# Patient Record
Sex: MALE | Race: WHITE | NOT HISPANIC OR LATINO | Employment: OTHER | URBAN - METROPOLITAN AREA
[De-identification: names, ages, dates, MRNs, and addresses within clinical notes are randomized per-mention and may not be internally consistent; named-entity substitution may affect disease eponyms.]

---

## 2017-03-31 ENCOUNTER — ALLSCRIPTS OFFICE VISIT (OUTPATIENT)
Dept: OTHER | Facility: OTHER | Age: 72
End: 2017-03-31

## 2017-03-31 LAB — HBA1C MFR BLD HPLC: 6.6 %

## 2017-04-03 ENCOUNTER — APPOINTMENT (OUTPATIENT)
Dept: LAB | Facility: CLINIC | Age: 72
End: 2017-04-03
Payer: MEDICARE

## 2017-04-03 ENCOUNTER — TRANSCRIBE ORDERS (OUTPATIENT)
Dept: LAB | Facility: CLINIC | Age: 72
End: 2017-04-03

## 2017-04-03 DIAGNOSIS — E11.22 TYPE 2 DIABETES MELLITUS WITH ESRD (END-STAGE RENAL DISEASE) (HCC): ICD-10-CM

## 2017-04-03 DIAGNOSIS — N18.6 TYPE 2 DIABETES MELLITUS WITH ESRD (END-STAGE RENAL DISEASE) (HCC): ICD-10-CM

## 2017-04-03 DIAGNOSIS — R01.1 UNDIAGNOSED CARDIAC MURMURS: ICD-10-CM

## 2017-04-03 DIAGNOSIS — E11.65 TYPE 2 DIABETES MELLITUS WITH HYPERGLYCEMIA (HCC): ICD-10-CM

## 2017-04-03 DIAGNOSIS — E78.5 OTHER AND UNSPECIFIED HYPERLIPIDEMIA: ICD-10-CM

## 2017-04-03 DIAGNOSIS — I10 ESSENTIAL HYPERTENSION, MALIGNANT: ICD-10-CM

## 2017-04-03 DIAGNOSIS — I10 ESSENTIAL HYPERTENSION, MALIGNANT: Primary | ICD-10-CM

## 2017-04-03 LAB
ALBUMIN SERPL BCP-MCNC: 4.4 G/DL (ref 3.5–5)
ALP SERPL-CCNC: 57 U/L (ref 46–116)
ALT SERPL W P-5'-P-CCNC: 19 U/L (ref 12–78)
ANION GAP SERPL CALCULATED.3IONS-SCNC: 7 MMOL/L (ref 4–13)
AST SERPL W P-5'-P-CCNC: 14 U/L (ref 5–45)
BILIRUB SERPL-MCNC: 0.71 MG/DL (ref 0.2–1)
BUN SERPL-MCNC: 30 MG/DL (ref 5–25)
CALCIUM SERPL-MCNC: 9 MG/DL (ref 8.3–10.1)
CHLORIDE SERPL-SCNC: 104 MMOL/L (ref 100–108)
CHOLEST SERPL-MCNC: 201 MG/DL (ref 50–200)
CO2 SERPL-SCNC: 27 MMOL/L (ref 21–32)
CREAT SERPL-MCNC: 1.27 MG/DL (ref 0.6–1.3)
CREAT UR-MCNC: 120 MG/DL
GFR SERPL CREATININE-BSD FRML MDRD: 55.9 ML/MIN/1.73SQ M
GLUCOSE P FAST SERPL-MCNC: 138 MG/DL (ref 65–99)
HDLC SERPL-MCNC: 46 MG/DL (ref 40–60)
LDLC SERPL CALC-MCNC: 135 MG/DL (ref 0–100)
MICROALBUMIN UR-MCNC: 12.7 MG/L (ref 0–20)
MICROALBUMIN/CREAT 24H UR: 11 MG/G CREATININE (ref 0–30)
POTASSIUM SERPL-SCNC: 4.3 MMOL/L (ref 3.5–5.3)
PROT SERPL-MCNC: 7.5 G/DL (ref 6.4–8.2)
SODIUM SERPL-SCNC: 138 MMOL/L (ref 136–145)
TRIGL SERPL-MCNC: 101 MG/DL

## 2017-04-03 PROCEDURE — 36415 COLL VENOUS BLD VENIPUNCTURE: CPT | Performed by: FAMILY MEDICINE

## 2017-04-03 PROCEDURE — 82570 ASSAY OF URINE CREATININE: CPT | Performed by: FAMILY MEDICINE

## 2017-04-03 PROCEDURE — 80061 LIPID PANEL: CPT

## 2017-04-03 PROCEDURE — 82043 UR ALBUMIN QUANTITATIVE: CPT | Performed by: FAMILY MEDICINE

## 2017-04-03 PROCEDURE — 80053 COMPREHEN METABOLIC PANEL: CPT | Performed by: FAMILY MEDICINE

## 2017-04-12 ENCOUNTER — GENERIC CONVERSION - ENCOUNTER (OUTPATIENT)
Dept: OTHER | Facility: OTHER | Age: 72
End: 2017-04-12

## 2017-10-03 ENCOUNTER — ALLSCRIPTS OFFICE VISIT (OUTPATIENT)
Dept: OTHER | Facility: OTHER | Age: 72
End: 2017-10-03

## 2017-10-09 ENCOUNTER — APPOINTMENT (OUTPATIENT)
Dept: LAB | Facility: CLINIC | Age: 72
End: 2017-10-09
Payer: MEDICARE

## 2017-10-09 ENCOUNTER — TRANSCRIBE ORDERS (OUTPATIENT)
Dept: LAB | Facility: CLINIC | Age: 72
End: 2017-10-09

## 2017-10-09 DIAGNOSIS — E78.5 HYPERLIPIDEMIA, UNSPECIFIED HYPERLIPIDEMIA TYPE: ICD-10-CM

## 2017-10-09 DIAGNOSIS — M54.32 BILATERAL SCIATICA: ICD-10-CM

## 2017-10-09 DIAGNOSIS — M54.31 BILATERAL SCIATICA: ICD-10-CM

## 2017-10-09 DIAGNOSIS — I10 ESSENTIAL HYPERTENSION, MALIGNANT: Primary | ICD-10-CM

## 2017-10-09 DIAGNOSIS — N52.9 IMPOTENCE OF ORGANIC ORIGIN: ICD-10-CM

## 2017-10-09 DIAGNOSIS — I10 ESSENTIAL HYPERTENSION, MALIGNANT: ICD-10-CM

## 2017-10-09 LAB
ALBUMIN SERPL BCP-MCNC: 4.4 G/DL (ref 3.5–5)
ALP SERPL-CCNC: 60 U/L (ref 46–116)
ALT SERPL W P-5'-P-CCNC: 17 U/L (ref 12–78)
ANION GAP SERPL CALCULATED.3IONS-SCNC: 7 MMOL/L (ref 4–13)
AST SERPL W P-5'-P-CCNC: 18 U/L (ref 5–45)
BILIRUB SERPL-MCNC: 0.49 MG/DL (ref 0.2–1)
BUN SERPL-MCNC: 26 MG/DL (ref 5–25)
CALCIUM SERPL-MCNC: 9.6 MG/DL (ref 8.3–10.1)
CHLORIDE SERPL-SCNC: 103 MMOL/L (ref 100–108)
CHOLEST SERPL-MCNC: 171 MG/DL (ref 50–200)
CO2 SERPL-SCNC: 27 MMOL/L (ref 21–32)
CREAT SERPL-MCNC: 1.18 MG/DL (ref 0.6–1.3)
GFR SERPL CREATININE-BSD FRML MDRD: 62 ML/MIN/1.73SQ M
GLUCOSE P FAST SERPL-MCNC: 125 MG/DL (ref 65–99)
HDLC SERPL-MCNC: 42 MG/DL (ref 40–60)
LDLC SERPL CALC-MCNC: 96 MG/DL (ref 0–100)
POTASSIUM SERPL-SCNC: 4 MMOL/L (ref 3.5–5.3)
PROT SERPL-MCNC: 7.6 G/DL (ref 6.4–8.2)
SODIUM SERPL-SCNC: 137 MMOL/L (ref 136–145)
TRIGL SERPL-MCNC: 164 MG/DL

## 2017-10-09 PROCEDURE — 36415 COLL VENOUS BLD VENIPUNCTURE: CPT | Performed by: FAMILY MEDICINE

## 2017-10-09 PROCEDURE — 80053 COMPREHEN METABOLIC PANEL: CPT | Performed by: FAMILY MEDICINE

## 2017-10-09 PROCEDURE — G0103 PSA SCREENING: HCPCS

## 2017-10-09 PROCEDURE — 84154 ASSAY OF PSA FREE: CPT

## 2017-10-09 PROCEDURE — 80061 LIPID PANEL: CPT

## 2017-10-10 LAB
PSA FREE MFR SERPL: 32.5 %
PSA FREE SERPL-MCNC: 0.26 NG/ML
PSA SERPL-MCNC: 0.8 NG/ML (ref 0–4)

## 2017-10-12 ENCOUNTER — GENERIC CONVERSION - ENCOUNTER (OUTPATIENT)
Dept: OTHER | Facility: OTHER | Age: 72
End: 2017-10-12

## 2018-01-11 NOTE — RESULT NOTES
Verified Results  (1) COMPREHENSIVE METABOLIC PANEL [id number] 30:10JT Ade Mitchell     Test Name Result Flag Reference   SODIUM 138 mmol/L  136-145   POTASSIUM 4 3 mmol/L  3 5-5 3   CHLORIDE 104 mmol/L  100-108   CARBON DIOXIDE 27 mmol/L  21-32   ANION GAP (CALC) 7 mmol/L  4-13   BLOOD UREA NITROGEN 30 mg/dL H 5-25   CREATININE 1 27 mg/dL  0 60-1 30   Standardized to IDMS reference method   CALCIUM 9 0 mg/dL  8 3-10 1   BILI, TOTAL 0 71 mg/dL  0 20-1 00   ALK PHOSPHATAS 57 U/L     ALT (SGPT) 19 U/L  12-78   AST(SGOT) 14 U/L  5-45   ALBUMIN 4 4 g/dL  3 5-5 0   TOTAL PROTEIN 7 5 g/dL  6 4-8 2   eGFR Non-African American 55 9 ml/min/1 73sq m     This is a fasting blood test  Water,black tea or black  coffee only after 9:00pm the night before test  Drink 2 glasses of water the morning of test   National Kidney Disease Education Program recommendations are as follows:  GFR calculation is accurate only with a steady state creatinine  Chronic Kidney disease less than 60 ml/min/1 73 sq  meters  Kidney failure less than 15 ml/min/1 73 sq  meters     GLUCOSE FASTING 138 mg/dL H 65-99     (1) LIPID PANEL FASTING W DIRECT LDL REFLEX 03Apr2017 10:53AM Ade Mitchell     Test Name Result Flag Reference   CHOLESTEROL 201 mg/dL H    LDL CHOLESTEROL CALCULATED 135 mg/dL H 0-100   This is a fasting blood test  Water,black tea or black  coffee only after 9:00pm the night before test  Drink 2 glasses of water the morning of test       This is a fasting blood test  Water,black tea or black  coffee only after 9:00pm the night before test  Drink 2 glasses of water the morning of test   Triglyceride:         Normal              <150 mg/dl       Borderline High    150-199 mg/dl       High               200-499 mg/dl       Very High          >499 mg/dl  Cholesterol:         Desirable        <200 mg/dl      Borderline High  200-239 mg/dl      High             >239 mg/dl  HDL Cholesterol:        High    >59 mg/dL      Low <41 mg/dL  LDL Cholesterol:        Optimal          <100 mg/dl        Near Optimal     100-129 mg/dl        Above Optimal          Borderline High   130-159 mg/dl          High              160-189 mg/dl          Very High        >189 mg/dl  LDL CALCULATED:    This screening LDL is a calculated result  It does not have the accuracy of the Direct Measured LDL in the monitoring of patients with hyperlipidemia and/or statin therapy  Direct Measure LDL (OJZ691) must be ordered separately in these patients  TRIGLYCERIDES 101 mg/dL  <=150   Specimen collection should occur prior to N-Acetylcysteine or Metamizole administration due to the potential for falsely depressed results  HDL,DIRECT 46 mg/dL  40-60   Specimen collection should occur prior to Metamizole administration due to the potential for falsely depressed results  (1) MICROALBUMIN CREATININE RATIO, RANDOM URINE 03Apr2017 10:53AM Idxie Cover     Test Name Result Flag Reference   MICROALBUMIN/ CREAT R 11 mg/g creatinine  0-30   MICROALBUMIN,URINE 12 7 mg/L  0 0-20 0   CREATININE URINE 120 0 mg/dL         Plan  PMH: History of type 2 diabetes mellitus    · (1) MICROALBUMIN CREATININE RATIO, RANDOM URINE ; every 1 year;  Last  03Apr2017; Status:Active

## 2018-01-13 VITALS
BODY MASS INDEX: 25.61 KG/M2 | TEMPERATURE: 97.8 F | DIASTOLIC BLOOD PRESSURE: 80 MMHG | WEIGHT: 169 LBS | SYSTOLIC BLOOD PRESSURE: 140 MMHG | HEART RATE: 64 BPM | RESPIRATION RATE: 16 BRPM | HEIGHT: 68 IN

## 2018-01-14 VITALS
HEIGHT: 68 IN | DIASTOLIC BLOOD PRESSURE: 76 MMHG | WEIGHT: 176 LBS | RESPIRATION RATE: 12 BRPM | TEMPERATURE: 98 F | HEART RATE: 60 BPM | SYSTOLIC BLOOD PRESSURE: 138 MMHG | BODY MASS INDEX: 26.67 KG/M2

## 2018-01-15 NOTE — RESULT NOTES
Discussion/Summary   Your triglycerides are elevated  Please make sure to eat a heart healthy diet with low white carbs and low refined sugar  Dr Eliana Guerrero, DO      Verified Results  (1) COMPREHENSIVE METABOLIC PANEL [id number] 00:84BI [facility]     Test Name Result Flag Reference   SODIUM 137 mmol/L  136-145   POTASSIUM 4 0 mmol/L  3 5-5 3   CHLORIDE 103 mmol/L  100-108   CARBON DIOXIDE 27 mmol/L  21-32   ANION GAP (CALC) 7 mmol/L  4-13   BLOOD UREA NITROGEN 26 mg/dL H 5-25   CREATININE 1 18 mg/dL  0 60-1 30   Standardized to IDMS reference method   CALCIUM 9 6 mg/dL  8 3-10 1   BILI, TOTAL 0 49 mg/dL  0 20-1 00   ALK PHOSPHATAS 60 U/L     ALT (SGPT) 17 U/L  12-78   Specimen collection should occur prior to Sulfasalazine and/or Sulfapyridine administration due to the potential for falsely depressed results  AST(SGOT) 18 U/L  5-45   Specimen collection should occur prior to Sulfasalazine administration due to the potential for falsely depressed results  ALBUMIN 4 4 g/dL  3 5-5 0   TOTAL PROTEIN 7 6 g/dL  6 4-8 2   eGFR 62 ml/min/1 73sq m     National Kidney Disease Education Program recommendations are as follows:  GFR calculation is accurate only with a steady state creatinine  Chronic Kidney disease less than 60 ml/min/1 73 sq  meters  Kidney failure less than 15 ml/min/1 73 sq  meters  GLUCOSE FASTING 125 mg/dL H 65-99   Specimen collection should occur prior to Sulfasalazine administration due to the potential for falsely depressed results  Specimen collection should occur prior to Sulfapyridine administration due to the potential for falsely elevated results  (1) LIPID PANEL FASTING W DIRECT LDL REFLEX [id number] 08:47AM [facility]     Test Name Result Flag Reference   CHOLESTEROL 171 mg/dL     LDL CHOLESTEROL CALCULATED 96 mg/dL  0-100   This is a patient instruction:  This is a fasting test  Water, black tea or black coffee only after 9:00pm the night before the test  Drink 2 glasses of water the morning of the test         Triglyceride:        Normal <150 mg/dl   Borderline High 150-199 mg/dl   High 200-499 mg/dl   Very High >499 mg/dl      Cholesterol:       Desirable <200 mg/dl    Borderline High 200-239 mg/dl    High >239 mg/dl      HDL Cholesterol:       High>59 mg/dL    Low <41 mg/dL      HDL Cholesterol:       High>59 mg/dL    Low <41 mg/dL      This screening LDL is a calculated result  It does not have the accuracy of the Direct Measured LDL in the monitoring of patients with hyperlipidemia and/or statin therapy  Direct Measure LDL ([id number]) must be ordered separately in these patients  TRIGLYCERIDES 164 mg/dL H <=150   Specimen collection should occur prior to N-Acetylcysteine or Metamizole administration due to the potential for falsely depressed results  HDL,DIRECT 42 mg/dL  40-60   Specimen collection should occur prior to Metamizole administration due to the potential for falsley depressed results  (1) PSA FREE & TOTAL 09Oct2017 08:47AM Alee Aguero     Test Name Result Flag Reference   PSA, FREE 0 26 ng/mL  N/A   Roche ECLIA methodology  % FREE PSA 32 5 %     The table below lists the probability of prostate cancer for  men with non-suspicious EZE results and total PSA between  4 and 10 ng/mL, by patient age Huy Nascimento, [facility],  [phone])  % Free PSA       50-64 yr        65-75 yr                    0 00-10 00%        56%             55%                   10 01-15 00%        24%             35%                   15 01-20 00%        17%             23%                   20 01-25 00%        10%             20%                        >25 00%         5%              9%  Please note:  Tara et al did not make specific                recommendations regarding the use of                percent free PSA for any other population                of men    Performed at:  [address] [id number]  : Kiana Dias MD, Phone:  [phone]   PROSTATE SPECIFIC ANTIGEN TOTAL 0 8 ng/mL  0 0 - 4 0   Roche ECLIA methodology  According to the American Urological Association, Serum PSA should  decrease and remain at undetectable levels after radical  prostatectomy  The AUA defines biochemical recurrence as an initial  PSA value 0 2 ng/mL or greater followed by a subsequent confirmatory  PSA value 0 2 ng/mL or greater  Values obtained with different assay methods or kits cannot be used  interchangeably  Results cannot be interpreted as absolute evidence  of the presence or absence of malignant disease

## 2018-01-15 NOTE — RESULT NOTES
Verified Results  (1) HEMOGLOBIN A1C [id number] 09:08AM Venkat De La Cruz   TW Order Number: [id number]  TW Order Number: [id number]     Test Name Result Flag Reference   HEMOGLOBIN A1C 7 0 % H 4 2-6 3   EST  AVG  GLUCOSE 154 mg/dl         Plan  Type 2 diabetes mellitus    · (1) HEMOGLOBIN A1C ; every 3 months;  Last [id number]; Next [id number];  Status:Active    Discussion/Summary   Good results

## 2018-02-05 ENCOUNTER — OFFICE VISIT (OUTPATIENT)
Dept: FAMILY MEDICINE CLINIC | Facility: CLINIC | Age: 73
End: 2018-02-05
Payer: MEDICARE

## 2018-02-05 VITALS
WEIGHT: 169.4 LBS | RESPIRATION RATE: 16 BRPM | DIASTOLIC BLOOD PRESSURE: 80 MMHG | HEIGHT: 69 IN | BODY MASS INDEX: 25.09 KG/M2 | HEART RATE: 84 BPM | SYSTOLIC BLOOD PRESSURE: 160 MMHG

## 2018-02-05 DIAGNOSIS — R01.1 MURMUR: ICD-10-CM

## 2018-02-05 DIAGNOSIS — E13.8 DIABETES MELLITUS OF OTHER TYPE WITH COMPLICATION, UNSPECIFIED LONG TERM INSULIN USE STATUS: Primary | ICD-10-CM

## 2018-02-05 DIAGNOSIS — E11.40 CONTROLLED TYPE 2 DIABETES MELLITUS WITH DIABETIC NEUROPATHY, WITHOUT LONG-TERM CURRENT USE OF INSULIN (HCC): ICD-10-CM

## 2018-02-05 DIAGNOSIS — I10 BENIGN ESSENTIAL HYPERTENSION: ICD-10-CM

## 2018-02-05 DIAGNOSIS — E78.2 MIXED HYPERLIPIDEMIA: ICD-10-CM

## 2018-02-05 LAB — SL AMB POCT HEMOGLOBIN AIC: 5.8

## 2018-02-05 PROCEDURE — 99214 OFFICE O/P EST MOD 30 MIN: CPT | Performed by: FAMILY MEDICINE

## 2018-02-05 PROCEDURE — 83036 HEMOGLOBIN GLYCOSYLATED A1C: CPT | Performed by: FAMILY MEDICINE

## 2018-02-05 NOTE — PROGRESS NOTES
Chief Complaint   Patient presents with    Follow-up    Diabetes        Patient ID: Rafal Cabral is a 71 y o  male  Diabetes   He presents for his follow-up diabetic visit  He has type 2 diabetes mellitus  His disease course has been stable  Pertinent negatives for hypoglycemia include no headaches or sweats  Pertinent negatives for diabetes include no blurred vision, no chest pain, no fatigue, no foot paresthesias, no foot ulcerations, no polydipsia, no polyphagia, no polyuria, no visual change, no weakness and no weight loss  There are no hypoglycemic complications  Symptoms are stable  There are no diabetic complications  Risk factors for coronary artery disease include dyslipidemia and hypertension  Current diabetic treatment includes oral agent (dual therapy)  He is compliant with treatment all of the time  He is following a generally healthy diet  Meal planning includes avoidance of concentrated sweets  He participates in exercise daily  There is no change in his home blood glucose trend  An ACE inhibitor/angiotensin II receptor blocker is being taken  Eye exam is not current  Hypertension   This is a recurrent problem  The problem is controlled  Pertinent negatives include no anxiety, blurred vision, chest pain, headaches, malaise/fatigue, neck pain, orthopnea, palpitations, peripheral edema, PND, shortness of breath or sweats  Risk factors for coronary artery disease include diabetes mellitus and dyslipidemia  Past treatments include angiotensin blockers  There is no history of chronic renal disease  Hyperlipidemia   This is a chronic problem  The problem is controlled  He has no history of chronic renal disease  Pertinent negatives include no chest pain or shortness of breath  There are no compliance problems            The following portions of the patient's history were reviewed and updated as appropriate: allergies, current medications, past family history, past medical history, past social history, past surgical history and problem list     Review of Systems   Constitutional: Negative for appetite change, fatigue, malaise/fatigue, unexpected weight change and weight loss  Eyes: Negative for blurred vision  Respiratory: Negative for shortness of breath  Cardiovascular: Negative for chest pain, palpitations, orthopnea and PND  Gastrointestinal: Negative for abdominal pain, constipation and diarrhea  Endocrine: Negative for polydipsia, polyphagia and polyuria  Genitourinary: Negative for difficulty urinating  Musculoskeletal: Negative for neck pain  Neurological: Negative for weakness and headaches  Current Outpatient Prescriptions   Medication Sig Dispense Refill    amLODIPine (NORVASC) 10 mg tablet Take 10 mg by mouth daily   labetalol (NORMODYNE) 100 mg tablet Take 100 mg by mouth 2 (two) times a day Indications: High Blood Pressure   metFORMIN (GLUMETZA) 500 MG (MOD) 24 hr tablet Take 500 mg by mouth daily with breakfast       olmesartan (BENICAR) 40 mg tablet Take 40 mg by mouth daily Indications: High Blood Pressure   pravastatin (PRAVACHOL) 40 mg tablet Take 40 mg by mouth daily   sitaGLIPtin (JANUVIA) 100 mg tablet Take 1 tablet (100 mg total) by mouth daily 30 tablet 6     No current facility-administered medications for this visit  Objective:    /80 (BP Location: Left arm, Patient Position: Sitting, Cuff Size: Adult)   Pulse 84   Resp 16   Ht 5' 9" (1 753 m)   Wt 76 8 kg (169 lb 6 4 oz)   BMI 25 02 kg/m²        Physical Exam   Constitutional: He is oriented to person, place, and time  He appears well-developed and well-nourished  Eyes: Conjunctivae are normal    Neck: Normal range of motion  No thyromegaly present  Cardiovascular: Normal rate, regular rhythm, normal heart sounds and intact distal pulses  Pulmonary/Chest: Effort normal and breath sounds normal    Abdominal: Soft  Musculoskeletal: He exhibits no edema  Neurological: He is alert and oriented to person, place, and time  Psychiatric: He has a normal mood and affect  Labs in chart were reviewed  Assessment/Plan:    Controlled diabetes mellitus with diabetic neuropathy (HCC)  Stable  Chronic condition is stable  Continue current treatment  Education provided       Benign essential hypertension  bp mildly elevated  Watch diet and exercise    Hyperlipidemia  Repeat bw in April  Murmur  Not new  Has been work up  Diagnoses and all orders for this visit:    Diabetes mellitus of other type with complication, unspecified long term insulin use status ([facility] [address] )  Comments:  hold Agus Moreno for now   will recheck at next appt  Orders:  -     POCT hemoglobin A1c  -     sitaGLIPtin (JANUVIA) 100 mg tablet; Take 1 tablet (100 mg total) by mouth daily  -     Lipid panel; Future  -     Comprehensive metabolic panel; Future    Controlled type 2 diabetes mellitus with diabetic neuropathy, without long-term current use of insulin (HCC)    Benign essential hypertension    Mixed hyperlipidemia    Murmur                  No Follow-up on file            Maggie Calzada DO

## 2018-02-05 NOTE — PATIENT INSTRUCTIONS
10% - bad control"> 10% - bad control,Hemoglobin A1c (HbA1c) greater than 10% indicating poor diabetic control,Haemoglobin A1c greater than 10% indicating poor diabetic control">   Diabetes Mellitus Type 2 in Adults, Ambulatory Care   GENERAL INFORMATION:   Diabetes mellitus type 2  is a disease that affects how your body uses glucose (sugar)  Insulin helps move sugar out of the blood so it can be used for energy  Normally, when the blood sugar level increases, the pancreas makes more insulin  Type 2 diabetes develops because either the body cannot make enough insulin, or it cannot use the insulin correctly  After many years, your pancreas may stop making insulin  Common symptoms include the following:   · More hunger or thirst than usual     · Frequent urination     · Weight loss without trying     · Blurred vision  Seek immediate care for the following symptoms:   · Severe abdominal pain, or pain that spreads to your back  You may also be vomiting  · Trouble staying awake or focusing    · Shaking or sweating    · Blurred or double vision    · Breath has a fruity, sweet smell    · Breathing is deep and labored, or rapid and shallow    · Heartbeat is fast and weak  Treatment for diabetes mellitus type 2  includes keeping your blood sugar at a normal level  You must eat the right foods, and exercise regularly  You may also need medicine if you cannot control your blood sugar level with nutrition and exercise  Manage diabetes mellitus type 2:   · Check your blood sugar level  You will be taught how to check a small drop of blood in a glucose monitor  Ask your healthcare provider when and how often to check during the day  Ask your healthcare provider what your blood sugar levels should be when you check them  · Keep track of carbohydrates (sugar and starchy foods)  Your blood sugar level can get too high if you eat too many carbohydrates   Your dietitian will help you plan meals and snacks that have the right amount of carbohydrates  · Eat low-fat foods  Some examples are skinless chicken and low-fat milk  · Eat less sodium (salt)  Some examples of high-sodium foods to limit are soy sauce, potato chips, and soup  Do not add salt to food you cook  Limit your use of table salt  · Eat high-fiber foods  Foods that are a good source of fiber include vegetables, whole grain bread, and beans  · Limit alcohol  Alcohol affects your blood sugar level and can make it harder to manage your diabetes  Women should limit alcohol to 1 drink a day  Men should limit alcohol to 2 drinks a day  A drink of alcohol is 12 ounces of beer, 5 ounces of wine, or 1½ ounces of liquor  · Get regular exercise  Exercise can help keep your blood sugar level steady, decrease your risk of heart disease, and help you lose weight  Exercise for at least 30 minutes, 5 days a week  Include muscle strengthening activities 2 days each week  Work with your healthcare provider to create an exercise plan  · Check your feet each day  for injuries or open sores  Ask your healthcare provider for activities you can do if you have an open sore  · Quit smoking  If you smoke, it is never too late to quit  Smoking can worsen the problems that may occur with diabetes  Ask your healthcare provider for information about how to stop smoking if you are having trouble quitting  · Ask about your weight:  Ask healthcare providers if you need to lose weight, and how much to lose  Ask them to help you with a weight loss program  Even a 10 to 15 pound weight loss can help you manage your blood sugar level  · Carry medical alert identification  Wear medical alert jewelry or carry a card that says you have diabetes  Ask your healthcare provider where to get these items  · Ask about vaccines  Diabetes puts you at risk of serious illness if you get the flu, pneumonia, or hepatitis   Ask your healthcare provider if you should get a flu, pneumonia, or hepatitis B vaccine, and when to get the vaccine  Follow up with your healthcare provider as directed:  Write down your questions so you remember to ask them during your visits  CARE AGREEMENT:   You have the right to help plan your care  Learn about your health condition and how it may be treated  Discuss treatment options with your caregivers to decide what care you want to receive  You always have the right to refuse treatment  The above information is an  only  It is not intended as medical advice for individual conditions or treatments  Talk to your doctor, nurse or pharmacist before following any medical regimen to see if it is safe and effective for you  © 2014 [address] is for End User's use only and may not be sold, redistributed or otherwise used for commercial purposes  All illustrations and images included in CareNotes® are the copyrighted property of A D A M , Inc  or Tomás Billingsley

## 2018-04-02 DIAGNOSIS — E11.9 CONTROLLED TYPE 2 DIABETES MELLITUS WITHOUT COMPLICATION, WITHOUT LONG-TERM CURRENT USE OF INSULIN (HCC): Primary | ICD-10-CM

## 2018-04-03 RX ORDER — DAPAGLIFLOZIN 5 MG/1
TABLET, FILM COATED ORAL
Qty: 30 TABLET | Refills: 3 | Status: SHIPPED | OUTPATIENT
Start: 2018-04-03 | End: 2018-10-29 | Stop reason: SDUPTHER

## 2018-05-10 ENCOUNTER — TELEPHONE (OUTPATIENT)
Dept: FAMILY MEDICINE CLINIC | Facility: CLINIC | Age: 73
End: 2018-05-10

## 2018-05-10 DIAGNOSIS — I10 BENIGN ESSENTIAL HTN: Primary | ICD-10-CM

## 2018-05-10 NOTE — TELEPHONE ENCOUNTER
Dr Shalonda White    Patient was late for today's appointment so it was rescheduled for 5/17  Patient dropped off paperwork he received from Medicare stating olmesartan meds not covered and needs to be switched to different med  He is all out of meds  Leaving papers in Dr Stevens Code folder, copy in drawer up front

## 2018-05-11 RX ORDER — LOSARTAN POTASSIUM AND HYDROCHLOROTHIAZIDE 12.5; 5 MG/1; MG/1
1 TABLET ORAL DAILY
Qty: 90 TABLET | Refills: 0 | Status: SHIPPED | OUTPATIENT
Start: 2018-05-11 | End: 2020-01-21

## 2018-05-15 DIAGNOSIS — I10 BENIGN ESSENTIAL HTN: Primary | ICD-10-CM

## 2018-05-15 RX ORDER — LABETALOL 300 MG/1
TABLET, FILM COATED ORAL
Qty: 60 TABLET | Refills: 5 | Status: SHIPPED | OUTPATIENT
Start: 2018-05-15 | End: 2018-10-29 | Stop reason: SDUPTHER

## 2018-05-17 ENCOUNTER — OFFICE VISIT (OUTPATIENT)
Dept: FAMILY MEDICINE CLINIC | Facility: CLINIC | Age: 73
End: 2018-05-17
Payer: MEDICARE

## 2018-05-17 ENCOUNTER — APPOINTMENT (OUTPATIENT)
Dept: LAB | Facility: CLINIC | Age: 73
End: 2018-05-17
Payer: MEDICARE

## 2018-05-17 ENCOUNTER — TRANSCRIBE ORDERS (OUTPATIENT)
Dept: LAB | Facility: CLINIC | Age: 73
End: 2018-05-17

## 2018-05-17 VITALS
WEIGHT: 166.6 LBS | HEART RATE: 72 BPM | SYSTOLIC BLOOD PRESSURE: 130 MMHG | BODY MASS INDEX: 24.68 KG/M2 | HEIGHT: 69 IN | DIASTOLIC BLOOD PRESSURE: 80 MMHG | TEMPERATURE: 97.6 F | RESPIRATION RATE: 16 BRPM

## 2018-05-17 DIAGNOSIS — E11.40 CONTROLLED TYPE 2 DIABETES MELLITUS WITH DIABETIC NEUROPATHY, WITHOUT LONG-TERM CURRENT USE OF INSULIN (HCC): Primary | ICD-10-CM

## 2018-05-17 DIAGNOSIS — E78.2 MIXED HYPERLIPIDEMIA: ICD-10-CM

## 2018-05-17 DIAGNOSIS — I10 BENIGN ESSENTIAL HYPERTENSION: ICD-10-CM

## 2018-05-17 LAB
ALBUMIN SERPL BCP-MCNC: 4.3 G/DL (ref 3.5–5)
ALP SERPL-CCNC: 66 U/L (ref 46–116)
ALT SERPL W P-5'-P-CCNC: 24 U/L (ref 12–78)
ANION GAP SERPL CALCULATED.3IONS-SCNC: 6 MMOL/L (ref 4–13)
AST SERPL W P-5'-P-CCNC: 20 U/L (ref 5–45)
BILIRUB SERPL-MCNC: 0.55 MG/DL (ref 0.2–1)
BUN SERPL-MCNC: 15 MG/DL (ref 5–25)
CALCIUM SERPL-MCNC: 9.2 MG/DL (ref 8.3–10.1)
CHLORIDE SERPL-SCNC: 105 MMOL/L (ref 100–108)
CHOLEST SERPL-MCNC: 128 MG/DL (ref 50–200)
CO2 SERPL-SCNC: 28 MMOL/L (ref 21–32)
CREAT SERPL-MCNC: 0.98 MG/DL (ref 0.6–1.3)
GFR SERPL CREATININE-BSD FRML MDRD: 77 ML/MIN/1.73SQ M
GLUCOSE P FAST SERPL-MCNC: 111 MG/DL (ref 65–99)
HDLC SERPL-MCNC: 61 MG/DL (ref 40–60)
LDLC SERPL CALC-MCNC: 53 MG/DL (ref 0–100)
NONHDLC SERPL-MCNC: 67 MG/DL
POTASSIUM SERPL-SCNC: 4.4 MMOL/L (ref 3.5–5.3)
PROT SERPL-MCNC: 7.4 G/DL (ref 6.4–8.2)
SL AMB POCT HEMOGLOBIN AIC: 5.7
SODIUM SERPL-SCNC: 139 MMOL/L (ref 136–145)
TRIGL SERPL-MCNC: 68 MG/DL

## 2018-05-17 PROCEDURE — 83036 HEMOGLOBIN GLYCOSYLATED A1C: CPT | Performed by: FAMILY MEDICINE

## 2018-05-17 PROCEDURE — 36415 COLL VENOUS BLD VENIPUNCTURE: CPT

## 2018-05-17 PROCEDURE — 99214 OFFICE O/P EST MOD 30 MIN: CPT | Performed by: FAMILY MEDICINE

## 2018-05-17 PROCEDURE — 80053 COMPREHEN METABOLIC PANEL: CPT

## 2018-05-17 PROCEDURE — 80061 LIPID PANEL: CPT

## 2018-05-17 NOTE — PATIENT INSTRUCTIONS
Chronic Hypertension   AMBULATORY CARE:   Hypertension  is high blood pressure (BP)  Your BP is the force of your blood moving against the walls of your arteries  Normal BP is less than 120/80  Prehypertension is between 120/80 and 139/89  Hypertension is 140/90 or higher  Hypertension causes your BP to get so high that your heart has to work much harder than normal  This can damage your heart  Chronic hypertension is a long-term condition that you can control with a healthy lifestyle or medicines  A controlled blood pressure helps protect your organs, such as your heart, lungs, brain, and kidneys  Common symptoms include the following:   · Headache     · Blurred vision    · Chest pain     · Dizziness or weakness     · Trouble breathing     · Nosebleeds  Call 911 for any of the following:   · You have discomfort in your chest that feels like squeezing, pressure, fullness, or pain  · You become confused or have difficulty speaking  · You suddenly feel lightheaded or have trouble breathing  · You have pain or discomfort in your back, neck, jaw, stomach, or arm  Seek care immediately if:   · You have a severe headache or vision loss  · You have weakness in an arm or leg  Contact your healthcare provider if:   · You feel faint, dizzy, confused, or drowsy  · You have been taking your BP medicine and your BP is still higher than your healthcare provider says it should be  · You have questions or concerns about your condition or care  Treatment for chronic hypertension  may include medicine to lower your BP and lower your cholesterol level  A low cholesterol level helps prevent heart disease and makes it easier to control your blood pressure  Heart disease can make your blood pressure harder to control  You may also need to make lifestyle changes  Take your medicine exactly as directed    Manage chronic hypertension:  Talk with your healthcare provider about these and other ways to manage hypertension:  · Take your BP at home  Sit and rest for 5 minutes before you take your BP  Extend your arm and support it on a flat surface  Your arm should be at the same level as your heart  Follow the directions that came with your BP monitor  If possible, take at least 2 BP readings each time  Take your BP at least twice a day at the same times each day, such as morning and evening  Keep a record of your BP readings and bring it to your follow-up visits  Ask your healthcare provider what your blood pressure should be  · Limit sodium (salt) as directed  Too much sodium can affect your fluid balance  Check labels to find low-sodium or no-salt-added foods  Some low-sodium foods use potassium salts for flavor  Too much potassium can also cause health problems  Your healthcare provider will tell you how much sodium and potassium are safe for you to have in a day  He or she may recommend that you limit sodium to 2,300 mg a day  · Follow the meal plan recommended by your healthcare provider  A dietitian or your provider can give you more information on low-sodium plans or the DASH (Dietary Approaches to Stop Hypertension) eating plan  The DASH plan is low in sodium, unhealthy fats, and total fat  It is high in potassium, calcium, and fiber  · Exercise to maintain a healthy weight  Exercise at least 30 minutes per day, on most days of the week  This will help decrease your blood pressure  Ask about the best exercise plan for you  · Decrease stress  This may help lower your BP  Learn ways to relax, such as deep breathing or listening to music  · Limit alcohol  Women should limit alcohol to 1 drink a day  Men should limit alcohol to 2 drinks a day  A drink of alcohol is 12 ounces of beer, 5 ounces of wine, or 1½ ounces of liquor  · Do not smoke  Nicotine and other chemicals in cigarettes and cigars can increase your BP and also cause lung damage   Ask your healthcare provider for information if you currently smoke and need help to quit  E-cigarettes or smokeless tobacco still contain nicotine  Talk to your healthcare provider before you use these products  Follow up with your healthcare provider as directed: You will need to return to have your BP checked and to have other lab tests done  Write down your questions so you remember to ask them during your visits  © 2017 [address] Information is for End User's use only and may not be sold, redistributed or otherwise used for commercial purposes  All illustrations and images included in CareNotes® are the copyrighted property of [employer]  or Tomás Billingsley  The above information is an  only  It is not intended as medical advice for individual conditions or treatments  Talk to your doctor, nurse or pharmacist before following any medical regimen to see if it is safe and effective for you  10% - bad control"> 10% - bad control,Hemoglobin A1c (HbA1c) greater than 10% indicating poor diabetic control,Haemoglobin A1c greater than 10% indicating poor diabetic control">   Diabetes Mellitus Type 2 in Adults, Ambulatory Care   GENERAL INFORMATION:   Diabetes mellitus type 2  is a disease that affects how your body uses glucose (sugar)  Insulin helps move sugar out of the blood so it can be used for energy  Normally, when the blood sugar level increases, the pancreas makes more insulin  Type 2 diabetes develops because either the body cannot make enough insulin, or it cannot use the insulin correctly  After many years, your pancreas may stop making insulin  Common symptoms include the following:   · More hunger or thirst than usual     · Frequent urination     · Weight loss without trying     · Blurred vision  Seek immediate care for the following symptoms:   · Severe abdominal pain, or pain that spreads to your back  You may also be vomiting      · Trouble staying awake or focusing    · Shaking or sweating    · Blurred or double vision    · Breath has a fruity, sweet smell    · Breathing is deep and labored, or rapid and shallow    · Heartbeat is fast and weak  Treatment for diabetes mellitus type 2  includes keeping your blood sugar at a normal level  You must eat the right foods, and exercise regularly  You may also need medicine if you cannot control your blood sugar level with nutrition and exercise  Manage diabetes mellitus type 2:   · Check your blood sugar level  You will be taught how to check a small drop of blood in a glucose monitor  Ask your healthcare provider when and how often to check during the day  Ask your healthcare provider what your blood sugar levels should be when you check them  · Keep track of carbohydrates (sugar and starchy foods)  Your blood sugar level can get too high if you eat too many carbohydrates  Your dietitian will help you plan meals and snacks that have the right amount of carbohydrates  · Eat low-fat foods  Some examples are skinless chicken and low-fat milk  · Eat less sodium (salt)  Some examples of high-sodium foods to limit are soy sauce, potato chips, and soup  Do not add salt to food you cook  Limit your use of table salt  · Eat high-fiber foods  Foods that are a good source of fiber include vegetables, whole grain bread, and beans  · Limit alcohol  Alcohol affects your blood sugar level and can make it harder to manage your diabetes  Women should limit alcohol to 1 drink a day  Men should limit alcohol to 2 drinks a day  A drink of alcohol is 12 ounces of beer, 5 ounces of wine, or 1½ ounces of liquor  · Get regular exercise  Exercise can help keep your blood sugar level steady, decrease your risk of heart disease, and help you lose weight  Exercise for at least 30 minutes, 5 days a week  Include muscle strengthening activities 2 days each week  Work with your healthcare provider to create an exercise plan      · Check your feet each day  for injuries or open sores  Ask your healthcare provider for activities you can do if you have an open sore  · Quit smoking  If you smoke, it is never too late to quit  Smoking can worsen the problems that may occur with diabetes  Ask your healthcare provider for information about how to stop smoking if you are having trouble quitting  · Ask about your weight:  Ask healthcare providers if you need to lose weight, and how much to lose  Ask them to help you with a weight loss program  Even a 10 to 15 pound weight loss can help you manage your blood sugar level  · Carry medical alert identification  Wear medical alert jewelry or carry a card that says you have diabetes  Ask your healthcare provider where to get these items  · Ask about vaccines  Diabetes puts you at risk of serious illness if you get the flu, pneumonia, or hepatitis  Ask your healthcare provider if you should get a flu, pneumonia, or hepatitis B vaccine, and when to get the vaccine  Follow up with your healthcare provider as directed:  Write down your questions so you remember to ask them during your visits  CARE AGREEMENT:   You have the right to help plan your care  Learn about your health condition and how it may be treated  Discuss treatment options with your caregivers to decide what care you want to receive  You always have the right to refuse treatment  The above information is an  only  It is not intended as medical advice for individual conditions or treatments  Talk to your doctor, nurse or pharmacist before following any medical regimen to see if it is safe and effective for you  © 2014 [address] is for End User's use only and may not be sold, redistributed or otherwise used for commercial purposes  All illustrations and images included in CareNotes® are the copyrighted property of [employer]  or Tomás Billingsley  Doing wonderfully!!!   Get bw done

## 2018-05-17 NOTE — PROGRESS NOTES
Chief Complaint   Patient presents with    Follow-up    Diabetes        Patient ID: Lewis Main is a 68 y o  male  Pt seen and examined  Feeling "great "  Working with   Doing a lot of outside work     Had to change bp meds but bp is well controlled       Diabetes   He presents for his follow-up diabetic visit  He has type 2 diabetes mellitus  There are no hypoglycemic associated symptoms  Pertinent negatives for hypoglycemia include no headaches or sweats  Pertinent negatives for diabetes include no blurred vision, no chest pain, no fatigue, no foot paresthesias, no foot ulcerations, no polydipsia, no polyphagia, no polyuria, no visual change, no weakness and no weight loss  There are no hypoglycemic complications  Symptoms are stable  Risk factors for coronary artery disease include dyslipidemia, hypertension and male sex  He participates in exercise daily  An ACE inhibitor/angiotensin II receptor blocker is being taken  Eye exam is current  Hypertension   This is a chronic problem  The problem is unchanged  The problem is controlled  Pertinent negatives include no anxiety, blurred vision, chest pain, headaches, malaise/fatigue, neck pain, orthopnea, palpitations, peripheral edema, PND, shortness of breath or sweats  Hyperlipidemia   This is a chronic problem  Pertinent negatives include no chest pain, myalgias or shortness of breath  Current antihyperlipidemic treatment includes statins  There are no compliance problems  The following portions of the patient's history were reviewed and updated as appropriate: allergies, current medications, past family history, past medical history, past social history, past surgical history and problem list     Review of Systems   Constitutional: Negative for fatigue, malaise/fatigue and weight loss  Eyes: Negative for blurred vision  Respiratory: Negative for chest tightness and shortness of breath      Cardiovascular: Negative for chest pain, palpitations, orthopnea, leg swelling and PND  Gastrointestinal: Negative for abdominal pain, diarrhea, nausea and vomiting  Endocrine: Negative for polydipsia, polyphagia and polyuria  Musculoskeletal: Negative for myalgias and neck pain  Neurological: Negative for weakness, numbness and headaches  Current Outpatient Prescriptions   Medication Sig Dispense Refill    amLODIPine (NORVASC) 10 mg tablet Take 10 mg by mouth daily   FARXIGA 5 MG TABS TAKE ONE TABLET BY MOUTH EVERY DAY 30 tablet 3    labetalol (NORMODYNE) 300 mg tablet TAKE ONE TABLET BY MOUTH TWICE A DAY 60 tablet 5    losartan-hydrochlorothiazide (HYZAAR) 50-12 5 mg per tablet Take 1 tablet by mouth daily 90 tablet 0    metFORMIN (GLUMETZA) 500 MG (MOD) 24 hr tablet Take 500 mg by mouth daily with breakfast       pravastatin (PRAVACHOL) 40 mg tablet Take 40 mg by mouth daily   sitaGLIPtin (JANUVIA) 100 mg tablet Take 1 tablet (100 mg total) by mouth daily 30 tablet 6     No current facility-administered medications for this visit  Objective:    /80 (BP Location: Left arm, Patient Position: Sitting, Cuff Size: Standard)   Pulse 72   Temp 97 6 °F (36 4 °C)   Resp 16   Ht 5' 9" (1 753 m)   Wt 75 6 kg (166 lb 9 6 oz)   BMI 24 60 kg/m²        Physical Exam   Constitutional: He is oriented to person, place, and time  He appears well-developed and well-nourished  Eyes: Conjunctivae are normal    Neck: Normal range of motion  No thyromegaly present  Cardiovascular: Normal rate, regular rhythm, normal heart sounds and intact distal pulses  Pulmonary/Chest: Effort normal and breath sounds normal    Abdominal: Soft  Musculoskeletal: He exhibits no edema  Neurological: He is alert and oriented to person, place, and time  Psychiatric: He has a normal mood and affect  Vitals reviewed              Assessment/Plan:         Diagnoses and all orders for this visit:    Controlled type 2 diabetes mellitus with diabetic neuropathy, without long-term current use of insulin (HCC)  Comments:  well controlled at 5 7  t/c decreasing medication if a1c continues to be low  exercising and diet modfication  Orders:  -     POCT hemoglobin A1c    Benign essential hypertension  Comments:  meds changed due to insurance  bp is stable  continue current medication   Orders:  -     Lipid panel; Future  -     Comprehensive metabolic panel; Future    Mixed hyperlipidemia  Comments:  on a statin  will obtain bw  Orders:  -     Lipid panel; Future  -     Comprehensive metabolic panel; Future            Patient Instructions     Chronic Hypertension   AMBULATORY CARE:   Hypertension  is high blood pressure (BP)  Your BP is the force of your blood moving against the walls of your arteries  Normal BP is less than 120/80  Prehypertension is between 120/80 and 139/89  Hypertension is 140/90 or higher  Hypertension causes your BP to get so high that your heart has to work much harder than normal  This can damage your heart  Chronic hypertension is a long-term condition that you can control with a healthy lifestyle or medicines  A controlled blood pressure helps protect your organs, such as your heart, lungs, brain, and kidneys  Common symptoms include the following:   · Headache     · Blurred vision    · Chest pain     · Dizziness or weakness     · Trouble breathing     · Nosebleeds  Call 911 for any of the following:   · You have discomfort in your chest that feels like squeezing, pressure, fullness, or pain  · You become confused or have difficulty speaking  · You suddenly feel lightheaded or have trouble breathing  · You have pain or discomfort in your back, neck, jaw, stomach, or arm  Seek care immediately if:   · You have a severe headache or vision loss  · You have weakness in an arm or leg  Contact your healthcare provider if:   · You feel faint, dizzy, confused, or drowsy       · You have been taking your BP medicine and your BP is still higher than your healthcare provider says it should be  · You have questions or concerns about your condition or care  Treatment for chronic hypertension  may include medicine to lower your BP and lower your cholesterol level  A low cholesterol level helps prevent heart disease and makes it easier to control your blood pressure  Heart disease can make your blood pressure harder to control  You may also need to make lifestyle changes  Take your medicine exactly as directed  Manage chronic hypertension:  Talk with your healthcare provider about these and other ways to manage hypertension:  · Take your BP at home  Sit and rest for 5 minutes before you take your BP  Extend your arm and support it on a flat surface  Your arm should be at the same level as your heart  Follow the directions that came with your BP monitor  If possible, take at least 2 BP readings each time  Take your BP at least twice a day at the same times each day, such as morning and evening  Keep a record of your BP readings and bring it to your follow-up visits  Ask your healthcare provider what your blood pressure should be  · Limit sodium (salt) as directed  Too much sodium can affect your fluid balance  Check labels to find low-sodium or no-salt-added foods  Some low-sodium foods use potassium salts for flavor  Too much potassium can also cause health problems  Your healthcare provider will tell you how much sodium and potassium are safe for you to have in a day  He or she may recommend that you limit sodium to 2,300 mg a day  · Follow the meal plan recommended by your healthcare provider  A dietitian or your provider can give you more information on low-sodium plans or the DASH (Dietary Approaches to Stop Hypertension) eating plan  The DASH plan is low in sodium, unhealthy fats, and total fat  It is high in potassium, calcium, and fiber  · Exercise to maintain a healthy weight    Exercise at least 30 minutes per day, on most days of the week  This will help decrease your blood pressure  Ask about the best exercise plan for you  · Decrease stress  This may help lower your BP  Learn ways to relax, such as deep breathing or listening to music  · Limit alcohol  Women should limit alcohol to 1 drink a day  Men should limit alcohol to 2 drinks a day  A drink of alcohol is 12 ounces of beer, 5 ounces of wine, or 1½ ounces of liquor  · Do not smoke  Nicotine and other chemicals in cigarettes and cigars can increase your BP and also cause lung damage  Ask your healthcare provider for information if you currently smoke and need help to quit  E-cigarettes or smokeless tobacco still contain nicotine  Talk to your healthcare provider before you use these products  Follow up with your healthcare provider as directed: You will need to return to have your BP checked and to have other lab tests done  Write down your questions so you remember to ask them during your visits  © 2017 [address] Information is for End User's use only and may not be sold, redistributed or otherwise used for commercial purposes  All illustrations and images included in CareNotes® are the copyrighted property of [employer]  or Tomás Billingsley  The above information is an  only  It is not intended as medical advice for individual conditions or treatments  Talk to your doctor, nurse or pharmacist before following any medical regimen to see if it is safe and effective for you  10% - bad control"> 10% - bad control,Hemoglobin A1c (HbA1c) greater than 10% indicating poor diabetic control,Haemoglobin A1c greater than 10% indicating poor diabetic control">   Diabetes Mellitus Type 2 in Adults, Ambulatory Care   GENERAL INFORMATION:   Diabetes mellitus type 2  is a disease that affects how your body uses glucose (sugar)  Insulin helps move sugar out of the blood so it can be used for energy  Normally, when the blood sugar level increases, the pancreas makes more insulin  Type 2 diabetes develops because either the body cannot make enough insulin, or it cannot use the insulin correctly  After many years, your pancreas may stop making insulin  Common symptoms include the following:   · More hunger or thirst than usual     · Frequent urination     · Weight loss without trying     · Blurred vision  Seek immediate care for the following symptoms:   · Severe abdominal pain, or pain that spreads to your back  You may also be vomiting  · Trouble staying awake or focusing    · Shaking or sweating    · Blurred or double vision    · Breath has a fruity, sweet smell    · Breathing is deep and labored, or rapid and shallow    · Heartbeat is fast and weak  Treatment for diabetes mellitus type 2  includes keeping your blood sugar at a normal level  You must eat the right foods, and exercise regularly  You may also need medicine if you cannot control your blood sugar level with nutrition and exercise  Manage diabetes mellitus type 2:   · Check your blood sugar level  You will be taught how to check a small drop of blood in a glucose monitor  Ask your healthcare provider when and how often to check during the day  Ask your healthcare provider what your blood sugar levels should be when you check them  · Keep track of carbohydrates (sugar and starchy foods)  Your blood sugar level can get too high if you eat too many carbohydrates  Your dietitian will help you plan meals and snacks that have the right amount of carbohydrates  · Eat low-fat foods  Some examples are skinless chicken and low-fat milk  · Eat less sodium (salt)  Some examples of high-sodium foods to limit are soy sauce, potato chips, and soup  Do not add salt to food you cook  Limit your use of table salt  · Eat high-fiber foods  Foods that are a good source of fiber include vegetables, whole grain bread, and beans  · Limit alcohol  Alcohol affects your blood sugar level and can make it harder to manage your diabetes  Women should limit alcohol to 1 drink a day  Men should limit alcohol to 2 drinks a day  A drink of alcohol is 12 ounces of beer, 5 ounces of wine, or 1½ ounces of liquor  · Get regular exercise  Exercise can help keep your blood sugar level steady, decrease your risk of heart disease, and help you lose weight  Exercise for at least 30 minutes, 5 days a week  Include muscle strengthening activities 2 days each week  Work with your healthcare provider to create an exercise plan  · Check your feet each day  for injuries or open sores  Ask your healthcare provider for activities you can do if you have an open sore  · Quit smoking  If you smoke, it is never too late to quit  Smoking can worsen the problems that may occur with diabetes  Ask your healthcare provider for information about how to stop smoking if you are having trouble quitting  · Ask about your weight:  Ask healthcare providers if you need to lose weight, and how much to lose  Ask them to help you with a weight loss program  Even a 10 to 15 pound weight loss can help you manage your blood sugar level  · Carry medical alert identification  Wear medical alert jewelry or carry a card that says you have diabetes  Ask your healthcare provider where to get these items  · Ask about vaccines  Diabetes puts you at risk of serious illness if you get the flu, pneumonia, or hepatitis  Ask your healthcare provider if you should get a flu, pneumonia, or hepatitis B vaccine, and when to get the vaccine  Follow up with your healthcare provider as directed:  Write down your questions so you remember to ask them during your visits  CARE AGREEMENT:   You have the right to help plan your care  Learn about your health condition and how it may be treated  Discuss treatment options with your caregivers to decide what care you want to receive   You always have the right to refuse treatment  The above information is an  only  It is not intended as medical advice for individual conditions or treatments  Talk to your doctor, nurse or pharmacist before following any medical regimen to see if it is safe and effective for you  © 2014 [address] is for End User's use only and may not be sold, redistributed or otherwise used for commercial purposes  All illustrations and images included in CareNotes® are the copyrighted property of [employer]  or Tomás Billingsley  Doing wonderfully!!!   Get bw done                     Helen Betancourt, DO

## 2018-05-18 ENCOUNTER — TELEPHONE (OUTPATIENT)
Dept: FAMILY MEDICINE CLINIC | Facility: CLINIC | Age: 73
End: 2018-05-18

## 2018-05-18 NOTE — TELEPHONE ENCOUNTER
----- Message from Roger Smith DO sent at 5/18/2018 11:53 AM EDT -----  bw stable  Follow up as discussed at last appt     Roger Smith DO

## 2018-05-22 DIAGNOSIS — E78.2 MIXED HYPERLIPIDEMIA: Primary | ICD-10-CM

## 2018-05-22 RX ORDER — PRAVASTATIN SODIUM 40 MG
TABLET ORAL
Qty: 30 TABLET | Refills: 3 | Status: SHIPPED | OUTPATIENT
Start: 2018-05-22 | End: 2018-10-29 | Stop reason: SDUPTHER

## 2018-05-23 ENCOUNTER — TELEPHONE (OUTPATIENT)
Dept: FAMILY MEDICINE CLINIC | Facility: CLINIC | Age: 73
End: 2018-05-23

## 2018-05-23 NOTE — TELEPHONE ENCOUNTER
----- Message from Janel Nguyen DO sent at 5/18/2018 11:53 AM EDT -----  bw stable  Follow up as discussed at last appt     Janel Nguyen DO

## 2018-08-03 DIAGNOSIS — E11.8 TYPE 2 DIABETES MELLITUS WITH COMPLICATION, UNSPECIFIED WHETHER LONG TERM INSULIN USE: Primary | ICD-10-CM

## 2018-08-04 RX ORDER — METFORMIN HYDROCHLORIDE 500 MG/1
500 TABLET, FILM COATED, EXTENDED RELEASE ORAL 2 TIMES DAILY WITH MEALS
Qty: 60 TABLET | Refills: 0 | Status: SHIPPED | OUTPATIENT
Start: 2018-08-04 | End: 2018-08-09 | Stop reason: DRUGHIGH

## 2018-08-06 DIAGNOSIS — I10 ESSENTIAL HYPERTENSION: Primary | ICD-10-CM

## 2018-08-07 RX ORDER — AMLODIPINE BESYLATE 10 MG/1
10 TABLET ORAL DAILY
Qty: 30 TABLET | Refills: 0 | Status: SHIPPED | OUTPATIENT
Start: 2018-08-07 | End: 2020-08-24

## 2018-08-09 ENCOUNTER — TELEPHONE (OUTPATIENT)
Dept: FAMILY MEDICINE CLINIC | Facility: CLINIC | Age: 73
End: 2018-08-09

## 2018-08-09 DIAGNOSIS — E11.9 TYPE 2 DIABETES MELLITUS WITHOUT COMPLICATION, WITHOUT LONG-TERM CURRENT USE OF INSULIN (HCC): Primary | ICD-10-CM

## 2018-10-29 ENCOUNTER — OFFICE VISIT (OUTPATIENT)
Dept: FAMILY MEDICINE CLINIC | Facility: CLINIC | Age: 73
End: 2018-10-29
Payer: MEDICARE

## 2018-10-29 VITALS
DIASTOLIC BLOOD PRESSURE: 80 MMHG | HEIGHT: 69 IN | TEMPERATURE: 97.4 F | HEART RATE: 76 BPM | RESPIRATION RATE: 16 BRPM | SYSTOLIC BLOOD PRESSURE: 158 MMHG | WEIGHT: 170 LBS | BODY MASS INDEX: 25.18 KG/M2

## 2018-10-29 DIAGNOSIS — I10 BENIGN ESSENTIAL HTN: ICD-10-CM

## 2018-10-29 DIAGNOSIS — R01.1 MURMUR: ICD-10-CM

## 2018-10-29 DIAGNOSIS — E78.2 MIXED HYPERLIPIDEMIA: ICD-10-CM

## 2018-10-29 DIAGNOSIS — E11.8 TYPE 2 DIABETES MELLITUS WITH COMPLICATION, UNSPECIFIED WHETHER LONG TERM INSULIN USE: Primary | ICD-10-CM

## 2018-10-29 DIAGNOSIS — Z23 NEED FOR INFLUENZA VACCINATION: ICD-10-CM

## 2018-10-29 LAB — SL AMB POCT HEMOGLOBIN AIC: 5.5

## 2018-10-29 PROCEDURE — G0008 ADMIN INFLUENZA VIRUS VAC: HCPCS | Performed by: FAMILY MEDICINE

## 2018-10-29 PROCEDURE — 83036 HEMOGLOBIN GLYCOSYLATED A1C: CPT | Performed by: FAMILY MEDICINE

## 2018-10-29 PROCEDURE — 90662 IIV NO PRSV INCREASED AG IM: CPT | Performed by: FAMILY MEDICINE

## 2018-10-29 PROCEDURE — 99214 OFFICE O/P EST MOD 30 MIN: CPT | Performed by: FAMILY MEDICINE

## 2018-10-29 RX ORDER — LABETALOL 300 MG/1
300 TABLET, FILM COATED ORAL 2 TIMES DAILY
Qty: 60 TABLET | Refills: 0 | Status: SHIPPED | OUTPATIENT
Start: 2018-10-29 | End: 2019-02-05 | Stop reason: SDUPTHER

## 2018-10-29 RX ORDER — PRAVASTATIN SODIUM 40 MG
40 TABLET ORAL
Qty: 30 TABLET | Refills: 9 | Status: SHIPPED | OUTPATIENT
Start: 2018-10-29 | End: 2019-12-10 | Stop reason: SDUPTHER

## 2018-10-29 NOTE — PROGRESS NOTES
Mandeep Delgado 1945 male MRN: [id number]    [address] [facility]  Follow-up Visit    ASSESSMENT/PLAN  Mandeep Delgado is a 68 y o  male presents to the office for     1  Type 2 diabetes mellitus with complication, unspecified whether long term insulin use ([facility] [address] )  - Controlled  -Most recent lab testing:   Results from last 6 Months  Lab Units 10/29/18  1511  05/17/18  1105   HEMOGLOBIN A1C  5 5  < >  --    LDL CALC mg/dL  --   --  53   CREATININE mg/dL  --   --  0 98   EGFR ml/min/1 73sq m  --   --  77   < > = values in this interval not displayed  - Continue on  Farxiga 5mg, Januvia 100mg daily,Metformin 1000 mg BID  - Podiatry/ Foot exam: performed today/ Normal no signs of PN   - Vaccines: Infuenza vaccine: given today  - Diabetes education:Encourage the patient to continue lifestyle changes  If the patient A1c continue to be on lower side, will like to take 1 medication away to see if he would tolerate, given risk of hypoglycemia  Will await till 3 month follow up  - POCT hemoglobin A1c  - metFORMIN (GLUCOPHAGE) 1000 MG tablet; Take 1 tablet (1,000 mg total) by mouth 2 (two) times a day with meals  Dispense: 60 tablet; Refill: 9  - Dapagliflozin Propanediol (FARXIGA) 5 MG TABS; Take 1 tablet (5 mg total) by mouth daily  Dispense: 30 tablet; Refill: 9    2  Benign essential hypertension  - Slighltyl elevated  - Would like the patient to take BP 3 time this week, and report to us on Monday   - COnitnue medications for now  3  Mixed hyperlipidemia  - Controlled  Repeat in Feb   - pravastatin (PRAVACHOL) 40 mg tablet; Take 1 tablet (40 mg total) by mouth daily at bedtime  Dispense: 30 tablet; Refill: 9    4  Need for influenza vaccination    - influenza vaccine, [id number], high-dose, PF 0 5 mL, for patients 65 yr+ (FLUZONE HIGH-DOSE)    5  Murmur  - Unsure his baseline, would like a ECHO to evaluate     - Echo complete with contrast if indicated; Future           Disposition: Return to the clinic in  4 months  Future Appointments  Date Time Provider Nirmal [place]   2/13/2019 10:30 AM Farooq El MD [facility]          SUBJECTIVE  CC: Follow-up (Dm check and est new pcp)      HPI:  Raudel Bergeron is a 68 y o  male with significant pmhx of DM type 2; HLD; HTN; murmur presenting to the office for a follow up on chronic conditions  - DM type 2; he is very compliant with his diet and medications  Denies any hypoglycemia  Doesn't not monitor his sugars  -HLD; takes medications w/o s e   - murmur denies any sob on exertion  States that he has had it since a child  Last echo > 10 years ago  - HTN currently no signs of Cp/ or SOB  States that his BP sometimes is elevated, and sometimes normal  Never truly controlled  No questions or concerns to discuss today  Review of Systems   Constitutional: Negative for activity change, appetite change, chills, fatigue and fever  HENT: Negative for congestion  Eyes: Negative for visual disturbance  Respiratory: Negative for cough, chest tightness and shortness of breath  Cardiovascular: Negative for chest pain and leg swelling  Gastrointestinal: Negative for abdominal distention, abdominal pain, constipation, diarrhea, nausea and vomiting  Allergic/Immunologic: Negative for environmental allergies  Neurological: Negative for dizziness, light-headedness and headaches  All other systems reviewed and are negative        Historical Information   The patient history was reviewed as follows:    Past Medical History:   Diagnosis Date    Arthritis     generalized    Diabetes mellitus ([facility] [address] )     type    Hypertension     Peyronie's disease     last assessed [id number]    Reflux involving intestinal tract     gerd     Past Surgical History:   Procedure Laterality Date    APPENDECTOMY      COLONOSCOPY N/A 1/22/2016    Procedure: COLONOSCOPY;  Surgeon: Criss Taylor MD;  Location: [facility] GI LAB; Service:     KNEE ARTHROSCOPY W/ MENISCAL REPAIR Right      History reviewed  No pertinent family history  Social History   History   Alcohol Use    Yes     Comment: social     History   Drug Use No     History   Smoking Status    Never Smoker   Smokeless Tobacco    Never Used       Medications:     Current Outpatient Prescriptions:     amLODIPine (NORVASC) 10 mg tablet, Take 1 tablet (10 mg total) by mouth daily, Disp: 30 tablet, Rfl: 0    Dapagliflozin Propanediol (FARXIGA) 5 MG TABS, Take 1 tablet (5 mg total) by mouth daily, Disp: 30 tablet, Rfl: 9    labetalol (NORMODYNE) 300 mg tablet, Take 1 tablet (300 mg total) by mouth 2 (two) times a day, Disp: 60 tablet, Rfl: 0    losartan-hydrochlorothiazide (HYZAAR) 50-12 5 mg per tablet, Take 1 tablet by mouth daily, Disp: 90 tablet, Rfl: 0    metFORMIN (GLUCOPHAGE) 1000 MG tablet, Take 1 tablet (1,000 mg total) by mouth 2 (two) times a day with meals, Disp: 60 tablet, Rfl: 9    pravastatin (PRAVACHOL) 40 mg tablet, Take 1 tablet (40 mg total) by mouth daily at bedtime, Disp: 30 tablet, Rfl: 9    sitaGLIPtin (JANUVIA) 100 mg tablet, Take 1 tablet (100 mg total) by mouth daily, Disp: 30 tablet, Rfl: 6  No Known Allergies    OBJECTIVE    Vitals:   Vitals:    10/29/18 1456   BP: 158/80   BP Location: Left arm   Patient Position: Sitting   Cuff Size: Standard   Pulse: 76   Resp: 16   Temp: (!) 97 4 °F (36 3 °C)   Weight: 77 1 kg (170 lb)   Height: 5' 9" (1 753 m)           Physical Exam   Constitutional: He is oriented to person, place, and time  Vital signs are normal  He appears well-developed and well-nourished  HENT:   Head: Normocephalic and atraumatic  Right Ear: Hearing normal    Left Ear: Hearing normal    Eyes: Pupils are equal, round, and reactive to light  Conjunctivae and EOM are normal    Neck: Normal range of motion  Neck supple  Cardiovascular: Normal rate, regular rhythm, S1 normal, S2 normal and intact distal pulses    Pulses are no weak pulses  Murmur heard  Pulses:       Dorsalis pedis pulses are 2+ on the right side, and 2+ on the left side  Posterior tibial pulses are 2+ on the right side, and 2+ on the left side  Pulmonary/Chest: Effort normal and breath sounds normal  No respiratory distress  He has no wheezes  Abdominal: Soft  Bowel sounds are normal  He exhibits no distension  There is no tenderness  Musculoskeletal: Normal range of motion  He exhibits no edema  Feet:   Right Foot:   Skin Integrity: Negative for ulcer, skin breakdown, erythema, warmth, callus or dry skin  Left Foot:   Skin Integrity: Negative for ulcer, skin breakdown, erythema, warmth, callus or dry skin  Neurological: He is alert and oriented to person, place, and time  He has normal strength  Skin: Skin is warm  No rash noted  Psychiatric: He has a normal mood and affect  His speech is normal and behavior is normal  Judgment and thought content normal    Vitals reviewed  Patient's shoes and socks removed  Right Foot/Ankle   Right Foot Inspection  Skin Exam: skin normal and skin intact no dry skin, no warmth, no callus, no erythema, no maceration, no abnormal color, no pre-ulcer, no ulcer and no callus                          Toe Exam: ROM and strength within normal limitsno swelling  Sensory       Monofilament testing: intact  Vascular  Capillary refills: < 3 seconds  The right DP pulse is 2+  The right PT pulse is 2+  Left Foot/Ankle  Left Foot Inspection  Skin Exam: skin normal and skin intactno dry skin, no warmth, no erythema, no maceration, normal color, no pre-ulcer, no ulcer and no callus                         Toe Exam: ROM and strength within normal limitsno swelling                   Sensory       Monofilament: intact  Vascular  Capillary refills: < 3 seconds  The left DP pulse is 2+  The left PT pulse is 2+  Assign Risk Category:  No deformity present; No loss of protective sensation;  No weak pulses       Risk: [address] [facility] [address]

## 2019-02-05 DIAGNOSIS — I10 BENIGN ESSENTIAL HTN: ICD-10-CM

## 2019-02-05 RX ORDER — LABETALOL 300 MG/1
300 TABLET, FILM COATED ORAL 2 TIMES DAILY
Qty: 180 TABLET | Refills: 2 | Status: SHIPPED | OUTPATIENT
Start: 2019-02-05 | End: 2019-12-18 | Stop reason: SDUPTHER

## 2019-04-25 ENCOUNTER — TELEPHONE (OUTPATIENT)
Dept: FAMILY MEDICINE CLINIC | Facility: CLINIC | Age: 74
End: 2019-04-25

## 2019-05-15 LAB
LEFT EYE DIABETIC RETINOPATHY: NORMAL
RIGHT EYE DIABETIC RETINOPATHY: NORMAL

## 2019-12-10 DIAGNOSIS — E11.9 TYPE 2 DIABETES MELLITUS WITHOUT COMPLICATION, WITHOUT LONG-TERM CURRENT USE OF INSULIN (HCC): ICD-10-CM

## 2019-12-10 DIAGNOSIS — E78.2 MIXED HYPERLIPIDEMIA: ICD-10-CM

## 2019-12-10 RX ORDER — PRAVASTATIN SODIUM 40 MG
40 TABLET ORAL
Qty: 30 TABLET | Refills: 0 | Status: SHIPPED | OUTPATIENT
Start: 2019-12-10 | End: 2020-01-20 | Stop reason: SDUPTHER

## 2019-12-10 NOTE — TELEPHONE ENCOUNTER
Dr Angelica Ram    Patient is requesting refill pravastatin sodium 40 mg tab, farxiga 5 mg and metformin hcl 1000 mg sent to [facility]  Last ov 10/29/18, I scheduled DM appointment 12/19

## 2019-12-18 ENCOUNTER — OFFICE VISIT (OUTPATIENT)
Dept: FAMILY MEDICINE CLINIC | Facility: CLINIC | Age: 74
End: 2019-12-18
Payer: MEDICARE

## 2019-12-18 VITALS
BODY MASS INDEX: 25.77 KG/M2 | DIASTOLIC BLOOD PRESSURE: 86 MMHG | WEIGHT: 174 LBS | RESPIRATION RATE: 16 BRPM | HEIGHT: 69 IN | HEART RATE: 60 BPM | TEMPERATURE: 97 F | SYSTOLIC BLOOD PRESSURE: 140 MMHG

## 2019-12-18 DIAGNOSIS — E11.9 TYPE 2 DIABETES MELLITUS WITHOUT COMPLICATION, WITHOUT LONG-TERM CURRENT USE OF INSULIN (HCC): Primary | ICD-10-CM

## 2019-12-18 DIAGNOSIS — M25.511 BILATERAL SHOULDER PAIN, UNSPECIFIED CHRONICITY: ICD-10-CM

## 2019-12-18 DIAGNOSIS — M25.512 BILATERAL SHOULDER PAIN, UNSPECIFIED CHRONICITY: ICD-10-CM

## 2019-12-18 DIAGNOSIS — Z12.5 SCREENING PSA (PROSTATE SPECIFIC ANTIGEN): ICD-10-CM

## 2019-12-18 DIAGNOSIS — Z77.018 EXPOSURE TO MERCURY: ICD-10-CM

## 2019-12-18 DIAGNOSIS — Z23 NEED FOR VACCINATION: ICD-10-CM

## 2019-12-18 DIAGNOSIS — R01.1 MURMUR: ICD-10-CM

## 2019-12-18 DIAGNOSIS — E78.2 MIXED HYPERLIPIDEMIA: ICD-10-CM

## 2019-12-18 DIAGNOSIS — I10 BENIGN ESSENTIAL HTN: ICD-10-CM

## 2019-12-18 LAB — SL AMB POCT HEMOGLOBIN AIC: 6.1 (ref ?–6.5)

## 2019-12-18 PROCEDURE — G0438 PPPS, INITIAL VISIT: HCPCS | Performed by: FAMILY MEDICINE

## 2019-12-18 PROCEDURE — G0008 ADMIN INFLUENZA VIRUS VAC: HCPCS | Performed by: FAMILY MEDICINE

## 2019-12-18 PROCEDURE — 90662 IIV NO PRSV INCREASED AG IM: CPT | Performed by: FAMILY MEDICINE

## 2019-12-18 PROCEDURE — 83036 HEMOGLOBIN GLYCOSYLATED A1C: CPT | Performed by: FAMILY MEDICINE

## 2019-12-18 RX ORDER — LABETALOL 300 MG/1
300 TABLET, FILM COATED ORAL 2 TIMES DAILY
Qty: 180 TABLET | Refills: 2 | Status: SHIPPED | OUTPATIENT
Start: 2019-12-18 | End: 2020-12-28

## 2019-12-18 NOTE — PROGRESS NOTES
[address] [facility]  DM Type 2 check  ASSESSMENT/PLAN  Lb Mcclain is a 69 y o  male presents to the office for     1) Diabetes Type 2  - Controlled  -Most recent lab testing:   Results from last 6 Months   Lab Units 12/18/19  1230   HEMOGLOBIN A1C  6 1     - Continue on Farxiga 5mg, Januvia 100mg daily,Metformin 1000 mg BID  - Opthamology: UTD  - Podiatry/ Foot exam: performed today, mild neuropathy over b/l greater toes  Will monitor  - Vaccines:  Immunization History   Administered Date(s) Administered    Influenza Split High Dose Preservative Free IM 11/01/2013, 10/19/2015, 10/03/2017    Influenza TIV (IM) 12/15/1999, 12/27/2010    Influenza, high dose seasonal 0 5 mL 10/29/2018, 12/18/2019    Pneumococcal Conjugate 13-Valent 10/03/2017    Pneumococcal Polysaccharide PPV23 12/27/2010, 02/07/2014   - Diabetes education:Encourage the patient to continue lifestyle changes  2) HTN: Slighthy above goal  Repeat at our next visit  If elevated will increase medications  3) HLD Continue on pravastatin 40mg repeat levels  4) Screening PSA sent  5) Murmor: Referred to Cardio; never performed our ECHO  6) B/l shoulder pain: Referred to ortho for eval  Patient would like to be seen by the specilist for steriod injection  7) exposure to mercury requesting urine sample given that his girlfriend came back significantly elevated with no symptoms and needed to be admitted    Flu vaccine given today    BMI Counseling: Body mass index is 25 7 kg/m²  Discussed the patient's BMI with him  The BMI is above normal  Nutrition recommendations include consuming healthier snacks  No future appointments  Disposition: Return to the clinic in 6 months          SUBJECTIVE  CC: Medicare Wellness Visit and Diabetes      HPI:  Lb Mcclain is a 69 y o  male presenting to the office for Diabetes check  Patient is taking all his diabetic medications without any difficulties    Started to notice a little neuropathy over his 2 larger greater toes  Other than that does do daily foot exams  Patient states that he has no complaints of hypoglycemia  Patient has never seen a podiatrist   Patient states that he has bilateral shoulder pain that he would like to get a steroid injection for given that it helped him with his knee significantly in the past   Patient would like to the results being performe A flu vaccine today  Girlfriend was just exposed to me agree levels and needed admission for cleansing  Patient concerned given that she was asymptomatic and was only done as a screening by her PCP  History of murmur as a child  Patient denies any shortness of breath or chest   Did not get the echocardiogram that was prescribed at her last appointment  Hyperlipidemia taking the medications as prescribed without any difficulties  Hypertension taking his blood pressure medications as prescribed denies any abnormalities  Denies any chest pain or shortness of breath    Review of Systems   Constitutional: Negative for activity change, appetite change, chills, fatigue and fever  HENT: Negative for congestion  Respiratory: Negative for cough, chest tightness and shortness of breath  Cardiovascular: Negative for chest pain and leg swelling  Gastrointestinal: Negative for abdominal distention, abdominal pain, constipation, diarrhea, nausea and vomiting  Musculoskeletal: Positive for arthralgias (Bilateral shoulder pain)  All other systems reviewed and are negative        Historical Information   The patient history was reviewed as follows:    Past Medical History:   Diagnosis Date    Arthritis     generalized    Diabetes mellitus ([facility] [address] )     type    Hypertension     Peyronie's disease     last assessed [id number]    Reflux involving intestinal tract     gerd     Past Surgical History:   Procedure Laterality Date    APPENDECTOMY      COLONOSCOPY N/A 1/22/2016    Procedure: COLONOSCOPY; Surgeon: Saray Cook MD;  Location: [facility] GI LAB; Service:     KNEE ARTHROSCOPY W/ MENISCAL REPAIR Right      History reviewed  No pertinent family history  Social History   Social History     Substance and Sexual Activity   Alcohol Use Yes    Comment: social     Social History     Substance and Sexual Activity   Drug Use No     Social History     Tobacco Use   Smoking Status Never Smoker   Smokeless Tobacco Never Used       Medications:     Current Outpatient Medications:     amLODIPine (NORVASC) 10 mg tablet, Take 1 tablet (10 mg total) by mouth daily, Disp: 30 tablet, Rfl: 0    Dapagliflozin Propanediol (FARXIGA) 5 MG TABS, Take 1 tablet (5 mg total) by mouth daily, Disp: 30 tablet, Rfl: 0    labetalol (NORMODYNE) 300 mg tablet, Take 1 tablet (300 mg total) by mouth 2 (two) times a day, Disp: 180 tablet, Rfl: 2    losartan-hydrochlorothiazide (HYZAAR) 50-12 5 mg per tablet, Take 1 tablet by mouth daily, Disp: 90 tablet, Rfl: 0    metFORMIN (GLUCOPHAGE) 1000 MG tablet, Take 1 tablet (1,000 mg total) by mouth 2 (two) times a day with meals, Disp: 60 tablet, Rfl: 0    pravastatin (PRAVACHOL) 40 mg tablet, Take 1 tablet (40 mg total) by mouth daily at bedtime, Disp: 30 tablet, Rfl: 0    sitaGLIPtin (JANUVIA) 100 mg tablet, Take 1 tablet (100 mg total) by mouth daily, Disp: 30 tablet, Rfl: 6  No Known Allergies    OBJECTIVE    Vitals:   Vitals:    12/18/19 0809   BP: 140/86   BP Location: Right arm   Patient Position: Sitting   Cuff Size: Standard   Pulse: 60   Resp: 16   Temp: (!) 97 °F (36 1 °C)   TempSrc: Tympanic   Weight: 78 9 kg (174 lb)   Height: 5' 9" (1 753 m)           Physical Exam   Constitutional: He is oriented to person, place, and time  Vital signs are normal  He appears well-developed and well-nourished  HENT:   Head: Normocephalic and atraumatic     Right Ear: External ear normal    Left Ear: External ear normal    Nose: Nose normal    Mouth/Throat: Oropharynx is clear and moist  Eyes: Pupils are equal, round, and reactive to light  Conjunctivae and EOM are normal    Neck: Normal range of motion  Neck supple  Cardiovascular: Normal rate, regular rhythm, S1 normal, S2 normal and intact distal pulses  Pulses are no weak pulses  Murmur heard  Pulses:       Dorsalis pedis pulses are 2+ on the right side, and 2+ on the left side  Posterior tibial pulses are 2+ on the right side, and 2+ on the left side  Pulmonary/Chest: Effort normal and breath sounds normal  No respiratory distress  He has no wheezes  Musculoskeletal: Normal range of motion  He exhibits no edema  Right shoulder: He exhibits no tenderness (With range of motion)  Left shoulder: He exhibits tenderness (With range of motion however does have full range of motion  )  He exhibits normal range of motion  Feet:    Feet:   Right Foot:   Skin Integrity: Negative for ulcer, skin breakdown, erythema, warmth, callus or dry skin  Left Foot:   Skin Integrity: Negative for ulcer, skin breakdown, erythema, warmth, callus or dry skin  Neurological: He is alert and oriented to person, place, and time  He has normal strength  Skin: Skin is warm  Psychiatric: He has a normal mood and affect  His speech is normal and behavior is normal  Judgment and thought content normal    Vitals reviewed  Patient's shoes and socks removed  Right Foot/Ankle   Right Foot Inspection  Skin Exam: skin normal and skin intact no dry skin, no warmth, no callus, no erythema, no maceration, no abnormal color, no pre-ulcer, no ulcer and no callus                          Toe Exam: ROM and strength within normal limitsno swelling  Sensory   Vibration: intact  Proprioception: intact   Monofilament testing: diminished  Vascular  Capillary refills: < 3 seconds  The right DP pulse is 2+  The right PT pulse is 2+       Left Foot/Ankle  Left Foot Inspection  Skin Exam: skin normal and skin intactno dry skin, no warmth, no erythema, no maceration, normal color, no pre-ulcer, no ulcer and no callus                         Toe Exam: ROM and strength within normal limitsno swelling                   Sensory   Vibration: intact  Proprioception: intact  Monofilament: diminished  Vascular  Capillary refills: < 3 seconds  The left DP pulse is 2+  The left PT pulse is 2+  Assign Risk Category:  No deformity present; No loss of protective sensation;  No weak pulses       Risk: 1         Osmin Barron MD  [id number] [facility]

## 2019-12-19 NOTE — PATIENT INSTRUCTIONS

## 2019-12-19 NOTE — PROGRESS NOTES
Assessment and Plan:     Problem List Items Addressed This Visit        Other    Hyperlipidemia    Relevant Orders    Lipid panel    Murmur    Relevant Orders    Echo complete with contrast if indicated    Ambulatory referral to Cardiology      Other Visit Diagnoses     Type 2 diabetes mellitus without complication, without long-term current use of insulin ([facility] [address] )    -  Primary    Relevant Orders    POCT hemoglobin A1c (Completed)    Microalbumin / creatinine urine ratio    Comprehensive metabolic panel    Benign essential HTN        Relevant Medications    labetalol (NORMODYNE) 300 mg tablet    Need for vaccination        Relevant Orders    FLUZONE HIGH-DOSE: influenza vaccine, high-dose, preservative-free 0 5 mL (Completed)    Bilateral shoulder pain, unspecified chronicity        Relevant Orders    Ambulatory referral to Orthopedic Surgery    Exposure to mercury        Relevant Orders    Heavy Metals Panel 4, Urine (CSA)    Heavy metals screen, urine    Screening PSA (prostate specific antigen)        Relevant Orders    PSA, Total Screen           Preventive health issues were discussed with patient, and age appropriate screening tests were ordered as noted in patient's After Visit Summary  Personalized health advice and appropriate referrals for health education or preventive services given if needed, as noted in patient's After Visit Summary       History of Present Illness:     Patient presents for Medicare Annual Wellness visit    Patient Care Team:  Ed Patel MD as PCP - General (Family Medicine)  Gabriela Miller MD (Family Medicine)  MD Dino Page MD as Endoscopist     Problem List:     Patient Active Problem List   Diagnosis    Benign essential hypertension    Controlled diabetes mellitus with diabetic neuropathy ([facility] [address] )    Hyperlipidemia    Murmur    Organic impotence      Past Medical and Surgical History:     Past Medical History:   Diagnosis Date    Arthritis     generalized  Diabetes mellitus ([facility] [address] )     type    Hypertension     Peyronie's disease     last assessed [id number]    Reflux involving intestinal tract     gerd     Past Surgical History:   Procedure Laterality Date    APPENDECTOMY      COLONOSCOPY N/A 1/22/2016    Procedure: COLONOSCOPY;  Surgeon: Rema Pierce MD;  Location: [facility]; Service:     KNEE ARTHROSCOPY W/ MENISCAL REPAIR Right       Family History:     History reviewed  No pertinent family history     Social History:     Social History     Socioeconomic History    Marital status: Single     Spouse name: None    Number of children: None    Years of education: None    Highest education level: None   Occupational History    None   Social Needs    Financial resource strain: None    Food insecurity:     Worry: None     Inability: None    Transportation needs:     Medical: None     Non-medical: None   Tobacco Use    Smoking status: Never Smoker    Smokeless tobacco: Never Used   Substance and Sexual Activity    Alcohol use: Yes     Comment: social    Drug use: No    Sexual activity: None   Lifestyle    Physical activity:     Days per week: None     Minutes per session: None    Stress: None   Relationships    Social connections:     Talks on phone: None     Gets together: None     Attends Pent[employer]ostal service: None     Active member of club or organization: None     Attends meetings of clubs or organizations: None     Relationship status: None    Intimate partner violence:     Fear of current or ex partner: None     Emotionally abused: None     Physically abused: None     Forced sexual activity: None   Other Topics Concern    None   Social History Narrative    Daily caffeinnated coffee        Medications and Allergies:     Current Outpatient Medications   Medication Sig Dispense Refill    amLODIPine (NORVASC) 10 mg tablet Take 1 tablet (10 mg total) by mouth daily 30 tablet 0    Dapagliflozin Propanediol (FARXIGA) 5 MG TABS Take 1 tablet (5 mg total) by mouth daily 30 tablet 0    labetalol (NORMODYNE) 300 mg tablet Take 1 tablet (300 mg total) by mouth 2 (two) times a day 180 tablet 2    losartan-hydrochlorothiazide (HYZAAR) 50-12 5 mg per tablet Take 1 tablet by mouth daily 90 tablet 0    metFORMIN (GLUCOPHAGE) 1000 MG tablet Take 1 tablet (1,000 mg total) by mouth 2 (two) times a day with meals 60 tablet 0    pravastatin (PRAVACHOL) 40 mg tablet Take 1 tablet (40 mg total) by mouth daily at bedtime 30 tablet 0    sitaGLIPtin (JANUVIA) 100 mg tablet Take 1 tablet (100 mg total) by mouth daily 30 tablet 6     No current facility-administered medications for this visit  No Known Allergies   Immunizations:     Immunization History   Administered Date(s) Administered    Influenza Split High Dose Preservative Free IM 11/01/2013, 10/19/2015, 10/03/2017    Influenza TIV (IM) 12/15/1999, 12/27/2010    Influenza, high dose seasonal 0 5 mL 10/29/2018, 12/18/2019    Pneumococcal Conjugate 13-Valent 10/03/2017    Pneumococcal Polysaccharide PPV23 12/27/2010, 02/07/2014      Health Maintenance:         Topic Date Due    Hepatitis C Screening  01/16/2020 (Originally 1945)    CRC Screening: Colonoscopy  01/22/2026     There are no preventive care reminders to display for this patient  Medicare Health Risk Assessment:     /86 (BP Location: Right arm, Patient Position: Sitting, Cuff Size: Standard)   Pulse 60   Temp (!) 97 °F (36 1 °C) (Tympanic)   Resp 16   Ht 5' 9" (1 753 m)   Wt 78 9 kg (174 lb)   BMI 25 70 kg/m²      Jodee Nova is here for his Initial Wellness visit  Health Risk Assessment:   Patient rates overall health as very good  Patient feels that their physical health rating is same  Eyesight was rated as slightly worse  Hearing was rated as same  Patient feels that their emotional and mental health rating is same  Pain experienced in the last 7 days has been some  Patient's pain rating has been 6/10       Depression Screening:   PHQ-2 Score: 0      Fall Risk Screening: In the past year, patient has experienced: no history of falling in past year      Home Safety:  Patient does not have trouble with stairs inside or outside of their home  Patient has working smoke alarms and has working carbon monoxide detector  Home safety hazards include: none  Nutrition:   Current diet is Regular, Diabetic, No Added Salt, Limited junk food and Low Saturated Fat  Medications:   Patient is currently taking over-the-counter supplements  OTC medications include: see medication list  Patient is not able to manage medications  Activities of Daily Living (ADLs)/Instrumental Activities of Daily Living (IADLs):   Walk and transfer into and out of bed and chair?: Yes  Dress and groom yourself?: Yes    Bathe or shower yourself?: Yes    Feed yourself? Yes  Do your laundry/housekeeping?: Yes  Manage your money, pay your bills and track your expenses?: Yes  Make your own meals?: Yes    Do your own shopping?: Yes    Previous Hospitalizations:   Any hospitalizations or ED visits within the last 12 months?: No      Advance Care Planning:   Living will: Yes    Durable POA for healthcare:  Yes    Advanced directive: Yes    End of Life Decisions reviewed with patient: Yes    Provider agrees with end of life decisions: Yes      Comments: Full code    1) Jeanne Dockery his son is his POA    Cognitive Screening:   Provider or family/friend/caregiver concerned regarding cognition?: No    PREVENTIVE SCREENINGS      Cardiovascular Screening:    General: Screening Not Indicated and History Lipid Disorder    Due for: Lipid Panel      Diabetes Screening:     General: Screening Not Indicated and History Diabetes    Due for: Blood Glucose      Colorectal Cancer Screening:     General: Screening Current      Prostate Cancer Screening:    General: Risks and Benefits Discussed    Due for: PSA      Osteoporosis Screening:    General: Screening Not Indicated Abdominal Aortic Aneurysm (AAA) Screening:    Risk factors include: age between 73-67 yo        Hepatitis C Screening:    General: Screening Current      Fredrick Tejeda MD

## 2019-12-20 ENCOUNTER — APPOINTMENT (OUTPATIENT)
Dept: LAB | Facility: CLINIC | Age: 74
End: 2019-12-20
Payer: MEDICARE

## 2019-12-20 DIAGNOSIS — E78.2 MIXED HYPERLIPIDEMIA: ICD-10-CM

## 2019-12-20 DIAGNOSIS — E11.9 TYPE 2 DIABETES MELLITUS WITHOUT COMPLICATION, WITHOUT LONG-TERM CURRENT USE OF INSULIN (HCC): ICD-10-CM

## 2019-12-20 DIAGNOSIS — Z12.5 SCREENING PSA (PROSTATE SPECIFIC ANTIGEN): ICD-10-CM

## 2019-12-20 LAB
ALBUMIN SERPL BCP-MCNC: 4.6 G/DL (ref 3.5–5)
ALP SERPL-CCNC: 76 U/L (ref 46–116)
ALT SERPL W P-5'-P-CCNC: 21 U/L (ref 12–78)
ANION GAP SERPL CALCULATED.3IONS-SCNC: 4 MMOL/L (ref 4–13)
AST SERPL W P-5'-P-CCNC: 18 U/L (ref 5–45)
BILIRUB SERPL-MCNC: 0.84 MG/DL (ref 0.2–1)
BUN SERPL-MCNC: 17 MG/DL (ref 5–25)
CALCIUM SERPL-MCNC: 9.6 MG/DL (ref 8.3–10.1)
CHLORIDE SERPL-SCNC: 104 MMOL/L (ref 100–108)
CHOLEST SERPL-MCNC: 189 MG/DL (ref 50–200)
CO2 SERPL-SCNC: 30 MMOL/L (ref 21–32)
CREAT SERPL-MCNC: 1.26 MG/DL (ref 0.6–1.3)
CREAT UR-MCNC: 100 MG/DL
GFR SERPL CREATININE-BSD FRML MDRD: 56 ML/MIN/1.73SQ M
GLUCOSE P FAST SERPL-MCNC: 175 MG/DL (ref 65–99)
HDLC SERPL-MCNC: 60 MG/DL
LDLC SERPL CALC-MCNC: 111 MG/DL (ref 0–100)
MICROALBUMIN UR-MCNC: 392 MG/L (ref 0–20)
MICROALBUMIN/CREAT 24H UR: 392 MG/G CREATININE (ref 0–30)
NONHDLC SERPL-MCNC: 129 MG/DL
POTASSIUM SERPL-SCNC: 4.3 MMOL/L (ref 3.5–5.3)
PROT SERPL-MCNC: 7.5 G/DL (ref 6.4–8.2)
PSA SERPL-MCNC: 0.5 NG/ML (ref 0–4)
SODIUM SERPL-SCNC: 138 MMOL/L (ref 136–145)
TRIGL SERPL-MCNC: 90 MG/DL

## 2019-12-20 PROCEDURE — 80053 COMPREHEN METABOLIC PANEL: CPT

## 2019-12-20 PROCEDURE — 82570 ASSAY OF URINE CREATININE: CPT | Performed by: FAMILY MEDICINE

## 2019-12-20 PROCEDURE — 80061 LIPID PANEL: CPT

## 2019-12-20 PROCEDURE — 82043 UR ALBUMIN QUANTITATIVE: CPT | Performed by: FAMILY MEDICINE

## 2019-12-20 PROCEDURE — 36415 COLL VENOUS BLD VENIPUNCTURE: CPT

## 2019-12-20 PROCEDURE — G0103 PSA SCREENING: HCPCS

## 2019-12-27 ENCOUNTER — TELEPHONE (OUTPATIENT)
Dept: FAMILY MEDICINE CLINIC | Facility: CLINIC | Age: 74
End: 2019-12-27

## 2019-12-27 NOTE — TELEPHONE ENCOUNTER
----- Message from Sofiya Mathis MD sent at 12/26/2019  4:18 PM EST -----  Please advise the patient that his PSA was within normal limits  His cholesterol is stable     Sugars were slightly elevated on fasting as expected given diabetes  Even having a good A1c would recommend avoid excessive sugars/carbohydrate intake  any questions let me know

## 2019-12-30 ENCOUNTER — TELEPHONE (OUTPATIENT)
Dept: FAMILY MEDICINE CLINIC | Facility: CLINIC | Age: 74
End: 2019-12-30

## 2019-12-30 NOTE — TELEPHONE ENCOUNTER
----- Message from Breanna Vargas MD sent at 12/26/2019  4:18 PM EST -----  Please advise the patient that his PSA was within normal limits  His cholesterol is stable     Sugars were slightly elevated on fasting as expected given diabetes  Even having a good A1c would recommend avoid excessive sugars/carbohydrate intake  any questions let me know

## 2019-12-30 NOTE — TELEPHONE ENCOUNTER
----- Message from Javier Mccormick MD sent at 12/26/2019  4:18 PM EST -----  Please advise the patient that his PSA was within normal limits  His cholesterol is stable     Sugars were slightly elevated on fasting as expected given diabetes  Even having a good A1c would recommend avoid excessive sugars/carbohydrate intake  any questions let me know

## 2020-01-02 DIAGNOSIS — E11.9 TYPE 2 DIABETES MELLITUS WITHOUT COMPLICATION, WITHOUT LONG-TERM CURRENT USE OF INSULIN (HCC): Primary | ICD-10-CM

## 2020-01-03 ENCOUNTER — TELEPHONE (OUTPATIENT)
Dept: FAMILY MEDICINE CLINIC | Facility: CLINIC | Age: 75
End: 2020-01-03

## 2020-01-03 NOTE — TELEPHONE ENCOUNTER
[id number]  tc/cma----- Message from John Lawson MD sent at 1/2/2020  4:40 PM EST -----  Left a voice message for the patient to call back  His microalbumin/creatinine ratio came back elevated which is strange given that his last 4 years have been consistently low and his diabetes is very well managed    Would like him to repeat the crea_  tinine/microalbumin levels downstairs as urine test   Order has been placed in the chart

## 2020-01-06 ENCOUNTER — TELEPHONE (OUTPATIENT)
Dept: FAMILY MEDICINE CLINIC | Facility: CLINIC | Age: 75
End: 2020-01-06

## 2020-01-06 NOTE — TELEPHONE ENCOUNTER
NIKOLAS spoke to pt and he will go to the lab tomorrow to retest micro  tc/cma ----- Message from Ed Patel MD sent at 1/2/2020  4:40 PM EST -----  Left a voice message for the patient to call back  His microalbumin/creatinine ratio came back elevated which is strange given that his last 4 years have been consistently low and his diabetes is very well managed    Would like him to repeat the crea_  tinine/microalbumin levels downstairs as urine test   Order has been placed in the chart

## 2020-01-08 ENCOUNTER — APPOINTMENT (OUTPATIENT)
Dept: LAB | Facility: CLINIC | Age: 75
End: 2020-01-08
Payer: MEDICARE

## 2020-01-08 LAB
CREAT UR-MCNC: 86.3 MG/DL
MICROALBUMIN UR-MCNC: 115 MG/L (ref 0–20)
MICROALBUMIN/CREAT 24H UR: 133 MG/G CREATININE (ref 0–30)

## 2020-01-08 PROCEDURE — 82043 UR ALBUMIN QUANTITATIVE: CPT | Performed by: FAMILY MEDICINE

## 2020-01-08 PROCEDURE — 82570 ASSAY OF URINE CREATININE: CPT | Performed by: FAMILY MEDICINE

## 2020-01-20 DIAGNOSIS — E78.2 MIXED HYPERLIPIDEMIA: ICD-10-CM

## 2020-01-20 DIAGNOSIS — E11.9 TYPE 2 DIABETES MELLITUS WITHOUT COMPLICATION, WITHOUT LONG-TERM CURRENT USE OF INSULIN (HCC): ICD-10-CM

## 2020-01-20 RX ORDER — PRAVASTATIN SODIUM 40 MG
40 TABLET ORAL
Qty: 30 TABLET | Refills: 0 | Status: SHIPPED | OUTPATIENT
Start: 2020-01-20 | End: 2020-02-20 | Stop reason: SDUPTHER

## 2020-01-20 NOTE — TELEPHONE ENCOUNTER
Dr Joey Owen    Patient is requesting refill farxiga 5 mg and pravastatin sodium sent to [facility]  Last ov 12/18/19

## 2020-01-21 ENCOUNTER — CONSULT (OUTPATIENT)
Dept: CARDIOLOGY CLINIC | Facility: CLINIC | Age: 75
End: 2020-01-21
Payer: MEDICARE

## 2020-01-21 VITALS
HEIGHT: 69 IN | DIASTOLIC BLOOD PRESSURE: 90 MMHG | SYSTOLIC BLOOD PRESSURE: 154 MMHG | BODY MASS INDEX: 25.03 KG/M2 | HEART RATE: 49 BPM | WEIGHT: 169 LBS | OXYGEN SATURATION: 99 %

## 2020-01-21 DIAGNOSIS — E11.40 CONTROLLED TYPE 2 DIABETES MELLITUS WITH DIABETIC NEUROPATHY, WITHOUT LONG-TERM CURRENT USE OF INSULIN (HCC): ICD-10-CM

## 2020-01-21 DIAGNOSIS — E78.2 MIXED HYPERLIPIDEMIA: ICD-10-CM

## 2020-01-21 DIAGNOSIS — I10 BENIGN ESSENTIAL HYPERTENSION: ICD-10-CM

## 2020-01-21 DIAGNOSIS — R01.1 MURMUR: Primary | ICD-10-CM

## 2020-01-21 PROCEDURE — 99205 OFFICE O/P NEW HI 60 MIN: CPT | Performed by: INTERNAL MEDICINE

## 2020-01-21 PROCEDURE — 93000 ELECTROCARDIOGRAM COMPLETE: CPT | Performed by: INTERNAL MEDICINE

## 2020-01-21 RX ORDER — LISINOPRIL 5 MG/1
5 TABLET ORAL DAILY
Qty: 30 TABLET | Refills: 5 | Status: SHIPPED | OUTPATIENT
Start: 2020-01-21 | End: 2020-08-11 | Stop reason: SDUPTHER

## 2020-01-21 NOTE — PROGRESS NOTES
[facility]  [address]   [place] [address]    Cardiology Consultation    Lorette Landau  [id number]  1945      Consult for: Heart murmur  Appreciate consult by: Vadim Li MD    1  Murmur  Ambulatory referral to Cardiology    POCT ECG    Echo complete with contrast if indicated   2  Benign essential hypertension  POCT ECG    lisinopril (ZESTRIL) 5 mg tablet    Basic metabolic panel   3  Controlled type 2 diabetes mellitus with diabetic neuropathy, without long-term current use of insulin ([facility])  lisinopril (ZESTRIL) 5 mg tablet    Basic metabolic panel   4  Mixed hyperlipidemia        Discussion/Summary:     - Mr Laury Cherry has a systolic ejection murmur likely secondary to aortic valve stenosis  Will review with 2D echocardiogram   - review of recent blood work showed the presence of protein urea  Was previously taking losartan but does not recall why it was discontinued  Will begin low-dose lisinopril 5 mg daily to assist with protein urea and blood pressure control as blood pressure is elevated on exam today  - repeat BMP in 1 month  - will follow-up after echocardiogram and BMP complete  HPI:     Lorette Landau is a 69year old male here for evaluation of a heart murmur  He has had a heart murmur for the past several years  He has no complaints at this time  He denies any shortness of breath, LE edema, orthopnea or PND  In summer, he was active playing golf on a regular basis  He has not been able to play after suffering injury  Currently undergoing rehab  He is retired and previously worked in [employer]  No prior history of cardiac disease  He does have a history of hypertension  In the past he was on losartan and currently uses labetalol along with amlodipine  Recent blood work showed the presence of proteinuria      Past Medical History:   Diagnosis Date    Arthritis     generalized    Diabetes mellitus ([facility] [address] )     type  Hypertension     Peyronie's disease     last assessed [id number]    Reflux involving intestinal tract     gerd     Social History     Socioeconomic History    Marital status: Single     Spouse name: Not on file    Number of children: Not on file    Years of education: Not on file    Highest education level: Not on file   Occupational History    Not on file   Social Needs    Financial resource strain: Not on file    Food insecurity:     Worry: Not on file     Inability: Not on file    Transportation needs:     Medical: Not on file     Non-medical: Not on file   Tobacco Use    Smoking status: Never Smoker    Smokeless tobacco: Never Used   Substance and Sexual Activity    Alcohol use: Yes     Comment: social    Drug use: No    Sexual activity: Not on file   Lifestyle    Physical activity:     Days per week: Not on file     Minutes per session: Not on file    Stress: Not on file   Relationships    Social connections:     Talks on phone: Not on file     Gets together: Not on file     Attends Faith service: Not on file     Active member of club or organization: Not on file     Attends meetings of clubs or organizations: Not on file     Relationship status: Not on file    Intimate partner violence:     Fear of current or ex partner: Not on file     Emotionally abused: Not on file     Physically abused: Not on file     Forced sexual activity: Not on file   Other Topics Concern    Not on file   Social History Narrative    Daily caffeinnated coffee       Family History   Problem Relation Age of Onset    Heart attack Mother [de-identified]        CABG    Kidney failure Father     Diabetes Father         pre-diabetic     Past Surgical History:   Procedure Laterality Date    APPENDECTOMY      COLONOSCOPY N/A 1/22/2016    Procedure: COLONOSCOPY;  Surgeon: Jonny Silva MD;  Location: [place] [address] GI LAB;   Service:     KNEE ARTHROSCOPY W/ MENISCAL REPAIR Right        Current Outpatient Medications:     amLODIPine (NORVASC) 10 mg tablet, Take 1 tablet (10 mg total) by mouth daily, Disp: 30 tablet, Rfl: 0    Dapagliflozin Propanediol (FARXIGA) 5 MG TABS, Take 1 tablet (5 mg total) by mouth daily, Disp: 30 tablet, Rfl: 0    labetalol (NORMODYNE) 300 mg tablet, Take 1 tablet (300 mg total) by mouth 2 (two) times a day, Disp: 180 tablet, Rfl: 2    metFORMIN (GLUCOPHAGE) 1000 MG tablet, Take 1 tablet (1,000 mg total) by mouth 2 (two) times a day with meals, Disp: 60 tablet, Rfl: 0    pravastatin (PRAVACHOL) 40 mg tablet, Take 1 tablet (40 mg total) by mouth daily at bedtime, Disp: 30 tablet, Rfl: 0    lisinopril (ZESTRIL) 5 mg tablet, Take 1 tablet (5 mg total) by mouth daily, Disp: 30 tablet, Rfl: 5    sitaGLIPtin (JANUVIA) 100 mg tablet, Take 1 tablet (100 mg total) by mouth daily (Patient not taking: Reported on 1/21/2020), Disp: 30 tablet, Rfl: 6  No Known Allergies    Review of Systems:   Review of Systems   Constitutional: Negative for chills, fatigue and fever  HENT: Negative for congestion, nosebleeds and postnasal drip  Respiratory: Negative for cough, chest tightness and shortness of breath  Cardiovascular: Negative for chest pain, palpitations and leg swelling  Gastrointestinal: Negative for abdominal distention, abdominal pain, diarrhea, nausea and vomiting  Endocrine: Negative for polydipsia, polyphagia and polyuria  Musculoskeletal: Negative for gait problem and myalgias  Skin: Negative for color change, pallor and rash  Allergic/Immunologic: Negative for environmental allergies, food allergies and immunocompromised state  Neurological: Negative for dizziness, seizures, syncope and light-headedness  Hematological: Negative for adenopathy  Does not bruise/bleed easily  Psychiatric/Behavioral: Negative for dysphoric mood  The patient is not nervous/anxious          Physical Examination:     Vitals:    01/21/20 1452   BP: 154/90   BP Location: Right arm   Patient Position: Sitting   Cuff Size: Standard   Pulse: (!) 49   SpO2: 99%   Weight: 76 7 kg (169 lb)   Height: 5' 9" (1 753 m)       Physical Exam   Constitutional: He is oriented to person, place, and time  He appears well-developed  No distress  HENT:   Head: Normocephalic and atraumatic  Eyes: Pupils are equal, round, and reactive to light  Conjunctivae and EOM are normal    Neck: Neck supple  No JVD present  No thyromegaly present  Cardiovascular: Regular rhythm and intact distal pulses  Bradycardia present  Exam reveals no gallop and no friction rub  Murmur heard  Systolic murmur is present with a grade of 3/6  Pulmonary/Chest: Effort normal and breath sounds normal    Abdominal: Soft  He exhibits no distension  There is no tenderness  Musculoskeletal: He exhibits deformity  He exhibits no edema  Neurological: He is alert and oriented to person, place, and time  No cranial nerve deficit  Skin: Skin is warm and dry  No rash noted  He is not diaphoretic  No erythema  Psychiatric: He has a normal mood and affect   His behavior is normal  Judgment and thought content normal        Labs:   No results found for: WBC, HGB, HCT, MCV, RDW, PLT  BMP:  Lab Results   Component Value Date    SODIUM 138 12/20/2019    K 4 3 12/20/2019     12/20/2019    CO2 30 12/20/2019    ANIONGAP 13 2 10/22/2015    BUN 17 12/20/2019    CREATININE 1 26 12/20/2019    GLUF 175 (H) 12/20/2019    CALCIUM 9 6 12/20/2019    EGFR 56 12/20/2019     LFT:  Lab Results   Component Value Date    AST 18 12/20/2019    ALT 21 12/20/2019    ALKPHOS 76 12/20/2019    TP 7 5 12/20/2019    ALB 4 6 12/20/2019      No results found for: [facility]  Lab Results   Component Value Date    HGBA1C 6 1 12/18/2019     Lipid Profile:   Lab Results   Component Value Date    CHOLESTEROL 189 12/20/2019    HDL 60 12/20/2019    LDLCALC 111 (H) 12/20/2019    TRIG 90 12/20/2019     Lab Results   Component Value Date    CHOLESTEROL 189 12/20/2019    CHOLESTEROL 128 05/17/2018     No results found for: CKTOTAL, CKMB, CKMBINDEX, TROPONINI  No results found for: NTBNP   Recent Results (from the past 672 hour(s))   Microalbumin / creatinine urine ratio    Collection Time: 01/08/20 12:28 PM   Result Value Ref Range    Creatinine, Ur 86 3 mg/dL    Microalbum  ,U,Random 115 0 (H) 0 0 - 20 0 mg/L    Microalb Creat Ratio 133 (H) 0 - 30 mg/g creatinine       Imaging & Testing   I have personally reviewed pertinent reports  Cardiac Testing   No results found for this or any previous visit  EKG: Personally reviewed  Sinus bradycardia at 50 beats per minute with incomplete right bundle-branch block and nonspecific T-wave abnormalities      Roby Siegel DO, [place]  [phone]  Please call with any questions

## 2020-02-11 ENCOUNTER — HOSPITAL ENCOUNTER (OUTPATIENT)
Dept: NON INVASIVE DIAGNOSTICS | Facility: HOSPITAL | Age: 75
Discharge: HOME | End: 2020-02-11
Attending: INTERNAL MEDICINE
Payer: MEDICARE

## 2020-02-11 DIAGNOSIS — R01.1 MURMUR: ICD-10-CM

## 2020-02-11 PROCEDURE — 93306 TTE W/DOPPLER COMPLETE: CPT

## 2020-02-12 PROCEDURE — 93306 TTE W/DOPPLER COMPLETE: CPT | Performed by: INTERNAL MEDICINE

## 2020-02-13 DIAGNOSIS — E11.9 TYPE 2 DIABETES MELLITUS WITHOUT COMPLICATION, WITHOUT LONG-TERM CURRENT USE OF INSULIN (HCC): ICD-10-CM

## 2020-02-13 NOTE — TELEPHONE ENCOUNTER
Dr Иван Min:    Patient needs a refill of metformin sent to [facility] in [place]    Patient is out of this medication as of today

## 2020-02-20 DIAGNOSIS — E11.9 TYPE 2 DIABETES MELLITUS WITHOUT COMPLICATION, WITHOUT LONG-TERM CURRENT USE OF INSULIN (HCC): ICD-10-CM

## 2020-02-20 DIAGNOSIS — E78.2 MIXED HYPERLIPIDEMIA: ICD-10-CM

## 2020-02-20 NOTE — TELEPHONE ENCOUNTER
Dr Jose Mojica    Patient is requesting refills pravastatin sodium  40 mg  And farxiga 5 mg tabs sent to [facility]  Last ov 12/2019  I advised that Dr Jose Mojica is out of the office

## 2020-02-21 RX ORDER — PRAVASTATIN SODIUM 40 MG
40 TABLET ORAL
Qty: 30 TABLET | Refills: 0 | Status: SHIPPED | OUTPATIENT
Start: 2020-02-21 | End: 2020-04-01 | Stop reason: SDUPTHER

## 2020-03-23 DIAGNOSIS — E11.9 TYPE 2 DIABETES MELLITUS WITHOUT COMPLICATION, WITHOUT LONG-TERM CURRENT USE OF INSULIN (HCC): ICD-10-CM

## 2020-03-23 NOTE — TELEPHONE ENCOUNTER
Dr Tawnya Hartman,    Patient needs refill on his Farxiga 5mg sent to [facility] in [place]

## 2020-04-01 DIAGNOSIS — E78.2 MIXED HYPERLIPIDEMIA: ICD-10-CM

## 2020-04-01 RX ORDER — PRAVASTATIN SODIUM 40 MG
40 TABLET ORAL
Qty: 90 TABLET | Refills: 1 | Status: SHIPPED | OUTPATIENT
Start: 2020-04-01 | End: 2020-09-15 | Stop reason: SDUPTHER

## 2020-05-20 ENCOUNTER — TELEPHONE (OUTPATIENT)
Dept: FAMILY MEDICINE CLINIC | Facility: CLINIC | Age: 75
End: 2020-05-20

## 2020-08-02 ENCOUNTER — TELEPHONE (OUTPATIENT)
Dept: FAMILY MEDICINE CLINIC | Facility: CLINIC | Age: 75
End: 2020-08-02

## 2020-08-03 NOTE — TELEPHONE ENCOUNTER
Due for a DM check in August please schedule   Also remind him that he is due for his Eye exam  If he has it already performed please gather info for caregap

## 2020-08-11 ENCOUNTER — TELEPHONE (OUTPATIENT)
Dept: FAMILY MEDICINE CLINIC | Facility: CLINIC | Age: 75
End: 2020-08-11

## 2020-08-11 DIAGNOSIS — E11.40 CONTROLLED TYPE 2 DIABETES MELLITUS WITH DIABETIC NEUROPATHY, WITHOUT LONG-TERM CURRENT USE OF INSULIN (HCC): ICD-10-CM

## 2020-08-11 DIAGNOSIS — I10 BENIGN ESSENTIAL HYPERTENSION: ICD-10-CM

## 2020-08-11 RX ORDER — LISINOPRIL 5 MG/1
5 TABLET ORAL DAILY
Qty: 30 TABLET | Refills: 5 | Status: SHIPPED | OUTPATIENT
Start: 2020-08-11 | End: 2021-01-20 | Stop reason: SDUPTHER

## 2020-08-11 NOTE — TELEPHONE ENCOUNTER
Dr Amauri Fleming,    Patient dropped in asking if you could refill his lisinopril 5mg  Patient usually gets it filled by cardiologist but was wondering if you could refill it for him  Pharmacy is [facility] in [place]  Please advise

## 2020-08-11 NOTE — TELEPHONE ENCOUNTER
Called pt states he has not seen cardio Dr Gely Khan in a while pt has apt with you 8-24-20 can you fill lisinopril for him?

## 2020-08-19 LAB
LEFT EYE DIABETIC RETINOPATHY: NORMAL
RIGHT EYE DIABETIC RETINOPATHY: NORMAL

## 2020-08-24 ENCOUNTER — TELEPHONE (OUTPATIENT)
Dept: ADMINISTRATIVE | Facility: OTHER | Age: 75
End: 2020-08-24

## 2020-08-24 ENCOUNTER — OFFICE VISIT (OUTPATIENT)
Dept: FAMILY MEDICINE CLINIC | Facility: CLINIC | Age: 75
End: 2020-08-24
Payer: MEDICARE

## 2020-08-24 VITALS
HEIGHT: 69 IN | DIASTOLIC BLOOD PRESSURE: 84 MMHG | OXYGEN SATURATION: 98 % | RESPIRATION RATE: 20 BRPM | WEIGHT: 162 LBS | BODY MASS INDEX: 23.99 KG/M2 | TEMPERATURE: 97.5 F | HEART RATE: 59 BPM | SYSTOLIC BLOOD PRESSURE: 132 MMHG

## 2020-08-24 DIAGNOSIS — E11.9 TYPE 2 DIABETES MELLITUS WITHOUT COMPLICATION, WITHOUT LONG-TERM CURRENT USE OF INSULIN (HCC): Primary | ICD-10-CM

## 2020-08-24 DIAGNOSIS — I10 BENIGN ESSENTIAL HYPERTENSION: ICD-10-CM

## 2020-08-24 DIAGNOSIS — K04.7 TOOTH ABSCESS: ICD-10-CM

## 2020-08-24 DIAGNOSIS — E78.2 MIXED HYPERLIPIDEMIA: ICD-10-CM

## 2020-08-24 LAB — SL AMB POCT HEMOGLOBIN AIC: 5.8 (ref ?–6.5)

## 2020-08-24 PROCEDURE — 83036 HEMOGLOBIN GLYCOSYLATED A1C: CPT | Performed by: FAMILY MEDICINE

## 2020-08-24 PROCEDURE — 3044F HG A1C LEVEL LT 7.0%: CPT | Performed by: FAMILY MEDICINE

## 2020-08-24 PROCEDURE — 3060F POS MICROALBUMINURIA REV: CPT | Performed by: FAMILY MEDICINE

## 2020-08-24 PROCEDURE — 4040F PNEUMOC VAC/ADMIN/RCVD: CPT | Performed by: FAMILY MEDICINE

## 2020-08-24 PROCEDURE — 1160F RVW MEDS BY RX/DR IN RCRD: CPT | Performed by: FAMILY MEDICINE

## 2020-08-24 PROCEDURE — 99214 OFFICE O/P EST MOD 30 MIN: CPT | Performed by: FAMILY MEDICINE

## 2020-08-24 PROCEDURE — 3008F BODY MASS INDEX DOCD: CPT | Performed by: FAMILY MEDICINE

## 2020-08-24 PROCEDURE — 2022F DILAT RTA XM EVC RTNOPTHY: CPT | Performed by: FAMILY MEDICINE

## 2020-08-24 PROCEDURE — 3079F DIAST BP 80-89 MM HG: CPT | Performed by: FAMILY MEDICINE

## 2020-08-24 PROCEDURE — 3075F SYST BP GE 130 - 139MM HG: CPT | Performed by: FAMILY MEDICINE

## 2020-08-24 PROCEDURE — 1036F TOBACCO NON-USER: CPT | Performed by: FAMILY MEDICINE

## 2020-08-24 RX ORDER — AMOXICILLIN AND CLAVULANATE POTASSIUM 875; 125 MG/1; MG/1
1 TABLET, FILM COATED ORAL EVERY 12 HOURS SCHEDULED
Qty: 20 TABLET | Refills: 0 | Status: SHIPPED | OUTPATIENT
Start: 2020-08-24 | End: 2020-09-03

## 2020-08-24 RX ORDER — ASPIRIN 81 MG/1
81 TABLET, CHEWABLE ORAL DAILY
Status: ON HOLD | COMMUNITY
End: 2021-05-14 | Stop reason: SDUPTHER

## 2020-08-24 NOTE — PATIENT INSTRUCTIONS
1) Diabetes Type 2  - Controlled  -Most recent lab testing:   Results from last 6 Months   Lab Units 08/24/20  0913   HEMOGLOBIN A1C  5 8     - Continue on  metformin a 1000 mg twice a day,farxiga 5mg  If her A1c remains at 5 8% we will likely be decreasing her metformin to 500 mg twice a day    - Opthamology:  Up-to-date early signs of cataracts  - Podiatry/ Foot exam: December UTD  - Vaccines:  Immunization History   Administered Date(s) Administered    Influenza Split High Dose Preservative Free IM 11/01/2013, 10/19/2015, 10/03/2017    Influenza TIV (IM) 12/15/1999, 12/27/2010    Influenza, high dose seasonal 0 7 mL 10/29/2018, 12/18/2019    Pneumococcal Conjugate 13-Valent 10/03/2017    Pneumococcal Polysaccharide PPV23 12/27/2010, 02/07/2014

## 2020-08-24 NOTE — LETTER
Diabetic Eye Exam Form    Date Requested: 20  Patient: Frida Kwong  Patient : 1945   Referring Provider: Live Crystal MD    Dilated Retinal Exam, Optomap-Iris Exam, or Fundus Photography Done         Yes ([place] one above)         No     Date of Diabetic Eye Exam ______________________________  Left Eye      Exam did show retinopathy    Exam did not show retinopathy         Mild       Moderate       None       Proliferative       Severe     Right Eye     Exam did show retinopathy    Exam did not show retinopathy         Mild       Moderate       None       Proliferative       Severe     Comments __________________________________________________________    Practice Providing Exam ______________________________________________    Exam Performed By (print name) _______________________________________      Provider Signature ___________________________________________________      These reports are needed for  compliance    Please fax this completed form and a copy of the Diabetic Eye Exam report to our office located at [place] [address] as soon as possible to [phone] juan Toussaint Lean: Phone [phone]    We thank you for your assistance in treating our mutual patient

## 2020-08-24 NOTE — TELEPHONE ENCOUNTER
Upon review of the In Basket request and the patient's chart, initial outreach has been made via fax, please see Contacts section for details  A second outreach attempt will be made within 5 business days      Thank you  Jeevan Nguyễn MA

## 2020-08-24 NOTE — TELEPHONE ENCOUNTER
Upon review of the In Basket request we were able to locate, review, and update the patient chart as requested for Diabetic Eye Exam     Any additional questions or concerns should be emailed to the Practice Liaisons via [email or web address] email, please do not reply via In Basket  Thank you  Dennis Kwok MA ----- Message from Doc Mcintosh MD sent at 8/24/2020  9:23 AM EDT -----  Dr Stacey Brown  ( Dr Phill Castleman office) 7/19/2020  DM eye exam  Please help me obtain     Thank you

## 2020-08-24 NOTE — PROGRESS NOTES
[facility] - [facility]  DM Type 2 check  ASSESSMENT/PLAN  Leandro Walker is a 77 y o  male presents to the office for     Diagnoses and all orders for this visit:    Type 2 diabetes mellitus without complication, without long-term current use of insulin (HCC)  -     POCT hemoglobin A1c    Benign essential hypertension    Mixed hyperlipidemia    Tooth abscess  -     Ambulatory referral to Oral Maxillofacial Surgery; Future  -     amoxicillin-clavulanate (Augmentin) 875-125 mg per tablet; Take 1 tablet by mouth every 12 (twelve) hours for 10 days    Other orders  -     aspirin 81 mg chewable tablet; Chew 81 mg daily      1) Diabetes Type 2  - Controlled  -Most recent lab testing:   Results from last 6 Months   Lab Units 08/24/20  0913   HEMOGLOBIN A1C  5 8     - Continue on  metformin a 1000 mg twice a day,farxiga 5mg  If her A1c remains at 5 8% we will likely be decreasing her metformin to 500 mg twice a day  - Opthamology:  Up-to-date early signs of cataracts  - Podiatry/ Foot exam: December UTD  - Vaccines:  Immunization History   Administered Date(s) Administered    Influenza Split High Dose Preservative Free IM 11/01/2013, 10/19/2015, 10/03/2017    Influenza TIV (IM) 12/15/1999, 12/27/2010    Influenza, high dose seasonal 0 7 mL 10/29/2018, 12/18/2019    Pneumococcal Conjugate 13-Valent 10/03/2017    Pneumococcal Polysaccharide PPV23 12/27/2010, 02/07/2014     - Diabetes education:Encourage the patient to continue lifestyle changes  2  Hypertension with murmur continue labetalol 300 mg twice a day with lisinopril 5 mg daily  3  Hyperlipidemia continue on pravastatin 40 mg daily  Repeat fasting lipid panel in December  Currently controlled  4  Very concerned about tooth abscess  Patient is to report to oral surgery at 2:00 p m  Today for evaluation  Augmentin twice a day has been called into the pharmacy      Future Appointments   Date Time Provider Nirmal Cleveland 12/14/2020 11:30 AM Jayla Sommers MD [facility]          Disposition: Return to the clinic in 6 months             SUBJECTIVE  CC: Diabetes (pt here for diabetic check)      HPI:  Tamara Frankel is a 77 y o  male presenting to the office for Diabetes check with Chronic conditions check  Patient states that his right tooth has been hurting him  He believes that broke off and now started with swelling of the gland  Patient states that is very tender and not able to chew  Patient states that his diabetes is been going very well  He has been losing weight ot help with numbers  Patient has been depressed given the fact that he is unable to see his grandchildren given the COVID crisis  He does not need therapy or any medications at this time  Patient states that his blood pressures been very well controlled  Went for an echocardiogram with his cardiologist   Lisinopril was added to his medications  Denies any SOB or chest pain  Taking medications as prescribed for HLD    Review of Systems   Constitutional: Negative for activity change, appetite change, chills, fatigue and fever  HENT: Positive for dental problem (Right tooth)  Negative for congestion  Respiratory: Negative for cough, chest tightness and shortness of breath  Cardiovascular: Negative for chest pain and leg swelling  Gastrointestinal: Negative for abdominal distention, abdominal pain, constipation, diarrhea, nausea and vomiting  All other systems reviewed and are negative        Historical Information   The patient history was reviewed as follows:    Past Medical History:   Diagnosis Date    Arthritis     generalized    Diabetes mellitus ([address] )     type    Hypertension     Peyronie's disease     last assessed [id number]    Reflux involving intestinal tract     gerd     Past Surgical History:   Procedure Laterality Date    APPENDECTOMY      COLONOSCOPY N/A 1/22/2016    Procedure: COLONOSCOPY;  Surgeon: Vanessa Ling MD; [facility]; Service:     KNEE ARTHROSCOPY W/ MENISCAL REPAIR Right      Family History   Problem Relation Age of Onset    Heart attack Mother [de-identified]        CABG    Kidney failure Father     Diabetes Father         pre-diabetic      Social History   Social History     Substance and Sexual Activity   Alcohol Use Yes    Comment: social     Social History     Substance and Sexual Activity   Drug Use No     Social History     Tobacco Use   Smoking Status Never Smoker   Smokeless Tobacco Never Used       Medications:     Current Outpatient Medications:     aspirin 81 mg chewable tablet, Chew 81 mg daily, Disp: , Rfl:     Dapagliflozin Propanediol (Farxiga) 5 MG TABS, Take 1 tablet (5 mg total) by mouth daily, Disp: 90 tablet, Rfl: 2    labetalol (NORMODYNE) 300 mg tablet, Take 1 tablet (300 mg total) by mouth 2 (two) times a day, Disp: 180 tablet, Rfl: 2    lisinopril (ZESTRIL) 5 mg tablet, Take 1 tablet (5 mg total) by mouth daily, Disp: 30 tablet, Rfl: 5    metFORMIN (GLUCOPHAGE) 1000 MG tablet, Take 1 tablet (1,000 mg total) by mouth 2 (two) times a day with meals, Disp: 180 tablet, Rfl: 2    pravastatin (PRAVACHOL) 40 mg tablet, Take 1 tablet (40 mg total) by mouth daily at bedtime, Disp: 90 tablet, Rfl: 1    amoxicillin-clavulanate (Augmentin) 875-125 mg per tablet, Take 1 tablet by mouth every 12 (twelve) hours for 10 days, Disp: 20 tablet, Rfl: 0  No Known Allergies    OBJECTIVE    Vitals:   Vitals:    08/24/20 0905   BP: 132/84   BP Location: Right arm   Patient Position: Sitting   Cuff Size: Standard   Pulse: 59   Resp: 20   Temp: 97 5 °F (36 4 °C)   TempSrc: Temporal   SpO2: 98%   Weight: 73 5 kg (162 lb)   Height: 5' 9" (1 753 m)           Physical Exam  Vitals signs reviewed  Constitutional:       Appearance: He is well-developed  HENT:      Head: Normocephalic and atraumatic        Right Ear: Tympanic membrane, ear canal and external ear normal       Left Ear: Tympanic membrane, ear canal and external ear normal       Ears:      Comments: Osteoma b/l ear       Nose: Nose normal       Mouth/Throat:     Eyes:      Conjunctiva/sclera: Conjunctivae normal       Pupils: Pupils are equal, round, and reactive to light  Neck:      Musculoskeletal: Normal range of motion and neck supple  Cardiovascular:      Rate and Rhythm: Normal rate and regular rhythm  Heart sounds: S1 normal and S2 normal  Murmur present  Pulmonary:      Effort: Pulmonary effort is normal  No respiratory distress  Breath sounds: Normal breath sounds  No wheezing  Musculoskeletal: Normal range of motion  Skin:     General: Skin is warm  Neurological:      Mental Status: He is alert and oriented to person, place, and time  Psychiatric:         Speech: Speech normal          Behavior: Behavior normal          Thought Content:  Thought content normal          Judgment: Judgment normal           Diabetic Foot Exam     Sade Crespo MD  [address] [facility]

## 2020-08-25 ENCOUNTER — TELEPHONE (OUTPATIENT)
Dept: FAMILY MEDICINE CLINIC | Facility: CLINIC | Age: 75
End: 2020-08-25

## 2020-08-25 NOTE — TELEPHONE ENCOUNTER
Dr May Eye:    Patient called to update you on his Oral Surgeon appointment  The surgeon removed the tooth and was advised that the infection is not an abscess but a growth  He was advised to followup with ENT  Please c/b to discuss further

## 2020-08-26 DIAGNOSIS — R60.0 SWELLING OF RIGHT PAROTID GLAND: Primary | ICD-10-CM

## 2020-08-26 NOTE — TELEPHONE ENCOUNTER
1  Tell patient I am sorry that the referral wasn't placed earlier  He doesn't need to pick it up  Please give him ENT in [facility] contact for Dr Shantell Morgan  2  I will need to get records from the Oral surgeon that we referred him too  Can we please call their office  Because I know ENT will need it

## 2020-08-26 NOTE — TELEPHONE ENCOUNTER
Dr Magali Kearney,    Patient dropped by the office asking for a referral to see an ENT  Could you please write one and he will

## 2020-08-28 NOTE — TELEPHONE ENCOUNTER
Dede Molina, SRUTHI  Phone: [phone]  Fax: [phone] [address] [facility]  [address]

## 2020-08-31 ENCOUNTER — OFFICE VISIT (OUTPATIENT)
Dept: OTOLARYNGOLOGY | Facility: CLINIC | Age: 75
End: 2020-08-31
Payer: MEDICARE

## 2020-08-31 VITALS — WEIGHT: 162 LBS | TEMPERATURE: 98.3 F | HEIGHT: 69 IN | BODY MASS INDEX: 23.99 KG/M2

## 2020-08-31 DIAGNOSIS — R60.0 SWELLING OF RIGHT PAROTID GLAND: ICD-10-CM

## 2020-08-31 DIAGNOSIS — R22.1 MASS OF RIGHT SIDE OF NECK: Primary | ICD-10-CM

## 2020-08-31 PROCEDURE — 10021 FNA BX W/O IMG GDN 1ST LES: CPT | Performed by: SPECIALIST

## 2020-08-31 PROCEDURE — 99204 OFFICE O/P NEW MOD 45 MIN: CPT | Performed by: SPECIALIST

## 2020-08-31 PROCEDURE — 88173 CYTOPATH EVAL FNA REPORT: CPT | Performed by: PATHOLOGY

## 2020-08-31 NOTE — ASSESSMENT & PLAN NOTE
Mass right neck along submandibular region  No facial nerve weakness noted today  Noted erythema along the mass on skin, 1 5 cm excoriation  Discussed possible causes including lymph node, submandibular mass, vs dental process  Reviewed risk salivary gland carcinoma  Treatment options include imaging, FNAB of mass, vs surgical excision of the mass  Agreed to in office FNAB of the right mass, see procedure note  Also Ct scan neck     Follow up after testing

## 2020-09-04 ENCOUNTER — APPOINTMENT (OUTPATIENT)
Dept: LAB | Facility: CLINIC | Age: 75
End: 2020-09-04
Payer: MEDICARE

## 2020-09-04 DIAGNOSIS — I10 BENIGN ESSENTIAL HYPERTENSION: ICD-10-CM

## 2020-09-04 DIAGNOSIS — E11.40 CONTROLLED TYPE 2 DIABETES MELLITUS WITH DIABETIC NEUROPATHY, WITHOUT LONG-TERM CURRENT USE OF INSULIN (HCC): ICD-10-CM

## 2020-09-04 DIAGNOSIS — R22.1 MASS OF RIGHT SIDE OF NECK: Primary | ICD-10-CM

## 2020-09-04 LAB
ANION GAP SERPL CALCULATED.3IONS-SCNC: 6 MMOL/L (ref 4–13)
BUN SERPL-MCNC: 22 MG/DL (ref 5–25)
CALCIUM SERPL-MCNC: 9.4 MG/DL (ref 8.3–10.1)
CHLORIDE SERPL-SCNC: 106 MMOL/L (ref 100–108)
CO2 SERPL-SCNC: 27 MMOL/L (ref 21–32)
CREAT SERPL-MCNC: 1.11 MG/DL (ref 0.6–1.3)
GFR SERPL CREATININE-BSD FRML MDRD: 65 ML/MIN/1.73SQ M
GLUCOSE P FAST SERPL-MCNC: 111 MG/DL (ref 65–99)
POTASSIUM SERPL-SCNC: 4.3 MMOL/L (ref 3.5–5.3)
SODIUM SERPL-SCNC: 139 MMOL/L (ref 136–145)

## 2020-09-04 PROCEDURE — 80048 BASIC METABOLIC PNL TOTAL CA: CPT

## 2020-09-04 PROCEDURE — 36415 COLL VENOUS BLD VENIPUNCTURE: CPT

## 2020-09-09 ENCOUNTER — HOSPITAL ENCOUNTER (OUTPATIENT)
Dept: RADIOLOGY | Facility: HOSPITAL | Age: 75
Discharge: HOME | End: 2020-09-09
Payer: MEDICARE

## 2020-09-09 DIAGNOSIS — R22.1 MASS OF RIGHT SIDE OF NECK: ICD-10-CM

## 2020-09-09 PROCEDURE — 70491 CT SOFT TISSUE NECK W/DYE: CPT

## 2020-09-09 PROCEDURE — G1004 CDSM NDSC: HCPCS

## 2020-09-09 RX ADMIN — IOHEXOL 85 ML: 350 INJECTION, SOLUTION INTRAVENOUS at 10:52

## 2020-09-14 ENCOUNTER — OFFICE VISIT (OUTPATIENT)
Dept: OTOLARYNGOLOGY | Facility: CLINIC | Age: 75
End: 2020-09-14
Payer: MEDICARE

## 2020-09-14 VITALS — HEIGHT: 69 IN | BODY MASS INDEX: 23.99 KG/M2 | WEIGHT: 162 LBS | TEMPERATURE: 97.9 F

## 2020-09-14 DIAGNOSIS — R22.1 MASS OF RIGHT SIDE OF NECK: Primary | ICD-10-CM

## 2020-09-14 PROCEDURE — 99214 OFFICE O/P EST MOD 30 MIN: CPT | Performed by: SPECIALIST

## 2020-09-14 NOTE — ASSESSMENT & PLAN NOTE
FNAB indicating  Negative for malignancy  Specimen consists of macrophages and neutrophils associated with fibroconnective tissue  Ct scan neck indicating  Nodular enhancing lesion within the right lower anterior neck as described above with overlying skin thickening and reticulation of subcutaneous fat  Lesion extends deep to the rectus musculature which is thickened  Recent biopsy was reportedly negative for malignancy and findings may be secondary to phlegmonous change and/or nodular scar with suggestion of communication with the right mandibular tooth extraction site  Continued interval surveillance is recommended to exclude neoplastic etiologies  Right-sided supraclavicular lipoma as described above    Occlusion of the right internal carotid artery with reconstitution at the level of the cavernous segment  Correlation with CTA is recommended      Discussed most likely was a infectious process related to his dental concerns  Mass right neck along submandibular region  No facial nerve weakness noted today  Noted erythema along the mass on skin, 1 5 cm excoriation, area of induration mobile  Discussed possible causes including lymph node, submandibular mass, vs dental process  Treatment options include imaging, FNAB of mass, vs surgical excision of the mass  Discussed worsening symptoms including increased swelling, erythema, pain, fever  Discussed carotid occlusion  Strongly recommend pt contact Dr Manuel Gupta and vascular surgery for further evaluation      Agreed to watchful monitoring   Follow up in 4 weeks

## 2020-09-14 NOTE — PROGRESS NOTES
Assessment/Plan:    Mass of right side of neck  FNAB indicating  Negative for malignancy  Specimen consists of macrophages and neutrophils associated with fibroconnective tissue  Ct scan neck indicating  Nodular enhancing lesion within the right lower anterior neck as described above with overlying skin thickening and reticulation of subcutaneous fat  Lesion extends deep to the rectus musculature which is thickened  Recent biopsy was reportedly negative for malignancy and findings may be secondary to phlegmonous change and/or nodular scar with suggestion of communication with the right mandibular tooth extraction site  Continued interval surveillance is recommended to exclude neoplastic etiologies  Right-sided supraclavicular lipoma as described above    Occlusion of the right internal carotid artery with reconstitution at the level of the cavernous segment  Correlation with CTA is recommended      Discussed most likely was a infectious process related to his dental concerns  Mass right neck along submandibular region  No facial nerve weakness noted today  Noted erythema along the mass on skin, 1 5 cm excoriation, area of induration mobile  Discussed possible causes including lymph node, submandibular mass, vs dental process  Treatment options include imaging, FNAB of mass, vs surgical excision of the mass  Discussed worsening symptoms including increased swelling, erythema, pain, fever  Discussed carotid occlusion  Strongly recommend pt contact Dr Ghanshyam Noble and vascular surgery for further evaluation  Agreed to watchful monitoring   Follow up in 4 weeks            Diagnoses and all orders for this visit:    Mass of right side of neck          Subjective:      Patient ID: Rolanda Dey is a 77 y o  male  Presents today as a follow up due to neck lump  Broken lower tooth last November  Tooth removed few week ago by oral surgeon    Began with lump under chin for past month, assumed related to tooth concern  Tenderness area improving  External redness along right submandibular area  Feels improved in site since last visit  Less redness and less pain  Applying antibiotic ointment to site and completed oral antibiotics  Ct scan of neck  Prior FNAB of the mass  The following portions of the patient's history were reviewed and updated as appropriate: allergies, current medications, past family history, past medical history, past social history, past surgical history and problem list     Review of Systems   Constitutional: Negative  HENT: Positive for dental problem and mouth sores  Negative for congestion, ear discharge, ear pain, hearing loss, nosebleeds, postnasal drip, rhinorrhea, sinus pressure, sinus pain, sore throat, tinnitus and voice change  Eyes: Negative  Respiratory: Negative for chest tightness and shortness of breath  Cardiovascular: Negative  Gastrointestinal: Negative  Endocrine: Negative  Musculoskeletal: Negative  Skin: Positive for wound  Negative for color change  Neurological: Negative for dizziness, numbness and headaches  Psychiatric/Behavioral: Negative  Objective:      Temp 97 9 °F (36 6 °C) (Temporal)   Ht 5' 9" (1 753 m)   Wt 73 5 kg (162 lb)   BMI 23 92 kg/m²          Physical Exam  Constitutional:       Appearance: He is well-developed  HENT:      Head: Normocephalic  Right Ear: Hearing, tympanic membrane, ear canal and external ear normal  No decreased hearing noted  No drainage or tenderness  Tympanic membrane is not perforated or erythematous  Left Ear: Hearing, tympanic membrane, ear canal and external ear normal  No decreased hearing noted  No drainage or tenderness  Tympanic membrane is not perforated or erythematous  Nose: Nose normal  No nasal deformity or septal deviation  Mouth/Throat:      Mouth: Mucous membranes are not pale and not dry  No oral lesions  Dentition: Abnormal dentition  Pharynx: Uvula midline  No oropharyngeal exudate  Neck:      Musculoskeletal: Full passive range of motion without pain, normal range of motion and neck supple  Trachea: No tracheal deviation  Cardiovascular:      Rate and Rhythm: Normal rate  Pulmonary:      Effort: Pulmonary effort is normal  No accessory muscle usage or respiratory distress  Musculoskeletal:      Right shoulder: He exhibits normal range of motion  Lymphadenopathy:      Cervical: No cervical adenopathy  Skin:     General: Skin is warm and dry  Comments: Right skin excoriation with mild edema     Neurological:      Mental Status: He is alert and oriented to person, place, and time  Cranial Nerves: No cranial nerve deficit  Sensory: No sensory deficit  Psychiatric:         Behavior: Behavior is cooperative           Scribe Attestation    I,:   MOSHE Mcguire am acting as a scribe while in the presence of the attending physician :        I,:   Jess Cummins MD personally performed the services described in this documentation    as scribed in my presence :

## 2020-09-15 ENCOUNTER — OFFICE VISIT (OUTPATIENT)
Dept: FAMILY MEDICINE CLINIC | Facility: CLINIC | Age: 75
End: 2020-09-15
Payer: MEDICARE

## 2020-09-15 VITALS
HEIGHT: 69 IN | TEMPERATURE: 97.4 F | SYSTOLIC BLOOD PRESSURE: 170 MMHG | WEIGHT: 165.4 LBS | BODY MASS INDEX: 24.5 KG/M2 | OXYGEN SATURATION: 98 % | DIASTOLIC BLOOD PRESSURE: 78 MMHG | RESPIRATION RATE: 16 BRPM | HEART RATE: 57 BPM

## 2020-09-15 DIAGNOSIS — I65.21 CAROTID OCCLUSION, RIGHT: Primary | ICD-10-CM

## 2020-09-15 DIAGNOSIS — I10 BENIGN ESSENTIAL HYPERTENSION: ICD-10-CM

## 2020-09-15 DIAGNOSIS — E11.40 CONTROLLED TYPE 2 DIABETES MELLITUS WITH DIABETIC NEUROPATHY, WITHOUT LONG-TERM CURRENT USE OF INSULIN (HCC): ICD-10-CM

## 2020-09-15 DIAGNOSIS — E78.2 MIXED HYPERLIPIDEMIA: ICD-10-CM

## 2020-09-15 DIAGNOSIS — Z23 NEED FOR INFLUENZA VACCINATION: ICD-10-CM

## 2020-09-15 PROCEDURE — 90662 IIV NO PRSV INCREASED AG IM: CPT | Performed by: FAMILY MEDICINE

## 2020-09-15 PROCEDURE — G0008 ADMIN INFLUENZA VIRUS VAC: HCPCS | Performed by: FAMILY MEDICINE

## 2020-09-15 PROCEDURE — 99214 OFFICE O/P EST MOD 30 MIN: CPT | Performed by: FAMILY MEDICINE

## 2020-09-15 RX ORDER — PRAVASTATIN SODIUM 80 MG/1
80 TABLET ORAL
Qty: 90 TABLET | Refills: 2 | Status: SHIPPED | OUTPATIENT
Start: 2020-09-15 | End: 2021-05-14 | Stop reason: HOSPADM

## 2020-09-15 NOTE — PATIENT INSTRUCTIONS
Repeat liver function in 2 weeks, or sooner if you get a CTA scan appointment    Will need contrast   I will contact Dr Russell Cardenas

## 2020-09-15 NOTE — PROGRESS NOTES
Efren Lennon 1945 male MRN: [id number]    FAMILY PRACTICE OFFICE VISIT  [facility] - [address]      ASSESSMENT/PLAN  Efren Lennon is a 77 y o  male presents to the office for    Diagnoses and all orders for this visit:    Carotid occlusion, right  -     Ambulatory referral to Vascular Surgery; Future  -     CTA neck w wo contrast; Future    Benign essential hypertension    Mixed hyperlipidemia  -     pravastatin (PRAVACHOL) 80 mg tablet; Take 1 tablet (80 mg total) by mouth daily at bedtime  -     Comprehensive metabolic panel; Future    Need for influenza vaccination  -     FLUZONE HIGH-DOSE: influenza vaccine, high-dose, preservative-free 0 7 mL    Controlled type 2 diabetes mellitus with diabetic neuropathy, without long-term current use of insulin (HCC)       CT showing occlusion to Right carotid  Occlusion of the right internal carotid artery with reconstitution at the level of the cavernous segment  CTA scheduled for Friday  Spoke with Vascular schedule for Friday as well  Currently Asymptomatic  ER precautions given  Increase cholesterol pravastatin to 80 mg daily from 40mg  On baby ASA  BP elevated today from normal, given anxiety about recent finding  Patient spent the night goggling and was very stressed to talk about it  DM controlled A1C 5 8% on medications           Future Appointments   Date Time Provider Nirmal Cleveland   9/18/2020  9:30 AM [place]  [facility] [id number] [facility]   9/18/2020  1:00 PM Pinky Avery DO [facility]   10/12/2020  2:30 PM Sharifa Doherty MD ENT [facility]   12/14/2020 11:30 AM Joao Russell MD [facility]          SUBJECTIVE  CC: Follow-up (pt here f/u abscess, ENT Dr Raj Holloway did bx and Ct and a blockage of carotoid was found)      HPI:  Efren Lennon is a 77 y o  male who presents for an acute appointment  Recently seen by Dr Raj Holloway had a CT of the neck, and discovered Carotid occlusion of the right   Patient with a history of aortic stenosis moderate being managed by cardiology  No symptoms of SOB, Dizziness, Visual problems  Patient states that he is very anxious today and BP is never high  He takes all his medications as prescribed without any issues  Taking all DM medications as prescribed  Review of Systems   Constitutional: Negative for activity change, appetite change, chills, fatigue and fever  HENT: Negative for congestion  Eyes: Negative for visual disturbance  Respiratory: Negative for cough, chest tightness and shortness of breath  Cardiovascular: Negative for chest pain and leg swelling  Gastrointestinal: Negative for abdominal distention, abdominal pain, constipation, diarrhea, nausea and vomiting  Neurological: Negative for dizziness and headaches  All other systems reviewed and are negative        Historical Information   The patient history was reviewed as follows:  Past Medical History:   Diagnosis Date    Arthritis     generalized    Diabetes mellitus [address] [address]     type    Hypertension     Peyronie's disease     last assessed [id number]    Reflux involving intestinal tract     gerd         Medications:     Current Outpatient Medications:     aspirin 81 mg chewable tablet, Chew 81 mg daily, Disp: , Rfl:     Dapagliflozin Propanediol (Farxiga) 5 MG TABS, Take 1 tablet (5 mg total) by mouth daily, Disp: 90 tablet, Rfl: 2    labetalol (NORMODYNE) 300 mg tablet, Take 1 tablet (300 mg total) by mouth 2 (two) times a day, Disp: 180 tablet, Rfl: 2    lisinopril (ZESTRIL) 5 mg tablet, Take 1 tablet (5 mg total) by mouth daily, Disp: 30 tablet, Rfl: 5    metFORMIN (GLUCOPHAGE) 1000 MG tablet, Take 1 tablet (1,000 mg total) by mouth 2 (two) times a day with meals, Disp: 180 tablet, Rfl: 2    pravastatin (PRAVACHOL) 80 mg tablet, Take 1 tablet (80 mg total) by mouth daily at bedtime, Disp: 90 tablet, Rfl: 2    No Known Allergies    OBJECTIVE  Vitals:   Vitals:    09/15/20 1451   BP: 170/78   BP Location: Right arm   Patient Position: Sitting   Cuff Size: Standard   Pulse: 57   Resp: 16   Temp: (!) 97 4 °F (36 3 °C)   TempSrc: Temporal   SpO2: 98%   Weight: 75 kg (165 lb 6 4 oz)   Height: 5' 9" (1 753 m)         Physical Exam  Vitals signs reviewed  Constitutional:       Appearance: He is well-developed  HENT:      Head: Normocephalic and atraumatic  Eyes:      Conjunctiva/sclera: Conjunctivae normal       Pupils: Pupils are equal, round, and reactive to light  Neck:      Musculoskeletal: Normal range of motion and neck supple  Vascular: Carotid bruit present  Cardiovascular:      Rate and Rhythm: Normal rate and regular rhythm  Heart sounds: Normal heart sounds  Pulmonary:      Effort: Pulmonary effort is normal  No respiratory distress  Breath sounds: Normal breath sounds  Musculoskeletal: Normal range of motion  Skin:     General: Skin is warm  Capillary Refill: Capillary refill takes less than 2 seconds  Neurological:      Mental Status: He is alert and oriented to person, place, and time                      Shawna King MD,   [facility]  9/15/2020

## 2020-09-15 NOTE — Clinical Note
Patient is being referred to Vascular for Occlusion of Right carotid  Already set for CTA, and scheduled for Friday  Increase statin to 80mg  Currently on 81mg of ASA, will likely need increase  However pending results

## 2020-09-17 ENCOUNTER — APPOINTMENT (OUTPATIENT)
Dept: LAB | Facility: CLINIC | Age: 75
End: 2020-09-17
Payer: MEDICARE

## 2020-09-17 DIAGNOSIS — E78.2 MIXED HYPERLIPIDEMIA: ICD-10-CM

## 2020-09-17 LAB
ALBUMIN SERPL BCP-MCNC: 4.1 G/DL (ref 3.5–5)
ALP SERPL-CCNC: 70 U/L (ref 46–116)
ALT SERPL W P-5'-P-CCNC: 16 U/L (ref 12–78)
ANION GAP SERPL CALCULATED.3IONS-SCNC: 5 MMOL/L (ref 4–13)
AST SERPL W P-5'-P-CCNC: 15 U/L (ref 5–45)
BILIRUB SERPL-MCNC: 0.52 MG/DL (ref 0.2–1)
BUN SERPL-MCNC: 14 MG/DL (ref 5–25)
CALCIUM SERPL-MCNC: 9.2 MG/DL (ref 8.3–10.1)
CHLORIDE SERPL-SCNC: 107 MMOL/L (ref 100–108)
CO2 SERPL-SCNC: 27 MMOL/L (ref 21–32)
CREAT SERPL-MCNC: 1.07 MG/DL (ref 0.6–1.3)
GFR SERPL CREATININE-BSD FRML MDRD: 68 ML/MIN/1.73SQ M
GLUCOSE P FAST SERPL-MCNC: 142 MG/DL (ref 65–99)
POTASSIUM SERPL-SCNC: 4.4 MMOL/L (ref 3.5–5.3)
PROT SERPL-MCNC: 7.1 G/DL (ref 6.4–8.2)
SODIUM SERPL-SCNC: 139 MMOL/L (ref 136–145)

## 2020-09-17 PROCEDURE — 80053 COMPREHEN METABOLIC PANEL: CPT

## 2020-09-17 PROCEDURE — 36415 COLL VENOUS BLD VENIPUNCTURE: CPT

## 2020-09-18 ENCOUNTER — HOSPITAL ENCOUNTER (OUTPATIENT)
Dept: RADIOLOGY | Facility: HOSPITAL | Age: 75
Discharge: HOME | End: 2020-09-18
Attending: FAMILY MEDICINE
Payer: MEDICARE

## 2020-09-18 ENCOUNTER — OFFICE VISIT (OUTPATIENT)
Dept: VASCULAR SURGERY | Facility: CLINIC | Age: 75
End: 2020-09-18
Payer: MEDICARE

## 2020-09-18 VITALS
WEIGHT: 166 LBS | BODY MASS INDEX: 24.59 KG/M2 | SYSTOLIC BLOOD PRESSURE: 168 MMHG | TEMPERATURE: 99.2 F | HEIGHT: 69 IN | DIASTOLIC BLOOD PRESSURE: 90 MMHG | HEART RATE: 60 BPM

## 2020-09-18 DIAGNOSIS — I65.21 CAROTID OCCLUSION, RIGHT: ICD-10-CM

## 2020-09-18 DIAGNOSIS — I65.21 CAROTID OCCLUSION, RIGHT: Primary | ICD-10-CM

## 2020-09-18 PROCEDURE — 70498 CT ANGIOGRAPHY NECK: CPT

## 2020-09-18 PROCEDURE — 99203 OFFICE O/P NEW LOW 30 MIN: CPT | Performed by: SURGERY

## 2020-09-18 RX ADMIN — IOHEXOL 85 ML: 350 INJECTION, SOLUTION INTRAVENOUS at 09:40

## 2020-09-18 NOTE — ASSESSMENT & PLAN NOTE
Patient was undergoing workup for a right submandibular mass which was fortunately benign however during part workup carotid disease was noted  He underwent a CTA of the neck this morning  Official read is pending  The right ICA is occluded  Fortunately has no neurologic sequelae  He does have bulky plaque at the distal left common carotid artery however the left internal carotid artery appears patent  Will await the official read  We will obtain a carotid duplex as baseline  He should continue on his aspirin and statin therapy

## 2020-09-18 NOTE — PROGRESS NOTES
Assessment/Plan:    ICAO (internal carotid artery occlusion), right  Patient was undergoing workup for a right submandibular mass which was fortunately benign however during part workup carotid disease was noted  He underwent a CTA of the neck this morning  Official read is pending  The right ICA is occluded  Fortunately has no neurologic sequelae  He does have bulky plaque at the distal left common carotid artery however the left internal carotid artery appears patent  Will await the official read  We will obtain a carotid duplex as baseline  He should continue on his aspirin and statin therapy  Of note With respect to his chronically occluded right internal carotid artery there is no indication for treatment  Diagnoses and all orders for this visit:    Carotid occlusion, right  -     Ambulatory referral to Vascular Surgery  -     VAS carotid complete study; Future          Subjective:      Patient ID: Liliane Joya is a 77 y o  male  Patient is new to us referred by Dr Marlene Pisano, patient had a CTA neck w contrast  Patient denies any TIA or stroke symptoms  Patient currently takes ASA 81mg, Lisionpril, pravastatin  Michele Ngo is a pleasant 45-year-old gentleman who recently underwent workup for a right-sided submandibular mass which was ultimately deemed benign  During part of workup there was suspicion for carotid disease  He denies any focal neurologic deficits  He is a nonsmoker  He underwent CTA of the neck early this morning  The official read is pending however upon my review the right ICA is chronically occluded  There is bulky ulcerated plaque of the distal left common carotid artery with otherwise widely patent internal carotid artery        The following portions of the patient's history were reviewed and updated as appropriate: allergies, current medications, past family history, past medical history, past social history, past surgical history and problem list     Review of Systems Constitutional: Negative  Negative for chills and fever  HENT: Negative  Eyes: Negative  Negative for visual disturbance  Respiratory: Negative  Negative for chest tightness and shortness of breath  Cardiovascular: Negative  Negative for chest pain  Gastrointestinal: Negative  Endocrine: Negative  Genitourinary: Negative  Musculoskeletal: Negative  Skin: Negative  Allergic/Immunologic: Negative  Neurological: Negative  Negative for dizziness, facial asymmetry, speech difficulty, weakness, light-headedness, numbness and headaches  Hematological: Negative  Psychiatric/Behavioral: Negative  I have personally reviewed the ROS entered by MA and agree as documented  Objective:      /90 (BP Location: Left arm, Patient Position: Sitting, Cuff Size: Standard)   Pulse 60   Temp 99 2 °F (37 3 °C) (Tympanic)   Ht 5' 9" (1 753 m)   Wt 75 3 kg (166 lb)   BMI 24 51 kg/m²          Physical Exam  Constitutional:       General: He is not in acute distress  Appearance: He is well-developed  HENT:      Head: Normocephalic and atraumatic  Eyes:      General: No scleral icterus  Conjunctiva/sclera: Conjunctivae normal    Neck:      Musculoskeletal: Normal range of motion and neck supple  Trachea: No tracheal deviation  Cardiovascular:      Rate and Rhythm: Normal rate and regular rhythm  Heart sounds: Normal heart sounds  Pulmonary:      Effort: Pulmonary effort is normal       Breath sounds: Normal breath sounds  Abdominal:      General: There is no distension  Palpations: Abdomen is soft  There is no mass (no appreciable aortic pulsation/aneurysm)  Tenderness: There is no abdominal tenderness  There is no guarding or rebound  Musculoskeletal: Normal range of motion  Skin:     General: Skin is warm and dry  Neurological:      Mental Status: He is alert and oriented to person, place, and time     Psychiatric:         Behavior: Behavior normal          Thought Content: Thought content normal          Judgment: Judgment normal        CTA neck reviewed personally by me  Right ICA occluded  Bulky ulcerated plaque distal left common carotid artery/carotid bulb with no apparent significant hemodynamically significant left ICA stenosis

## 2020-09-18 NOTE — PATIENT INSTRUCTIONS
Carotid Artery Disease   AMBULATORY CARE:   Carotid artery disease  is a condition that causes narrow or blocked carotid arteries  Your carotid arteries are the blood vessels that supply your brain with most of the blood it needs to work  You have 2 carotid arteries, one on each side of your neck  Call 911 for any of the following:   · You have any of the following signs of a stroke:      ¨ Numbness or drooping on one side of your face     ¨ Weakness in an arm or leg    ¨ Confusion or difficulty speaking    ¨ Dizziness, a severe headache, or vision loss    · You have any of the following signs of a heart attack:      ¨ Squeezing, pressure, or pain in your chest that lasts longer than 5 minutes or returns    ¨ Discomfort or pain in your back, neck, jaw, stomach, or arm     ¨ Trouble breathing    ¨ Nausea or vomiting    ¨ Lightheadedness or a sudden cold sweat, especially with chest pain or trouble breathing  Contact your healthcare provider if:   · You have questions or concerns about your condition or care  Signs and symptoms of carotid artery disease: You may have no signs or symptoms  Most commonly, carotid artery disease causes transient ischemic attacks (TIAs), or mini-strokes  You may have numbness, weakness, lack of movement, or vision or speech problems  A TIA goes away quickly and does not cause permanent damage  A TIA may be a warning sign that you are about to have a stroke  If you have any symptoms of a TIA or stroke, seek care immediately  Warning signs of a stroke: The word F A S T  can help you remember and recognize warning signs of a stroke  · F = Face:  One side of the face droops  · A = Arms:  One arm starts to drop when both arms are raised  · S = Speech:  Speech is slurred or sounds different than usual     · T = Time:  A person who is having a stroke needs to be seen immediately  A stroke is a medical emergency that needs immediate treatment   Some medicines and treatments work best if given within a few hours of a stroke  Treatment  for carotid artery disease depends on how narrow your arteries have become, your symptoms, and your general health  The goal of treatment is to lower your risk for a stroke  You may need any of the following:  · Take aspirin if directed  Your healthcare provider may suggest that you take an aspirin a day to prevent blood clots from forming in the carotid arteries  If your healthcare provider wants you to take aspirin daily, do not take acetaminophen or ibuprofen instead  · Control risk factors  High blood pressure, high cholesterol, heart disease, diabetes, and being overweight increase your risk for atherosclerosis  · Procedures can help open blocked arteries  A carotid endarterectomy is used to cut plaque out of the artery  An angioplasty is used to push the plaque against the artery wall with a balloon device  Sometimes a stent is placed during an angioplasty  A stent is a metal mesh tube that is placed in the artery to keep it open  Manage carotid artery disease:   · Eat a variety of healthy foods  Healthy foods include fruit, vegetables, whole-grain breads, low-fat dairy products, lean meat, and fish  Choose fish that are high in omega-3 fatty acids, such as salmon and fresh tuna  Ask your healthcare provider for more information on a heart healthy diet and the DASH eating plan  · Limit sodium (salt)  Sodium may increase your blood pressure  Add less table salt to your foods  Read food labels and choose foods that are low in sodium  Your healthcare provider may suggest you follow a low sodium diet  · Reach or maintain a healthy weight  Extra weight makes your heart work harder  Ask your healthcare provider how much you should weight  He can help you create a safe weight loss plan  Even a weight loss of 10% of your body weight can help your heart function better  · Exercise as directed    Exercise helps improve heart function and can help you manage your weight  Exercise can also help lower your cholesterol and blood sugar levels  Try to get at least 30 minutes of exercise at least 5 times each week  Try to be active every day  Your healthcare provider can help you create an exercise plan that works best for you  · Limit alcohol  Alcohol can increase your blood pressure and triglyceride levels  Men should limit alcohol to 2 drinks per day  Women should limit alcohol to 1 drink per day  A drink of alcohol is 12 ounces of beer, 5 ounces of wine, or 1½ ounces of liquor  · Do not smoke  Nicotine and other chemicals in cigarettes and cigars can cause heart and lung damage  Ask your healthcare provider for information if you currently smoke and need help to quit  E-cigarettes or smokeless tobacco still contain nicotine  Talk to your healthcare provider before you use these products  Follow up with your healthcare provider as directed:  Write down your questions so you remember to ask them during your visits  © 2017 [employer] Information is for End User's use only and may not be sold, redistributed or otherwise used for commercial purposes  All illustrations and images included in CareNotes® are the copyrighted property of [employer]  or Tomás Billingsley  The above information is an  only  It is not intended as medical advice for individual conditions or treatments  Talk to your doctor, nurse or pharmacist before following any medical regimen to see if it is safe and effective for you

## 2020-09-21 ENCOUNTER — TELEPHONE (OUTPATIENT)
Dept: FAMILY MEDICINE CLINIC | Facility: CLINIC | Age: 75
End: 2020-09-21

## 2020-09-21 NOTE — TELEPHONE ENCOUNTER
[facility] called from reading room - significant findings on pt CT A of neck tc/cma

## 2020-09-22 NOTE — TELEPHONE ENCOUNTER
Please advise the patient that the carotid is 60 to 70% stenosis of the left  I have sent it directly to the vascular doctor for him to evaluate

## 2020-09-22 NOTE — TELEPHONE ENCOUNTER
Pt stated that Dr Marzena Tyson is sending the pt for a f/u US pt goes Monday and then the pt will have a f/u appt with Dr Farr Larger

## 2020-09-28 ENCOUNTER — HOSPITAL ENCOUNTER (OUTPATIENT)
Dept: RADIOLOGY | Facility: HOSPITAL | Age: 75
Discharge: HOME | End: 2020-09-28
Attending: SURGERY
Payer: MEDICARE

## 2020-09-28 ENCOUNTER — TRANSCRIBE ORDERS (OUTPATIENT)
Dept: ADMINISTRATIVE | Facility: HOSPITAL | Age: 75
End: 2020-09-28

## 2020-09-28 DIAGNOSIS — I65.21 CAROTID OCCLUSION, RIGHT: ICD-10-CM

## 2020-09-28 PROCEDURE — 93880 EXTRACRANIAL BILAT STUDY: CPT

## 2020-09-28 PROCEDURE — 93880 EXTRACRANIAL BILAT STUDY: CPT | Performed by: SURGERY

## 2020-09-29 ENCOUNTER — TELEPHONE (OUTPATIENT)
Dept: ADMINISTRATIVE | Facility: HOSPITAL | Age: 75
End: 2020-09-29

## 2020-09-30 ENCOUNTER — TELEPHONE (OUTPATIENT)
Dept: ADMINISTRATIVE | Facility: HOSPITAL | Age: 75
End: 2020-09-30

## 2020-09-30 ENCOUNTER — OFFICE VISIT (OUTPATIENT)
Dept: VASCULAR SURGERY | Facility: CLINIC | Age: 75
End: 2020-09-30
Payer: MEDICARE

## 2020-09-30 VITALS
HEIGHT: 69 IN | BODY MASS INDEX: 24.53 KG/M2 | TEMPERATURE: 98.6 F | SYSTOLIC BLOOD PRESSURE: 152 MMHG | WEIGHT: 165.6 LBS | HEART RATE: 90 BPM | DIASTOLIC BLOOD PRESSURE: 90 MMHG

## 2020-09-30 DIAGNOSIS — I65.22 ASYMPTOMATIC STENOSIS OF LEFT CAROTID ARTERY: Primary | ICD-10-CM

## 2020-09-30 DIAGNOSIS — I65.21 INTERNAL CAROTID ARTERY OCCLUSION, RIGHT: ICD-10-CM

## 2020-09-30 PROCEDURE — 99214 OFFICE O/P EST MOD 30 MIN: CPT | Performed by: SURGERY

## 2020-09-30 RX ORDER — CEFAZOLIN SODIUM 1 G/50ML
1000 SOLUTION INTRAVENOUS ONCE
Status: CANCELLED | OUTPATIENT
Start: 2020-10-29 | End: 2020-09-30

## 2020-09-30 RX ORDER — CHLORHEXIDINE GLUCONATE 0.12 MG/ML
15 RINSE ORAL EVERY 12 HOURS SCHEDULED
Status: CANCELLED | OUTPATIENT
Start: 2020-10-29

## 2020-09-30 RX ORDER — CHLORHEXIDINE GLUCONATE 0.12 MG/ML
15 RINSE ORAL ONCE
Status: CANCELLED | OUTPATIENT
Start: 2020-10-29 | End: 2020-09-30

## 2020-09-30 NOTE — PATIENT INSTRUCTIONS
Carotid Artery Disease   AMBULATORY CARE:   Carotid artery disease  is a condition that causes narrow or blocked carotid arteries  Your carotid arteries are the blood vessels that supply your brain with most of the blood it needs to work  You have 2 carotid arteries, one on each side of your neck  Call 911 for any of the following:   · You have any of the following signs of a stroke:      ¨ Numbness or drooping on one side of your face     ¨ Weakness in an arm or leg    ¨ Confusion or difficulty speaking    ¨ Dizziness, a severe headache, or vision loss    · You have any of the following signs of a heart attack:      ¨ Squeezing, pressure, or pain in your chest that lasts longer than 5 minutes or returns    ¨ Discomfort or pain in your back, neck, jaw, stomach, or arm     ¨ Trouble breathing    ¨ Nausea or vomiting    ¨ Lightheadedness or a sudden cold sweat, especially with chest pain or trouble breathing  Contact your healthcare provider if:   · You have questions or concerns about your condition or care  Signs and symptoms of carotid artery disease: You may have no signs or symptoms  Most commonly, carotid artery disease causes transient ischemic attacks (TIAs), or mini-strokes  You may have numbness, weakness, lack of movement, or vision or speech problems  A TIA goes away quickly and does not cause permanent damage  A TIA may be a warning sign that you are about to have a stroke  If you have any symptoms of a TIA or stroke, seek care immediately  Warning signs of a stroke: The word F A S T  can help you remember and recognize warning signs of a stroke  · F = Face:  One side of the face droops  · A = Arms:  One arm starts to drop when both arms are raised  · S = Speech:  Speech is slurred or sounds different than usual     · T = Time:  A person who is having a stroke needs to be seen immediately  A stroke is a medical emergency that needs immediate treatment   Some medicines and treatments work best if given within a few hours of a stroke  Treatment  for carotid artery disease depends on how narrow your arteries have become, your symptoms, and your general health  The goal of treatment is to lower your risk for a stroke  You may need any of the following:  · Take aspirin if directed  Your healthcare provider may suggest that you take an aspirin a day to prevent blood clots from forming in the carotid arteries  If your healthcare provider wants you to take aspirin daily, do not take acetaminophen or ibuprofen instead  · Control risk factors  High blood pressure, high cholesterol, heart disease, diabetes, and being overweight increase your risk for atherosclerosis  · Procedures can help open blocked arteries  A carotid endarterectomy is used to cut plaque out of the artery  An angioplasty is used to push the plaque against the artery wall with a balloon device  Sometimes a stent is placed during an angioplasty  A stent is a metal mesh tube that is placed in the artery to keep it open  Manage carotid artery disease:   · Eat a variety of healthy foods  Healthy foods include fruit, vegetables, whole-grain breads, low-fat dairy products, lean meat, and fish  Choose fish that are high in omega-3 fatty acids, such as salmon and fresh tuna  Ask your healthcare provider for more information on a heart healthy diet and the DASH eating plan  · Limit sodium (salt)  Sodium may increase your blood pressure  Add less table salt to your foods  Read food labels and choose foods that are low in sodium  Your healthcare provider may suggest you follow a low sodium diet  · Reach or maintain a healthy weight  Extra weight makes your heart work harder  Ask your healthcare provider how much you should weight  He can help you create a safe weight loss plan  Even a weight loss of 10% of your body weight can help your heart function better  · Exercise as directed    Exercise helps improve heart function and can help you manage your weight  Exercise can also help lower your cholesterol and blood sugar levels  Try to get at least 30 minutes of exercise at least 5 times each week  Try to be active every day  Your healthcare provider can help you create an exercise plan that works best for you  · Limit alcohol  Alcohol can increase your blood pressure and triglyceride levels  Men should limit alcohol to 2 drinks per day  Women should limit alcohol to 1 drink per day  A drink of alcohol is 12 ounces of beer, 5 ounces of wine, or 1½ ounces of liquor  · Do not smoke  Nicotine and other chemicals in cigarettes and cigars can cause heart and lung damage  Ask your healthcare provider for information if you currently smoke and need help to quit  E-cigarettes or smokeless tobacco still contain nicotine  Talk to your healthcare provider before you use these products  Follow up with your healthcare provider as directed:  Write down your questions so you remember to ask them during your visits  © 2017 [facility] Information is for End User's use only and may not be sold, redistributed or otherwise used for commercial purposes  All illustrations and images included in CareNotes® are the copyrighted property of [employer]  or Tomás Billingsley  The above information is an  only  It is not intended as medical advice for individual conditions or treatments  Talk to your doctor, nurse or pharmacist before following any medical regimen to see if it is safe and effective for you

## 2020-09-30 NOTE — PROGRESS NOTES
Assessment/Plan:    Internal carotid artery occlusion, right  Right internal iliac artery chronically occluded  No vascular interventions indicated  His carotid duplex also however demonstrates elevated left internal carotid artery which while it  can be due to compensatory flow secondary to the contralateral occlusion does have heavy atherosclerotic plaque noted on CTA  Recommend left carotid endarterectomy  The procedure, risks, benefits, alternatives, and anticipated postop course were discussed in detail  Patient was agreeable to proceed  Written consent was obtained  Asymptomatic stenosis of left carotid artery  Asymptomatic left carotid stenosis  Carotid duplex-  Left                 Impression  PSV  EDV (cm/s)  Direction of Flow  Ratio    Dist  ICA                        106          36                      0 81    Mid  ICA                         124          33                      2 22    Prox  ICA            50 - 69%    155          39                      1 40    Carotid Bifurcation  70%+        291          77                              Dist CCA                         115                                          Mid CCA                          131                                  1 49    Prox CCA                          88                                          Ext Carotid                      216          41                      1 32    Prox Vert                         51              Antegrade                   Subclavian                       107                                                    Diagnoses and all orders for this visit:    Asymptomatic stenosis of left carotid artery  -     Case request operating room: ENDARTERECTOMY ARTERY CAROTID; Standing  -     Type and screen; Future  -     CBC and Platelet; Future  -     EKG 12 lead;  Future  -     Case request operating room: ENDARTERECTOMY ARTERY CAROTID    Internal carotid artery occlusion, right    Other orders  - Diet NPO; Sips with meds; Standing  -     Void on call to OR; Standing  -     Insert peripheral IV; Standing  -     Nursing Communication CHG bath, have staff wash entire body (neck down) per pre op bathing protocol  Routine, evening prior to, and day of surgery ; Standing  -     Nursing Communication Swab both nares with Povidone-Iodine solution, EXCLUDE if patient has shellfish/Iodine allergy  Routine, day of surgery, on call to OR ; Standing  -     chlorhexidine (PERIDEX) 0 12 % oral rinse 15 mL  -     Place sequential compression device; Standing  -     chlorhexidine (PERIDEX) 0 12 % oral rinse 15 mL  -     ceFAZolin (ANCEF) IVPB (premix in dextrose) 1,000 mg 50 mL          Subjective:      Patient ID: Benny Cuenca is a 77 y o  male  Patient is here to REV CV done on 9/28/20  Patient has no tia or stroke symptoms  Patient currently takes ASA 81mg, Lisinopril, pravastatin  Megha Citrin returns to the office to discuss results of his carotid duplex  He recently underwent CT imaging for a right submandibular mass  CT a was notable for right internal carotid artery occlusion  There was also heavy plaque burden of the distal left common carotid/bifurcation  Carotid duplex does suggest a 70+ stenosis at the bifurcation with a 50-69% of the proximal ICA  He denies any focal neurologic deficits  The following portions of the patient's history were reviewed and updated as appropriate: allergies, current medications, past family history, past medical history, past social history, past surgical history and problem list     Review of Systems   Constitutional: Negative  Negative for chills and fever  HENT: Negative  Eyes: Negative  Negative for visual disturbance  Respiratory: Negative  Negative for chest tightness and shortness of breath  Cardiovascular: Negative  Negative for chest pain  Gastrointestinal: Negative  Endocrine: Negative  Genitourinary: Negative  Musculoskeletal: Negative  Skin: Negative  Allergic/Immunologic: Negative  Neurological: Negative  Negative for dizziness, facial asymmetry, speech difficulty, weakness, light-headedness, numbness and headaches  Hematological: Negative  Psychiatric/Behavioral: Negative  I have personally reviewed the ROS entered by MA and agree as documented  Objective:      /90 (BP Location: Left arm, Patient Position: Sitting, Cuff Size: Standard)   Pulse 90   Temp 98 6 °F (37 °C) (Tympanic)   Ht 5' 9" (1 753 m)   Wt 75 1 kg (165 lb 9 6 oz)   BMI 24 45 kg/m²          Physical Exam  Constitutional:       General: He is not in acute distress  Appearance: He is well-developed  HENT:      Head: Normocephalic and atraumatic  Eyes:      General: No scleral icterus  Conjunctiva/sclera: Conjunctivae normal    Neck:      Musculoskeletal: Normal range of motion and neck supple  Trachea: No tracheal deviation  Cardiovascular:      Rate and Rhythm: Normal rate and regular rhythm  Heart sounds: Normal heart sounds  Pulmonary:      Effort: Pulmonary effort is normal       Breath sounds: Normal breath sounds  Abdominal:      General: There is no distension  Palpations: Abdomen is soft  There is no mass (no appreciable aortic pulsation/aneurysm)  Tenderness: There is no abdominal tenderness  There is no guarding or rebound  Musculoskeletal: Normal range of motion  Skin:     General: Skin is warm and dry  Neurological:      Mental Status: He is alert and oriented to person, place, and time  Psychiatric:         Mood and Affect: Mood normal          Behavior: Behavior normal          Thought Content:  Thought content normal          Judgment: Judgment normal        Operative Scheduling Information:    Hospital:  [facility]    Physician:  Mckenzie Hernandez    Surgery:  Left carotid endarterectomy    Urgency:  Standard    Level:  Level 3: Outpatients to be scheduled for elective surgery than can be delayed up to 4 weeks without reasonable expectation of detriment to patient    Case Length:  Normal    Post-op Bed:  Stepdown    OR Table:  Standard    Equipment Needs:  Product/Implant:  Bovine pericardial patch    Medication Instructions:  Aspirin:   Continue (do not hold)    Hydration:  No

## 2020-09-30 NOTE — ASSESSMENT & PLAN NOTE
Right internal iliac artery chronically occluded  No vascular interventions indicated  His carotid duplex also however demonstrates elevated left internal carotid artery which while it  can be due to compensatory flow secondary to the contralateral occlusion does have heavy atherosclerotic plaque noted on CTA  Recommend left carotid endarterectomy  The procedure, risks, benefits, alternatives, and anticipated postop course were discussed in detail  Patient was agreeable to proceed  Written consent was obtained

## 2020-09-30 NOTE — ASSESSMENT & PLAN NOTE
Asymptomatic left carotid stenosis  Carotid duplex-  Left                 Impression  PSV  EDV (cm/s)  Direction of Flow  Ratio    Dist  ICA                        106          36                      0 81    Mid  ICA                         124          33                      2 22    Prox   ICA            50 - 69%    155          39                      1 40    Carotid Bifurcation  70%+        291          77                              Dist CCA                         115                                          Mid CCA                          131                                  1 49    Prox CCA                          88                                          Ext Carotid                      216          41                      1 32    Prox Vert                         51              Antegrade                   Subclavian                       107

## 2020-10-02 ENCOUNTER — PREP FOR PROCEDURE (OUTPATIENT)
Dept: VASCULAR SURGERY | Facility: CLINIC | Age: 75
End: 2020-10-02

## 2020-10-02 DIAGNOSIS — E11.40 CONTROLLED TYPE 2 DIABETES MELLITUS WITH DIABETIC NEUROPATHY, UNSPECIFIED WHETHER LONG TERM INSULIN USE (HCC): ICD-10-CM

## 2020-10-02 DIAGNOSIS — Z11.59 SCREENING FOR VIRAL DISEASE: Primary | ICD-10-CM

## 2020-10-02 DIAGNOSIS — I65.22 ASYMPTOMATIC STENOSIS OF LEFT CAROTID ARTERY: Primary | ICD-10-CM

## 2020-10-12 ENCOUNTER — OFFICE VISIT (OUTPATIENT)
Dept: OTOLARYNGOLOGY | Facility: CLINIC | Age: 75
End: 2020-10-12
Payer: MEDICARE

## 2020-10-12 VITALS — BODY MASS INDEX: 24.44 KG/M2 | TEMPERATURE: 97.5 F | WEIGHT: 165 LBS | HEIGHT: 69 IN

## 2020-10-12 DIAGNOSIS — R22.1 MASS OF RIGHT SIDE OF NECK: Primary | ICD-10-CM

## 2020-10-12 DIAGNOSIS — I65.21 INTERNAL CAROTID ARTERY OCCLUSION, RIGHT: ICD-10-CM

## 2020-10-12 PROCEDURE — 99213 OFFICE O/P EST LOW 20 MIN: CPT | Performed by: SPECIALIST

## 2020-10-15 ENCOUNTER — ANESTHESIA EVENT (OUTPATIENT)
Dept: PERIOP | Facility: HOSPITAL | Age: 75
DRG: 039 | End: 2020-10-15
Payer: MEDICARE

## 2020-10-19 ENCOUNTER — APPOINTMENT (OUTPATIENT)
Dept: LAB | Facility: CLINIC | Age: 75
End: 2020-10-19
Payer: MEDICARE

## 2020-10-19 DIAGNOSIS — I65.22 ASYMPTOMATIC STENOSIS OF LEFT CAROTID ARTERY: Primary | ICD-10-CM

## 2020-10-19 DIAGNOSIS — Z11.59 SCREENING FOR VIRAL DISEASE: ICD-10-CM

## 2020-10-19 DIAGNOSIS — E11.40 CONTROLLED TYPE 2 DIABETES MELLITUS WITH DIABETIC NEUROPATHY, UNSPECIFIED WHETHER LONG TERM INSULIN USE (HCC): ICD-10-CM

## 2020-10-19 LAB
ANION GAP SERPL CALCULATED.3IONS-SCNC: 2 MMOL/L (ref 4–13)
BUN SERPL-MCNC: 20 MG/DL (ref 5–25)
CALCIUM SERPL-MCNC: 9.4 MG/DL (ref 8.3–10.1)
CHLORIDE SERPL-SCNC: 107 MMOL/L (ref 100–108)
CO2 SERPL-SCNC: 30 MMOL/L (ref 21–32)
CREAT SERPL-MCNC: 1.06 MG/DL (ref 0.6–1.3)
ERYTHROCYTE [DISTWIDTH] IN BLOOD BY AUTOMATED COUNT: 12.8 % (ref 11.6–15.1)
EST. AVERAGE GLUCOSE BLD GHB EST-MCNC: 131 MG/DL
GFR SERPL CREATININE-BSD FRML MDRD: 69 ML/MIN/1.73SQ M
GLUCOSE SERPL-MCNC: 207 MG/DL (ref 65–140)
HBA1C MFR BLD: 6.2 %
HCT VFR BLD AUTO: 41.3 % (ref 36.5–49.3)
HGB BLD-MCNC: 13.8 G/DL (ref 12–17)
INR PPP: 1 (ref 0.84–1.19)
MCH RBC QN AUTO: 31.2 PG (ref 26.8–34.3)
MCHC RBC AUTO-ENTMCNC: 33.4 G/DL (ref 31.4–37.4)
MCV RBC AUTO: 93 FL (ref 82–98)
PLATELET # BLD AUTO: 186 THOUSANDS/UL (ref 149–390)
PMV BLD AUTO: 9.9 FL (ref 8.9–12.7)
POTASSIUM SERPL-SCNC: 4.1 MMOL/L (ref 3.5–5.3)
PROTHROMBIN TIME: 13.2 SECONDS (ref 11.6–14.5)
RBC # BLD AUTO: 4.42 MILLION/UL (ref 3.88–5.62)
SODIUM SERPL-SCNC: 139 MMOL/L (ref 136–145)
WBC # BLD AUTO: 5.4 THOUSAND/UL (ref 4.31–10.16)

## 2020-10-19 PROCEDURE — 85610 PROTHROMBIN TIME: CPT

## 2020-10-19 PROCEDURE — 86900 BLOOD TYPING SEROLOGIC ABO: CPT

## 2020-10-19 PROCEDURE — 85027 COMPLETE CBC AUTOMATED: CPT

## 2020-10-19 PROCEDURE — 86901 BLOOD TYPING SEROLOGIC RH(D): CPT

## 2020-10-19 PROCEDURE — U0003 INFECTIOUS AGENT DETECTION BY NUCLEIC ACID (DNA OR RNA); SEVERE ACUTE RESPIRATORY SYNDROME CORONAVIRUS 2 (SARS-COV-2) (CORONAVIRUS DISEASE [COVID-19]), AMPLIFIED PROBE TECHNIQUE, MAKING USE OF HIGH THROUGHPUT TECHNOLOGIES AS DESCRIBED BY CMS-2020-01-R: HCPCS | Performed by: SURGERY

## 2020-10-19 PROCEDURE — 83036 HEMOGLOBIN GLYCOSYLATED A1C: CPT

## 2020-10-19 PROCEDURE — 86850 RBC ANTIBODY SCREEN: CPT

## 2020-10-19 PROCEDURE — 80048 BASIC METABOLIC PNL TOTAL CA: CPT

## 2020-10-19 PROCEDURE — 36415 COLL VENOUS BLD VENIPUNCTURE: CPT

## 2020-10-20 LAB
ABO GROUP BLD: NORMAL
BLD GP AB SCN SERPL QL: NEGATIVE
RH BLD: POSITIVE
SPECIMEN EXPIRATION DATE: NORMAL

## 2020-10-21 LAB — SARS-COV-2 RNA SPEC QL NAA+PROBE: NOT DETECTED

## 2020-10-29 ENCOUNTER — APPOINTMENT (OUTPATIENT)
Dept: NON INVASIVE DIAGNOSTICS | Facility: HOSPITAL | Age: 75
DRG: 039 | End: 2020-10-29
Payer: MEDICARE

## 2020-10-29 ENCOUNTER — HOSPITAL ENCOUNTER (INPATIENT)
Facility: HOSPITAL | Age: 75
LOS: 1 days | Discharge: HOME | DRG: 039 | End: 2020-10-30
Attending: SURGERY | Admitting: SURGERY
Payer: MEDICARE

## 2020-10-29 ENCOUNTER — ANESTHESIA (OUTPATIENT)
Dept: PERIOP | Facility: HOSPITAL | Age: 75
DRG: 039 | End: 2020-10-29
Payer: MEDICARE

## 2020-10-29 VITALS — HEART RATE: 75 BPM

## 2020-10-29 DIAGNOSIS — I65.22 CAROTID STENOSIS, LEFT: ICD-10-CM

## 2020-10-29 DIAGNOSIS — I10 BENIGN ESSENTIAL HYPERTENSION: Primary | ICD-10-CM

## 2020-10-29 DIAGNOSIS — E78.5 HYPERLIPIDEMIA, UNSPECIFIED HYPERLIPIDEMIA TYPE: ICD-10-CM

## 2020-10-29 PROBLEM — Z98.890 STATUS POST CAROTID ENDARTERECTOMY: Status: ACTIVE | Noted: 2020-10-29

## 2020-10-29 LAB
ABO GROUP BLD: NORMAL
GLUCOSE SERPL-MCNC: 117 MG/DL (ref 65–140)
GLUCOSE SERPL-MCNC: 122 MG/DL (ref 65–140)
GLUCOSE SERPL-MCNC: 126 MG/DL (ref 65–140)
GLUCOSE SERPL-MCNC: 131 MG/DL (ref 65–140)
KCT BLD-ACNC: 189 SEC (ref 89–137)
KCT BLD-ACNC: 201 SEC (ref 89–137)
KCT BLD-ACNC: 225 SEC (ref 89–137)
PLATELET # BLD AUTO: 169 THOUSANDS/UL (ref 149–390)
PMV BLD AUTO: 9.4 FL (ref 8.9–12.7)
RH BLD: POSITIVE
SPECIMEN SOURCE: ABNORMAL

## 2020-10-29 PROCEDURE — 85049 AUTOMATED PLATELET COUNT: CPT | Performed by: SURGERY

## 2020-10-29 PROCEDURE — 99024 POSTOP FOLLOW-UP VISIT: CPT | Performed by: SURGERY

## 2020-10-29 PROCEDURE — G9197 ORDER FOR CEPH: HCPCS | Performed by: SURGERY

## 2020-10-29 PROCEDURE — 03UJ0KZ SUPPLEMENT LEFT COMMON CAROTID ARTERY WITH NONAUTOLOGOUS TISSUE SUBSTITUTE, OPEN APPROACH: ICD-10-PCS | Performed by: SURGERY

## 2020-10-29 PROCEDURE — 03CJ0ZZ EXTIRPATION OF MATTER FROM LEFT COMMON CAROTID ARTERY, OPEN APPROACH: ICD-10-PCS | Performed by: SURGERY

## 2020-10-29 PROCEDURE — 82948 REAGENT STRIP/BLOOD GLUCOSE: CPT

## 2020-10-29 PROCEDURE — 35301 RECHANNELING OF ARTERY: CPT | Performed by: SURGERY

## 2020-10-29 PROCEDURE — 99223 1ST HOSP IP/OBS HIGH 75: CPT | Performed by: INTERNAL MEDICINE

## 2020-10-29 PROCEDURE — 93882 EXTRACRANIAL UNI/LTD STUDY: CPT

## 2020-10-29 PROCEDURE — 85347 COAGULATION TIME ACTIVATED: CPT

## 2020-10-29 PROCEDURE — 35301 RECHANNELING OF ARTERY: CPT | Performed by: PHYSICIAN ASSISTANT

## 2020-10-29 DEVICE — XENOSURE BIOLOGIC PATCH, 0.8CM X 8CM, EIFU
Type: IMPLANTABLE DEVICE | Site: CAROTID | Status: FUNCTIONAL
Brand: XENOSURE BIOLOGIC PATCH

## 2020-10-29 RX ORDER — GLYCOPYRROLATE 0.2 MG/ML
INJECTION INTRAMUSCULAR; INTRAVENOUS AS NEEDED
Status: DISCONTINUED | OUTPATIENT
Start: 2020-10-29 | End: 2020-10-29

## 2020-10-29 RX ORDER — DEXTROSE MONOHYDRATE AND SODIUM CHLORIDE 5; .45 G/100ML; G/100ML
75 INJECTION, SOLUTION INTRAVENOUS CONTINUOUS
Status: DISPENSED | OUTPATIENT
Start: 2020-10-29 | End: 2020-10-29

## 2020-10-29 RX ORDER — LISINOPRIL 5 MG/1
5 TABLET ORAL DAILY
Status: DISCONTINUED | OUTPATIENT
Start: 2020-10-30 | End: 2020-10-29

## 2020-10-29 RX ORDER — PROTAMINE SULFATE 10 MG/ML
INJECTION, SOLUTION INTRAVENOUS AS NEEDED
Status: DISCONTINUED | OUTPATIENT
Start: 2020-10-29 | End: 2020-10-29

## 2020-10-29 RX ORDER — LABETALOL 20 MG/4 ML (5 MG/ML) INTRAVENOUS SYRINGE
5
Status: COMPLETED | OUTPATIENT
Start: 2020-10-29 | End: 2020-10-29

## 2020-10-29 RX ORDER — NEOSTIGMINE METHYLSULFATE 1 MG/ML
INJECTION INTRAVENOUS AS NEEDED
Status: DISCONTINUED | OUTPATIENT
Start: 2020-10-29 | End: 2020-10-29

## 2020-10-29 RX ORDER — EPHEDRINE SULFATE 50 MG/ML
INJECTION INTRAVENOUS AS NEEDED
Status: DISCONTINUED | OUTPATIENT
Start: 2020-10-29 | End: 2020-10-29

## 2020-10-29 RX ORDER — CEFAZOLIN SODIUM 1 G/50ML
1000 SOLUTION INTRAVENOUS ONCE
Status: COMPLETED | OUTPATIENT
Start: 2020-10-29 | End: 2020-10-29

## 2020-10-29 RX ORDER — FENTANYL CITRATE 50 UG/ML
INJECTION, SOLUTION INTRAMUSCULAR; INTRAVENOUS AS NEEDED
Status: DISCONTINUED | OUTPATIENT
Start: 2020-10-29 | End: 2020-10-29

## 2020-10-29 RX ORDER — LIDOCAINE HYDROCHLORIDE AND EPINEPHRINE 10; 10 MG/ML; UG/ML
INJECTION, SOLUTION INFILTRATION; PERINEURAL AS NEEDED
Status: DISCONTINUED | OUTPATIENT
Start: 2020-10-29 | End: 2020-10-29 | Stop reason: HOSPADM

## 2020-10-29 RX ORDER — HEPARIN SODIUM 5000 [USP'U]/ML
5000 INJECTION, SOLUTION INTRAVENOUS; SUBCUTANEOUS EVERY 8 HOURS SCHEDULED
Status: DISCONTINUED | OUTPATIENT
Start: 2020-10-30 | End: 2020-10-30 | Stop reason: HOSPADM

## 2020-10-29 RX ORDER — CHLORHEXIDINE GLUCONATE 0.12 MG/ML
15 RINSE ORAL ONCE
Status: DISCONTINUED | OUTPATIENT
Start: 2020-10-29 | End: 2020-10-29

## 2020-10-29 RX ORDER — SODIUM CHLORIDE 9 MG/ML
125 INJECTION, SOLUTION INTRAVENOUS CONTINUOUS
Status: DISCONTINUED | OUTPATIENT
Start: 2020-10-29 | End: 2020-10-29

## 2020-10-29 RX ORDER — FENTANYL CITRATE/PF 50 MCG/ML
25 SYRINGE (ML) INJECTION
Status: DISCONTINUED | OUTPATIENT
Start: 2020-10-29 | End: 2020-10-29 | Stop reason: HOSPADM

## 2020-10-29 RX ORDER — ONDANSETRON 2 MG/ML
INJECTION INTRAMUSCULAR; INTRAVENOUS AS NEEDED
Status: DISCONTINUED | OUTPATIENT
Start: 2020-10-29 | End: 2020-10-29

## 2020-10-29 RX ORDER — ROCURONIUM BROMIDE 10 MG/ML
INJECTION, SOLUTION INTRAVENOUS AS NEEDED
Status: DISCONTINUED | OUTPATIENT
Start: 2020-10-29 | End: 2020-10-29

## 2020-10-29 RX ORDER — LIDOCAINE HYDROCHLORIDE 10 MG/ML
INJECTION, SOLUTION EPIDURAL; INFILTRATION; INTRACAUDAL; PERINEURAL AS NEEDED
Status: DISCONTINUED | OUTPATIENT
Start: 2020-10-29 | End: 2020-10-29

## 2020-10-29 RX ORDER — PRAVASTATIN SODIUM 80 MG/1
80 TABLET ORAL
Status: DISCONTINUED | OUTPATIENT
Start: 2020-10-29 | End: 2020-10-30 | Stop reason: HOSPADM

## 2020-10-29 RX ORDER — ALBUTEROL SULFATE 2.5 MG/3ML
2.5 SOLUTION RESPIRATORY (INHALATION) ONCE AS NEEDED
Status: DISCONTINUED | OUTPATIENT
Start: 2020-10-29 | End: 2020-10-29 | Stop reason: HOSPADM

## 2020-10-29 RX ORDER — HYDRALAZINE HYDROCHLORIDE 20 MG/ML
15 INJECTION INTRAMUSCULAR; INTRAVENOUS
Status: DISCONTINUED | OUTPATIENT
Start: 2020-10-29 | End: 2020-10-30 | Stop reason: HOSPADM

## 2020-10-29 RX ORDER — SODIUM CHLORIDE 9 MG/ML
INJECTION, SOLUTION INTRAVENOUS CONTINUOUS PRN
Status: DISCONTINUED | OUTPATIENT
Start: 2020-10-29 | End: 2020-10-29

## 2020-10-29 RX ORDER — ONDANSETRON 2 MG/ML
4 INJECTION INTRAMUSCULAR; INTRAVENOUS EVERY 6 HOURS PRN
Status: DISCONTINUED | OUTPATIENT
Start: 2020-10-29 | End: 2020-10-30 | Stop reason: HOSPADM

## 2020-10-29 RX ORDER — PROPOFOL 10 MG/ML
INJECTION, EMULSION INTRAVENOUS AS NEEDED
Status: DISCONTINUED | OUTPATIENT
Start: 2020-10-29 | End: 2020-10-29

## 2020-10-29 RX ORDER — CHLORHEXIDINE GLUCONATE 0.12 MG/ML
15 RINSE ORAL EVERY 12 HOURS SCHEDULED
Status: DISCONTINUED | OUTPATIENT
Start: 2020-10-29 | End: 2020-10-29

## 2020-10-29 RX ORDER — ASPIRIN 81 MG/1
81 TABLET, CHEWABLE ORAL DAILY
Status: DISCONTINUED | OUTPATIENT
Start: 2020-10-30 | End: 2020-10-30 | Stop reason: HOSPADM

## 2020-10-29 RX ORDER — HEPARIN SODIUM 1000 [USP'U]/ML
INJECTION, SOLUTION INTRAVENOUS; SUBCUTANEOUS AS NEEDED
Status: DISCONTINUED | OUTPATIENT
Start: 2020-10-29 | End: 2020-10-29

## 2020-10-29 RX ORDER — CLOPIDOGREL BISULFATE 75 MG/1
75 TABLET ORAL DAILY
Status: DISCONTINUED | OUTPATIENT
Start: 2020-10-29 | End: 2020-10-30 | Stop reason: HOSPADM

## 2020-10-29 RX ORDER — LISINOPRIL 5 MG/1
5 TABLET ORAL DAILY
Status: DISCONTINUED | OUTPATIENT
Start: 2020-10-29 | End: 2020-10-30 | Stop reason: HOSPADM

## 2020-10-29 RX ORDER — MIDAZOLAM HYDROCHLORIDE 2 MG/2ML
INJECTION, SOLUTION INTRAMUSCULAR; INTRAVENOUS AS NEEDED
Status: DISCONTINUED | OUTPATIENT
Start: 2020-10-29 | End: 2020-10-29

## 2020-10-29 RX ADMIN — FENTANYL CITRATE 50 MCG: 50 INJECTION, SOLUTION INTRAMUSCULAR; INTRAVENOUS at 11:03

## 2020-10-29 RX ADMIN — GLYCOPYRROLATE 0.4 MG: 0.2 INJECTION, SOLUTION INTRAMUSCULAR; INTRAVENOUS at 13:08

## 2020-10-29 RX ADMIN — LIDOCAINE HYDROCHLORIDE 100 MG: 10 INJECTION, SOLUTION EPIDURAL; INFILTRATION; INTRACAUDAL; PERINEURAL at 10:31

## 2020-10-29 RX ADMIN — PROTAMINE SULFATE 20 MG: 10 INJECTION, SOLUTION INTRAVENOUS at 12:56

## 2020-10-29 RX ADMIN — EPHEDRINE SULFATE 5 MG: 50 INJECTION, SOLUTION INTRAVENOUS at 11:13

## 2020-10-29 RX ADMIN — DEXTROSE AND SODIUM CHLORIDE 75 ML/HR: 5; .45 INJECTION, SOLUTION INTRAVENOUS at 15:19

## 2020-10-29 RX ADMIN — HYDRALAZINE HYDROCHLORIDE 15 MG: 20 INJECTION INTRAMUSCULAR; INTRAVENOUS at 15:09

## 2020-10-29 RX ADMIN — HYDRALAZINE HYDROCHLORIDE 15 MG: 20 INJECTION INTRAMUSCULAR; INTRAVENOUS at 20:51

## 2020-10-29 RX ADMIN — SODIUM CHLORIDE: 0.9 INJECTION, SOLUTION INTRAVENOUS at 10:27

## 2020-10-29 RX ADMIN — GLYCOPYRROLATE 0.2 MG: 0.2 INJECTION, SOLUTION INTRAMUSCULAR; INTRAVENOUS at 10:50

## 2020-10-29 RX ADMIN — HEPARIN SODIUM 2000 UNITS: 1000 INJECTION INTRAVENOUS; SUBCUTANEOUS at 11:38

## 2020-10-29 RX ADMIN — ROCURONIUM BROMIDE 50 MG: 10 INJECTION, SOLUTION INTRAVENOUS at 10:31

## 2020-10-29 RX ADMIN — EPHEDRINE SULFATE 10 MG: 50 INJECTION, SOLUTION INTRAVENOUS at 11:35

## 2020-10-29 RX ADMIN — SODIUM CHLORIDE: 0.9 INJECTION, SOLUTION INTRAVENOUS at 12:00

## 2020-10-29 RX ADMIN — MIDAZOLAM 2 MG: 1 INJECTION INTRAMUSCULAR; INTRAVENOUS at 10:27

## 2020-10-29 RX ADMIN — SODIUM CHLORIDE: 0.9 INJECTION, SOLUTION INTRAVENOUS at 10:46

## 2020-10-29 RX ADMIN — LABETALOL 20 MG/4 ML (5 MG/ML) INTRAVENOUS SYRINGE 5 MG: at 13:52

## 2020-10-29 RX ADMIN — PROPOFOL 200 MG: 10 INJECTION, EMULSION INTRAVENOUS at 10:31

## 2020-10-29 RX ADMIN — CHLORHEXIDINE GLUCONATE 0.12% ORAL RINSE 15 ML: 1.2 LIQUID ORAL at 09:16

## 2020-10-29 RX ADMIN — HEPARIN SODIUM 7000 UNITS: 1000 INJECTION INTRAVENOUS; SUBCUTANEOUS at 11:26

## 2020-10-29 RX ADMIN — ONDANSETRON 4 MG: 2 INJECTION INTRAMUSCULAR; INTRAVENOUS at 10:31

## 2020-10-29 RX ADMIN — SODIUM CHLORIDE: 0.9 INJECTION, SOLUTION INTRAVENOUS at 12:35

## 2020-10-29 RX ADMIN — PHENYLEPHRINE HYDROCHLORIDE 100 MCG: 10 INJECTION INTRAVENOUS at 11:10

## 2020-10-29 RX ADMIN — CLOPIDOGREL BISULFATE 75 MG: 75 TABLET ORAL at 17:07

## 2020-10-29 RX ADMIN — CEFAZOLIN SODIUM 1000 MG: 1 SOLUTION INTRAVENOUS at 10:31

## 2020-10-29 RX ADMIN — FENTANYL CITRATE 50 MCG: 50 INJECTION, SOLUTION INTRAMUSCULAR; INTRAVENOUS at 10:31

## 2020-10-29 RX ADMIN — LABETALOL HYDROCHLORIDE 300 MG: 200 TABLET, FILM COATED ORAL at 17:25

## 2020-10-29 RX ADMIN — EPHEDRINE SULFATE 5 MG: 50 INJECTION, SOLUTION INTRAVENOUS at 11:28

## 2020-10-29 RX ADMIN — HYDRALAZINE HYDROCHLORIDE 15 MG: 20 INJECTION INTRAMUSCULAR; INTRAVENOUS at 23:23

## 2020-10-29 RX ADMIN — HEPARIN SODIUM 1000 UNITS: 1000 INJECTION INTRAVENOUS; SUBCUTANEOUS at 12:20

## 2020-10-29 RX ADMIN — PHENYLEPHRINE HYDROCHLORIDE 30 MCG/MIN: 10 INJECTION INTRAVENOUS at 10:49

## 2020-10-29 RX ADMIN — PRAVASTATIN SODIUM 80 MG: 80 TABLET ORAL at 23:23

## 2020-10-29 RX ADMIN — LABETALOL 20 MG/4 ML (5 MG/ML) INTRAVENOUS SYRINGE 5 MG: at 14:09

## 2020-10-29 RX ADMIN — NEOSTIGMINE METHYLSULFATE 3 MG: 1 INJECTION, SOLUTION INTRAVENOUS at 13:08

## 2020-10-29 RX ADMIN — LISINOPRIL 5 MG: 5 TABLET ORAL at 17:25

## 2020-10-30 ENCOUNTER — DOCUMENTATION (OUTPATIENT)
Dept: VASCULAR SURGERY | Facility: CLINIC | Age: 75
End: 2020-10-30

## 2020-10-30 VITALS
HEIGHT: 69 IN | WEIGHT: 165.57 LBS | OXYGEN SATURATION: 96 % | BODY MASS INDEX: 24.52 KG/M2 | HEART RATE: 80 BPM | RESPIRATION RATE: 18 BRPM | SYSTOLIC BLOOD PRESSURE: 113 MMHG | DIASTOLIC BLOOD PRESSURE: 59 MMHG | TEMPERATURE: 97.9 F

## 2020-10-30 LAB
ANION GAP SERPL CALCULATED.3IONS-SCNC: 12 MMOL/L (ref 4–13)
BUN SERPL-MCNC: 12 MG/DL (ref 5–25)
CALCIUM SERPL-MCNC: 7.8 MG/DL (ref 8.3–10.1)
CHLORIDE SERPL-SCNC: 103 MMOL/L (ref 100–108)
CO2 SERPL-SCNC: 22 MMOL/L (ref 21–32)
CREAT SERPL-MCNC: 0.67 MG/DL (ref 0.6–1.3)
ERYTHROCYTE [DISTWIDTH] IN BLOOD BY AUTOMATED COUNT: 13 % (ref 11.6–15.1)
GFR SERPL CREATININE-BSD FRML MDRD: 95 ML/MIN/1.73SQ M
GLUCOSE SERPL-MCNC: 135 MG/DL (ref 65–140)
GLUCOSE SERPL-MCNC: 142 MG/DL (ref 65–140)
GLUCOSE SERPL-MCNC: 185 MG/DL (ref 65–140)
HCT VFR BLD AUTO: 40 % (ref 36.5–49.3)
HGB BLD-MCNC: 13.5 G/DL (ref 12–17)
MCH RBC QN AUTO: 31.4 PG (ref 26.8–34.3)
MCHC RBC AUTO-ENTMCNC: 33.8 G/DL (ref 31.4–37.4)
MCV RBC AUTO: 93 FL (ref 82–98)
PLATELET # BLD AUTO: 172 THOUSANDS/UL (ref 149–390)
PMV BLD AUTO: 9.6 FL (ref 8.9–12.7)
POTASSIUM SERPL-SCNC: 3.5 MMOL/L (ref 3.5–5.3)
RBC # BLD AUTO: 4.3 MILLION/UL (ref 3.88–5.62)
SODIUM SERPL-SCNC: 137 MMOL/L (ref 136–145)
WBC # BLD AUTO: 8.65 THOUSAND/UL (ref 4.31–10.16)

## 2020-10-30 PROCEDURE — 82948 REAGENT STRIP/BLOOD GLUCOSE: CPT

## 2020-10-30 PROCEDURE — 80048 BASIC METABOLIC PNL TOTAL CA: CPT | Performed by: SURGERY

## 2020-10-30 PROCEDURE — 99024 POSTOP FOLLOW-UP VISIT: CPT | Performed by: PHYSICIAN ASSISTANT

## 2020-10-30 PROCEDURE — 85027 COMPLETE CBC AUTOMATED: CPT | Performed by: SURGERY

## 2020-10-30 PROCEDURE — 99024 POSTOP FOLLOW-UP VISIT: CPT | Performed by: SURGERY

## 2020-10-30 PROCEDURE — 99232 SBSQ HOSP IP/OBS MODERATE 35: CPT | Performed by: INTERNAL MEDICINE

## 2020-10-30 RX ORDER — HYDROCODONE BITARTRATE AND ACETAMINOPHEN 5; 325 MG/1; MG/1
1 TABLET ORAL EVERY 6 HOURS PRN
Qty: 4 TABLET | Refills: 0 | Status: SHIPPED | OUTPATIENT
Start: 2020-10-30 | End: 2020-11-03

## 2020-10-30 RX ORDER — CLOPIDOGREL BISULFATE 75 MG/1
75 TABLET ORAL DAILY
Qty: 30 TABLET | Refills: 2 | Status: SHIPPED | OUTPATIENT
Start: 2020-10-31 | End: 2021-01-20

## 2020-10-30 RX ADMIN — HYDRALAZINE HYDROCHLORIDE 15 MG: 20 INJECTION INTRAMUSCULAR; INTRAVENOUS at 05:49

## 2020-10-30 RX ADMIN — HEPARIN SODIUM 5000 UNITS: 5000 INJECTION INTRAVENOUS; SUBCUTANEOUS at 05:50

## 2020-10-30 RX ADMIN — HYDRALAZINE HYDROCHLORIDE 15 MG: 20 INJECTION INTRAMUSCULAR; INTRAVENOUS at 01:53

## 2020-10-30 RX ADMIN — ASPIRIN 81 MG CHEWABLE TABLET 81 MG: 81 TABLET CHEWABLE at 09:06

## 2020-10-30 RX ADMIN — HYDRALAZINE HYDROCHLORIDE 15 MG: 20 INJECTION INTRAMUSCULAR; INTRAVENOUS at 03:59

## 2020-10-30 RX ADMIN — METFORMIN HYDROCHLORIDE 1000 MG: 500 TABLET ORAL at 10:30

## 2020-10-30 RX ADMIN — LISINOPRIL 5 MG: 5 TABLET ORAL at 09:06

## 2020-10-30 RX ADMIN — LABETALOL HYDROCHLORIDE 300 MG: 200 TABLET, FILM COATED ORAL at 09:05

## 2020-10-30 RX ADMIN — CLOPIDOGREL BISULFATE 75 MG: 75 TABLET ORAL at 09:06

## 2020-11-02 ENCOUNTER — TELEPHONE (OUTPATIENT)
Dept: VASCULAR SURGERY | Facility: CLINIC | Age: 75
End: 2020-11-02

## 2020-11-02 ENCOUNTER — TRANSITIONAL CARE MANAGEMENT (OUTPATIENT)
Dept: FAMILY MEDICINE CLINIC | Facility: CLINIC | Age: 75
End: 2020-11-02

## 2020-11-03 ENCOUNTER — OFFICE VISIT (OUTPATIENT)
Dept: FAMILY MEDICINE CLINIC | Facility: CLINIC | Age: 75
End: 2020-11-03
Payer: MEDICARE

## 2020-11-03 VITALS
DIASTOLIC BLOOD PRESSURE: 70 MMHG | HEIGHT: 69 IN | HEART RATE: 62 BPM | RESPIRATION RATE: 18 BRPM | WEIGHT: 169 LBS | OXYGEN SATURATION: 99 % | SYSTOLIC BLOOD PRESSURE: 164 MMHG | BODY MASS INDEX: 25.03 KG/M2 | TEMPERATURE: 97.4 F

## 2020-11-03 DIAGNOSIS — E11.40 CONTROLLED TYPE 2 DIABETES MELLITUS WITH DIABETIC NEUROPATHY, WITHOUT LONG-TERM CURRENT USE OF INSULIN (HCC): ICD-10-CM

## 2020-11-03 DIAGNOSIS — I10 BENIGN ESSENTIAL HYPERTENSION: ICD-10-CM

## 2020-11-03 DIAGNOSIS — Z98.890 STATUS POST CAROTID ENDARTERECTOMY: Primary | ICD-10-CM

## 2020-11-03 DIAGNOSIS — E83.51 HYPOCALCEMIA: ICD-10-CM

## 2020-11-03 PROCEDURE — 99495 TRANSJ CARE MGMT MOD F2F 14D: CPT | Performed by: FAMILY MEDICINE

## 2020-11-06 ENCOUNTER — TELEPHONE (OUTPATIENT)
Dept: VASCULAR SURGERY | Facility: CLINIC | Age: 75
End: 2020-11-06

## 2020-11-09 ENCOUNTER — OFFICE VISIT (OUTPATIENT)
Dept: VASCULAR SURGERY | Facility: CLINIC | Age: 75
End: 2020-11-09

## 2020-11-09 VITALS
HEIGHT: 69 IN | BODY MASS INDEX: 24.29 KG/M2 | SYSTOLIC BLOOD PRESSURE: 152 MMHG | DIASTOLIC BLOOD PRESSURE: 88 MMHG | TEMPERATURE: 98.7 F | WEIGHT: 164 LBS | HEART RATE: 71 BPM | RESPIRATION RATE: 18 BRPM

## 2020-11-09 DIAGNOSIS — I65.21 INTERNAL CAROTID ARTERY OCCLUSION, RIGHT: Primary | ICD-10-CM

## 2020-11-09 DIAGNOSIS — I65.22 CAROTID STENOSIS, LEFT: ICD-10-CM

## 2020-11-09 PROCEDURE — 99024 POSTOP FOLLOW-UP VISIT: CPT | Performed by: NURSE PRACTITIONER

## 2020-12-28 DIAGNOSIS — I10 BENIGN ESSENTIAL HTN: ICD-10-CM

## 2020-12-28 RX ORDER — LABETALOL 300 MG/1
TABLET, FILM COATED ORAL
Qty: 180 TABLET | Refills: 2 | Status: SHIPPED | OUTPATIENT
Start: 2020-12-28 | End: 2021-02-08

## 2021-01-20 ENCOUNTER — APPOINTMENT (OUTPATIENT)
Dept: LAB | Facility: CLINIC | Age: 76
End: 2021-01-20
Payer: MEDICARE

## 2021-01-20 ENCOUNTER — OFFICE VISIT (OUTPATIENT)
Dept: FAMILY MEDICINE CLINIC | Facility: CLINIC | Age: 76
End: 2021-01-20
Payer: MEDICARE

## 2021-01-20 VITALS
RESPIRATION RATE: 18 BRPM | DIASTOLIC BLOOD PRESSURE: 90 MMHG | HEIGHT: 69 IN | HEART RATE: 62 BPM | TEMPERATURE: 97.4 F | SYSTOLIC BLOOD PRESSURE: 180 MMHG | BODY MASS INDEX: 25.58 KG/M2 | WEIGHT: 172.7 LBS

## 2021-01-20 DIAGNOSIS — I10 BENIGN ESSENTIAL HYPERTENSION: ICD-10-CM

## 2021-01-20 DIAGNOSIS — Z12.5 SCREENING PSA (PROSTATE SPECIFIC ANTIGEN): ICD-10-CM

## 2021-01-20 DIAGNOSIS — E11.40 CONTROLLED TYPE 2 DIABETES MELLITUS WITH DIABETIC NEUROPATHY, WITHOUT LONG-TERM CURRENT USE OF INSULIN (HCC): ICD-10-CM

## 2021-01-20 DIAGNOSIS — G89.29 CHRONIC PAIN OF BOTH SHOULDERS: ICD-10-CM

## 2021-01-20 DIAGNOSIS — E78.5 HYPERLIPIDEMIA, UNSPECIFIED HYPERLIPIDEMIA TYPE: ICD-10-CM

## 2021-01-20 DIAGNOSIS — I65.22 CAROTID STENOSIS, LEFT: ICD-10-CM

## 2021-01-20 DIAGNOSIS — M25.511 CHRONIC PAIN OF BOTH SHOULDERS: ICD-10-CM

## 2021-01-20 DIAGNOSIS — Z00.00 MEDICARE ANNUAL WELLNESS VISIT, SUBSEQUENT: Primary | ICD-10-CM

## 2021-01-20 DIAGNOSIS — M25.512 CHRONIC PAIN OF BOTH SHOULDERS: ICD-10-CM

## 2021-01-20 LAB
ALBUMIN SERPL BCP-MCNC: 4.4 G/DL (ref 3.5–5)
ALP SERPL-CCNC: 72 U/L (ref 46–116)
ALT SERPL W P-5'-P-CCNC: 20 U/L (ref 12–78)
ANION GAP SERPL CALCULATED.3IONS-SCNC: 4 MMOL/L (ref 4–13)
AST SERPL W P-5'-P-CCNC: 19 U/L (ref 5–45)
BASOPHILS # BLD AUTO: 0.09 THOUSANDS/ΜL (ref 0–0.1)
BASOPHILS NFR BLD AUTO: 1 % (ref 0–1)
BILIRUB SERPL-MCNC: 0.54 MG/DL (ref 0.2–1)
BUN SERPL-MCNC: 18 MG/DL (ref 5–25)
CALCIUM SERPL-MCNC: 9.8 MG/DL (ref 8.3–10.1)
CHLORIDE SERPL-SCNC: 108 MMOL/L (ref 100–108)
CHOLEST SERPL-MCNC: 154 MG/DL (ref 50–200)
CO2 SERPL-SCNC: 29 MMOL/L (ref 21–32)
CREAT SERPL-MCNC: 1.12 MG/DL (ref 0.6–1.3)
EOSINOPHIL # BLD AUTO: 0.43 THOUSAND/ΜL (ref 0–0.61)
EOSINOPHIL NFR BLD AUTO: 4 % (ref 0–6)
ERYTHROCYTE [DISTWIDTH] IN BLOOD BY AUTOMATED COUNT: 13.1 % (ref 11.6–15.1)
GFR SERPL CREATININE-BSD FRML MDRD: 64 ML/MIN/1.73SQ M
GLUCOSE P FAST SERPL-MCNC: 124 MG/DL (ref 65–99)
HCT VFR BLD AUTO: 42.1 % (ref 36.5–49.3)
HDLC SERPL-MCNC: 59 MG/DL
HGB BLD-MCNC: 13.6 G/DL (ref 12–17)
IMM GRANULOCYTES # BLD AUTO: 0.03 THOUSAND/UL (ref 0–0.2)
IMM GRANULOCYTES NFR BLD AUTO: 0 % (ref 0–2)
LDLC SERPL CALC-MCNC: 82 MG/DL (ref 0–100)
LYMPHOCYTES # BLD AUTO: 2.07 THOUSANDS/ΜL (ref 0.6–4.47)
LYMPHOCYTES NFR BLD AUTO: 21 % (ref 14–44)
MCH RBC QN AUTO: 30.1 PG (ref 26.8–34.3)
MCHC RBC AUTO-ENTMCNC: 32.3 G/DL (ref 31.4–37.4)
MCV RBC AUTO: 93 FL (ref 82–98)
MONOCYTES # BLD AUTO: 0.68 THOUSAND/ΜL (ref 0.17–1.22)
MONOCYTES NFR BLD AUTO: 7 % (ref 4–12)
NEUTROPHILS # BLD AUTO: 6.41 THOUSANDS/ΜL (ref 1.85–7.62)
NEUTS SEG NFR BLD AUTO: 67 % (ref 43–75)
NONHDLC SERPL-MCNC: 95 MG/DL
NRBC BLD AUTO-RTO: 0 /100 WBCS
PLATELET # BLD AUTO: 192 THOUSANDS/UL (ref 149–390)
PMV BLD AUTO: 10 FL (ref 8.9–12.7)
POTASSIUM SERPL-SCNC: 4.6 MMOL/L (ref 3.5–5.3)
PROT SERPL-MCNC: 7.4 G/DL (ref 6.4–8.2)
PSA SERPL-MCNC: 0.7 NG/ML (ref 0–4)
RBC # BLD AUTO: 4.52 MILLION/UL (ref 3.88–5.62)
SL AMB POCT HEMOGLOBIN AIC: 6.7 (ref ?–6.5)
SODIUM SERPL-SCNC: 141 MMOL/L (ref 136–145)
TRIGL SERPL-MCNC: 65 MG/DL
WBC # BLD AUTO: 9.71 THOUSAND/UL (ref 4.31–10.16)

## 2021-01-20 PROCEDURE — 80061 LIPID PANEL: CPT

## 2021-01-20 PROCEDURE — 83036 HEMOGLOBIN GLYCOSYLATED A1C: CPT | Performed by: FAMILY MEDICINE

## 2021-01-20 PROCEDURE — 85025 COMPLETE CBC W/AUTO DIFF WBC: CPT

## 2021-01-20 PROCEDURE — 36415 COLL VENOUS BLD VENIPUNCTURE: CPT

## 2021-01-20 PROCEDURE — 80053 COMPREHEN METABOLIC PANEL: CPT

## 2021-01-20 PROCEDURE — 99214 OFFICE O/P EST MOD 30 MIN: CPT | Performed by: FAMILY MEDICINE

## 2021-01-20 PROCEDURE — G0439 PPPS, SUBSEQ VISIT: HCPCS | Performed by: FAMILY MEDICINE

## 2021-01-20 PROCEDURE — G0103 PSA SCREENING: HCPCS

## 2021-01-20 RX ORDER — LISINOPRIL 10 MG/1
10 TABLET ORAL DAILY
Qty: 90 TABLET | Refills: 1 | Status: SHIPPED | OUTPATIENT
Start: 2021-01-20 | End: 2021-03-26

## 2021-01-20 NOTE — PATIENT INSTRUCTIONS

## 2021-01-20 NOTE — PROGRESS NOTES
Ramu Calvert 1945 male MRN: [id number]    FAMILY PRACTICE OFFICE VISIT  [facility] - [address]      ASSESSMENT/PLAN  Ramu Calvert is a 77 y o  male presents to the office for    Diagnoses and all orders for this visit:    Medicare annual wellness visit, subsequent    Benign essential hypertension  -     lisinopril (ZESTRIL) 10 mg tablet; Take 1 tablet (10 mg total) by mouth daily  -     Comprehensive metabolic panel; Future  -     CBC and differential; Future    Controlled type 2 diabetes mellitus with diabetic neuropathy, without long-term current use of insulin (HCC)  -     POCT hemoglobin A1c  -     lisinopril (ZESTRIL) 10 mg tablet; Take 1 tablet (10 mg total) by mouth daily    Carotid stenosis, left    Chronic pain of both shoulders  -     XR shoulder 2+ vw left; Future  -     XR shoulder 2+ vw right; Future    Hyperlipidemia, unspecified hyperlipidemia type  -     Lipid panel; Future    Screening PSA (prostate specific antigen)  -     PSA, Total Screen; Future       A1c; at 6 7  Education given on diet control  Continue on metformin a 1000 mg b i d  Hyperlipidemia continue medications as prescribed  Hypertension not well controlled; recommend following BP's at home  Start lisinopril 10 mg; for patient to Cardiology  I do believe the patient should have a stress test or catheterization given his history of occlusion of the carotid  Bilateral shoulder x-rays  If blood pressure worsens would recommend the patient go to the emergency room  BMI Counseling: Body mass index is 25 5 kg/m²  The BMI is above normal  Nutrition recommendations include encouraging healthy choices of fruits and vegetables, consuming healthier snacks and limiting drinks that contain sugar  Exercise recommendations include exercising 3-5 times per week               Future Appointments   Date Time Provider [place]al [place]   2/9/2021  9:00 AM [place] [facility] 1 [id number]   5/10/2021 8:00 AM [place] [facility] 2 [id number]   8/9/2021  8:00 AM [place] [facility] 2 [id number]          SUBJECTIVE  CC: Medicare Wellness Visit and Diabetes (f/u)      HPI:  Lb Mcclain is a 77 y o  male who presents for an [address] appointment  Patient states that he does have chest tightness and shortness of breath at times  But is very sporadic  Has not been seen by the cardiologist   Only vascular surgery  Rotator cuff injury on the right/shoulder separation of the left history  Does not want to see an orthopedic does not have full range of motion of both arms      Review of Systems   Constitutional: Negative for activity change, appetite change, chills, fatigue and fever  HENT: Negative for congestion  Respiratory: Negative for cough, chest tightness and shortness of breath  Cardiovascular: Negative for chest pain and leg swelling  Gastrointestinal: Negative for abdominal distention, abdominal pain, constipation, diarrhea, nausea and vomiting  Musculoskeletal: Positive for arthralgias  All other systems reviewed and are negative        Historical Information   The patient history was reviewed as follows:  Past Medical History:   Diagnosis Date    Arthritis     generalized    Asthma     seasonal-does not use inhaler    Back pain     Carotid artery stenosis     Diabetes mellitus ([facility] [address] )     type    Hyperlipidemia     Hypertension     Peyronie's disease     last assessed [id number]    Seasonal allergies     Wears glasses          Medications:     Current Outpatient Medications:     aspirin 81 mg chewable tablet, Chew 81 mg daily, Disp: , Rfl:     Dapagliflozin Propanediol (Farxiga) 5 MG TABS, Take 1 tablet (5 mg total) by mouth daily (Patient taking differently: Take 5 mg by mouth every morning ), Disp: 90 tablet, Rfl: 2    labetalol (NORMODYNE) 300 mg tablet, TAKE ONE TABLET BY MOUTH TWICE A DAY (GENERIC FOR NORMODYNE), Disp: 180 tablet, Rfl: 2    lisinopril (ZESTRIL) 10 mg tablet, Take 1 tablet (10 mg total) by mouth daily, Disp: 90 tablet, Rfl: 1    Naproxen Sodium (ALEVE PO), Take 2 tablets by mouth as needed , Disp: , Rfl:     pravastatin (PRAVACHOL) 80 mg tablet, Take 1 tablet (80 mg total) by mouth daily at bedtime, Disp: 90 tablet, Rfl: 2    metFORMIN (GLUCOPHAGE) 1000 MG tablet, Take 1 tablet (1,000 mg total) by mouth 2 (two) times a day with meals, Disp: 180 tablet, Rfl: 2    No Known Allergies    OBJECTIVE  Vitals:   Vitals:    01/20/21 1041 01/20/21 1048   BP: (!) 210/82 (!) 190/90   BP Location: Left arm Left arm   Patient Position: Sitting Sitting   Cuff Size: Large Standard   Pulse: 62 62   Resp: 18    Temp: (!) 97 4 °F (36 3 °C)    Weight: 78 3 kg (172 lb 11 2 oz)    Height: 5' 9" (1 753 m)          Physical Exam  Vitals signs reviewed  Constitutional:       Appearance: He is well-developed  HENT:      Head: Normocephalic and atraumatic  Eyes:      Conjunctiva/sclera: Conjunctivae normal       Pupils: Pupils are equal, round, and reactive to light  Neck:      Musculoskeletal: Normal range of motion and neck supple  Cardiovascular:      Rate and Rhythm: Normal rate and regular rhythm  Heart sounds: Normal heart sounds, S1 normal and S2 normal  No murmur  Pulmonary:      Effort: Pulmonary effort is normal  No respiratory distress  Breath sounds: Normal breath sounds  No wheezing  Musculoskeletal:      Right shoulder: He exhibits decreased range of motion  Left shoulder: He exhibits decreased range of motion  Skin:     General: Skin is warm  Neurological:      Mental Status: He is alert and oriented to person, place, and time  Psychiatric:         Speech: Speech normal          Behavior: Behavior normal          Thought Content:  Thought content normal          Judgment: Judgment normal                     Tracey Chong MD,   [facility]  1/20/2021

## 2021-01-20 NOTE — PROGRESS NOTES
Assessment and Plan:     Problem List Items Addressed This Visit        Endocrine    Controlled diabetes mellitus with diabetic neuropathy ([facility] [address] )    Relevant Medications    lisinopril (ZESTRIL) 10 mg tablet    Other Relevant Orders    POCT hemoglobin A1c (Completed)       Cardiovascular and Mediastinum    Benign essential hypertension    Relevant Medications    lisinopril (ZESTRIL) 10 mg tablet    Other Relevant Orders    Comprehensive metabolic panel (Completed)    CBC and differential (Completed)    Carotid stenosis, left       Other    Hyperlipidemia    Relevant Orders    Lipid panel (Completed)      Other Visit Diagnoses     Medicare annual wellness visit, subsequent    -  Primary    Chronic pain of both shoulders        Relevant Orders    XR shoulder 2+ vw left    XR shoulder 2+ vw right    Screening PSA (prostate specific antigen)        Relevant Orders    PSA, Total Screen (Completed)           Preventive health issues were discussed with patient, and age appropriate screening tests were ordered as noted in patient's After Visit Summary  Personalized health advice and appropriate referrals for health education or preventive services given if needed, as noted in patient's After Visit Summary       History of Present Illness:     Patient presents for Medicare Annual Wellness visit    Patient Care Team:  Ed Patel MD as PCP - General (Family Medicine)  Gabriela Miller MD (Family Medicine)  MD Dino Page MD as Endoscopist  Jasper Adams DO (Vascular Surgery)     Problem List:     Patient Active Problem List   Diagnosis    Benign essential hypertension    Controlled diabetes mellitus with diabetic neuropathy ([facility] [address] )    Hyperlipidemia    Murmur    Organic impotence    Mass of right side of neck    ICAO (internal carotid artery occlusion), right    Internal carotid artery occlusion, right    Carotid stenosis, left    Asthma    Status post carotid endarterectomy      Past Medical and Surgical History:     Past Medical History:   Diagnosis Date    Arthritis     generalized    Asthma     seasonal-does not use inhaler    Back pain     Carotid artery stenosis     Diabetes mellitus ([address] )     type    Hyperlipidemia     Hypertension     Peyronie's disease     last assessed [id number]    Seasonal allergies     Wears glasses      Past Surgical History:   Procedure Laterality Date    APPENDECTOMY      CAROTID ENDARTERECTOMY Left 10/29/2020    COLONOSCOPY N/A 1/22/2016    Procedure: COLONOSCOPY;  Surgeon: Jonny Silva MD;  Location: [facility];   Service:     KNEE ARTHROSCOPY W/ MENISCAL REPAIR Right     ME THROMBOENDARTECTMY NECK,NECK INCIS Left 10/29/2020    Procedure: CAROTID ENDARTERECTOMY W/BOVINE PATCH ANGIOPLASTY AND COMPLETION DUPLEX IMAGING;  Surgeon: Moisés Tapia DO;  Location: [facility] OR;  Service: Vascular      Family History:     Family History   Problem Relation Age of Onset    Heart attack Mother [de-identified]        CABG    Kidney failure Father     Diabetes Father         pre-diabetic      Social History:     E-Cigarette/Vaping    E-Cigarette Use Never User      E-Cigarette/Vaping Substances    Nicotine No     THC No     CBD No     Flavoring No     Other No     Unknown No      Social History     Socioeconomic History    Marital status: Single     Spouse name: None    Number of children: None    Years of education: None    Highest education level: None   Occupational History    None   Social Needs    Financial resource strain: None    Food insecurity     Worry: None     Inability: None    Transportation needs     Medical: None     Non-medical: None   Tobacco Use    Smoking status: Never Smoker    Smokeless tobacco: Never Used   Substance and Sexual Activity    Alcohol use: Yes     Frequency: 4 or more times a week     Drinks per session: 1 or 2    Drug use: No    Sexual activity: None   Lifestyle    Physical activity     Days per week: None     Minutes per session: None    Stress: None   Relationships    Social connections     Talks on phone: None     Gets together: None     Attends [employer] service: None     Active member of club or organization: None     Attends meetings of clubs or organizations: None     Relationship status: None    Intimate partner violence     Fear of current or ex partner: None     Emotionally abused: None     Physically abused: None     Forced sexual activity: None   Other Topics Concern    None   Social History Narrative    Daily caffeinnated coffee       Medications and Allergies:     Current Outpatient Medications   Medication Sig Dispense Refill    aspirin 81 mg chewable tablet Chew 81 mg daily      Dapagliflozin Propanediol (Farxiga) 5 MG TABS Take 1 tablet (5 mg total) by mouth daily (Patient taking differently: Take 5 mg by mouth every morning ) 90 tablet 2    labetalol (NORMODYNE) 300 mg tablet TAKE ONE TABLET BY MOUTH TWICE A DAY (GENERIC FOR NORMODYNE) 180 tablet 2    lisinopril (ZESTRIL) 10 mg tablet Take 1 tablet (10 mg total) by mouth daily 90 tablet 1    Naproxen Sodium (ALEVE PO) Take 2 tablets by mouth as needed       pravastatin (PRAVACHOL) 80 mg tablet Take 1 tablet (80 mg total) by mouth daily at bedtime 90 tablet 2    metFORMIN (GLUCOPHAGE) 1000 MG tablet Take 1 tablet (1,000 mg total) by mouth 2 (two) times a day with meals 180 tablet 2     No current facility-administered medications for this visit        No Known Allergies   Immunizations:     Immunization History   Administered Date(s) Administered    Influenza Split High Dose Preservative Free IM 11/01/2013, 10/19/2015, 10/03/2017    Influenza, high dose seasonal 0 7 mL 10/29/2018, 12/18/2019, 09/15/2020    Influenza, seasonal, injectable 12/15/1999, 12/27/2010    Pneumococcal Conjugate 13-Valent 10/03/2017    Pneumococcal Polysaccharide PPV23 12/27/2010, 02/07/2014      Health Maintenance:         Topic Date Due    Hepatitis C Screening 1945    Colorectal Cancer Screening  01/22/2026         Topic Date Due    DTaP,Tdap,and Td Vaccines (1 - Tdap) 03/19/1967      Medicare Health Risk Assessment:     BP (!) 180/90 (BP Location: Left arm, Patient Position: Sitting, Cuff Size: Standard)   Pulse 62   Temp (!) 97 4 °F (36 3 °C)   Resp 18   Ht 5' 9" (1 753 m)   Wt 78 3 kg (172 lb 11 2 oz)   BMI 25 50 kg/m²      Kirt Benavides is here for his Subsequent Wellness visit  Last Medicare Wellness visit information reviewed, patient interviewed and updates made to the record today  Health Risk Assessment:   Patient rates overall health as fair  Patient feels that their physical health rating is same  Eyesight was rated as same  Hearing was rated as same  Patient feels that their emotional and mental health rating is same  Pain experienced in the last 7 days has been some  Patient's pain rating has been 9/10  Patient states that he has experienced no weight loss or gain in last 6 months  Shoulder pain    Depression Screening:   PHQ-2 Score: 0      Fall Risk Screening: In the past year, patient has experienced: no history of falling in past year      Home Safety:  Patient has trouble with stairs inside or outside of their home  Patient has working smoke alarms and has working carbon monoxide detector  Home safety hazards include: none  Nutrition:   Current diet is Regular and No Added Salt  Medications:   Patient is currently taking over-the-counter supplements  OTC medications include: see medication list  Patient is able to manage medications  Activities of Daily Living (ADLs)/Instrumental Activities of Daily Living (IADLs):   Walk and transfer into and out of bed and chair?: Yes  Dress and groom yourself?: Yes    Bathe or shower yourself?: Yes    Feed yourself?  Yes  Do your laundry/housekeeping?: Yes  Manage your money, pay your bills and track your expenses?: Yes  Make your own meals?: Yes    Do your own shopping?: Yes    Previous Hospitalizations:   Any hospitalizations or ED visits within the last 12 months?: Yes      Hospitalization Comments: Surgery on carotid artery    Advance Care Planning:   Living will: Yes    Durable POA for healthcare:  Yes    Advanced directive: Yes    Advanced directive counseling given: Yes    Five wishes given: No      Cognitive Screening:   Provider or family/friend/caregiver concerned regarding cognition?: No    PREVENTIVE SCREENINGS      Cardiovascular Screening:    General: History Lipid Disorder and Risks and Benefits Discussed      Diabetes Screening:     General: Screening Not Indicated, History Diabetes and Risks and Benefits Discussed      Colorectal Cancer Screening:     General: Screening Current      Prostate Cancer Screening:    General: Screening Current    Due for: PSA      Osteoporosis Screening:    General: Risks and Benefits Discussed      Abdominal Aortic Aneurysm (AAA) Screening:    Risk factors include: age between 73-69 yo        General: Risks and Benefits Discussed      Lung Cancer Screening:     General: Screening Not Indicated      Hepatitis C Screening:    General: Screening Not Indicated      Rosie Mayorga MD

## 2021-01-21 ENCOUNTER — TELEPHONE (OUTPATIENT)
Dept: FAMILY MEDICINE CLINIC | Facility: CLINIC | Age: 76
End: 2021-01-21

## 2021-01-21 NOTE — TELEPHONE ENCOUNTER
----- Message from Xavi Gutierrez MD sent at 1/21/2021  7:28 AM EST -----  Please call Lona Baron to see how his BP is around 11  Also Please advise him of the following  1  Prostate level is within normal limits  2  Cholesterol is within normal limits  3  Liver and kidney stable from previous years  4  Sugars slightly elevated, just continue to work on DM diet as we discussed

## 2021-01-21 NOTE — TELEPHONE ENCOUNTER
CALLED PT REVIEWED ALL LAB RESULTS, PT TOOK HIS BP @ 11AM TODAY AND IT /97 BUT BEFORE HE TOOK HIS MEDICINE, PT IS GOING TO CALL ME BACK LATER THIS AFTERNOON WITH THAT READING

## 2021-01-22 NOTE — TELEPHONE ENCOUNTER
Pt states he feels fine just started new meds this morning higher dose of lisinopril 192/97 he checked it at [facility] this morning he had a problem with BP cuff this morning and ordering another cuff should have tomorrow

## 2021-01-26 NOTE — TELEPHONE ENCOUNTER
Please have him increase to Lisinopril 20mg, so that all numbers go down   Thank you  I will see him next week

## 2021-01-26 NOTE — TELEPHONE ENCOUNTER
Called pt his bp today after meds was 171/82, he is keeping a log and it is slowly coming down and he feels fine, he said if you have any message for him its ok to leave on voicemail if he does not answer

## 2021-01-26 NOTE — TELEPHONE ENCOUNTER
CALLED PT AND ADVISED HIM TO INCREASE HIS LISINOPRIL TO 20MG QD, AND CONTINUE MONITORING HIS BP AND KEEP A LOG TO BRING WITH HIM AT NEXT APPT

## 2021-02-05 ENCOUNTER — TELEPHONE (OUTPATIENT)
Dept: CARDIOLOGY CLINIC | Facility: CLINIC | Age: 76
End: 2021-02-05

## 2021-02-05 DIAGNOSIS — E11.9 TYPE 2 DIABETES MELLITUS WITHOUT COMPLICATION, WITHOUT LONG-TERM CURRENT USE OF INSULIN (HCC): ICD-10-CM

## 2021-02-05 RX ORDER — DAPAGLIFLOZIN 5 MG/1
5 TABLET, FILM COATED ORAL DAILY
Qty: 30 TABLET | Refills: 1 | Status: SHIPPED | OUTPATIENT
Start: 2021-02-05 | End: 2021-04-10

## 2021-02-05 NOTE — TELEPHONE ENCOUNTER
Dr Deana Navarro,    Patient called for a refill on farxiga 5mg tabs, but patient is requesting only a 30 day supply because the 90 day supply was very expensive  Pharmacy is [facility] in [place]

## 2021-02-05 NOTE — TELEPHONE ENCOUNTER
COVID Pre-Visit Screening     1  Have you traveled outside of your state in the past 2 weeks? NO  2  Do you presently have a fever or flu-like symptoms? NO  3  Do you have symptoms of an upper respiratory infection like runny nose, sore throat, or cough? NO  4  Are you suffering from new headache that you have not had in the past?  NO  5  Do you have/have you experienced any new shortness of breath recently? NO  6  Do you have any new diarrhea, nausea or vomiting? NO  7  Have you been in contact with anyone who has been sick or diagnosed with COVID-19? NO  8  Do you have any new loss of taste or smell? NO  9  Are you able to wear a mask without a valve for the entire visit?  YES

## 2021-02-08 ENCOUNTER — OFFICE VISIT (OUTPATIENT)
Dept: CARDIOLOGY CLINIC | Facility: CLINIC | Age: 76
End: 2021-02-08
Payer: MEDICARE

## 2021-02-08 VITALS
DIASTOLIC BLOOD PRESSURE: 108 MMHG | TEMPERATURE: 97.1 F | HEART RATE: 54 BPM | SYSTOLIC BLOOD PRESSURE: 152 MMHG | OXYGEN SATURATION: 99 % | BODY MASS INDEX: 25.18 KG/M2 | WEIGHT: 170 LBS | HEIGHT: 69 IN

## 2021-02-08 DIAGNOSIS — E78.2 MIXED HYPERLIPIDEMIA: Primary | ICD-10-CM

## 2021-02-08 DIAGNOSIS — I10 BENIGN ESSENTIAL HYPERTENSION: ICD-10-CM

## 2021-02-08 DIAGNOSIS — E11.40 CONTROLLED TYPE 2 DIABETES MELLITUS WITH DIABETIC NEUROPATHY, WITHOUT LONG-TERM CURRENT USE OF INSULIN (HCC): ICD-10-CM

## 2021-02-08 DIAGNOSIS — I65.21 INTERNAL CAROTID ARTERY OCCLUSION, RIGHT: ICD-10-CM

## 2021-02-08 PROCEDURE — 99214 OFFICE O/P EST MOD 30 MIN: CPT | Performed by: INTERNAL MEDICINE

## 2021-02-08 RX ORDER — CARVEDILOL 6.25 MG/1
6.25 TABLET ORAL 2 TIMES DAILY WITH MEALS
Qty: 60 TABLET | Refills: 1 | Status: SHIPPED | OUTPATIENT
Start: 2021-02-08 | End: 2021-04-12 | Stop reason: SDUPTHER

## 2021-02-09 ENCOUNTER — HOSPITAL ENCOUNTER (OUTPATIENT)
Dept: RADIOLOGY | Facility: HOSPITAL | Age: 76
Discharge: HOME | End: 2021-02-09
Payer: MEDICARE

## 2021-02-09 DIAGNOSIS — I65.22 CAROTID STENOSIS, LEFT: ICD-10-CM

## 2021-02-09 DIAGNOSIS — I65.21 INTERNAL CAROTID ARTERY OCCLUSION, RIGHT: ICD-10-CM

## 2021-02-09 PROCEDURE — 93880 EXTRACRANIAL BILAT STUDY: CPT

## 2021-02-09 PROCEDURE — 93000 ELECTROCARDIOGRAM COMPLETE: CPT | Performed by: INTERNAL MEDICINE

## 2021-02-09 PROCEDURE — 93880 EXTRACRANIAL BILAT STUDY: CPT | Performed by: SURGERY

## 2021-02-09 NOTE — PROGRESS NOTES
Cardiology   Donavon Trammell DO, Ashley Delcid MD, Lester Jones MD, Chavo Ibanez MD, [facility] - [place]  -------------------------------------------------------------------  [facility]  [address], [address] [facility],[address]  [place], [address]  [phone]    Cardiology Follow Up  Hyun Page  1945  [id number]          Assessment/Plan:    1  Benign essential hypertension  - BP remains elevated inspite of increase in lisinopril  - Will discontinue labetolol and begin carvedilol   - Avoid NSAIDS  - If no improvement, will obtain workup for secondary cause of hypertension with renal artery ultrasound and renin/aldosterone  - carvedilol (COREG) 6 25 mg tablet; Take 1 tablet (6 25 mg total) by mouth 2 (two) times a day with meals  Dispense: 60 tablet; Refill: 1    2  Internal carotid artery occlusion, right  - Continue ASA  - POCT ECG    3  Mixed hyperlipidemia  - Continue pravastatin    4  Controlled type 2 diabetes mellitus with diabetic neuropathy, without long-term current use of insulin ([facility] [address] )  - Management per PMD         Interval History:     Hynu Page is 77 y o  male here for followup of hypertension  Recently, BP has been elevated during visit to PMD   Lisinopril was increased to 20 mg daily  He has been using it along with labetolol 300 mg bid  He denies any chest pain, shortness of breath, LE edema, orthopnea or PND  BP has improved but remains elevated  He reports good adherence with medications and uses NSAIDS rarely  patient has a longstanding history of hypertension  He has been on labetalol for many years  Previously he was on losartan as well as amlodipine  Recently, was found to have severe carotid artery stenosis incidentally on CT and eventually underwent carotid endarterectomy         Past Medical History:   Diagnosis Date    Arthritis     generalized    Asthma     seasonal-does not use inhaler    Back pain     Carotid artery stenosis     Diabetes mellitus ([facility] [address] )     type    Hyperlipidemia     Hypertension     Peyronie's disease     last assessed [id number]    Seasonal allergies     Wears glasses      Social History     Socioeconomic History    Marital status: Single     Spouse name: Not on file    Number of children: Not on file    Years of education: Not on file    Highest education level: Not on file   Occupational History    Not on file   Social Needs    Financial resource strain: Not on file    Food insecurity     Worry: Not on file     Inability: Not on file    Transportation needs     Medical: Not on file     Non-medical: Not on file   Tobacco Use    Smoking status: Never Smoker    Smokeless tobacco: Never Used   Substance and Sexual Activity    Alcohol use: Yes     Frequency: 4 or more times a week     Drinks per session: 1 or 2    Drug use: No    Sexual activity: Not on file   Lifestyle    Physical activity     Days per week: Not on file     Minutes per session: Not on file    Stress: Not on file   Relationships    Social connections     Talks on phone: Not on file     Gets together: Not on file     Attends Scientologist service: Not on file     Active member of club or organization: Not on file     Attends meetings of clubs or organizations: Not on file     Relationship status: Not on file    Intimate partner violence     Fear of current or ex partner: Not on file     Emotionally abused: Not on file     Physically abused: Not on file     Forced sexual activity: Not on file   Other Topics Concern    Not on file   Social History Narrative    Daily caffeinnated coffee       Family History   Problem Relation Age of Onset    Heart attack Mother [de-identified]        CABG    Kidney failure Father     Diabetes Father         pre-diabetic     Past Surgical History:   Procedure Laterality Date    APPENDECTOMY      CAROTID ENDARTERECTOMY Left 10/29/2020    COLONOSCOPY N/A 1/22/2016    Procedure: COLONOSCOPY;  Surgeon: Cece Navarrete MD;  Location: [facility] GI LAB; Service:     KNEE ARTHROSCOPY W/ MENISCAL REPAIR Right     [place] THROMBOENDARTECTMY NECK,NECK INCIS Left 10/29/2020    Procedure: CAROTID ENDARTERECTOMY W/BOVINE PATCH ANGIOPLASTY AND COMPLETION DUPLEX IMAGING;  Surgeon: Malika Perez DO;  Location: [facility];  Service: Vascular       Current Outpatient Medications:     aspirin 81 mg chewable tablet, Chew 81 mg daily, Disp: , Rfl:     Dapagliflozin Propanediol (Farxiga) 5 MG TABS, Take 1 tablet (5 mg total) by mouth daily, Disp: 30 tablet, Rfl: 1    lisinopril (ZESTRIL) 10 mg tablet, Take 1 tablet (10 mg total) by mouth daily, Disp: 90 tablet, Rfl: 1    Naproxen Sodium (ALEVE PO), Take 2 tablets by mouth as needed , Disp: , Rfl:     pravastatin (PRAVACHOL) 80 mg tablet, Take 1 tablet (80 mg total) by mouth daily at bedtime, Disp: 90 tablet, Rfl: 2    carvedilol (COREG) 6 25 mg tablet, Take 1 tablet (6 25 mg total) by mouth 2 (two) times a day with meals, Disp: 60 tablet, Rfl: 1    metFORMIN (GLUCOPHAGE) 1000 MG tablet, Take 1 tablet (1,000 mg total) by mouth 2 (two) times a day with meals, Disp: 180 tablet, Rfl: 2        Review of Systems:  Review of Systems   Constitutional: Negative for chills and fever  HENT: Negative for ear pain and sore throat  Eyes: Negative for pain and visual disturbance  Respiratory: Negative for cough and shortness of breath  Cardiovascular: Negative for chest pain and palpitations  Gastrointestinal: Negative for abdominal pain and vomiting  Genitourinary: Negative for dysuria and hematuria  Musculoskeletal: Negative for arthralgias and back pain  Skin: Negative for color change and rash  Neurological: Negative for seizures and syncope  All other systems reviewed and are negative          Physical Exam:  Vitals:  Vitals:    02/08/21 1523   BP: (!) 152/108   BP Location: Left arm   Patient Position: Sitting   Cuff Size: Large   Pulse: (!) 54 Temp: (!) 97 1 °F (36 2 °C)   TempSrc: Temporal   SpO2: 99%   Weight: 77 1 kg (170 lb)   Height: 5' 9" (1 753 m)     Physical Exam   Constitutional: He appears healthy  No distress  Eyes: Pupils are equal, round, and reactive to light  Conjunctivae are normal    Neck: Normal range of motion  Neck supple  No JVD present  Cardiovascular: Normal rate, regular rhythm and normal heart sounds  Exam reveals no gallop and no friction rub  No murmur heard  Pulmonary/Chest: Effort normal and breath sounds normal  He has no wheezes  He has no rales  Musculoskeletal:         General: No tenderness, deformity or edema  Neurological: He is alert and oriented to person, place, and time  Skin: Skin is warm and dry  Cardiographics:  EKG: Personally reviewed   Sinus bradycardia 55 beats per minute with LVH and  Repolarization abnormality  Last known EF: 35-40%    This note was completed in part utilizing [employer] Fluency Direct Software  Grammatical errors, random word insertions, spelling mistakes, and incomplete sentences can be an occasional consequence of this system secondary to software limitations, ambient noise, and hardware issues  If you have any questions or concerns about the content, text, or information contained within the body of this dictation, please contact the provider for clarification

## 2021-02-16 DIAGNOSIS — E11.9 TYPE 2 DIABETES MELLITUS WITHOUT COMPLICATION, WITHOUT LONG-TERM CURRENT USE OF INSULIN (HCC): ICD-10-CM

## 2021-03-08 ENCOUNTER — OFFICE VISIT (OUTPATIENT)
Dept: CARDIOLOGY CLINIC | Facility: CLINIC | Age: 76
End: 2021-03-08
Payer: MEDICARE

## 2021-03-08 VITALS
TEMPERATURE: 98.4 F | BODY MASS INDEX: 24.29 KG/M2 | HEIGHT: 69 IN | OXYGEN SATURATION: 98 % | DIASTOLIC BLOOD PRESSURE: 92 MMHG | SYSTOLIC BLOOD PRESSURE: 136 MMHG | WEIGHT: 164 LBS | HEART RATE: 75 BPM

## 2021-03-08 DIAGNOSIS — I10 BENIGN ESSENTIAL HYPERTENSION: Primary | ICD-10-CM

## 2021-03-08 DIAGNOSIS — E78.2 MIXED HYPERLIPIDEMIA: ICD-10-CM

## 2021-03-08 DIAGNOSIS — I50.22 CHRONIC SYSTOLIC CONGESTIVE HEART FAILURE (HCC): ICD-10-CM

## 2021-03-08 PROCEDURE — 99214 OFFICE O/P EST MOD 30 MIN: CPT | Performed by: INTERNAL MEDICINE

## 2021-03-08 RX ORDER — LISINOPRIL AND HYDROCHLOROTHIAZIDE 12.5; 1 MG/1; MG/1
1 TABLET ORAL 2 TIMES DAILY
Qty: 60 TABLET | Refills: 2 | Status: SHIPPED | OUTPATIENT
Start: 2021-03-08 | End: 2021-05-14 | Stop reason: HOSPADM

## 2021-03-08 NOTE — PROGRESS NOTES
Cardiology   Roby Siegel DO, Amie Jauregui MD, Ye Pérez MD, Vikas Fernández MD, [facility] - [place]  -------------------------------------------------------------------  [facility]OPED[facility]  [address], [address], [address] [place] [address] [place] [address], [address] [place] [address]  [phone]    Cardiology Follow Up  Sheba Rosas  1945  [id number]          Assessment/Plan:    1  Benign essential hypertension  - BP remains elevated in spite of increase in lisinopril and switch to carvedilol  - Will add HCTZ to lisinopril  May continue to take bid  - Avoid NSAIDS   - BMP in 2 weeks    2  Internal carotid artery occlusion, right  - Continue ASA  - POCT ECG    3  Mixed hyperlipidemia  - Continue pravastatin    4  Controlled type 2 diabetes mellitus with diabetic neuropathy, without long-term current use of insulin (HCC)  - Management per PMD     5  CHF - chronic systolic CHF with EF of 12-12%  - No symptoms at this time  - Continue carvedilol and lisinopril  - Stress test to be ordered after BP controlled  Interval History:     Sheba Rosas is 77 y o  male here for followup of hypertension  He was last seen on February 8th and was   Switched from labetalol to carvedilol 6 25 mg b i d   Afterwards, blood pressure has improved but he still has elevated readings on home monitoring  He has no symptoms  Denies any chest pain or shortness of breath  Remains on lisinopril 10 mg bid  BP has improved but remains elevated  He reports good adherence with medications and uses NSAIDS rarely  patient has a longstanding history of hypertension  He has been on labetalol for many years  Previously he was on losartan as well as amlodipine  Recently, was found to have severe carotid artery stenosis incidentally on CT and eventually underwent carotid endarterectomy         Past Medical History:   Diagnosis Date    Arthritis     generalized    Asthma     seasonal-does not use inhaler    Back pain     Carotid artery stenosis     Diabetes mellitus ([facility] [address] )     type    Hyperlipidemia     Hypertension     Peyronie's disease     last assessed [id number]    Seasonal allergies     Wears glasses      Social History     Socioeconomic History    Marital status: Single     Spouse name: Not on file    Number of children: Not on file    Years of education: Not on file    Highest education level: Not on file   Occupational History    Not on file   Social Needs    Financial resource strain: Not on file    Food insecurity     Worry: Not on file     Inability: Not on file    Transportation needs     Medical: Not on file     Non-medical: Not on file   Tobacco Use    Smoking status: Never Smoker    Smokeless tobacco: Never Used   Substance and Sexual Activity    Alcohol use: Yes     Frequency: 4 or more times a week     Drinks per session: 1 or 2    Drug use: No    Sexual activity: Not on file   Lifestyle    Physical activity     Days per week: Not on file     Minutes per session: Not on file    Stress: Not on file   Relationships    Social connections     Talks on phone: Not on file     Gets together: Not on file     Attends Scientologist service: Not on file     Active member of club or organization: Not on file     Attends meetings of clubs or organizations: Not on file     Relationship status: Not on file    Intimate partner violence     Fear of current or ex partner: Not on file     Emotionally abused: Not on file     Physically abused: Not on file     Forced sexual activity: Not on file   Other Topics Concern    Not on file   Social History Narrative    Daily caffeinnated coffee       Family History   Problem Relation Age of Onset    Heart attack Mother [de-identified]        CABG    Kidney failure Father     Diabetes Father         pre-diabetic     Past Surgical History:   Procedure Laterality Date    APPENDECTOMY      CAROTID ENDARTERECTOMY Left 10/29/2020    COLONOSCOPY N/A 1/22/2016    Procedure: COLONOSCOPY;  Surgeon: Cece Navarrete MD;  Location: [place] [address] GI LAB; Service:     KNEE ARTHROSCOPY W/ MENISCAL REPAIR Right     [place] THROMBOENDARTECTMY NECK,NECK INCIS Left 10/29/2020    Procedure: CAROTID ENDARTERECTOMY W/BOVINE PATCH ANGIOPLASTY AND COMPLETION DUPLEX IMAGING;  Surgeon: Malika Perez DO;  Location: [facility];  Service: Vascular       Current Outpatient Medications:     aspirin 81 mg chewable tablet, Chew 81 mg daily, Disp: , Rfl:     carvedilol (COREG) 6 25 mg tablet, Take 1 tablet (6 25 mg total) by mouth 2 (two) times a day with meals, Disp: 60 tablet, Rfl: 1    Dapagliflozin Propanediol (Farxiga) 5 MG TABS, Take 1 tablet (5 mg total) by mouth daily, Disp: 30 tablet, Rfl: 1    lisinopril (ZESTRIL) 10 mg tablet, Take 1 tablet (10 mg total) by mouth daily (Patient taking differently: Take 10 mg by mouth 2 (two) times a day ), Disp: 90 tablet, Rfl: 1    metFORMIN (GLUCOPHAGE) 1000 MG tablet, TAKE ONE TABLET BY MOUTH TWICE A DAY WITH MEALS, Disp: 180 tablet, Rfl: 2    Naproxen Sodium (ALEVE PO), Take 2 tablets by mouth as needed , Disp: , Rfl:     pravastatin (PRAVACHOL) 80 mg tablet, Take 1 tablet (80 mg total) by mouth daily at bedtime, Disp: 90 tablet, Rfl: 2    lisinopril-hydrochlorothiazide (PRINZIDE,ZESTORETIC) 10-12 5 MG per tablet, Take 1 tablet by mouth 2 (two) times a day, Disp: 60 tablet, Rfl: 2        Review of Systems:  Review of Systems   Constitutional: Negative for chills and fever  HENT: Negative for ear pain and sore throat  Eyes: Negative for pain and visual disturbance  Respiratory: Negative for cough and shortness of breath  Cardiovascular: Negative for chest pain and palpitations  Gastrointestinal: Negative for abdominal pain and vomiting  Genitourinary: Negative for dysuria and hematuria  Musculoskeletal: Negative for arthralgias and back pain  Skin: Negative for color change and rash     Neurological: Negative for seizures and syncope  All other systems reviewed and are negative  Physical Exam:  Vitals:  Vitals:    03/08/21 1058   BP: 136/92   BP Location: Right arm   Patient Position: Sitting   Cuff Size: Standard   Pulse: 75   Temp: 98 4 °F (36 9 °C)   TempSrc: Temporal   SpO2: 98%   Weight: 74 4 kg (164 lb)   Height: 5' 9" (1 753 m)     Physical Exam   Constitutional: He appears healthy  No distress  Eyes: Pupils are equal, round, and reactive to light  Conjunctivae are normal    Neck: Normal range of motion  Neck supple  No JVD present  Cardiovascular: Normal rate, regular rhythm and normal heart sounds  Exam reveals no gallop and no friction rub  No murmur heard  Pulmonary/Chest: Effort normal and breath sounds normal  He has no wheezes  He has no rales  Musculoskeletal:         General: No tenderness, deformity or edema  Neurological: He is alert and oriented to person, place, and time  Skin: Skin is warm and dry  Cardiographics:  EKG: Personally reviewed   Sinus bradycardia 55 beats per minute with LVH and  Repolarization abnormality  Last known EF: 35-40%    This note was completed in part utilizing [employer] Fluency Direct Software  Grammatical errors, random word insertions, spelling mistakes, and incomplete sentences can be an occasional consequence of this system secondary to software limitations, ambient noise, and hardware issues  If you have any questions or concerns about the content, text, or information contained within the body of this dictation, please contact the provider for clarification

## 2021-03-09 ENCOUNTER — TELEPHONE (OUTPATIENT)
Dept: CARDIOLOGY CLINIC | Facility: CLINIC | Age: 76
End: 2021-03-09

## 2021-03-09 PROBLEM — I50.22 CHRONIC SYSTOLIC CONGESTIVE HEART FAILURE (HCC): Status: ACTIVE | Noted: 2021-03-09

## 2021-03-09 NOTE — TELEPHONE ENCOUNTER
Insurance will not cover Lisinopril with HCTZ  Can they be sent in separately? Please advise  If so, please send to pharmacy  Thank you

## 2021-03-22 ENCOUNTER — APPOINTMENT (OUTPATIENT)
Dept: LAB | Facility: CLINIC | Age: 76
End: 2021-03-22
Payer: MEDICARE

## 2021-03-22 DIAGNOSIS — I10 BENIGN ESSENTIAL HYPERTENSION: ICD-10-CM

## 2021-03-22 LAB
ANION GAP SERPL CALCULATED.3IONS-SCNC: 3 MMOL/L (ref 4–13)
BUN SERPL-MCNC: 23 MG/DL (ref 5–25)
CALCIUM SERPL-MCNC: 9.5 MG/DL (ref 8.3–10.1)
CHLORIDE SERPL-SCNC: 105 MMOL/L (ref 100–108)
CO2 SERPL-SCNC: 31 MMOL/L (ref 21–32)
CREAT SERPL-MCNC: 1.18 MG/DL (ref 0.6–1.3)
GFR SERPL CREATININE-BSD FRML MDRD: 60 ML/MIN/1.73SQ M
GLUCOSE P FAST SERPL-MCNC: 135 MG/DL (ref 65–99)
POTASSIUM SERPL-SCNC: 4.2 MMOL/L (ref 3.5–5.3)
SODIUM SERPL-SCNC: 139 MMOL/L (ref 136–145)

## 2021-03-22 PROCEDURE — 36415 COLL VENOUS BLD VENIPUNCTURE: CPT

## 2021-03-22 PROCEDURE — 80048 BASIC METABOLIC PNL TOTAL CA: CPT

## 2021-03-26 ENCOUNTER — OFFICE VISIT (OUTPATIENT)
Dept: CARDIOLOGY CLINIC | Facility: CLINIC | Age: 76
End: 2021-03-26
Payer: MEDICARE

## 2021-03-26 VITALS
WEIGHT: 164.5 LBS | HEIGHT: 69 IN | BODY MASS INDEX: 24.37 KG/M2 | SYSTOLIC BLOOD PRESSURE: 138 MMHG | TEMPERATURE: 98.4 F | DIASTOLIC BLOOD PRESSURE: 92 MMHG | HEART RATE: 55 BPM | OXYGEN SATURATION: 97 %

## 2021-03-26 DIAGNOSIS — I50.42 CHRONIC COMBINED SYSTOLIC AND DIASTOLIC CONGESTIVE HEART FAILURE (HCC): ICD-10-CM

## 2021-03-26 DIAGNOSIS — E78.2 MIXED HYPERLIPIDEMIA: ICD-10-CM

## 2021-03-26 DIAGNOSIS — I10 BENIGN ESSENTIAL HYPERTENSION: Primary | ICD-10-CM

## 2021-03-26 DIAGNOSIS — E11.40 CONTROLLED TYPE 2 DIABETES MELLITUS WITH DIABETIC NEUROPATHY, WITHOUT LONG-TERM CURRENT USE OF INSULIN (HCC): ICD-10-CM

## 2021-03-26 PROCEDURE — 99214 OFFICE O/P EST MOD 30 MIN: CPT | Performed by: INTERNAL MEDICINE

## 2021-03-26 NOTE — H&P (VIEW-ONLY)
Cardiology   Jennifer Edwards DO, Mely Moreno MD, Sukhi Rios MD, Kelly Pinedo MD, [facility] - [place]  -------------------------------------------------------------------  [facility]  [address], [address] [facility], [address] [place] [address], [facility]  [phone]    Cardiology Follow Up  Yaquelin Marrero  1945  [id number]          Assessment/Plan:    1  Benign essential hypertension  - BP improved with addition of HCTZ and increase in lisinopril and switch to carvedilol  - Avoid NSAIDS     2  Internal carotid artery occlusion, right  - Continue ASA  - POCT ECG    3  Mixed hyperlipidemia  - Continue pravastatin    4  Controlled type 2 diabetes mellitus with diabetic neuropathy, without long-term current use of insulin (HCC)  - Management per PMD     5  CHF - chronic systolic CHF with EF of 07-11%  - No symptoms at this time  - Continue carvedilol and lisinopril  - Stress test ordered to rule out CAD - he has a history of PVD  Interval History:     Yaquelin Marrero is 77 y o  male here for followup of hypertension  During his last visit,  Hydrochlorothiazide was added to his medication regimen as blood pressure remained elevated  Previously, he was seen on February 8th and was  switched from labetalol to carvedilol 6 25 mg b i d  He reports good adherence with medications and uses NSAIDS rarely  blood pressure has  Improved  Since his last visit, he has been feeling well without any chest pain, shortness of breath, lower extremity edema, orthopnea or paroxysmal nocturnal dyspnea  The patient has a longstanding history of hypertension  He has been on labetalol for many years  Previously he was on losartan as well as amlodipine  Recently, was found to have severe carotid artery stenosis incidentally on CT and eventually underwent carotid endarterectomy       He had an echocardiogram done last year which showed an ejection fraction which was moderately reduced        Past Medical History:   Diagnosis Date    Arthritis     generalized    Asthma     seasonal-does not use inhaler    Back pain     Carotid artery stenosis     Diabetes mellitus (HCC)     type    Hyperlipidemia     Hypertension     Peyronie's disease     last assessed [id number]    Seasonal allergies     Wears glasses      Social History     Socioeconomic History    Marital status: Single     Spouse name: Not on file    Number of children: Not on file    Years of education: Not on file    Highest education level: Not on file   Occupational History    Not on file   Social Needs    Financial resource strain: Not on file    Food insecurity     Worry: Not on file     Inability: Not on file    Transportation needs     Medical: Not on file     Non-medical: Not on file   Tobacco Use    Smoking status: Never Smoker    Smokeless tobacco: Never Used   Substance and Sexual Activity    Alcohol use: Yes     Frequency: 4 or more times a week     Drinks per session: 1 or 2    Drug use: No    Sexual activity: Not on file   Lifestyle    Physical activity     Days per week: Not on file     Minutes per session: Not on file    Stress: Not on file   Relationships    Social connections     Talks on phone: Not on file     Gets together: Not on file     Attends Sikh service: Not on file     Active member of club or organization: Not on file     Attends meetings of clubs or organizations: Not on file     Relationship status: Not on file    Intimate partner violence     Fear of current or ex partner: Not on file     Emotionally abused: Not on file     Physically abused: Not on file     Forced sexual activity: Not on file   Other Topics Concern    Not on file   Social History Narrative    Daily caffeinnated coffee       Family History   Problem Relation Age of Onset    Heart attack Mother [de-identified]        CABG    Kidney failure Father     Diabetes Father         pre-diabetic     Past Surgical History:   Procedure Laterality Date    APPENDECTOMY      CAROTID ENDARTERECTOMY Left 10/29/2020    COLONOSCOPY N/A 1/22/2016    Procedure: COLONOSCOPY;  Surgeon: Criss Taylor MD;  Location: [facility] GI LAB; Service:     KNEE ARTHROSCOPY W/ MENISCAL REPAIR Right     [place] THROMBOENDARTECTMY NECK,NECK INCIS Left 10/29/2020    Procedure: CAROTID ENDARTERECTOMY W/BOVINE PATCH ANGIOPLASTY AND COMPLETION DUPLEX IMAGING;  Surgeon: Bonita Manrique DO;  Location: [facility];  Service: Vascular       Current Outpatient Medications:     aspirin 81 mg chewable tablet, Chew 81 mg daily, Disp: , Rfl:     carvedilol (COREG) 6 25 mg tablet, Take 1 tablet (6 25 mg total) by mouth 2 (two) times a day with meals, Disp: 60 tablet, Rfl: 1    Dapagliflozin Propanediol (Farxiga) 5 MG TABS, Take 1 tablet (5 mg total) by mouth daily, Disp: 30 tablet, Rfl: 1    lisinopril (ZESTRIL) 10 mg tablet, Take 1 tablet (10 mg total) by mouth daily (Patient taking differently: Take 10 mg by mouth 2 (two) times a day ), Disp: 90 tablet, Rfl: 1    lisinopril-hydrochlorothiazide (PRINZIDE,ZESTORETIC) 10-12 5 MG per tablet, Take 1 tablet by mouth 2 (two) times a day, Disp: 60 tablet, Rfl: 2    metFORMIN (GLUCOPHAGE) 1000 MG tablet, TAKE ONE TABLET BY MOUTH TWICE A DAY WITH MEALS, Disp: 180 tablet, Rfl: 2    Naproxen Sodium (ALEVE PO), Take 2 tablets by mouth as needed , Disp: , Rfl:     pravastatin (PRAVACHOL) 80 mg tablet, Take 1 tablet (80 mg total) by mouth daily at bedtime, Disp: 90 tablet, Rfl: 2        Review of Systems:  Review of Systems   Constitutional: Positive for fatigue  Negative for chills and fever  HENT: Negative for ear pain and sore throat  Eyes: Negative for pain and visual disturbance  Respiratory: Negative for cough and shortness of breath  Cardiovascular: Negative for chest pain and palpitations  Gastrointestinal: Negative for abdominal pain and vomiting     Genitourinary: Negative for dysuria and hematuria  Musculoskeletal: Negative for arthralgias and back pain  Skin: Negative for color change and rash  Neurological: Negative for seizures and syncope  All other systems reviewed and are negative  Physical Exam:  Vitals:  Vitals:    03/26/21 1022   BP: 138/92   BP Location: Left arm   Patient Position: Sitting   Cuff Size: Standard   Pulse: 55   Temp: 98 4 °F (36 9 °C)   TempSrc: Temporal   SpO2: 97%   Weight: 74 6 kg (164 lb 8 oz)   Height: 5' 9" (1 753 m)     Physical Exam   Constitutional: He appears healthy  No distress  Eyes: Pupils are equal, round, and reactive to light  Conjunctivae are normal    Neck: Normal range of motion  Neck supple  No JVD present  Cardiovascular: Normal rate, regular rhythm and normal heart sounds  Exam reveals no gallop and no friction rub  No murmur heard  Pulmonary/Chest: Effort normal and breath sounds normal  He has no wheezes  He has no rales  Musculoskeletal:         General: No tenderness, deformity or edema  Neurological: He is alert and oriented to person, place, and time  Skin: Skin is warm and dry  Cardiographics:  EKG: Personally reviewed   Sinus bradycardia 55 beats per minute with LVH and  Repolarization abnormality  Last known EF: 35-40%    This note was completed in part utilizing [employer] Fluency Direct Software  Grammatical errors, random word insertions, spelling mistakes, and incomplete sentences can be an occasional consequence of this system secondary to software limitations, ambient noise, and hardware issues  If you have any questions or concerns about the content, text, or information contained within the body of this dictation, please contact the provider for clarification

## 2021-03-31 ENCOUNTER — TELEPHONE (OUTPATIENT)
Dept: ADMINISTRATIVE | Facility: HOSPITAL | Age: 76
End: 2021-03-31

## 2021-03-31 NOTE — TELEPHONE ENCOUNTER
Attempted to contact patient to schedule 9 month office visit to review carotid doppler results  Unable to leave voicemail because recall placed for OV  Surgeon - Amber Hernández (NPI: [id number])  Date of Surgery - 10/29/2020  All appointments must be scheduled by, 4/18/2022  !!!! Patient must be scheduled for their 9-month office visit before the follow-up window close date !!!!    [facility]/ [facility] VQI Protocol  patients must be scheduled for the following    [] 3-month Doppler -     [] 9-month Doppler Expected - scheduled for 08/09/2021    [x] 9-month OV after Doppler - due on or after 08/09/2021          Scheduling Reminders  1  Insurance requires, 9-month doppler to be scheduled 6-months and 2-days after the 3-month doppler  2  Appointment notes MUST be entered in the following format:  a  VQI FORMS NEEDED VQI LTFU ([facility] or [facility]) (Date of Surgery) CV Duplex (Date of Doppler) (Location of Doppler)  3  If unable to schedule, a recall MUST be entered  >>Please route this encounter to [place], [place], and [place]  <<

## 2021-04-06 ENCOUNTER — HOSPITAL ENCOUNTER (OUTPATIENT)
Dept: RADIOLOGY | Facility: HOSPITAL | Age: 76
Discharge: HOME | End: 2021-04-06
Attending: INTERNAL MEDICINE
Payer: MEDICARE

## 2021-04-06 ENCOUNTER — HOSPITAL ENCOUNTER (OUTPATIENT)
Dept: NON INVASIVE DIAGNOSTICS | Facility: HOSPITAL | Age: 76
Discharge: HOME | End: 2021-04-06
Attending: INTERNAL MEDICINE
Payer: MEDICARE

## 2021-04-06 DIAGNOSIS — I10 BENIGN ESSENTIAL HYPERTENSION: ICD-10-CM

## 2021-04-06 DIAGNOSIS — I50.42 CHRONIC COMBINED SYSTOLIC AND DIASTOLIC CONGESTIVE HEART FAILURE (HCC): ICD-10-CM

## 2021-04-06 LAB
CHEST PAIN STATEMENT: NORMAL
MAX DIASTOLIC BP: 81 MMHG
MAX HEART RATE: 78 BPM
MAX PREDICTED HEART RATE: 145 BPM
MAX. SYSTOLIC BP: 192 MMHG
PROTOCOL NAME: NORMAL
REASON FOR TERMINATION: NORMAL
TARGET HR FORMULA: NORMAL
TEST INDICATION: NORMAL
TIME IN EXERCISE PHASE: NORMAL

## 2021-04-06 PROCEDURE — 78452 HT MUSCLE IMAGE SPECT MULT: CPT

## 2021-04-06 PROCEDURE — 93017 CV STRESS TEST TRACING ONLY: CPT

## 2021-04-06 PROCEDURE — 93018 CV STRESS TEST I&R ONLY: CPT | Performed by: INTERNAL MEDICINE

## 2021-04-06 PROCEDURE — G1004 CDSM NDSC: HCPCS

## 2021-04-06 PROCEDURE — A9502 TC99M TETROFOSMIN: HCPCS

## 2021-04-06 PROCEDURE — 93016 CV STRESS TEST SUPVJ ONLY: CPT | Performed by: INTERNAL MEDICINE

## 2021-04-06 PROCEDURE — 78452 HT MUSCLE IMAGE SPECT MULT: CPT | Performed by: INTERNAL MEDICINE

## 2021-04-08 DIAGNOSIS — R94.39 ABNORMAL STRESS TEST: ICD-10-CM

## 2021-04-08 DIAGNOSIS — I50.42 CHRONIC COMBINED SYSTOLIC AND DIASTOLIC CONGESTIVE HEART FAILURE (HCC): Primary | ICD-10-CM

## 2021-04-08 NOTE — PROGRESS NOTES
Patient's stress test reviewed with him - will schedule for cardiac catheterization for further evaluation

## 2021-04-09 DIAGNOSIS — E11.9 TYPE 2 DIABETES MELLITUS WITHOUT COMPLICATION, WITHOUT LONG-TERM CURRENT USE OF INSULIN (HCC): ICD-10-CM

## 2021-04-10 RX ORDER — DAPAGLIFLOZIN 5 MG/1
TABLET, FILM COATED ORAL
Qty: 30 TABLET | Refills: 1 | Status: SHIPPED | OUTPATIENT
Start: 2021-04-10 | End: 2021-06-17 | Stop reason: SDUPTHER

## 2021-04-12 ENCOUNTER — TELEPHONE (OUTPATIENT)
Dept: NON INVASIVE DIAGNOSTICS | Facility: HOSPITAL | Age: 76
End: 2021-04-12

## 2021-04-12 ENCOUNTER — TELEPHONE (OUTPATIENT)
Dept: CARDIOLOGY CLINIC | Facility: CLINIC | Age: 76
End: 2021-04-12

## 2021-04-12 DIAGNOSIS — I10 BENIGN ESSENTIAL HYPERTENSION: ICD-10-CM

## 2021-04-12 RX ORDER — CARVEDILOL 6.25 MG/1
6.25 TABLET ORAL 2 TIMES DAILY WITH MEALS
Qty: 90 TABLET | Refills: 3 | Status: SHIPPED | OUTPATIENT
Start: 2021-04-12 | End: 2021-05-14 | Stop reason: HOSPADM

## 2021-04-12 NOTE — TELEPHONE ENCOUNTER
Pt advised of Dr Maria Elena Galvez message regarding holding his diabetes meds  Pt expressed understanding

## 2021-04-12 NOTE — TELEPHONE ENCOUNTER
Pt called asking if he can take his diabetes medications prior to his angioplasty tomorrow? The nurse at the cath lab gave him instructions to take his BP meds, but told him to contact you regarding his diabetes meds  Please advise

## 2021-04-12 NOTE — TELEPHONE ENCOUNTER
Need refills of the following:   carvedilol (COREG) 6 25 mg tablet    90-day supply  [facility]    R[facility] out yesterday

## 2021-04-13 ENCOUNTER — HOSPITAL ENCOUNTER (OUTPATIENT)
Dept: NON INVASIVE DIAGNOSTICS | Facility: HOSPITAL | Age: 76
Discharge: HOME | End: 2021-04-13
Admitting: INTERNAL MEDICINE
Payer: MEDICARE

## 2021-04-13 VITALS
OXYGEN SATURATION: 98 % | TEMPERATURE: 97.8 F | HEART RATE: 60 BPM | RESPIRATION RATE: 16 BRPM | DIASTOLIC BLOOD PRESSURE: 74 MMHG | SYSTOLIC BLOOD PRESSURE: 156 MMHG

## 2021-04-13 DIAGNOSIS — I25.10 CORONARY ARTERY DISEASE INVOLVING NATIVE CORONARY ARTERY OF NATIVE HEART, ANGINA PRESENCE UNSPECIFIED: Primary | ICD-10-CM

## 2021-04-13 DIAGNOSIS — I50.42 CHRONIC COMBINED SYSTOLIC AND DIASTOLIC CONGESTIVE HEART FAILURE (HCC): ICD-10-CM

## 2021-04-13 DIAGNOSIS — R94.39 ABNORMAL STRESS TEST: ICD-10-CM

## 2021-04-13 LAB
ANION GAP SERPL CALCULATED.3IONS-SCNC: 11 MMOL/L (ref 4–13)
ATRIAL RATE: 57 BPM
BASOPHILS # BLD AUTO: 0.06 THOUSANDS/ΜL (ref 0–0.1)
BASOPHILS NFR BLD AUTO: 1 % (ref 0–1)
BUN SERPL-MCNC: 18 MG/DL (ref 5–25)
CALCIUM SERPL-MCNC: 9.1 MG/DL (ref 8.3–10.1)
CHLORIDE SERPL-SCNC: 104 MMOL/L (ref 100–108)
CO2 SERPL-SCNC: 28 MMOL/L (ref 21–32)
CREAT SERPL-MCNC: 1.04 MG/DL (ref 0.6–1.3)
EOSINOPHIL # BLD AUTO: 0.48 THOUSAND/ΜL (ref 0–0.61)
EOSINOPHIL NFR BLD AUTO: 7 % (ref 0–6)
ERYTHROCYTE [DISTWIDTH] IN BLOOD BY AUTOMATED COUNT: 12.4 % (ref 11.6–15.1)
GFR SERPL CREATININE-BSD FRML MDRD: 70 ML/MIN/1.73SQ M
GLUCOSE P FAST SERPL-MCNC: 160 MG/DL (ref 65–99)
GLUCOSE SERPL-MCNC: 160 MG/DL (ref 65–140)
HCT VFR BLD AUTO: 42.9 % (ref 36.5–49.3)
HGB BLD-MCNC: 14.1 G/DL (ref 12–17)
IMM GRANULOCYTES # BLD AUTO: 0.03 THOUSAND/UL (ref 0–0.2)
IMM GRANULOCYTES NFR BLD AUTO: 1 % (ref 0–2)
LYMPHOCYTES # BLD AUTO: 2.19 THOUSANDS/ΜL (ref 0.6–4.47)
LYMPHOCYTES NFR BLD AUTO: 34 % (ref 14–44)
MCH RBC QN AUTO: 29.8 PG (ref 26.8–34.3)
MCHC RBC AUTO-ENTMCNC: 32.9 G/DL (ref 31.4–37.4)
MCV RBC AUTO: 91 FL (ref 82–98)
MONOCYTES # BLD AUTO: 0.5 THOUSAND/ΜL (ref 0.17–1.22)
MONOCYTES NFR BLD AUTO: 8 % (ref 4–12)
NEUTROPHILS # BLD AUTO: 3.26 THOUSANDS/ΜL (ref 1.85–7.62)
NEUTS SEG NFR BLD AUTO: 49 % (ref 43–75)
NRBC BLD AUTO-RTO: 0 /100 WBCS
P AXIS: 61 DEGREES
PLATELET # BLD AUTO: 203 THOUSANDS/UL (ref 149–390)
PMV BLD AUTO: 9.5 FL (ref 8.9–12.7)
POTASSIUM SERPL-SCNC: 4.2 MMOL/L (ref 3.5–5.3)
PR INTERVAL: 184 MS
QRS AXIS: -21 DEGREES
QRSD INTERVAL: 106 MS
QT INTERVAL: 434 MS
QTC INTERVAL: 416 MS
RBC # BLD AUTO: 4.73 MILLION/UL (ref 3.88–5.62)
SODIUM SERPL-SCNC: 143 MMOL/L (ref 136–145)
T WAVE AXIS: 138 DEGREES
VENTRICULAR RATE: 55 BPM
WBC # BLD AUTO: 6.52 THOUSAND/UL (ref 4.31–10.16)

## 2021-04-13 PROCEDURE — 99152 MOD SED SAME PHYS/QHP 5/>YRS: CPT | Performed by: INTERNAL MEDICINE

## 2021-04-13 PROCEDURE — C1769 GUIDE WIRE: HCPCS | Performed by: INTERNAL MEDICINE

## 2021-04-13 PROCEDURE — 93458 L HRT ARTERY/VENTRICLE ANGIO: CPT | Performed by: INTERNAL MEDICINE

## 2021-04-13 PROCEDURE — 99153 MOD SED SAME PHYS/QHP EA: CPT | Performed by: INTERNAL MEDICINE

## 2021-04-13 PROCEDURE — 93010 ELECTROCARDIOGRAM REPORT: CPT | Performed by: INTERNAL MEDICINE

## 2021-04-13 PROCEDURE — 93005 ELECTROCARDIOGRAM TRACING: CPT

## 2021-04-13 PROCEDURE — 80048 BASIC METABOLIC PNL TOTAL CA: CPT | Performed by: INTERNAL MEDICINE

## 2021-04-13 PROCEDURE — 85025 COMPLETE CBC W/AUTO DIFF WBC: CPT | Performed by: INTERNAL MEDICINE

## 2021-04-13 PROCEDURE — C1894 INTRO/SHEATH, NON-LASER: HCPCS | Performed by: INTERNAL MEDICINE

## 2021-04-13 RX ORDER — LIDOCAINE HYDROCHLORIDE 10 MG/ML
INJECTION, SOLUTION EPIDURAL; INFILTRATION; INTRACAUDAL; PERINEURAL CODE/TRAUMA/SEDATION MEDICATION
Status: COMPLETED | OUTPATIENT
Start: 2021-04-13 | End: 2021-04-13

## 2021-04-13 RX ORDER — SODIUM CHLORIDE 9 MG/ML
75 INJECTION, SOLUTION INTRAVENOUS CONTINUOUS
Status: DISPENSED | OUTPATIENT
Start: 2021-04-13 | End: 2021-04-13

## 2021-04-13 RX ORDER — NITROGLYCERIN 20 MG/100ML
INJECTION INTRAVENOUS CODE/TRAUMA/SEDATION MEDICATION
Status: COMPLETED | OUTPATIENT
Start: 2021-04-13 | End: 2021-04-13

## 2021-04-13 RX ORDER — ASPIRIN 81 MG/1
TABLET, CHEWABLE ORAL CODE/TRAUMA/SEDATION MEDICATION
Status: COMPLETED | OUTPATIENT
Start: 2021-04-13 | End: 2021-04-13

## 2021-04-13 RX ORDER — MIDAZOLAM HYDROCHLORIDE 2 MG/2ML
INJECTION, SOLUTION INTRAMUSCULAR; INTRAVENOUS CODE/TRAUMA/SEDATION MEDICATION
Status: COMPLETED | OUTPATIENT
Start: 2021-04-13 | End: 2021-04-13

## 2021-04-13 RX ORDER — HEPARIN SODIUM 1000 [USP'U]/ML
INJECTION, SOLUTION INTRAVENOUS; SUBCUTANEOUS CODE/TRAUMA/SEDATION MEDICATION
Status: COMPLETED | OUTPATIENT
Start: 2021-04-13 | End: 2021-04-13

## 2021-04-13 RX ORDER — VERAPAMIL HYDROCHLORIDE 2.5 MG/ML
INJECTION, SOLUTION INTRAVENOUS CODE/TRAUMA/SEDATION MEDICATION
Status: COMPLETED | OUTPATIENT
Start: 2021-04-13 | End: 2021-04-13

## 2021-04-13 RX ORDER — FENTANYL CITRATE 50 UG/ML
INJECTION, SOLUTION INTRAMUSCULAR; INTRAVENOUS CODE/TRAUMA/SEDATION MEDICATION
Status: COMPLETED | OUTPATIENT
Start: 2021-04-13 | End: 2021-04-13

## 2021-04-13 RX ADMIN — FENTANYL CITRATE 25 MCG: 50 INJECTION, SOLUTION INTRAMUSCULAR; INTRAVENOUS at 07:52

## 2021-04-13 RX ADMIN — ASPIRIN 324 MG: 81 TABLET, CHEWABLE ORAL at 07:56

## 2021-04-13 RX ADMIN — IOHEXOL 80 ML: 350 INJECTION, SOLUTION INTRAVENOUS at 08:12

## 2021-04-13 RX ADMIN — NITROGLYCERIN 200 MCG: 20 INJECTION INTRAVENOUS at 07:56

## 2021-04-13 RX ADMIN — HEPARIN SODIUM 4000 UNITS: 1000 INJECTION INTRAVENOUS; SUBCUTANEOUS at 07:56

## 2021-04-13 RX ADMIN — VERAPAMIL HYDROCHLORIDE 2.5 MG: 2.5 INJECTION, SOLUTION INTRAVENOUS at 07:56

## 2021-04-13 RX ADMIN — MIDAZOLAM HYDROCHLORIDE 1 MG: 1 INJECTION, SOLUTION INTRAMUSCULAR; INTRAVENOUS at 07:51

## 2021-04-13 RX ADMIN — LIDOCAINE HYDROCHLORIDE 1 ML: 10 INJECTION, SOLUTION EPIDURAL; INFILTRATION; INTRACAUDAL at 07:55

## 2021-04-13 NOTE — INTERVAL H&P NOTE
H&P reviewed  After examining the patient I find no changes in the patients condition since the H&P had been written      Vitals are stable

## 2021-04-13 NOTE — DISCHARGE INSTRUCTIONS
1  Please see the post cardiac catheterization instructions  No heavy lifting, greater than 10 lbs  or strenuous  activity for 48 hrs  2 Remove band aid tomorrow  Shower and wash area- wrist gently with soap and water- beginning tomorrow  Rinse and pat dry  Apply new water seal band aid  Repeat this process for 5 days  No powders, creams lotions or antibiotic ointments  for 5 days  No tub baths, hot tubs or swimming for 5 days  3 No driving for 2 days    4  Do not stop aspirin  5  Follow up with Cardiothoracic surgery for CABG evaluation  Coronary Artery Disease   WHAT YOU SHOULD KNOW:   Coronary artery disease (CAD) is narrowing of the arteries to your heart caused by a buildup of plaque  Plaque is made up of cholesterol and other substances  The narrowing in your arteries decreases the amount of blood that can flow to your heart  This causes your heart to get less oxygen  INSTRUCTIONS:   Medicines: You may  need any of the following:  · Blood pressure medicines  are given to lower your blood pressure  These medicines may include ACE inhibitors and beta-blockers  ACE inhibitors help keep your blood vessels relaxed and open, which helps keep blood flowing into your heart  Beta-blockers keep your heart pumping strongly and regularly  This helps keep your heart from working too hard to get oxygen  · Cholesterol-lowering medicines  help lower high blood cholesterol levels  · Nitrates , such as nitroglycerin, relax the arteries of your heart so it gets more oxygen  They help to relieve your chest pain  · Antiplatelet medicines , such as aspirin, keep platelets from sticking to a damaged part of your artery  Platelets are a part of your blood that stick together to help heal injuries  They may cause a blockage in your artery and keep blood from flowing to your heart  · Anticoagulants    are a type of blood thinner medicine that helps prevent clots   Clots can cause strokes, heart attacks, and death  These medicines may cause you to bleed or bruise more easily  ¨ Watch for bleeding from your gums or nose  Watch for blood in your urine and bowel movements  Use a soft washcloth and a soft toothbrush  If you shave, use an electric razor  Avoid activities that can cause bruising or bleeding  ¨ Tell your healthcare provider about all medicines you take because many medicines cannot be used with anticoagulants  Do not start or stop any medicines unless your healthcare provider tells you to  Tell your dentist and other healthcare providers that you take anticoagulants  Wear a bracelet or necklace that says you take this medicine  ¨ You will need regular blood tests so your healthcare provider can decide how much medicine you need  Take anticoagulants exactly as directed  Tell your healthcare provider right away if you forget to take the medicine, or if you take too much  ¨ If you take warfarin, some foods can change how your blood clots  Do not make major changes to your diet while you take warfarin  Warfarin works best when you eat about the same amount of vitamin K every day  Vitamin K is found in green leafy vegetables, broccoli, grapes, and other foods  Ask for more information about what to eat when you take warfarin  · Take your medicine as directed  Call your healthcare provider if you think your medicine is not helping or if you have side effects  Tell him if you are allergic to any medicine  Keep a list of the medicines, vitamins, and herbs you take  Include the amounts, and when and why you take them  Bring the list or the pill bottles to follow-up visits  Carry your medicine list with you in case of an emergency  Follow up with your primary healthcare provider or cardiologist as directed: You may need to return for other tests  You may also be referred to a cardiac surgeon  Write down your questions so you remember to ask them during your visits    Cardiac rehabilitation:  Your primary healthcare provider or cardiologist may recommend that you attend cardiac rehabilitation (rehab)  This is a program run by specialists who will help you safely strengthen your heart and reduce the risk of more heart disease  The plan includes exercise, relaxation, stress management, and heart-healthy nutrition  Caregivers will also check to make sure any medicines you are taking are working  Manage CAD:   · Exercise regularly  Exercise at least 30 minutes per day, on most days of the week  Exercise helps to lower high cholesterol and high blood pressure  It can also help you to maintain a healthy weight  Ask your primary healthcare provider about the kind of exercise you should do and how to get started  · Maintain a healthy weight  If you are overweight, talk to your primary healthcare provider about how to lose weight  A weight loss of 10% can improve your heart health  · Eat heart-healthy foods  Include fresh fruits and vegetables in your meal plan  Choose low-fat foods, such as skim or 1% fat milk, low-fat cheese and yogurt, fish, chicken (without skin), and lean meats  Eat two 4-ounce servings of fish high in omega-3 fats each week, such as salmon, fresh tuna, and herring  Avoid foods that are high in sodium, such as canned foods, potato chips, salty snacks, and cold cuts  Put less table salt on your food  · Do not smoke  Smoking increases your risk of a heart attack  If you smoke, it is never too late to quit  Ask primary healthcare provider for information if you need help quitting  · Limit alcohol  Women should limit alcohol to 1 drink a day  Men should limit alcohol to 2 drinks a day  A drink of alcohol is 12 ounces of beer, 5 ounces of wine, or 1½ ounces of liquor  · Manage other health conditions  Follow your primary healthcare provider's advice on how to manage other conditions that can affect your heart health   These include diabetes, high blood pressure, and high cholesterol  You may need to take medicines for these conditions and make other lifestyle changes  Talk to your primary healthcare provider if you are depressed  He may recommend treatment for your depression  · Ask if you should have a flu vaccine  The flu can be dangerous for a person who has CAD  The flu vaccine is available every year in the fall  Contact your primary healthcare provider if:   · You have chest pain that is more frequent, or you have chest pain at rest      · You have questions or concerns about your condition or care  Return to the emergency department if:  You have any of the following signs of a heart attack:  · Squeezing, pressure, fullness, or pain in your chest that lasts longer than a few minutes or returns     · Discomfort or pain in your back, neck, jaw, stomach, or arm    · Shortness of breath or breathing problems    · A sudden cold sweat, lightheadedness, dizziness, or nausea, especially with chest pain or trouble breathing  © 2014 [address] is for End User's use only and may not be sold, redistributed or otherwise used for commercial purposes  All illustrations and images included in CareNotes® are the copyrighted property of [employer]  or Tomás Billingsley  The above information is an  only  It is not intended as medical advice for individual conditions or treatments  Talk to your doctor, nurse or pharmacist before following any medical regimen to see if it is safe and effective for you

## 2021-04-15 ENCOUNTER — TELEPHONE (OUTPATIENT)
Dept: CARDIAC SURGERY | Facility: CLINIC | Age: 76
End: 2021-04-15

## 2021-04-22 ENCOUNTER — OFFICE VISIT (OUTPATIENT)
Dept: CARDIAC SURGERY | Facility: CLINIC | Age: 76
End: 2021-04-22
Payer: MEDICARE

## 2021-04-22 VITALS
WEIGHT: 162.6 LBS | RESPIRATION RATE: 18 BRPM | HEIGHT: 69 IN | TEMPERATURE: 97 F | SYSTOLIC BLOOD PRESSURE: 180 MMHG | HEART RATE: 70 BPM | BODY MASS INDEX: 24.08 KG/M2 | OXYGEN SATURATION: 99 % | DIASTOLIC BLOOD PRESSURE: 90 MMHG

## 2021-04-22 DIAGNOSIS — I10 BENIGN ESSENTIAL HYPERTENSION: ICD-10-CM

## 2021-04-22 DIAGNOSIS — E09.59: ICD-10-CM

## 2021-04-22 DIAGNOSIS — I06.0 RHEUMATIC AORTIC STENOSIS: ICD-10-CM

## 2021-04-22 DIAGNOSIS — R06.00 DYSPNEA, UNSPECIFIED: ICD-10-CM

## 2021-04-22 DIAGNOSIS — I25.10 CORONARY ARTERY DISEASE INVOLVING NATIVE CORONARY ARTERY OF NATIVE HEART, ANGINA PRESENCE UNSPECIFIED: Primary | ICD-10-CM

## 2021-04-22 PROCEDURE — 99204 OFFICE O/P NEW MOD 45 MIN: CPT | Performed by: PHYSICIAN ASSISTANT

## 2021-04-22 RX ORDER — CHLORHEXIDINE GLUCONATE 0.12 MG/ML
15 RINSE ORAL EVERY 12 HOURS SCHEDULED
Status: CANCELLED | OUTPATIENT
Start: 2021-04-22

## 2021-04-22 RX ORDER — CEFAZOLIN SODIUM 2 G/50ML
2000 SOLUTION INTRAVENOUS ONCE
Status: CANCELLED | OUTPATIENT
Start: 2021-04-22 | End: 2021-04-22

## 2021-04-22 NOTE — H&P
Consultation - Cardiothoracic Surgery   Marvell Gaucher 77 y o  male MRN: [id number]    Physician Requesting Consult: Claire Saenz    Reason for Consult / Principal Problem: Aortic stenosis, Rheumatic, Coronary artery disease    History of Present Illness: Marvell Gaucher is a 77y o  year old male with a past medical history notable for carotid disease s/p L CEA and occluded ANI, DM, HTN, HTN, and arthritis who had complaints of SOB while shoveling snow this past winter, an echocardiogram showed decreased LVEF at 40%  His stress test was abnormal and then cardiac catheterization showed MVCAD  The patient now presents to the office for evaluation  Upon consultation today he is present with his girlfriend  He is a lifelong non smoker, has 2 drinks per night and retired from sales  He tolerates all of his ADLs independently  He is a diabetic with controlled blood glucose, last A1c 6 7  Both parents had a history of CABG mother in her 76s and father in his 62s  He has known aortic stenosis for quite some time he states he had rheumatic fever at age 4  Past Medical History:  Past Medical History:   Diagnosis Date    Arthritis     generalized    Asthma     seasonal-does not use inhaler    Back pain     Carotid artery stenosis     Diabetes mellitus (HCC)     type    GERD (gastroesophageal reflux disease)     Hiatal hernia     Hyperlipidemia     Hypertension     Peyronie's disease     last assessed [id number]    Seasonal allergies     Wears glasses      Past Surgical History:   Past Surgical History:   Procedure Laterality Date    APPENDECTOMY      CAROTID ENDARTERECTOMY Left 10/29/2020    COLONOSCOPY N/A 1/22/2016    Procedure: COLONOSCOPY;  Surgeon: Antonio Souza MD;  Location: [facility];   Service:     KNEE ARTHROSCOPY Left     KNEE ARTHROSCOPY W/ MENISCAL REPAIR Right     IA THROMBOENDARTECTMY NECK,NECK INCIS Left 10/29/2020    Procedure: CAROTID ENDARTERECTOMY W/BOVINE PATCH ANGIOPLASTY AND COMPLETION DUPLEX IMAGING;  Surgeon: Jas Zaldivar DO;  Location: [facility];  Service: Vascular     Family History:  Family History   Problem Relation Age of Onset    Heart attack Mother [de-identified]        CABG    Kidney failure Father     Diabetes Father         pre-diabetic     Social History:      Social History     Substance and Sexual Activity   Alcohol Use Yes    Frequency: 4 or more times a week    Drinks per session: 1 or 2    Binge frequency: Daily or almost daily     Social History     Substance and Sexual Activity   Drug Use No     Social History     Tobacco Use   Smoking Status Never Smoker   Smokeless Tobacco Never Used       Home Medications:   Prior to Admission medications    Medication Sig Start Date End Date Taking? Authorizing Provider   aspirin 81 mg chewable tablet Chew 81 mg daily   Yes Historical Provider, MD   carvedilol (COREG) 6 25 mg tablet Take 1 tablet (6 25 mg total) by mouth 2 (two) times a day with meals 4/12/21  Yes Lizzie Trujillo DO   Farxiga 5 MG TABS TAKE ONE TABLET BY MOUTH EVERY DAY 4/10/21  Yes Osmani Lange MD   lisinopril-hydrochlorothiazide (PRINZIDE,ZESTORETIC) 10-12 5 MG per tablet Take 1 tablet by mouth 2 (two) times a day 3/8/21  Yes Lizzie Trujillo DO   metFORMIN (GLUCOPHAGE) 1000 MG tablet TAKE ONE TABLET BY MOUTH TWICE A DAY WITH MEALS 2/17/21  Yes Osmani Lange MD   Naproxen Sodium (ALEVE PO) Take 2 tablets by mouth as needed    Yes Historical Provider, MD   pravastatin (PRAVACHOL) 80 mg tablet Take 1 tablet (80 mg total) by mouth daily at bedtime 9/15/20  Yes Osmani Lange MD   mupirocin (BACTROBAN) 2 % nasal ointment into each nostril 2 (two) times a day Start 5 days prior to surgery 4/22/21   Lloyd Flannery PA-C       Allergies:  No Known Allergies    Review of Systems:     Review of Systems   Constitutional: Positive for activity change and fatigue  HENT: Negative  Eyes: Negative  Respiratory: Positive for shortness of breath  Cardiovascular: Negative  Gastrointestinal: Negative  Endocrine: Negative  Genitourinary: Negative  Musculoskeletal: Negative  Skin: Negative  Allergic/Immunologic: Negative  Neurological: Negative  Hematological: Negative  Psychiatric/Behavioral: Negative  Vital Signs:     Vitals:    04/22/21 1405 04/22/21 1408   BP: (!) 184/96 (!) 180/90   BP Location: Left arm Right arm   Patient Position: Sitting Sitting   Cuff Size: Standard Standard   Pulse: 70    Resp: 18    Temp: (!) 97 °F (36 1 °C)    SpO2: 99%    Weight: 73 8 kg (162 lb 9 6 oz)    Height: 5' 9" (1 753 m)        Physical Exam:     Physical Exam  Constitutional:       General: He is not in acute distress  Appearance: He is normal weight  He is not ill-appearing or toxic-appearing  HENT:      Head: Normocephalic and atraumatic  Right Ear: External ear normal       Left Ear: External ear normal       Nose: Nose normal       Mouth/Throat:      Mouth: Mucous membranes are moist       Pharynx: Oropharynx is clear  Eyes:      General: No scleral icterus  Right eye: No discharge  Left eye: No discharge  Extraocular Movements: Extraocular movements intact  Conjunctiva/sclera: Conjunctivae normal       Pupils: Pupils are equal, round, and reactive to light  Neck:      Musculoskeletal: Normal range of motion and neck supple  Vascular: Carotid bruit present  Comments: Murmur with radiation to carotids on left  Cardiovascular:      Heart sounds: Murmur present  Comments: III/VI systolic murmur, rate is regular  Pulmonary:      Effort: Pulmonary effort is normal  No respiratory distress  Breath sounds: Normal breath sounds  No wheezing or rales  Abdominal:      General: Abdomen is flat  Bowel sounds are normal  There is no distension  Palpations: Abdomen is soft  Tenderness: There is no abdominal tenderness  There is no guarding     Musculoskeletal: Normal range of motion  General: No swelling or deformity  Right lower leg: No edema  Left lower leg: No edema  Skin:     General: Skin is warm and dry  Coloration: Skin is not pale  Findings: No erythema or rash  Neurological:      General: No focal deficit present  Mental Status: He is alert and oriented to person, place, and time  Cranial Nerves: No cranial nerve deficit  Sensory: No sensory deficit  Motor: No weakness  Coordination: Coordination normal       Gait: Gait normal       Deep Tendon Reflexes: Reflexes normal    Psychiatric:         Mood and Affect: Mood normal          Behavior: Behavior normal          Thought Content: Thought content normal          Judgment: Judgment normal        Imaging Studies:     Cardiac Catheterization:   CORONARY CIRCULATION:  Left main: The vessel was normal sized and mildly calcified  Angiography showed moderate atherosclerosis  No focal stenosis seen  It trifurcates into LAD, large ramus intermedius and nondominant circumflex system  Left main is calcified  LAD: The vessel was normal sized and mildly calcified  Angiography showed moderate atherosclerosis  Vessel is diffusely disease  Ostial there may be 50-60% stenosis  It gives a medium-size diagonal which has proximal around 90% stenosis  Mid LAD has around 50% stenosis distal vessel is moderately diffusely disease  Circumflex: The vessel was normal sized  It is a non dominant vessel  It is proximally 100% occluded  A obtuse marginal fills from right to left collaterals  Ramus intermedius: The vessel was large sized  Angiography showed moderate atherosclerosis  It has proximal / ostial around 90-95% stenosis  It is a large vessel  Distal vessel is a good target  RCA: The vessel was normal sized  It is moderately calcified  It has ostial around 45 50% stenosis  Mid there is a 35% stenosis  At the bifurcation of RPDA and RPL  RPDA has around 90% stenosis   RPL has around 50% stenosis  RCA gives  collateral to the left circulation to the obtuse marginal      CARDIAC STRUCTURES:  Global left ventricular function was moderately depressed  EF was estimated at 40 %  There was about 15 mm gradient across the aortic valve  Patient will need to get update echo Doppler for LV function as well as aortic valve severity     IMPRESSIONS:  There is a three-vessel coronary artery disease with significant stenosis of large ramus intermedius, medium size circumflex with total occlusion proximally, moderate disease of LAD and severe disease of distal RCA  Patient also had  moderately decreased LV systolic function and about 15 mm gradient across the aortic valve  Echocardiogram 2/2020:   LEFT VENTRICLE: Size was normal  Systolic function was moderately to markedly reduced by Teichholz  Ejection fraction was estimated in the range of 35 % to 40 % to be 36 %  There were no regional wall motion abnormalities  Wall thickness  was mildly increased  No evidence of apical thrombus  DOPPLER: Doppler parameters were consistent with abnormal left ventricular relaxation (grade 1 diastolic dysfunction)  Doppler parameters were consistent with elevated mean left atrial  filling pressure      RIGHT VENTRICLE: The size was normal  Systolic function was low normal with TAPSE-1 6cm Wall thickness was normal      LEFT ATRIUM: The atrium was moderately to markedly dilated      RIGHT ATRIUM: The atrium was moderately dilated      MITRAL VALVE: There was mild annular calcification  There was mild thickening  There was normal leaflet separation  DOPPLER: The transmitral velocity was within the normal range  There was no evidence for stenosis  There was mild  regurgitation      AORTIC VALVE: The valve was trileaflet  Leaflets exhibited mildly increased thickness, mild calcification, moderately reduced cuspal separation, and sclerosis  DOPPLER: Transaortic velocity was increased due to valvular stenosis   There was  moderate stenosis There was trace regurgitation      TRICUSPID VALVE: The valve structure was normal  There was normal leaflet separation  DOPPLER: The transtricuspid velocity was within the normal range  There was no evidence for stenosis  There was no significant regurgitation      PULMONIC VALVE: Leaflets exhibited normal thickness, no calcification, and normal cuspal separation  DOPPLER: The transpulmonic velocity was within the normal range  There was no significant regurgitation      PERICARDIUM: There was no pericardial effusion  The pericardium was normal in appearance      AORTA: The root exhibited normal size      SYSTEMIC VEINS: IVC: The inferior vena cava was normal in size  I have personally reviewed pertinent reports  Assessment:  Patient Active Problem List    Diagnosis Date Noted    Coronary artery disease involving native coronary artery of native heart 04/13/2021    Chronic systolic congestive heart failure ([facility] [address] ) 03/09/2021    Status post carotid endarterectomy 10/29/2020    Asthma     Internal carotid artery occlusion, right 09/30/2020    Carotid stenosis, left 09/30/2020    ICAO (internal carotid artery occlusion), right 09/18/2020    Mass of right side of neck 08/31/2020    Murmur 03/31/2017    [facility] with [facility] 06/15/2016    Hyperlipidemia 03/06/2014    Benign essential hypertension 09/04/2012    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     Multivessel CAD and Aortic Stenosis     Plan:  Risks and benefits of aortic valve replacement and coronary artery bypass grafting were discussed in detail today with the patient pending his echocardiogram studies for his aortic valve stenosis degree  They understand and wish to proceed with further workup and ultimately surgical intervention  We have ordered routine preoperative laboratory and vascular studies    Pending the results of these tests, they will be scheduled for surgery with Dr Kayce Johnson, SOCORRO Monet was comfortable with our recommendations, and their questions were answered to their satisfaction  Thank you for allowing us to participate in the care of this patient  SIGNATURE: Víctor BearAbraham tyson  DATE: April 22, 2021  TIME: 2:31 PM    * This note was completed in part utilizing [employer] fluency direct voice recognition software  Grammatical errors, random word insertion, spelling mistakes, and incomplete sentences may be an occasional consequence of the system secondary to software limitations, ambient noise and hardware issues  At the time of dictation, efforts were made to edit, clarify and /or correct errors  Please read the chart carefully and recognize, using context, where substitutions have occurred  If you have any questions or concerns about the context, text or information contained within the body of this dictation, please contact myself, the provider, for further clarification

## 2021-04-22 NOTE — PROGRESS NOTES
Consultation - Cardiothoracic Surgery   Mariia Estrada 77 y o  male MRN: [id number]    Physician Requesting Consult: Rogena Spatz    Reason for Consult / Principal Problem: Aortic stenosis, Rheumatic, Coronary artery disease    History of Present Illness: Mariia Estrada is a 77y o  year old male with a past medical history notable for carotid disease s/p L CEA and occluded ANI, DM, HTN, HTN, and arthritis who had complaints of SOB while shoveling snow this past winter, an echocardiogram showed decreased LVEF at 40%  His stress test was abnormal and then cardiac catheterization showed MVCAD  The patient now presents to the office for evaluation  Upon consultation today he is present with his girlfriend  He is a lifelong non smoker, has 2 drinks per night and retired from sales  He tolerates all of his ADLs independently  He is a diabetic with controlled blood glucose, last A1c 6 7  Both parents had a history of CABG mother in her 76s and father in his 62s  He has known aortic stenosis for quite some time he states he had rheumatic fever at age 4  Past Medical History:  Past Medical History:   Diagnosis Date    Arthritis     generalized    Asthma     seasonal-does not use inhaler    Back pain     Carotid artery stenosis     Diabetes mellitus (HCC)     type    GERD (gastroesophageal reflux disease)     Hiatal hernia     Hyperlipidemia     Hypertension     Peyronie's disease     last assessed [id number]    Seasonal allergies     Wears glasses      Past Surgical History:   Past Surgical History:   Procedure Laterality Date    APPENDECTOMY      CAROTID ENDARTERECTOMY Left 10/29/2020    COLONOSCOPY N/A 1/22/2016    Procedure: COLONOSCOPY;  Surgeon: Noe Zhong MD;  Location: [place] [id number] [facility];   Service:     KNEE ARTHROSCOPY Left     KNEE ARTHROSCOPY W/ MENISCAL REPAIR Right     ND THROMBOENDARTECTMY NECK,NECK INCIS Left 10/29/2020    Procedure: CAROTID ENDARTERECTOMY W/BOVINE PATCH ANGIOPLASTY AND COMPLETION DUPLEX IMAGING;  Surgeon: Bonita Manrique DO;  Location: [facility];  Service: Vascular     Family History:  Family History   Problem Relation Age of Onset    Heart attack Mother [de-identified]        CABG    Kidney failure Father     Diabetes Father         pre-diabetic     Social History:      Social History     Substance and Sexual Activity   Alcohol Use Yes    Frequency: 4 or more times a week    Drinks per session: 1 or 2    Binge frequency: Daily or almost daily     Social History     Substance and Sexual Activity   Drug Use No     Social History     Tobacco Use   Smoking Status Never Smoker   Smokeless Tobacco Never Used       Home Medications:   Prior to Admission medications    Medication Sig Start Date End Date Taking? Authorizing Provider   aspirin 81 mg chewable tablet Chew 81 mg daily   Yes Historical Provider, MD   carvedilol (COREG) 6 25 mg tablet Take 1 tablet (6 25 mg total) by mouth 2 (two) times a day with meals 4/12/21  Yes Lizzie Trujillo DO   Farxiga 5 MG TABS TAKE ONE TABLET BY MOUTH EVERY DAY 4/10/21  Yes Gregory Nugent MD   lisinopril-hydrochlorothiazide (PRINZIDE,ZESTORETIC) 10-12 5 MG per tablet Take 1 tablet by mouth 2 (two) times a day 3/8/21  Yes Lizzie Trujillo DO   metFORMIN (GLUCOPHAGE) 1000 MG tablet TAKE ONE TABLET BY MOUTH TWICE A DAY WITH MEALS 2/17/21  Yes Gregory Nugent MD   Naproxen Sodium (ALEVE PO) Take 2 tablets by mouth as needed    Yes Historical Provider, MD   pravastatin (PRAVACHOL) 80 mg tablet Take 1 tablet (80 mg total) by mouth daily at bedtime 9/15/20  Yes Gregory Nugent MD   mupirocin (BACTROBAN) 2 % nasal ointment into each nostril 2 (two) times a day Start 5 days prior to surgery 4/22/21   Chencho Shelton PA-C       Allergies:  No Known Allergies    Review of Systems:     Review of Systems   Constitutional: Positive for activity change and fatigue  HENT: Negative  Eyes: Negative  Respiratory: Positive for shortness of breath  Cardiovascular: Negative  Gastrointestinal: Negative  Endocrine: Negative  Genitourinary: Negative  Musculoskeletal: Negative  Skin: Negative  Allergic/Immunologic: Negative  Neurological: Negative  Hematological: Negative  Psychiatric/Behavioral: Negative  Vital Signs:     Vitals:    04/22/21 1405 04/22/21 1408   BP: (!) 184/96 (!) 180/90   BP Location: Left arm Right arm   Patient Position: Sitting Sitting   Cuff Size: Standard Standard   Pulse: 70    Resp: 18    Temp: (!) 97 °F (36 1 °C)    SpO2: 99%    Weight: 73 8 kg (162 lb 9 6 oz)    Height: 5' 9" (1 753 m)        Physical Exam:     Physical Exam  Constitutional:       General: He is not in acute distress  Appearance: He is normal weight  He is not ill-appearing or toxic-appearing  HENT:      Head: Normocephalic and atraumatic  Right Ear: External ear normal       Left Ear: External ear normal       Nose: Nose normal       Mouth/Throat:      Mouth: Mucous membranes are moist       Pharynx: Oropharynx is clear  Eyes:      General: No scleral icterus  Right eye: No discharge  Left eye: No discharge  Extraocular Movements: Extraocular movements intact  Conjunctiva/sclera: Conjunctivae normal       Pupils: Pupils are equal, round, and reactive to light  Neck:      Musculoskeletal: Normal range of motion and neck supple  Vascular: Carotid bruit present  Comments: Murmur with radiation to carotids on left  Cardiovascular:      Heart sounds: Murmur present  Comments: III/VI systolic murmur, rate is regular  Pulmonary:      Effort: Pulmonary effort is normal  No respiratory distress  Breath sounds: Normal breath sounds  No wheezing or rales  Abdominal:      General: Abdomen is flat  Bowel sounds are normal  There is no distension  Palpations: Abdomen is soft  Tenderness: There is no abdominal tenderness  There is no guarding     Musculoskeletal: Normal range of motion  General: No swelling or deformity  Right lower leg: No edema  Left lower leg: No edema  Skin:     General: Skin is warm and dry  Coloration: Skin is not pale  Findings: No erythema or rash  Neurological:      General: No focal deficit present  Mental Status: He is alert and oriented to person, place, and time  Cranial Nerves: No cranial nerve deficit  Sensory: No sensory deficit  Motor: No weakness  Coordination: Coordination normal       Gait: Gait normal       Deep Tendon Reflexes: Reflexes normal    Psychiatric:         Mood and Affect: Mood normal          Behavior: Behavior normal          Thought Content: Thought content normal          Judgment: Judgment normal        Imaging Studies:     Cardiac Catheterization:   CORONARY CIRCULATION:  Left main: The vessel was normal sized and mildly calcified  Angiography showed moderate atherosclerosis  No focal stenosis seen  It trifurcates into LAD, large ramus intermedius and nondominant circumflex system  Left main is calcified  LAD: The vessel was normal sized and mildly calcified  Angiography showed moderate atherosclerosis  Vessel is diffusely disease  Ostial there may be 50-60% stenosis  It gives a medium-size diagonal which has proximal around 90% stenosis  Mid LAD has around 50% stenosis distal vessel is moderately diffusely disease  Circumflex: The vessel was normal sized  It is a non dominant vessel  It is proximally 100% occluded  A obtuse marginal fills from right to left collaterals  Ramus intermedius: The vessel was large sized  Angiography showed moderate atherosclerosis  It has proximal / ostial around 90-95% stenosis  It is a large vessel  Distal vessel is a good target  RCA: The vessel was normal sized  It is moderately calcified  It has ostial around 45 50% stenosis  Mid there is a 35% stenosis  At the bifurcation of RPDA and RPL  RPDA has around 90% stenosis   RPL has around 50% stenosis  RCA gives  collateral to the left circulation to the obtuse marginal      CARDIAC STRUCTURES:  Global left ventricular function was moderately depressed  EF was estimated at 40 %  There was about 15 mm gradient across the aortic valve  Patient will need to get update echo Doppler for LV function as well as aortic valve severity     IMPRESSIONS:  There is a three-vessel coronary artery disease with significant stenosis of large ramus intermedius, medium size circumflex with total occlusion proximally, moderate disease of LAD and severe disease of distal RCA  Patient also had  moderately decreased LV systolic function and about 15 mm gradient across the aortic valve  Echocardiogram 2/2020:   LEFT VENTRICLE: Size was normal  Systolic function was moderately to markedly reduced by Teichholz  Ejection fraction was estimated in the range of 35 % to 40 % to be 36 %  There were no regional wall motion abnormalities  Wall thickness  was mildly increased  No evidence of apical thrombus  DOPPLER: Doppler parameters were consistent with abnormal left ventricular relaxation (grade 1 diastolic dysfunction)  Doppler parameters were consistent with elevated mean left atrial  filling pressure      RIGHT VENTRICLE: The size was normal  Systolic function was low normal with TAPSE-1 6cm Wall thickness was normal      LEFT ATRIUM: The atrium was moderately to markedly dilated      RIGHT ATRIUM: The atrium was moderately dilated      MITRAL VALVE: There was mild annular calcification  There was mild thickening  There was normal leaflet separation  DOPPLER: The transmitral velocity was within the normal range  There was no evidence for stenosis  There was mild  regurgitation      AORTIC VALVE: The valve was trileaflet  Leaflets exhibited mildly increased thickness, mild calcification, moderately reduced cuspal separation, and sclerosis  DOPPLER: Transaortic velocity was increased due to valvular stenosis   There was  moderate stenosis There was trace regurgitation      TRICUSPID VALVE: The valve structure was normal  There was normal leaflet separation  DOPPLER: The transtricuspid velocity was within the normal range  There was no evidence for stenosis  There was no significant regurgitation      PULMONIC VALVE: Leaflets exhibited normal thickness, no calcification, and normal cuspal separation  DOPPLER: The transpulmonic velocity was within the normal range  There was no significant regurgitation      PERICARDIUM: There was no pericardial effusion  The pericardium was normal in appearance      AORTA: The root exhibited normal size      SYSTEMIC VEINS: IVC: The inferior vena cava was normal in size  I have personally reviewed pertinent reports  Assessment:  Patient Active Problem List    Diagnosis Date Noted    Coronary artery disease involving native coronary artery of native heart 04/13/2021    Chronic systolic congestive heart failure ([facility] [address] ) 03/09/2021    Status post carotid endarterectomy 10/29/2020    Asthma     Internal carotid artery occlusion, right 09/30/2020    Carotid stenosis, left 09/30/2020    ICAO (internal carotid artery occlusion), right 09/18/2020    Mass of right side of neck 08/31/2020    Murmur 03/31/2017    Controlled diabetes mellitus with diabetic neuropathy [facility] ) 06/15/2016    Hyperlipidemia 03/06/2014    Benign essential hypertension 09/04/2012    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     Multivessel CAD and Aortic Stenosis     Plan:  Risks and benefits of aortic valve replacement and coronary artery bypass grafting were discussed in detail today with the patient pending his echocardiogram studies for his aortic valve stenosis degree  They understand and wish to proceed with further workup and ultimately surgical intervention  We have ordered routine preoperative laboratory and vascular studies    Pending the results of these tests, they will be scheduled for surgery with Dr Brandon Cárdenas, SOCORRO Cuenca was comfortable with our recommendations, and their questions were answered to their satisfaction  Thank you for allowing us to participate in the care of this patient  SIGNATURE: Michaelsiobhan Jewel, [place]  DATE: April 22, 2021  TIME: 2:31 PM    * This note was completed in part utilizing m-e[employer] fluency direct voice recognition software  Grammatical errors, random word insertion, spelling mistakes, and incomplete sentences may be an occasional consequence of the system secondary to software limitations, ambient noise and hardware issues  At the time of dictation, efforts were made to edit, clarify and /or correct errors  Please read the chart carefully and recognize, using context, where substitutions have occurred  If you have any questions or concerns about the context, text or information contained within the body of this dictation, please contact myself, the provider, for further clarification

## 2021-04-22 NOTE — H&P (VIEW-ONLY)
Consultation - Cardiothoracic Surgery   Aicha Payne 77 y o  male MRN: [id number]    Physician Requesting Consult: Sean Santiago    Reason for Consult / Principal Problem: Aortic stenosis, Rheumatic, Coronary artery disease    History of Present Illness: Aicha Payne is a 77y o  year old male with a past medical history notable for carotid disease s/p L CEA and occluded ANI, DM, HTN, HTN, and arthritis who had complaints of SOB while shoveling snow this past winter, an echocardiogram showed decreased LVEF at 40%  His stress test was abnormal and then cardiac catheterization showed MVCAD  The patient now presents to the office for evaluation  Upon consultation today he is present with his girlfriend  He is a lifelong non smoker, has 2 drinks per night and retired from sales  He tolerates all of his ADLs independently  He is a diabetic with controlled blood glucose, last A1c 6 7  Both parents had a history of CABG mother in her 76s and father in his 62s  He has known aortic stenosis for quite some time he states he had rheumatic fever at age 4  Past Medical History:  Past Medical History:   Diagnosis Date    Arthritis     generalized    Asthma     seasonal-does not use inhaler    Back pain     Carotid artery stenosis     Diabetes mellitus (HCC)     type    GERD (gastroesophageal reflux disease)     Hiatal hernia     Hyperlipidemia     Hypertension     Peyronie's disease     last assessed [id number]    Seasonal allergies     Wears glasses      Past Surgical History:   Past Surgical History:   Procedure Laterality Date    APPENDECTOMY      CAROTID ENDARTERECTOMY Left 10/29/2020    COLONOSCOPY N/A 1/22/2016    Procedure: COLONOSCOPY;  Surgeon: Cristiane Mata MD;  Location: [place] [address] [facility];   Service:     KNEE ARTHROSCOPY Left     KNEE ARTHROSCOPY W/ MENISCAL REPAIR Right     HI THROMBOENDARTECTMY NECK,NECK INCIS Left 10/29/2020    Procedure: CAROTID ENDARTERECTOMY W/BOVINE PATCH ANGIOPLASTY AND COMPLETION DUPLEX IMAGING;  Surgeon: Be Lin DO;  Location: [facility];  Service: Vascular     Family History:  Family History   Problem Relation Age of Onset    Heart attack Mother [de-identified]        CABG    Kidney failure Father     Diabetes Father         pre-diabetic     Social History:      Social History     Substance and Sexual Activity   Alcohol Use Yes    Frequency: 4 or more times a week    Drinks per session: 1 or 2    Binge frequency: Daily or almost daily     Social History     Substance and Sexual Activity   Drug Use No     Social History     Tobacco Use   Smoking Status Never Smoker   Smokeless Tobacco Never Used       Home Medications:   Prior to Admission medications    Medication Sig Start Date End Date Taking? Authorizing Provider   aspirin 81 mg chewable tablet Chew 81 mg daily   Yes Historical Provider, MD   carvedilol (COREG) 6 25 mg tablet Take 1 tablet (6 25 mg total) by mouth 2 (two) times a day with meals 4/12/21  Yes Lizzie Trujillo DO   Farxiga 5 MG TABS TAKE ONE TABLET BY MOUTH EVERY DAY 4/10/21  Yes Felicita Gaffney MD   lisinopril-hydrochlorothiazide (PRINZIDE,ZESTORETIC) 10-12 5 MG per tablet Take 1 tablet by mouth 2 (two) times a day 3/8/21  Yes Lizzie Trujillo DO   metFORMIN (GLUCOPHAGE) 1000 MG tablet TAKE ONE TABLET BY MOUTH TWICE A DAY WITH MEALS 2/17/21  Yes Felicita Gaffney MD   Naproxen Sodium (ALEVE PO) Take 2 tablets by mouth as needed    Yes Historical Provider, MD   pravastatin (PRAVACHOL) 80 mg tablet Take 1 tablet (80 mg total) by mouth daily at bedtime 9/15/20  Yes Felicita Gaffney MD   mupirocin (BACTROBAN) 2 % nasal ointment into each nostril 2 (two) times a day Start 5 days prior to surgery 4/22/21   Teresa Alexander PA-C       Allergies:  No Known Allergies    Review of Systems:     Review of Systems   Constitutional: Positive for activity change and fatigue  HENT: Negative  Eyes: Negative  Respiratory: Positive for shortness of breath  Cardiovascular: Negative  Gastrointestinal: Negative  Endocrine: Negative  Genitourinary: Negative  Musculoskeletal: Negative  Skin: Negative  Allergic/Immunologic: Negative  Neurological: Negative  Hematological: Negative  Psychiatric/Behavioral: Negative  Vital Signs:     Vitals:    04/22/21 1405 04/22/21 1408   BP: (!) 184/96 (!) 180/90   BP Location: Left arm Right arm   Patient Position: Sitting Sitting   Cuff Size: Standard Standard   Pulse: 70    Resp: 18    Temp: (!) 97 °F (36 1 °C)    SpO2: 99%    Weight: 73 8 kg (162 lb 9 6 oz)    Height: 5' 9" (1 753 m)        Physical Exam:     Physical Exam  Constitutional:       General: He is not in acute distress  Appearance: He is normal weight  He is not ill-appearing or toxic-appearing  HENT:      Head: Normocephalic and atraumatic  Right Ear: External ear normal       Left Ear: External ear normal       Nose: Nose normal       Mouth/Throat:      Mouth: Mucous membranes are moist       Pharynx: Oropharynx is clear  Eyes:      General: No scleral icterus  Right eye: No discharge  Left eye: No discharge  Extraocular Movements: Extraocular movements intact  Conjunctiva/sclera: Conjunctivae normal       Pupils: Pupils are equal, round, and reactive to light  Neck:      Musculoskeletal: Normal range of motion and neck supple  Vascular: Carotid bruit present  Comments: Murmur with radiation to carotids on left  Cardiovascular:      Heart sounds: Murmur present  Comments: III/VI systolic murmur, rate is regular  Pulmonary:      Effort: Pulmonary effort is normal  No respiratory distress  Breath sounds: Normal breath sounds  No wheezing or rales  Abdominal:      General: Abdomen is flat  Bowel sounds are normal  There is no distension  Palpations: Abdomen is soft  Tenderness: There is no abdominal tenderness  There is no guarding     Musculoskeletal: Normal range of motion  General: No swelling or deformity  Right lower leg: No edema  Left lower leg: No edema  Skin:     General: Skin is warm and dry  Coloration: Skin is not pale  Findings: No erythema or rash  Neurological:      General: No focal deficit present  Mental Status: He is alert and oriented to person, place, and time  Cranial Nerves: No cranial nerve deficit  Sensory: No sensory deficit  Motor: No weakness  Coordination: Coordination normal       Gait: Gait normal       Deep Tendon Reflexes: Reflexes normal    Psychiatric:         Mood and Affect: Mood normal          Behavior: Behavior normal          Thought Content: Thought content normal          Judgment: Judgment normal        Imaging Studies:     Cardiac Catheterization:   CORONARY CIRCULATION:  Left main: The vessel was normal sized and mildly calcified  Angiography showed moderate atherosclerosis  No focal stenosis seen  It trifurcates into LAD, large ramus intermedius and nondominant circumflex system  Left main is calcified  LAD: The vessel was normal sized and mildly calcified  Angiography showed moderate atherosclerosis  Vessel is diffusely disease  Ostial there may be 50-60% stenosis  It gives a medium-size diagonal which has proximal around 90% stenosis  Mid LAD has around 50% stenosis distal vessel is moderately diffusely disease  Circumflex: The vessel was normal sized  It is a non dominant vessel  It is proximally 100% occluded  A obtuse marginal fills from right to left collaterals  Ramus intermedius: The vessel was large sized  Angiography showed moderate atherosclerosis  It has proximal / ostial around 90-95% stenosis  It is a large vessel  Distal vessel is a good target  RCA: The vessel was normal sized  It is moderately calcified  It has ostial around 45 50% stenosis  Mid there is a 35% stenosis  At the bifurcation of RPDA and RPL  RPDA has around 90% stenosis   RPL has around 50% stenosis  RCA gives  collateral to the left circulation to the obtuse marginal      CARDIAC STRUCTURES:  Global left ventricular function was moderately depressed  EF was estimated at 40 %  There was about 15 mm gradient across the aortic valve  Patient will need to get update echo Doppler for LV function as well as aortic valve severity     IMPRESSIONS:  There is a three-vessel coronary artery disease with significant stenosis of large ramus intermedius, medium size circumflex with total occlusion proximally, moderate disease of LAD and severe disease of distal RCA  Patient also had  moderately decreased LV systolic function and about 15 mm gradient across the aortic valve  Echocardiogram 2/2020:   LEFT VENTRICLE: Size was normal  Systolic function was moderately to markedly reduced by Teichholz  Ejection fraction was estimated in the range of 35 % to 40 % to be 36 %  There were no regional wall motion abnormalities  Wall thickness  was mildly increased  No evidence of apical thrombus  DOPPLER: Doppler parameters were consistent with abnormal left ventricular relaxation (grade 1 diastolic dysfunction)  Doppler parameters were consistent with elevated mean left atrial  filling pressure      RIGHT VENTRICLE: The size was normal  Systolic function was low normal with TAPSE-1 6cm Wall thickness was normal      LEFT ATRIUM: The atrium was moderately to markedly dilated      RIGHT ATRIUM: The atrium was moderately dilated      MITRAL VALVE: There was mild annular calcification  There was mild thickening  There was normal leaflet separation  DOPPLER: The transmitral velocity was within the normal range  There was no evidence for stenosis  There was mild  regurgitation      AORTIC VALVE: The valve was trileaflet  Leaflets exhibited mildly increased thickness, mild calcification, moderately reduced cuspal separation, and sclerosis  DOPPLER: Transaortic velocity was increased due to valvular stenosis   There was  moderate stenosis There was trace regurgitation      TRICUSPID VALVE: The valve structure was normal  There was normal leaflet separation  DOPPLER: The transtricuspid velocity was within the normal range  There was no evidence for stenosis  There was no significant regurgitation      PULMONIC VALVE: Leaflets exhibited normal thickness, no calcification, and normal cuspal separation  DOPPLER: The transpulmonic velocity was within the normal range  There was no significant regurgitation      PERICARDIUM: There was no pericardial effusion  The pericardium was normal in appearance      AORTA: The root exhibited normal size      SYSTEMIC VEINS: IVC: The inferior vena cava was normal in size  I have personally reviewed pertinent reports  Assessment:  Patient Active Problem List    Diagnosis Date Noted    Coronary artery disease involving native coronary artery of native heart 04/13/2021    Chronic systolic congestive heart failure ([facility] [address] ) 03/09/2021    Status post carotid endarterectomy 10/29/2020    Asthma     Internal carotid artery occlusion, right 09/30/2020    Carotid stenosis, left 09/30/2020    ICAO (internal carotid artery occlusion), right 09/18/2020    Mass of right side of neck 08/31/2020    Murmur 03/31/2017    Controlled diabetes mellitus with diabetic neuropathy [facility] ) 06/15/2016    Hyperlipidemia 03/06/2014    Benign essential hypertension 09/04/2012    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     Multivessel CAD and Aortic Stenosis     Plan:  Risks and benefits of aortic valve replacement and coronary artery bypass grafting were discussed in detail today with the patient pending his echocardiogram studies for his aortic valve stenosis degree  They understand and wish to proceed with further workup and ultimately surgical intervention  We have ordered routine preoperative laboratory and vascular studies    Pending the results of these tests, they will be scheduled for surgery with Dr Kayce Johnson, SOCORRO Payne was comfortable with our recommendations, and their questions were answered to their satisfaction  Thank you for allowing us to participate in the care of this patient  SIGNATURE: Javon Tao, [place]  DATE: April 22, 2021  TIME: 2:31 PM    * This note was completed in part utilizing [employer] direct voice recognition software  Grammatical errors, random word insertion, spelling mistakes, and incomplete sentences may be an occasional consequence of the system secondary to software limitations, ambient noise and hardware issues  At the time of dictation, efforts were made to edit, clarify and /or correct errors  Please read the chart carefully and recognize, using context, where substitutions have occurred  If you have any questions or concerns about the context, text or information contained within the body of this dictation, please contact myself, the provider, for further clarification

## 2021-04-22 NOTE — LETTER
April 22, 2021     MD Radha Garcia [address]  [address] [facility]    Patient: Lily Luciano   YOB: 1945   Date of Visit: 4/22/2021       Dear Dr Maynor Dominguez: Thank you for referring Brayden Delatorre to me for evaluation  Below are my notes for this consultation  If you have questions, please do not hesitate to call me  I look forward to following your patient along with you  Sincerely,        Rashaad Hsu MD        CC: MD Zohreh Sandoval Daphney Lobos  4/22/2021  3:08 PM  Attested  Consultation - Cardiothoracic Surgery   Lily uLciano 77 y o  male MRN: [id number]    Physician Requesting Consult: Maynor Dominguez    Reason for Consult / Principal Problem: Aortic stenosis, Rheumatic, Coronary artery disease    History of Present Illness: Lily Luciano is a 77y o  year old male with a past medical history notable for carotid disease s/p L CEA and occluded ANI, DM, HTN, HTN, and arthritis who had complaints of SOB while shoveling snow this past winter, an echocardiogram showed decreased LVEF at 40%  His stress test was abnormal and then cardiac catheterization showed MVCAD  The patient now presents to the office for evaluation  Upon consultation today he is present with his girlfriend  He is a lifelong non smoker, has 2 drinks per night and retired from sales  He tolerates all of his ADLs independently  He is a diabetic with controlled blood glucose, last A1c 6 7  Both parents had a history of CABG mother in her 76s and father in his 62s  He has known aortic stenosis for quite some time he states he had rheumatic fever at age 4       Past Medical History:  Past Medical History:   Diagnosis Date    Arthritis     generalized    Asthma     seasonal-does not use inhaler    Back pain     Carotid artery stenosis     Diabetes mellitus (HCC)     type    GERD (gastroesophageal reflux disease)     Hiatal hernia     Hyperlipidemia     Hypertension     Peyronie's disease     last assessed [id number]    Seasonal allergies     Wears glasses      Past Surgical History:   Past Surgical History:   Procedure Laterality Date    APPENDECTOMY      CAROTID ENDARTERECTOMY Left 10/29/2020    COLONOSCOPY N/A 1/22/2016    Procedure: COLONOSCOPY;  Surgeon: Link Miller MD;  [facility]; Service:     KNEE ARTHROSCOPY Left     KNEE ARTHROSCOPY W/ MENISCAL REPAIR Right     OK THROMBOENDARTECTMY NECK,NECK INCIS Left 10/29/2020    Procedure: CAROTID ENDARTERECTOMY W/BOVINE PATCH ANGIOPLASTY AND COMPLETION DUPLEX IMAGING;  Surgeon: Marcio Sung DO;  Location: [facility];  Service: Vascular     Family History:  Family History   Problem Relation Age of Onset    Heart attack Mother [de-identified]        CABG    Kidney failure Father     Diabetes Father         pre-diabetic     Social History:      Social History     Substance and Sexual Activity   Alcohol Use Yes    Frequency: 4 or more times a week    Drinks per session: 1 or 2    Binge frequency: Daily or almost daily     Social History     Substance and Sexual Activity   Drug Use No     Social History     Tobacco Use   Smoking Status Never Smoker   Smokeless Tobacco Never Used       Home Medications:   Prior to Admission medications    Medication Sig Start Date End Date Taking?  Authorizing Provider   aspirin 81 mg chewable tablet Chew 81 mg daily   Yes Historical Provider, MD   carvedilol (COREG) 6 25 mg tablet Take 1 tablet (6 25 mg total) by mouth 2 (two) times a day with meals 4/12/21  Yes Lizzie Trujillo DO   Farxiga 5 MG TABS TAKE ONE TABLET BY MOUTH EVERY DAY 4/10/21  Yes Live Crystal MD   lisinopril-hydrochlorothiazide (PRINZIDE,ZESTORETIC) 10-12 5 MG per tablet Take 1 tablet by mouth 2 (two) times a day 3/8/21  Yes Lizzie Trujillo DO   metFORMIN (GLUCOPHAGE) 1000 MG tablet TAKE ONE TABLET BY MOUTH TWICE A DAY WITH MEALS 2/17/21  Yes Live Crystal MD   Naproxen Sodium (ALEVE PO) Take 2 tablets by mouth as needed Yes Historical Provider, MD   pravastatin (PRAVACHOL) 80 mg tablet Take 1 tablet (80 mg total) by mouth daily at bedtime 9/15/20  Yes Renny Ruiz MD   mupirocin (BACTROBAN) 2 % nasal ointment into each nostril 2 (two) times a day Start 5 days prior to surgery 4/22/21   Konrad Swift PA-C       Allergies:  No Known Allergies    Review of Systems:     Review of Systems   Constitutional: Positive for activity change and fatigue  HENT: Negative  Eyes: Negative  Respiratory: Positive for shortness of breath  Cardiovascular: Negative  Gastrointestinal: Negative  Endocrine: Negative  Genitourinary: Negative  Musculoskeletal: Negative  Skin: Negative  Allergic/Immunologic: Negative  Neurological: Negative  Hematological: Negative  Psychiatric/Behavioral: Negative  Vital Signs:     Vitals:    04/22/21 1405 04/22/21 1408   BP: (!) 184/96 (!) 180/90   BP Location: Left arm Right arm   Patient Position: Sitting Sitting   Cuff Size: Standard Standard   Pulse: 70    Resp: 18    Temp: (!) 97 °F (36 1 °C)    SpO2: 99%    Weight: 73 8 kg (162 lb 9 6 oz)    Height: 5' 9" (1 753 m)        Physical Exam:     Physical Exam  Constitutional:       General: He is not in acute distress  Appearance: He is normal weight  He is not ill-appearing or toxic-appearing  HENT:      Head: Normocephalic and atraumatic  Right Ear: External ear normal       Left Ear: External ear normal       Nose: Nose normal       Mouth/Throat:      Mouth: Mucous membranes are moist       Pharynx: Oropharynx is clear  Eyes:      General: No scleral icterus  Right eye: No discharge  Left eye: No discharge  Extraocular Movements: Extraocular movements intact  Conjunctiva/sclera: Conjunctivae normal       Pupils: Pupils are equal, round, and reactive to light  Neck:      Musculoskeletal: Normal range of motion and neck supple  Vascular: Carotid bruit present  Comments: Murmur with radiation to carotids on left  Cardiovascular:      Heart sounds: Murmur present  Comments: III/VI systolic murmur, rate is regular  Pulmonary:      Effort: Pulmonary effort is normal  No respiratory distress  Breath sounds: Normal breath sounds  No wheezing or rales  Abdominal:      General: Abdomen is flat  Bowel sounds are normal  There is no distension  Palpations: Abdomen is soft  Tenderness: There is no abdominal tenderness  There is no guarding  Musculoskeletal: Normal range of motion  General: No swelling or deformity  Right lower leg: No edema  Left lower leg: No edema  Skin:     General: Skin is warm and dry  Coloration: Skin is not pale  Findings: No erythema or rash  Neurological:      General: No focal deficit present  Mental Status: He is alert and oriented to person, place, and time  Cranial Nerves: No cranial nerve deficit  Sensory: No sensory deficit  Motor: No weakness  Coordination: Coordination normal       Gait: Gait normal       Deep Tendon Reflexes: Reflexes normal    Psychiatric:         Mood and Affect: Mood normal          Behavior: Behavior normal          Thought Content: Thought content normal          Judgment: Judgment normal        Imaging Studies:     Cardiac Catheterization:   CORONARY CIRCULATION:  Left main: The vessel was normal sized and mildly calcified  Angiography showed moderate atherosclerosis  No focal stenosis seen  It trifurcates into LAD, large ramus intermedius and nondominant circumflex system  Left main is calcified  LAD: The vessel was normal sized and mildly calcified  Angiography showed moderate atherosclerosis  Vessel is diffusely disease  Ostial there may be 50-60% stenosis  It gives a medium-size diagonal which has proximal around 90% stenosis  Mid LAD has around 50% stenosis distal vessel is moderately diffusely disease    Circumflex: The vessel was normal sized  It is a non dominant vessel  It is proximally 100% occluded  A obtuse marginal fills from right to left collaterals  Ramus intermedius: The vessel was large sized  Angiography showed moderate atherosclerosis  It has proximal / ostial around 90-95% stenosis  It is a large vessel  Distal vessel is a good target  RCA: The vessel was normal sized  It is moderately calcified  It has ostial around 45 50% stenosis  Mid there is a 35% stenosis  At the bifurcation of RPDA and RPL  RPDA has around 90% stenosis  RPL has around 50% stenosis  RCA gives  collateral to the left circulation to the obtuse marginal      CARDIAC STRUCTURES:  Global left ventricular function was moderately depressed  EF was estimated at 40 %  There was about 15 mm gradient across the aortic valve  Patient will need to get update echo Doppler for LV function as well as aortic valve severity     IMPRESSIONS:  There is a three-vessel coronary artery disease with significant stenosis of large ramus intermedius, medium size circumflex with total occlusion proximally, moderate disease of LAD and severe disease of distal RCA  Patient also had  moderately decreased LV systolic function and about 15 mm gradient across the aortic valve  Echocardiogram 2/2020:   LEFT VENTRICLE: Size was normal  Systolic function was moderately to markedly reduced by Teichholz  Ejection fraction was estimated in the range of 35 % to 40 % to be 36 %  There were no regional wall motion abnormalities  Wall thickness  was mildly increased  No evidence of apical thrombus  DOPPLER: Doppler parameters were consistent with abnormal left ventricular relaxation (grade 1 diastolic dysfunction)   Doppler parameters were consistent with elevated mean left atrial  filling pressure      RIGHT VENTRICLE: The size was normal  Systolic function was low normal with TAPSE-1 6cm Wall thickness was normal      LEFT ATRIUM: The atrium was moderately to markedly dilated      RIGHT ATRIUM: The atrium was moderately dilated      MITRAL VALVE: There was mild annular calcification  There was mild thickening  There was normal leaflet separation  DOPPLER: The transmitral velocity was within the normal range  There was no evidence for stenosis  There was mild  regurgitation      AORTIC VALVE: The valve was trileaflet  Leaflets exhibited mildly increased thickness, mild calcification, moderately reduced cuspal separation, and sclerosis  DOPPLER: Transaortic velocity was increased due to valvular stenosis  There was  moderate stenosis There was trace regurgitation      TRICUSPID VALVE: The valve structure was normal  There was normal leaflet separation  DOPPLER: The transtricuspid velocity was within the normal range  There was no evidence for stenosis  There was no significant regurgitation      PULMONIC VALVE: Leaflets exhibited normal thickness, no calcification, and normal cuspal separation  DOPPLER: The transpulmonic velocity was within the normal range  There was no significant regurgitation      PERICARDIUM: There was no pericardial effusion  The pericardium was normal in appearance      AORTA: The root exhibited normal size      SYSTEMIC VEINS: IVC: The inferior vena cava was normal in size  I have personally reviewed pertinent reports        Assessment:  Patient Active Problem List    Diagnosis Date Noted    Coronary artery disease involving native coronary artery of native heart 04/13/2021    Chronic systolic congestive heart failure ([address] ) 03/09/2021    Status post carotid endarterectomy 10/29/2020    Asthma     Internal carotid artery occlusion, right 09/30/2020    Carotid stenosis, left 09/30/2020    ICAO (internal carotid artery occlusion), right 09/18/2020    Mass of right side of neck 08/31/2020    Murmur 03/31/2017    Controlled diabetes mellitus with diabetic neuropathy ([facility] [address] ) 06/15/2016    Hyperlipidemia 03/06/2014    Benign essential hypertension 09/04/2012    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     Multivessel CAD and Aortic Stenosis     Plan:  Risks and benefits of aortic valve replacement and coronary artery bypass grafting were discussed in detail today with the patient pending his echocardiogram studies for his aortic valve stenosis degree  They understand and wish to proceed with further workup and ultimately surgical intervention  We have ordered routine preoperative laboratory and vascular studies  Pending the results of these tests, they will be scheduled for surgery with SKYLA Bennett  Cruz Dotson was comfortable with our recommendations, and their questions were answered to their satisfaction  Thank you for allowing us to participate in the care of this patient  SIGNATURE: Vinayarchie Liskyla, [place]  DATE: April 22, 2021  TIME: 2:31 PM    * This note was completed in part utilizing [employer] direct voice recognition software  Grammatical errors, random word insertion, spelling mistakes, and incomplete sentences may be an occasional consequence of the system secondary to software limitations, ambient noise and hardware issues  At the time of dictation, efforts were made to edit, clarify and /or correct errors  Please read the chart carefully and recognize, using context, where substitutions have occurred  If you have any questions or concerns about the context, text or information contained within the body of this dictation, please contact myself, the provider, for further clarification  Attestation signed by Curtis Epps MD at 4/22/2021  3:11 PM:  Patient seen and evaluated with Kenneth Fraga PA-C  I agree with the above assessment and plan with the following additions  The patient is a 56-year-old man with symptomatic severe multivessel coronary artery disease,  severe ischemic cardiomyopathy, associated diabetes and at least moderate aortic stenosis    I explained the diagnosis to the patient the treatment options including medications, PCI and surgery  I recommend coronary artery bypass  and aortic valve replacement  I explained the procedure, benefits, risks and possible complications  He understands and agrees to proceed with surgery  He will undergo preoperative testing and will return for an elective operation  This note was completed in part utilizing m-modal fluency direct voice recognition software  Grammatical errors, random word insertion, spelling mistakes, and incomplete sentences may be an occasional consequence of the system secondary to software limitations, ambient noise and hardware issues  At the time of dictation, efforts were made to edit, clarify and /or correct errors  Please read the chart carefully and recognize, using context, where substitutions have occurred  If you have any questions or concerns about the context, text or information contained within the body of this dictation, please contact myself, the provider, for further clarification

## 2021-05-03 ENCOUNTER — TELEPHONE (OUTPATIENT)
Dept: CARDIOLOGY CLINIC | Facility: CLINIC | Age: 76
End: 2021-05-03

## 2021-05-03 NOTE — TELEPHONE ENCOUNTER
Please call Dr Paulo Doss there now would like to speak to you regarding an extraction   [phone]

## 2021-05-04 ENCOUNTER — TELEPHONE (OUTPATIENT)
Dept: CARDIAC SURGERY | Facility: CLINIC | Age: 76
End: 2021-05-04

## 2021-05-04 NOTE — TELEPHONE ENCOUNTER
Left a message regarding Dental clearance  that is needed for his upcoming surgery scheduled for Monday 5/10/21

## 2021-05-06 ENCOUNTER — HOSPITAL ENCOUNTER (OUTPATIENT)
Dept: RADIOLOGY | Facility: HOSPITAL | Age: 76
Discharge: HOME | End: 2021-05-06
Payer: MEDICARE

## 2021-05-06 DIAGNOSIS — I25.10 CORONARY ARTERY DISEASE INVOLVING NATIVE CORONARY ARTERY OF NATIVE HEART: ICD-10-CM

## 2021-05-06 DIAGNOSIS — I10 BENIGN ESSENTIAL HYPERTENSION: ICD-10-CM

## 2021-05-06 DIAGNOSIS — R06.00 DYSPNEA, UNSPECIFIED: ICD-10-CM

## 2021-05-06 DIAGNOSIS — I06.0 RHEUMATIC AORTIC STENOSIS: ICD-10-CM

## 2021-05-06 PROCEDURE — G1004 CDSM NDSC: HCPCS

## 2021-05-06 PROCEDURE — 93971 EXTREMITY STUDY: CPT

## 2021-05-06 PROCEDURE — 71250 CT THORAX DX C-: CPT

## 2021-05-06 PROCEDURE — 93971 EXTREMITY STUDY: CPT | Performed by: SURGERY

## 2021-05-07 ENCOUNTER — LAB REQUISITION (OUTPATIENT)
Dept: LAB | Facility: HOSPITAL | Age: 76
DRG: 220 | End: 2021-05-07
Payer: MEDICARE

## 2021-05-07 ENCOUNTER — HOSPITAL ENCOUNTER (OUTPATIENT)
Dept: NON INVASIVE DIAGNOSTICS | Facility: HOSPITAL | Age: 76
Discharge: HOME | DRG: 220 | End: 2021-05-07
Payer: MEDICARE

## 2021-05-07 ENCOUNTER — APPOINTMENT (OUTPATIENT)
Dept: LAB | Facility: HOSPITAL | Age: 76
DRG: 220 | End: 2021-05-07
Payer: MEDICARE

## 2021-05-07 DIAGNOSIS — R06.00 DYSPNEA, UNSPECIFIED: ICD-10-CM

## 2021-05-07 DIAGNOSIS — E09.59: ICD-10-CM

## 2021-05-07 DIAGNOSIS — I06.0 RHEUMATIC AORTIC STENOSIS: ICD-10-CM

## 2021-05-07 DIAGNOSIS — I10 BENIGN ESSENTIAL HYPERTENSION: ICD-10-CM

## 2021-05-07 DIAGNOSIS — I25.10 CORONARY ARTERY DISEASE INVOLVING NATIVE CORONARY ARTERY OF NATIVE HEART: ICD-10-CM

## 2021-05-07 DIAGNOSIS — I25.10 ATHEROSCLEROTIC HEART DISEASE OF NATIVE CORONARY ARTERY WITHOUT ANGINA PECTORIS: ICD-10-CM

## 2021-05-07 DIAGNOSIS — I10 ESSENTIAL (PRIMARY) HYPERTENSION: ICD-10-CM

## 2021-05-07 LAB
ABO GROUP BLD: NORMAL
ANION GAP SERPL CALCULATED.3IONS-SCNC: 7 MMOL/L (ref 4–13)
BACTERIA UR QL AUTO: NORMAL /HPF
BILIRUB UR QL STRIP: NEGATIVE
BLD GP AB SCN SERPL QL: NEGATIVE
BUN SERPL-MCNC: 24 MG/DL (ref 5–25)
CALCIUM SERPL-MCNC: 9.8 MG/DL (ref 8.3–10.1)
CHLORIDE SERPL-SCNC: 105 MMOL/L (ref 100–108)
CHOLEST SERPL-MCNC: 155 MG/DL (ref 50–200)
CLARITY UR: CLEAR
CO2 SERPL-SCNC: 28 MMOL/L (ref 21–32)
COLOR UR: YELLOW
CREAT SERPL-MCNC: 1.05 MG/DL (ref 0.6–1.3)
EST. AVERAGE GLUCOSE BLD GHB EST-MCNC: 143 MG/DL
GFR SERPL CREATININE-BSD FRML MDRD: 69 ML/MIN/1.73SQ M
GLUCOSE P FAST SERPL-MCNC: 150 MG/DL (ref 65–99)
GLUCOSE UR STRIP-MCNC: ABNORMAL MG/DL
HBA1C MFR BLD: 6.6 %
HDLC SERPL-MCNC: 57 MG/DL
HGB UR QL STRIP.AUTO: ABNORMAL
HYALINE CASTS #/AREA URNS LPF: NORMAL /LPF
INR PPP: 0.95 (ref 0.84–1.19)
KETONES UR STRIP-MCNC: NEGATIVE MG/DL
LDLC SERPL CALC-MCNC: 75 MG/DL (ref 0–100)
LEUKOCYTE ESTERASE UR QL STRIP: ABNORMAL
NITRITE UR QL STRIP: NEGATIVE
NON-SQ EPI CELLS URNS QL MICRO: NORMAL /HPF
NONHDLC SERPL-MCNC: 98 MG/DL
PH UR STRIP.AUTO: 5 [PH]
POTASSIUM SERPL-SCNC: 4.3 MMOL/L (ref 3.5–5.3)
PROT UR STRIP-MCNC: NEGATIVE MG/DL
PROTHROMBIN TIME: 12.7 SECONDS (ref 11.6–14.5)
RBC #/AREA URNS AUTO: NORMAL /HPF
RH BLD: POSITIVE
SODIUM SERPL-SCNC: 140 MMOL/L (ref 136–145)
SP GR UR STRIP.AUTO: 1.03 (ref 1–1.03)
SPECIMEN EXPIRATION DATE: NORMAL
TRIGL SERPL-MCNC: 115 MG/DL
UROBILINOGEN UR QL STRIP.AUTO: 0.2 E.U./DL
WBC #/AREA URNS AUTO: NORMAL /HPF

## 2021-05-07 PROCEDURE — 93306 TTE W/DOPPLER COMPLETE: CPT

## 2021-05-07 PROCEDURE — 86901 BLOOD TYPING SEROLOGIC RH(D): CPT | Performed by: THORACIC SURGERY (CARDIOTHORACIC VASCULAR SURGERY)

## 2021-05-07 PROCEDURE — 86900 BLOOD TYPING SEROLOGIC ABO: CPT | Performed by: THORACIC SURGERY (CARDIOTHORACIC VASCULAR SURGERY)

## 2021-05-07 PROCEDURE — 80048 BASIC METABOLIC PNL TOTAL CA: CPT

## 2021-05-07 PROCEDURE — 36415 COLL VENOUS BLD VENIPUNCTURE: CPT

## 2021-05-07 PROCEDURE — 83036 HEMOGLOBIN GLYCOSYLATED A1C: CPT

## 2021-05-07 PROCEDURE — 86850 RBC ANTIBODY SCREEN: CPT | Performed by: THORACIC SURGERY (CARDIOTHORACIC VASCULAR SURGERY)

## 2021-05-07 PROCEDURE — 80061 LIPID PANEL: CPT

## 2021-05-07 PROCEDURE — 93306 TTE W/DOPPLER COMPLETE: CPT | Performed by: INTERNAL MEDICINE

## 2021-05-07 PROCEDURE — 85610 PROTHROMBIN TIME: CPT

## 2021-05-07 PROCEDURE — 87081 CULTURE SCREEN ONLY: CPT

## 2021-05-07 PROCEDURE — 81001 URINALYSIS AUTO W/SCOPE: CPT

## 2021-05-07 RX ORDER — HEPARIN SODIUM 1000 [USP'U]/ML
10000 INJECTION, SOLUTION INTRAVENOUS; SUBCUTANEOUS ONCE
Status: CANCELLED | OUTPATIENT
Start: 2021-05-10

## 2021-05-07 RX ORDER — HEPARIN SODIUM 1000 [USP'U]/ML
400 INJECTION, SOLUTION INTRAVENOUS; SUBCUTANEOUS ONCE
Status: CANCELLED | OUTPATIENT
Start: 2021-05-10

## 2021-05-07 NOTE — PRE-PROCEDURE INSTRUCTIONS
Pre-Surgery Instructions:   Medication Instructions    aspirin 81 mg chewable tablet Patient was instructed by Physician and understands   carvedilol (COREG) 6 25 mg tablet Patient was instructed by Physician and understands   Farxiga 5 MG TABS Patient was instructed by Physician and understands   lisinopril-hydrochlorothiazide (PRINZIDE,ZESTORETIC) 10-12 5 MG per tablet Patient was instructed by Physician and understands   metFORMIN (GLUCOPHAGE) 1000 MG tablet Patient was instructed by Physician and understands   mupirocin (BACTROBAN) 2 % nasal ointment Patient was instructed by Physician and understands   Naproxen Sodium (ALEVE PO) Patient was instructed by Physician and understands   pravastatin (PRAVACHOL) 80 mg tablet Patient was instructed by Physician and understands  ACE/ARB Med Class - FOR SAHIL  Do not take this medication the day before and the morning of the day of surgery/procedure  ASA Med Class: Aspirin  Should be discontinued at least one week prior to planned operation, unless specifically stated otherwise by surgical service  Your Surgeon may have patient stop taking aspirin up to a week before surgery if having intracranial, middle ear, posterior eye, spine surgery or prostate surgery  [Patients taking aspirin for coronary stents should be reviewed by an anesthesiologist in the optimization clinic  Please do not discontinue aspirin in patients with coronary stents unless given specific permission to do so by the cardiologist who prescribed medication ]   If your surgeon approves please continue to take this medication on your normal schedule  You may take this medication on the morning of your surgery with a sip of water  Beta blocker Med Class  Continue to take this heart medication on your normal schedule  If this is an oral medication and you take it in the morning, then you may take this medicine with a sip of water      Insulin Med Class  Pre-Surgery/Procedure Instructions for Adult Patients who Take Medicine for Diabetes or to Control their Blood Sugar     Day Before Surgery/Procedure  Use the directions based on the type of medicine you take for your diabetes  1  If you are having a procedure that does not require a bowel prep:  ? Pre-Mixed Insulin (Intermediate Acting: Humalog 75/25, Humulin 70/30  Novolog 70/30, Regular Insulin)  § Take ½ your regular dose the evening before your procedure  ? Rapid/Fast Acting Insulin/Long Acting Insulin (Humalog U200, NovoLog, Apidra, Lantus, Levemir, Ute Michelle, [place])  § Take your FULL regular dose the day before procedure  ? Oral Diabetic Medicines including Glipizide/Glimepiride/Glucotrol (sulfonylurea)  § Take your regular dose with dinner the evening before your procedure  2  If you are having a procedure (e g  Colonoscopy) that requires a bowel prep and you are allowed to have at least a clear liquid diet:  ? Pre-Mixed Insulin (Intermediate Acting: Humalog 75/25, Humulin 70/30, Novolog 70/30, Regular Insulin)  § Take ½ your regular dose the evening before your procedure  ? Rapid/Fast Acting Insulin (Humalog U200, NovoLog, Apidra, Fiasp)  § Take ½ your regular dose the evening before your procedure  ? Long Acting Insulin (Lantus, Levemir, Ute Michelle)  § Take your FULL regular dose the day before procedure  ? Oral Glipizide/Glimepiride/Glucotrol (sulfonylurea)  § Take ½ your regular dose the evening before your procedure  ? Oral Diabetic Medicines that are NOT Glipizide/Glimepiride/Glucotrol  § Take your regular dose with dinner in the evening before your procedure      Day of Surgery/Procedure  · Long Acting Insulin (Lantus, Levemir, Ute Michelle)  ? If you usually take your Long-Acting Insulin in the morning, take the full dose as scheduled    · With the exception of the morning Long-Acting Insulin noted above, DO NOT take ANY diabetic medicine on the day of your procedure unless you were instructed by the doctor who manages your diabetic medicines  · Continue to check your blood sugars  · If you have an insulin pump then consult with your Endocrinologist for instructions  · If you cannot see your Endocrinologist, on the day of the procedure set your insulin pump to your basal rate only  Please bring your insulin pump supplies to the hospital      This Educational material has been approved by the Patient Education Advisory Committee  Date prepared: 1/17/2018          Expiration date: 1/17/2019        Approval Number:         NSAID Med Class for SAHIL  Stop taking this medication at least 10 days prior to surgery/procedure  Statin Med Class  Continue to take this medication on your normal schedule  If this is an oral medication and you take it in the morning, then you may take this medicine with a sip of water      CT surgery Case  Reviewed with Pt med & showering insts given from surgeon's office  Advised of NPO at [facility] for DOS 5/10 including candy & mints etc   Advised of [facility] call 5/7 from [phone] with final DOS details    Pt verbalized  understanding to all of the above

## 2021-05-08 LAB — MRSA NOSE QL CULT: NORMAL

## 2021-05-09 ENCOUNTER — ANESTHESIA EVENT (INPATIENT)
Dept: PERIOP | Facility: HOSPITAL | Age: 76
DRG: 220 | End: 2021-05-09
Payer: MEDICARE

## 2021-05-10 ENCOUNTER — HOSPITAL ENCOUNTER (OUTPATIENT)
Dept: RADIOLOGY | Facility: HOSPITAL | Age: 76
DRG: 220 | End: 2021-05-10
Payer: MEDICARE

## 2021-05-10 ENCOUNTER — HOSPITAL ENCOUNTER (INPATIENT)
Facility: HOSPITAL | Age: 76
LOS: 4 days | Discharge: HOME HEALTH | DRG: 220 | End: 2021-05-14
Attending: THORACIC SURGERY (CARDIOTHORACIC VASCULAR SURGERY) | Admitting: THORACIC SURGERY (CARDIOTHORACIC VASCULAR SURGERY)
Payer: MEDICARE

## 2021-05-10 ENCOUNTER — APPOINTMENT (INPATIENT)
Dept: NON INVASIVE DIAGNOSTICS | Facility: HOSPITAL | Age: 76
DRG: 220 | End: 2021-05-10
Payer: MEDICARE

## 2021-05-10 ENCOUNTER — APPOINTMENT (INPATIENT)
Dept: RADIOLOGY | Facility: HOSPITAL | Age: 76
DRG: 220 | End: 2021-05-10
Payer: MEDICARE

## 2021-05-10 ENCOUNTER — ANESTHESIA (INPATIENT)
Dept: PERIOP | Facility: HOSPITAL | Age: 76
DRG: 220 | End: 2021-05-10
Payer: MEDICARE

## 2021-05-10 ENCOUNTER — TELEPHONE (OUTPATIENT)
Dept: FAMILY MEDICINE CLINIC | Facility: CLINIC | Age: 76
End: 2021-05-10

## 2021-05-10 DIAGNOSIS — E11.40 CONTROLLED TYPE 2 DIABETES MELLITUS WITH DIABETIC NEUROPATHY, WITHOUT LONG-TERM CURRENT USE OF INSULIN (HCC): ICD-10-CM

## 2021-05-10 DIAGNOSIS — Z95.2 S/P AVR: Primary | ICD-10-CM

## 2021-05-10 DIAGNOSIS — I25.10 CAD IN NATIVE ARTERY: ICD-10-CM

## 2021-05-10 DIAGNOSIS — Z95.1 S/P CABG (CORONARY ARTERY BYPASS GRAFT): ICD-10-CM

## 2021-05-10 DIAGNOSIS — I06.0 RHEUMATIC AORTIC STENOSIS: ICD-10-CM

## 2021-05-10 DIAGNOSIS — I25.10 CORONARY ARTERY DISEASE INVOLVING NATIVE CORONARY ARTERY OF NATIVE HEART, ANGINA PRESENCE UNSPECIFIED: ICD-10-CM

## 2021-05-10 DIAGNOSIS — I25.10 CORONARY ARTERY DISEASE INVOLVING NATIVE CORONARY ARTERY OF NATIVE HEART: ICD-10-CM

## 2021-05-10 DIAGNOSIS — I44.7 LBBB (LEFT BUNDLE BRANCH BLOCK): ICD-10-CM

## 2021-05-10 DIAGNOSIS — I44.0 1ST DEGREE AV BLOCK: ICD-10-CM

## 2021-05-10 LAB
ANION GAP SERPL CALCULATED.3IONS-SCNC: 8 MMOL/L (ref 4–13)
APTT PPP: 38 SECONDS (ref 23–37)
BASE EXCESS BLDA CALC-SCNC: -1 MMOL/L (ref -2–3)
BASE EXCESS BLDA CALC-SCNC: -2 MMOL/L (ref -2–3)
BASE EXCESS BLDA CALC-SCNC: 1 MMOL/L (ref -2–3)
BASE EXCESS BLDA CALC-SCNC: 2 MMOL/L (ref -2–3)
BASE EXCESS BLDA CALC-SCNC: 2 MMOL/L (ref -2–3)
BASE EXCESS BLDA CALC-SCNC: 3 MMOL/L (ref -2–3)
BUN SERPL-MCNC: 21 MG/DL (ref 5–25)
CA-I BLD-SCNC: 1 MMOL/L (ref 1.12–1.32)
CA-I BLD-SCNC: 1 MMOL/L (ref 1.12–1.32)
CA-I BLD-SCNC: 1.02 MMOL/L (ref 1.12–1.32)
CA-I BLD-SCNC: 1.02 MMOL/L (ref 1.12–1.32)
CA-I BLD-SCNC: 1.05 MMOL/L (ref 1.12–1.32)
CA-I BLD-SCNC: 1.07 MMOL/L (ref 1.12–1.32)
CA-I BLD-SCNC: 1.16 MMOL/L (ref 1.12–1.32)
CA-I BLD-SCNC: 1.26 MMOL/L (ref 1.12–1.32)
CALCIUM SERPL-MCNC: 7.8 MG/DL (ref 8.3–10.1)
CHLORIDE SERPL-SCNC: 111 MMOL/L (ref 100–108)
CO2 SERPL-SCNC: 24 MMOL/L (ref 21–32)
CREAT SERPL-MCNC: 0.91 MG/DL (ref 0.6–1.3)
FIBRINOGEN PPP-MCNC: 161 MG/DL (ref 227–495)
GFR SERPL CREATININE-BSD FRML MDRD: 82 ML/MIN/1.73SQ M
GLUCOSE SERPL-MCNC: 111 MG/DL (ref 65–140)
GLUCOSE SERPL-MCNC: 115 MG/DL (ref 65–140)
GLUCOSE SERPL-MCNC: 119 MG/DL (ref 65–140)
GLUCOSE SERPL-MCNC: 122 MG/DL (ref 65–140)
GLUCOSE SERPL-MCNC: 123 MG/DL (ref 65–140)
GLUCOSE SERPL-MCNC: 126 MG/DL (ref 65–140)
GLUCOSE SERPL-MCNC: 129 MG/DL (ref 65–140)
GLUCOSE SERPL-MCNC: 134 MG/DL (ref 65–140)
GLUCOSE SERPL-MCNC: 156 MG/DL (ref 65–140)
GLUCOSE SERPL-MCNC: 157 MG/DL (ref 65–140)
GLUCOSE SERPL-MCNC: 163 MG/DL (ref 65–140)
GLUCOSE SERPL-MCNC: 165 MG/DL (ref 65–140)
GLUCOSE SERPL-MCNC: 174 MG/DL (ref 65–140)
GLUCOSE SERPL-MCNC: 175 MG/DL (ref 65–140)
GLUCOSE SERPL-MCNC: 195 MG/DL (ref 65–140)
GLUCOSE SERPL-MCNC: 42 MG/DL (ref 65–140)
GLUCOSE SERPL-MCNC: 89 MG/DL (ref 65–140)
HCO3 BLDA-SCNC: 23.5 MMOL/L (ref 24–30)
HCO3 BLDA-SCNC: 24.4 MMOL/L (ref 22–28)
HCO3 BLDA-SCNC: 26.3 MMOL/L (ref 24–30)
HCO3 BLDA-SCNC: 27.6 MMOL/L (ref 22–28)
HCO3 BLDA-SCNC: 27.8 MMOL/L (ref 22–28)
HCO3 BLDA-SCNC: 28.4 MMOL/L (ref 24–30)
HCO3 BLDA-SCNC: 28.6 MMOL/L (ref 22–28)
HCO3 BLDA-SCNC: 29.2 MMOL/L (ref 22–28)
HCT VFR BLD AUTO: 29.7 % (ref 36.5–49.3)
HCT VFR BLD AUTO: 29.8 % (ref 36.5–49.3)
HCT VFR BLD CALC: 23 % (ref 36.5–49.3)
HCT VFR BLD CALC: 26 % (ref 36.5–49.3)
HCT VFR BLD CALC: 27 % (ref 36.5–49.3)
HCT VFR BLD CALC: 27 % (ref 36.5–49.3)
HCT VFR BLD CALC: 35 % (ref 36.5–49.3)
HCT VFR BLD CALC: 39 % (ref 36.5–49.3)
HGB BLD-MCNC: 10.1 G/DL (ref 12–17)
HGB BLD-MCNC: 9.9 G/DL (ref 12–17)
HGB BLDA-MCNC: 11.9 G/DL (ref 12–17)
HGB BLDA-MCNC: 13.3 G/DL (ref 12–17)
HGB BLDA-MCNC: 7.8 G/DL (ref 12–17)
HGB BLDA-MCNC: 8.8 G/DL (ref 12–17)
HGB BLDA-MCNC: 9.2 G/DL (ref 12–17)
HGB BLDA-MCNC: 9.2 G/DL (ref 12–17)
INR PPP: 1.51 (ref 0.84–1.19)
PCO2 BLD: 25 MMOL/L (ref 21–32)
PCO2 BLD: 26 MMOL/L (ref 21–32)
PCO2 BLD: 28 MMOL/L (ref 21–32)
PCO2 BLD: 29 MMOL/L (ref 21–32)
PCO2 BLD: 29 MMOL/L (ref 21–32)
PCO2 BLD: 30 MMOL/L (ref 21–32)
PCO2 BLD: 30 MMOL/L (ref 21–32)
PCO2 BLD: 31 MMOL/L (ref 21–32)
PCO2 BLD: 35.5 MM HG (ref 42–50)
PCO2 BLD: 43.1 MM HG (ref 36–44)
PCO2 BLD: 45.3 MM HG (ref 42–50)
PCO2 BLD: 45.6 MM HG (ref 36–44)
PCO2 BLD: 45.6 MM HG (ref 36–44)
PCO2 BLD: 49.1 MM HG (ref 36–44)
PCO2 BLD: 50.1 MM HG (ref 42–50)
PCO2 BLD: 55.9 MM HG (ref 36–44)
PH BLD: 7.33 [PH] (ref 7.35–7.45)
PH BLD: 7.34 [PH] (ref 7.35–7.45)
PH BLD: 7.36 [PH] (ref 7.3–7.4)
PH BLD: 7.37 [PH] (ref 7.35–7.45)
PH BLD: 7.37 [PH] (ref 7.3–7.4)
PH BLD: 7.39 [PH] (ref 7.35–7.45)
PH BLD: 7.42 [PH] (ref 7.35–7.45)
PH BLD: 7.43 [PH] (ref 7.3–7.4)
PLATELET # BLD AUTO: 108 THOUSANDS/UL (ref 149–390)
PLATELET # BLD AUTO: 156 THOUSANDS/UL (ref 149–390)
PMV BLD AUTO: 9.5 FL (ref 8.9–12.7)
PMV BLD AUTO: 9.8 FL (ref 8.9–12.7)
PO2 BLD: 191 MM HG (ref 35–45)
PO2 BLD: 250 MM HG (ref 75–129)
PO2 BLD: 257 MM HG (ref 75–129)
PO2 BLD: 272 MM HG (ref 75–129)
PO2 BLD: 31 MM HG (ref 35–45)
PO2 BLD: 326 MM HG (ref 75–129)
PO2 BLD: 367 MM HG (ref 75–129)
PO2 BLD: 47 MM HG (ref 35–45)
POTASSIUM BLD-SCNC: 3.6 MMOL/L (ref 3.5–5.3)
POTASSIUM BLD-SCNC: 4 MMOL/L (ref 3.5–5.3)
POTASSIUM BLD-SCNC: 4 MMOL/L (ref 3.5–5.3)
POTASSIUM BLD-SCNC: 4.2 MMOL/L (ref 3.5–5.3)
POTASSIUM BLD-SCNC: 4.3 MMOL/L (ref 3.5–5.3)
POTASSIUM BLD-SCNC: 4.3 MMOL/L (ref 3.5–5.3)
POTASSIUM BLD-SCNC: 4.6 MMOL/L (ref 3.5–5.3)
POTASSIUM BLD-SCNC: 4.6 MMOL/L (ref 3.5–5.3)
POTASSIUM SERPL-SCNC: 3.4 MMOL/L (ref 3.5–5.3)
POTASSIUM SERPL-SCNC: 3.7 MMOL/L (ref 3.5–5.3)
POTASSIUM SERPL-SCNC: 3.9 MMOL/L (ref 3.5–5.3)
PROTHROMBIN TIME: 18.1 SECONDS (ref 11.6–14.5)
SAO2 % BLD FROM PO2: 100 % (ref 60–85)
SAO2 % BLD FROM PO2: 56 % (ref 60–85)
SAO2 % BLD FROM PO2: 81 % (ref 60–85)
SODIUM BLD-SCNC: 136 MMOL/L (ref 136–145)
SODIUM BLD-SCNC: 138 MMOL/L (ref 136–145)
SODIUM BLD-SCNC: 139 MMOL/L (ref 136–145)
SODIUM BLD-SCNC: 140 MMOL/L (ref 136–145)
SODIUM BLD-SCNC: 141 MMOL/L (ref 136–145)
SODIUM SERPL-SCNC: 143 MMOL/L (ref 136–145)
SPECIMEN SOURCE: ABNORMAL

## 2021-05-10 PROCEDURE — 02100Z9 BYPASS CORONARY ARTERY, ONE ARTERY FROM LEFT INTERNAL MAMMARY, OPEN APPROACH: ICD-10-PCS | Performed by: THORACIC SURGERY (CARDIOTHORACIC VASCULAR SURGERY)

## 2021-05-10 PROCEDURE — 82947 ASSAY GLUCOSE BLOOD QUANT: CPT

## 2021-05-10 PROCEDURE — 86920 COMPATIBILITY TEST SPIN: CPT

## 2021-05-10 PROCEDURE — G9197 ORDER FOR CEPH: HCPCS | Performed by: THORACIC SURGERY (CARDIOTHORACIC VASCULAR SURGERY)

## 2021-05-10 PROCEDURE — 33508 ENDOSCOPIC VEIN HARVEST: CPT | Performed by: THORACIC SURGERY (CARDIOTHORACIC VASCULAR SURGERY)

## 2021-05-10 PROCEDURE — 06BQ4ZZ EXCISION OF LEFT SAPHENOUS VEIN, PERCUTANEOUS ENDOSCOPIC APPROACH: ICD-10-PCS | Performed by: THORACIC SURGERY (CARDIOTHORACIC VASCULAR SURGERY)

## 2021-05-10 PROCEDURE — 30233N0 TRANSFUSION OF AUTOLOGOUS RED BLOOD CELLS INTO PERIPHERAL VEIN, PERCUTANEOUS APPROACH: ICD-10-PCS | Performed by: ANESTHESIOLOGY

## 2021-05-10 PROCEDURE — 85384 FIBRINOGEN ACTIVITY: CPT | Performed by: PHYSICIAN ASSISTANT

## 2021-05-10 PROCEDURE — 88313 SPECIAL STAINS GROUP 2: CPT | Performed by: PATHOLOGY

## 2021-05-10 PROCEDURE — 85018 HEMOGLOBIN: CPT | Performed by: PHYSICIAN ASSISTANT

## 2021-05-10 PROCEDURE — 82803 BLOOD GASES ANY COMBINATION: CPT

## 2021-05-10 PROCEDURE — 85049 AUTOMATED PLATELET COUNT: CPT | Performed by: THORACIC SURGERY (CARDIOTHORACIC VASCULAR SURGERY)

## 2021-05-10 PROCEDURE — 93005 ELECTROCARDIOGRAM TRACING: CPT

## 2021-05-10 PROCEDURE — 5A1223Z PERFORMANCE OF CARDIAC PACING, CONTINUOUS: ICD-10-PCS | Performed by: THORACIC SURGERY (CARDIOTHORACIC VASCULAR SURGERY)

## 2021-05-10 PROCEDURE — P9035 PLATELET PHERES LEUKOREDUCED: HCPCS

## 2021-05-10 PROCEDURE — P9016 RBC LEUKOCYTES REDUCED: HCPCS

## 2021-05-10 PROCEDURE — 33533 CABG ARTERIAL SINGLE: CPT | Performed by: THORACIC SURGERY (CARDIOTHORACIC VASCULAR SURGERY)

## 2021-05-10 PROCEDURE — 93355 ECHO TRANSESOPHAGEAL (TEE): CPT

## 2021-05-10 PROCEDURE — 84132 ASSAY OF SERUM POTASSIUM: CPT

## 2021-05-10 PROCEDURE — 99223 1ST HOSP IP/OBS HIGH 75: CPT | Performed by: ANESTHESIOLOGY

## 2021-05-10 PROCEDURE — 85014 HEMATOCRIT: CPT

## 2021-05-10 PROCEDURE — 88305 TISSUE EXAM BY PATHOLOGIST: CPT | Performed by: PATHOLOGY

## 2021-05-10 PROCEDURE — 85049 AUTOMATED PLATELET COUNT: CPT | Performed by: PHYSICIAN ASSISTANT

## 2021-05-10 PROCEDURE — 85347 COAGULATION TIME ACTIVATED: CPT

## 2021-05-10 PROCEDURE — 82948 REAGENT STRIP/BLOOD GLUCOSE: CPT

## 2021-05-10 PROCEDURE — 84132 ASSAY OF SERUM POTASSIUM: CPT | Performed by: PHYSICIAN ASSISTANT

## 2021-05-10 PROCEDURE — 82330 ASSAY OF CALCIUM: CPT

## 2021-05-10 PROCEDURE — 80048 BASIC METABOLIC PNL TOTAL CA: CPT | Performed by: PHYSICIAN ASSISTANT

## 2021-05-10 PROCEDURE — 02HV33Z INSERTION OF INFUSION DEVICE INTO SUPERIOR VENA CAVA, PERCUTANEOUS APPROACH: ICD-10-PCS | Performed by: ANESTHESIOLOGY

## 2021-05-10 PROCEDURE — 021309W BYPASS CORONARY ARTERY, FOUR OR MORE ARTERIES FROM AORTA WITH AUTOLOGOUS VENOUS TISSUE, OPEN APPROACH: ICD-10-PCS | Performed by: THORACIC SURGERY (CARDIOTHORACIC VASCULAR SURGERY)

## 2021-05-10 PROCEDURE — 71045 X-RAY EXAM CHEST 1 VIEW: CPT

## 2021-05-10 PROCEDURE — 02RF08Z REPLACEMENT OF AORTIC VALVE WITH ZOOPLASTIC TISSUE, OPEN APPROACH: ICD-10-PCS | Performed by: THORACIC SURGERY (CARDIOTHORACIC VASCULAR SURGERY)

## 2021-05-10 PROCEDURE — 85730 THROMBOPLASTIN TIME PARTIAL: CPT | Performed by: PHYSICIAN ASSISTANT

## 2021-05-10 PROCEDURE — 84295 ASSAY OF SERUM SODIUM: CPT

## 2021-05-10 PROCEDURE — 82330 ASSAY OF CALCIUM: CPT | Performed by: THORACIC SURGERY (CARDIOTHORACIC VASCULAR SURGERY)

## 2021-05-10 PROCEDURE — 94002 VENT MGMT INPAT INIT DAY: CPT

## 2021-05-10 PROCEDURE — 5A1221Z PERFORMANCE OF CARDIAC OUTPUT, CONTINUOUS: ICD-10-PCS | Performed by: THORACIC SURGERY (CARDIOTHORACIC VASCULAR SURGERY)

## 2021-05-10 PROCEDURE — 88311 DECALCIFY TISSUE: CPT | Performed by: PATHOLOGY

## 2021-05-10 PROCEDURE — P9012 CRYOPRECIPITATE EACH UNIT: HCPCS

## 2021-05-10 PROCEDURE — 30233M1 TRANSFUSION OF NONAUTOLOGOUS PLASMA CRYOPRECIPITATE INTO PERIPHERAL VEIN, PERCUTANEOUS APPROACH: ICD-10-PCS | Performed by: ANESTHESIOLOGY

## 2021-05-10 PROCEDURE — 33521 CABG ARTERY-VEIN FOUR: CPT | Performed by: THORACIC SURGERY (CARDIOTHORACIC VASCULAR SURGERY)

## 2021-05-10 PROCEDURE — 94760 N-INVAS EAR/PLS OXIMETRY 1: CPT

## 2021-05-10 PROCEDURE — 85014 HEMATOCRIT: CPT | Performed by: PHYSICIAN ASSISTANT

## 2021-05-10 PROCEDURE — 30233R1 TRANSFUSION OF NONAUTOLOGOUS PLATELETS INTO PERIPHERAL VEIN, PERCUTANEOUS APPROACH: ICD-10-PCS | Performed by: ANESTHESIOLOGY

## 2021-05-10 PROCEDURE — 85610 PROTHROMBIN TIME: CPT | Performed by: PHYSICIAN ASSISTANT

## 2021-05-10 PROCEDURE — 33405 REPLACEMENT AORTIC VALVE OPN: CPT | Performed by: THORACIC SURGERY (CARDIOTHORACIC VASCULAR SURGERY)

## 2021-05-10 PROCEDURE — 30233N1 TRANSFUSION OF NONAUTOLOGOUS RED BLOOD CELLS INTO PERIPHERAL VEIN, PERCUTANEOUS APPROACH: ICD-10-PCS | Performed by: ANESTHESIOLOGY

## 2021-05-10 DEVICE — VALVE AORTIC MAGNA EASE 23MM: Type: IMPLANTABLE DEVICE | Site: AORTA | Status: FUNCTIONAL

## 2021-05-10 DEVICE — MARKER CORONARY BYPASS VOSS GRAFT: Type: IMPLANTABLE DEVICE | Site: AORTA | Status: FUNCTIONAL

## 2021-05-10 RX ORDER — SODIUM CHLORIDE 9 MG/ML
INJECTION, SOLUTION INTRAVENOUS CONTINUOUS PRN
Status: DISCONTINUED | OUTPATIENT
Start: 2021-05-10 | End: 2021-05-10

## 2021-05-10 RX ORDER — POTASSIUM CHLORIDE 14.9 MG/ML
20 INJECTION INTRAVENOUS
Status: DISCONTINUED | OUTPATIENT
Start: 2021-05-10 | End: 2021-05-11

## 2021-05-10 RX ORDER — ACETAMINOPHEN 650 MG/1
650 SUPPOSITORY RECTAL EVERY 4 HOURS PRN
Status: DISCONTINUED | OUTPATIENT
Start: 2021-05-10 | End: 2021-05-14 | Stop reason: HOSPADM

## 2021-05-10 RX ORDER — ESMOLOL HYDROCHLORIDE 10 MG/ML
INJECTION INTRAVENOUS AS NEEDED
Status: DISCONTINUED | OUTPATIENT
Start: 2021-05-10 | End: 2021-05-10

## 2021-05-10 RX ORDER — ALBUMIN, HUMAN INJ 5% 5 %
SOLUTION INTRAVENOUS
Status: COMPLETED
Start: 2021-05-10 | End: 2021-05-10

## 2021-05-10 RX ORDER — ALBUMIN, HUMAN INJ 5% 5 %
12.5 SOLUTION INTRAVENOUS ONCE
Status: COMPLETED | OUTPATIENT
Start: 2021-05-10 | End: 2021-05-10

## 2021-05-10 RX ORDER — POTASSIUM CHLORIDE 14.9 MG/ML
20 INJECTION INTRAVENOUS ONCE AS NEEDED
Status: COMPLETED | OUTPATIENT
Start: 2021-05-10 | End: 2021-05-11

## 2021-05-10 RX ORDER — MILRINONE LACTATE 0.2 MG/ML
0.25 INJECTION, SOLUTION INTRAVENOUS CONTINUOUS
Status: DISCONTINUED | OUTPATIENT
Start: 2021-05-10 | End: 2021-05-11

## 2021-05-10 RX ORDER — VANCOMYCIN HYDROCHLORIDE 1 G/20ML
INJECTION, POWDER, LYOPHILIZED, FOR SOLUTION INTRAVENOUS AS NEEDED
Status: DISCONTINUED | OUTPATIENT
Start: 2021-05-10 | End: 2021-05-10 | Stop reason: HOSPADM

## 2021-05-10 RX ORDER — POTASSIUM CHLORIDE 14.9 MG/ML
20 INJECTION INTRAVENOUS
Status: COMPLETED | OUTPATIENT
Start: 2021-05-10 | End: 2021-05-10

## 2021-05-10 RX ORDER — GLYCOPYRROLATE 0.2 MG/ML
INJECTION INTRAMUSCULAR; INTRAVENOUS AS NEEDED
Status: DISCONTINUED | OUTPATIENT
Start: 2021-05-10 | End: 2021-05-10

## 2021-05-10 RX ORDER — DEXTROSE MONOHYDRATE 25 G/50ML
INJECTION, SOLUTION INTRAVENOUS
Status: DISPENSED
Start: 2021-05-10 | End: 2021-05-11

## 2021-05-10 RX ORDER — AMIODARONE HYDROCHLORIDE 200 MG/1
200 TABLET ORAL EVERY 8 HOURS SCHEDULED
Status: DISCONTINUED | OUTPATIENT
Start: 2021-05-10 | End: 2021-05-11

## 2021-05-10 RX ORDER — FENTANYL CITRATE 50 UG/ML
50 INJECTION, SOLUTION INTRAMUSCULAR; INTRAVENOUS ONCE
Status: DISCONTINUED | OUTPATIENT
Start: 2021-05-10 | End: 2021-05-11

## 2021-05-10 RX ORDER — MAGNESIUM HYDROXIDE 1200 MG/15ML
LIQUID ORAL AS NEEDED
Status: DISCONTINUED | OUTPATIENT
Start: 2021-05-10 | End: 2021-05-10 | Stop reason: HOSPADM

## 2021-05-10 RX ORDER — PROPOFOL 10 MG/ML
INJECTION, EMULSION INTRAVENOUS AS NEEDED
Status: DISCONTINUED | OUTPATIENT
Start: 2021-05-10 | End: 2021-05-10

## 2021-05-10 RX ORDER — CEFAZOLIN SODIUM 2 G/50ML
2000 SOLUTION INTRAVENOUS EVERY 8 HOURS
Status: COMPLETED | OUTPATIENT
Start: 2021-05-10 | End: 2021-05-11

## 2021-05-10 RX ORDER — PROTAMINE SULFATE 10 MG/ML
INJECTION, SOLUTION INTRAVENOUS AS NEEDED
Status: DISCONTINUED | OUTPATIENT
Start: 2021-05-10 | End: 2021-05-10

## 2021-05-10 RX ORDER — HEPARIN SODIUM 1000 [USP'U]/ML
INJECTION, SOLUTION INTRAVENOUS; SUBCUTANEOUS AS NEEDED
Status: DISCONTINUED | OUTPATIENT
Start: 2021-05-10 | End: 2021-05-10

## 2021-05-10 RX ORDER — FENTANYL CITRATE 50 UG/ML
50 INJECTION, SOLUTION INTRAMUSCULAR; INTRAVENOUS
Status: DISCONTINUED | OUTPATIENT
Start: 2021-05-10 | End: 2021-05-11

## 2021-05-10 RX ORDER — FONDAPARINUX SODIUM 2.5 MG/.5ML
2.5 INJECTION SUBCUTANEOUS DAILY
Status: DISCONTINUED | OUTPATIENT
Start: 2021-05-11 | End: 2021-05-12

## 2021-05-10 RX ORDER — ATORVASTATIN CALCIUM 80 MG/1
80 TABLET, FILM COATED ORAL
Status: DISCONTINUED | OUTPATIENT
Start: 2021-05-10 | End: 2021-05-14 | Stop reason: HOSPADM

## 2021-05-10 RX ORDER — BISACODYL 10 MG
10 SUPPOSITORY, RECTAL RECTAL DAILY PRN
Status: DISCONTINUED | OUTPATIENT
Start: 2021-05-10 | End: 2021-05-14 | Stop reason: HOSPADM

## 2021-05-10 RX ORDER — ROCURONIUM BROMIDE 10 MG/ML
INJECTION, SOLUTION INTRAVENOUS AS NEEDED
Status: DISCONTINUED | OUTPATIENT
Start: 2021-05-10 | End: 2021-05-10

## 2021-05-10 RX ORDER — POLYETHYLENE GLYCOL 3350 17 G/17G
17 POWDER, FOR SOLUTION ORAL DAILY
Status: DISCONTINUED | OUTPATIENT
Start: 2021-05-11 | End: 2021-05-14 | Stop reason: HOSPADM

## 2021-05-10 RX ORDER — ALBUMIN, HUMAN INJ 5% 5 %
25 SOLUTION INTRAVENOUS ONCE
Status: COMPLETED | OUTPATIENT
Start: 2021-05-10 | End: 2021-05-10

## 2021-05-10 RX ORDER — ONDANSETRON 2 MG/ML
4 INJECTION INTRAMUSCULAR; INTRAVENOUS EVERY 6 HOURS PRN
Status: DISCONTINUED | OUTPATIENT
Start: 2021-05-10 | End: 2021-05-14 | Stop reason: HOSPADM

## 2021-05-10 RX ORDER — SODIUM CHLORIDE, SODIUM LACTATE, POTASSIUM CHLORIDE, CALCIUM CHLORIDE 600; 310; 30; 20 MG/100ML; MG/100ML; MG/100ML; MG/100ML
125 INJECTION, SOLUTION INTRAVENOUS CONTINUOUS
Status: DISCONTINUED | OUTPATIENT
Start: 2021-05-10 | End: 2021-05-10

## 2021-05-10 RX ORDER — ASPIRIN 325 MG
325 TABLET ORAL DAILY
Status: DISCONTINUED | OUTPATIENT
Start: 2021-05-10 | End: 2021-05-14 | Stop reason: HOSPADM

## 2021-05-10 RX ORDER — NEOSTIGMINE METHYLSULFATE 1 MG/ML
INJECTION INTRAVENOUS AS NEEDED
Status: DISCONTINUED | OUTPATIENT
Start: 2021-05-10 | End: 2021-05-10

## 2021-05-10 RX ORDER — SUCCINYLCHOLINE/SOD CL,ISO/PF 100 MG/5ML
SYRINGE (ML) INTRAVENOUS AS NEEDED
Status: DISCONTINUED | OUTPATIENT
Start: 2021-05-10 | End: 2021-05-10

## 2021-05-10 RX ORDER — ACETAMINOPHEN 325 MG/1
975 TABLET ORAL EVERY 8 HOURS
Status: DISCONTINUED | OUTPATIENT
Start: 2021-05-10 | End: 2021-05-14 | Stop reason: HOSPADM

## 2021-05-10 RX ORDER — MAGNESIUM SULFATE HEPTAHYDRATE 40 MG/ML
2 INJECTION, SOLUTION INTRAVENOUS ONCE
Status: COMPLETED | OUTPATIENT
Start: 2021-05-10 | End: 2021-05-10

## 2021-05-10 RX ORDER — KETAMINE HYDROCHLORIDE 100 MG/ML
INJECTION, SOLUTION INTRAMUSCULAR; INTRAVENOUS AS NEEDED
Status: DISCONTINUED | OUTPATIENT
Start: 2021-05-10 | End: 2021-05-10

## 2021-05-10 RX ORDER — FUROSEMIDE 10 MG/ML
40 INJECTION INTRAMUSCULAR; INTRAVENOUS EVERY 6 HOURS PRN
Status: DISCONTINUED | OUTPATIENT
Start: 2021-05-10 | End: 2021-05-11

## 2021-05-10 RX ORDER — FENTANYL CITRATE 50 UG/ML
INJECTION, SOLUTION INTRAMUSCULAR; INTRAVENOUS AS NEEDED
Status: DISCONTINUED | OUTPATIENT
Start: 2021-05-10 | End: 2021-05-10

## 2021-05-10 RX ORDER — ALBUMIN, HUMAN INJ 5% 5 %
SOLUTION INTRAVENOUS CONTINUOUS PRN
Status: DISCONTINUED | OUTPATIENT
Start: 2021-05-10 | End: 2021-05-10

## 2021-05-10 RX ORDER — CHLORHEXIDINE GLUCONATE 0.12 MG/ML
15 RINSE ORAL EVERY 12 HOURS SCHEDULED
Status: DISCONTINUED | OUTPATIENT
Start: 2021-05-10 | End: 2021-05-10 | Stop reason: HOSPADM

## 2021-05-10 RX ORDER — SODIUM CHLORIDE 450 MG/100ML
20 INJECTION, SOLUTION INTRAVENOUS CONTINUOUS
Status: DISCONTINUED | OUTPATIENT
Start: 2021-05-10 | End: 2021-05-12

## 2021-05-10 RX ORDER — OXYCODONE HYDROCHLORIDE 5 MG/1
2.5 TABLET ORAL EVERY 4 HOURS PRN
Status: DISCONTINUED | OUTPATIENT
Start: 2021-05-10 | End: 2021-05-14 | Stop reason: HOSPADM

## 2021-05-10 RX ORDER — CALCIUM CHLORIDE 100 MG/ML
1 INJECTION INTRAVENOUS; INTRAVENTRICULAR ONCE
Status: DISCONTINUED | OUTPATIENT
Start: 2021-05-10 | End: 2021-05-11

## 2021-05-10 RX ORDER — CEFAZOLIN SODIUM 2 G/50ML
2000 SOLUTION INTRAVENOUS ONCE
Status: COMPLETED | OUTPATIENT
Start: 2021-05-10 | End: 2021-05-10

## 2021-05-10 RX ORDER — CHLORHEXIDINE GLUCONATE 0.12 MG/ML
15 RINSE ORAL EVERY 12 HOURS SCHEDULED
Status: DISCONTINUED | OUTPATIENT
Start: 2021-05-10 | End: 2021-05-10

## 2021-05-10 RX ORDER — OXYCODONE HYDROCHLORIDE 5 MG/1
5 TABLET ORAL EVERY 4 HOURS PRN
Status: DISCONTINUED | OUTPATIENT
Start: 2021-05-10 | End: 2021-05-14 | Stop reason: HOSPADM

## 2021-05-10 RX ORDER — CHLORHEXIDINE GLUCONATE 0.12 MG/ML
15 RINSE ORAL 2 TIMES DAILY
Status: DISCONTINUED | OUTPATIENT
Start: 2021-05-10 | End: 2021-05-11

## 2021-05-10 RX ORDER — AMINOCAPROIC ACID 250 MG/ML
INJECTION, SOLUTION INTRAVENOUS AS NEEDED
Status: DISCONTINUED | OUTPATIENT
Start: 2021-05-10 | End: 2021-05-10

## 2021-05-10 RX ORDER — LIDOCAINE HYDROCHLORIDE 10 MG/ML
0.5 INJECTION, SOLUTION EPIDURAL; INFILTRATION; INTRACAUDAL; PERINEURAL ONCE AS NEEDED
Status: DISCONTINUED | OUTPATIENT
Start: 2021-05-10 | End: 2021-05-10 | Stop reason: HOSPADM

## 2021-05-10 RX ORDER — HYDROMORPHONE HCL/PF 1 MG/ML
0.5 SYRINGE (ML) INJECTION EVERY 2 HOUR PRN
Status: DISCONTINUED | OUTPATIENT
Start: 2021-05-10 | End: 2021-05-11

## 2021-05-10 RX ORDER — PANTOPRAZOLE SODIUM 40 MG/1
40 TABLET, DELAYED RELEASE ORAL
Status: DISCONTINUED | OUTPATIENT
Start: 2021-05-11 | End: 2021-05-14 | Stop reason: HOSPADM

## 2021-05-10 RX ADMIN — ALBUMIN (HUMAN) 25 G: 12.5 INJECTION, SOLUTION INTRAVENOUS at 22:30

## 2021-05-10 RX ADMIN — SODIUM CHLORIDE, SODIUM LACTATE, POTASSIUM CHLORIDE, AND CALCIUM CHLORIDE 500 ML: .6; .31; .03; .02 INJECTION, SOLUTION INTRAVENOUS at 13:30

## 2021-05-10 RX ADMIN — KETAMINE HYDROCHLORIDE 50 MG: 100 INJECTION INTRAMUSCULAR; INTRAVENOUS at 08:30

## 2021-05-10 RX ADMIN — SODIUM CHLORIDE 5 UNITS/HR: 9 INJECTION, SOLUTION INTRAVENOUS at 08:34

## 2021-05-10 RX ADMIN — SODIUM CHLORIDE, SODIUM LACTATE, POTASSIUM CHLORIDE, AND CALCIUM CHLORIDE: .6; .31; .03; .02 INJECTION, SOLUTION INTRAVENOUS at 07:23

## 2021-05-10 RX ADMIN — PROTAMINE SULFATE 10 MG: 10 INJECTION, SOLUTION INTRAVENOUS at 11:52

## 2021-05-10 RX ADMIN — FENTANYL CITRATE 1000 MCG: 50 INJECTION INTRAMUSCULAR; INTRAVENOUS at 08:30

## 2021-05-10 RX ADMIN — NICARDIPINE HYDROCHLORIDE 5 MG/HR: 2.5 INJECTION, SOLUTION INTRAVENOUS at 13:20

## 2021-05-10 RX ADMIN — SODIUM CHLORIDE, SODIUM LACTATE, POTASSIUM CHLORIDE, AND CALCIUM CHLORIDE 500 ML: .6; .31; .03; .02 INJECTION, SOLUTION INTRAVENOUS at 13:00

## 2021-05-10 RX ADMIN — CHLORHEXIDINE GLUCONATE 15 ML: 1.2 SOLUTION ORAL at 19:11

## 2021-05-10 RX ADMIN — PROPOFOL 160 MG: 10 INJECTION, EMULSION INTRAVENOUS at 07:31

## 2021-05-10 RX ADMIN — OXYCODONE HYDROCHLORIDE 5 MG: 5 TABLET ORAL at 16:30

## 2021-05-10 RX ADMIN — ALBUMIN (HUMAN) 12.5 G: 12.5 INJECTION, SOLUTION INTRAVENOUS at 17:26

## 2021-05-10 RX ADMIN — Medication 100 MG: at 07:31

## 2021-05-10 RX ADMIN — HEPARIN SODIUM 5000 UNITS: 1000 INJECTION INTRAVENOUS; SUBCUTANEOUS at 08:47

## 2021-05-10 RX ADMIN — PROTAMINE SULFATE 190 MG: 10 INJECTION, SOLUTION INTRAVENOUS at 11:53

## 2021-05-10 RX ADMIN — SODIUM CHLORIDE: 0.9 INJECTION, SOLUTION INTRAVENOUS at 07:49

## 2021-05-10 RX ADMIN — CEFAZOLIN SODIUM 2000 MG: 2 SOLUTION INTRAVENOUS at 20:59

## 2021-05-10 RX ADMIN — Medication 12.5 MG: at 06:41

## 2021-05-10 RX ADMIN — OXYCODONE HYDROCHLORIDE 5 MG: 5 TABLET ORAL at 21:00

## 2021-05-10 RX ADMIN — SODIUM CHLORIDE 0.3 MCG/KG/HR: 9 INJECTION, SOLUTION INTRAVENOUS at 08:34

## 2021-05-10 RX ADMIN — ROCURONIUM BROMIDE 50 MG: 50 INJECTION, SOLUTION INTRAVENOUS at 07:35

## 2021-05-10 RX ADMIN — CHLORHEXIDINE GLUCONATE 15 ML: 1.2 SOLUTION ORAL at 06:41

## 2021-05-10 RX ADMIN — ESMOLOL HYDROCHLORIDE 100 MG: 100 INJECTION, SOLUTION INTRAVENOUS at 07:33

## 2021-05-10 RX ADMIN — GLYCOPYRROLATE 0.8 MG: 0.2 INJECTION, SOLUTION INTRAMUSCULAR; INTRAVENOUS at 12:22

## 2021-05-10 RX ADMIN — HEPARIN SODIUM 24000 UNITS: 1000 INJECTION INTRAVENOUS; SUBCUTANEOUS at 09:00

## 2021-05-10 RX ADMIN — Medication 3 MCG/MIN: at 11:38

## 2021-05-10 RX ADMIN — CEFAZOLIN SODIUM 2000 MG: 2 SOLUTION INTRAVENOUS at 11:52

## 2021-05-10 RX ADMIN — ACETAMINOPHEN 975 MG: 325 TABLET, FILM COATED ORAL at 21:00

## 2021-05-10 RX ADMIN — CEFAZOLIN SODIUM 2000 MG: 2 SOLUTION INTRAVENOUS at 07:55

## 2021-05-10 RX ADMIN — ROCURONIUM BROMIDE 50 MG: 50 INJECTION, SOLUTION INTRAVENOUS at 08:31

## 2021-05-10 RX ADMIN — LIDOCAINE HYDROCHLORIDE 100 MG: 20 INJECTION, SOLUTION INTRAVENOUS at 07:31

## 2021-05-10 RX ADMIN — POTASSIUM CHLORIDE 20 MEQ: 14.9 INJECTION, SOLUTION INTRAVENOUS at 19:11

## 2021-05-10 RX ADMIN — SODIUM CHLORIDE 20 ML/HR: 0.45 INJECTION, SOLUTION INTRAVENOUS at 13:50

## 2021-05-10 RX ADMIN — NEOSTIGMINE METHYLSULFATE 5 MG: 1 INJECTION INTRAVENOUS at 12:22

## 2021-05-10 RX ADMIN — POTASSIUM CHLORIDE 20 MEQ: 14.9 INJECTION, SOLUTION INTRAVENOUS at 14:55

## 2021-05-10 RX ADMIN — MAGNESIUM SULFATE HEPTAHYDRATE 2 G: 40 INJECTION, SOLUTION INTRAVENOUS at 13:28

## 2021-05-10 RX ADMIN — AMINOCAPROIC ACID 1 MG/HR: 250 INJECTION, SOLUTION INTRAVENOUS at 08:24

## 2021-05-10 RX ADMIN — ALBUMIN (HUMAN): 12.5 INJECTION, SOLUTION INTRAVENOUS at 12:09

## 2021-05-10 RX ADMIN — ALBUMIN (HUMAN): 12.5 INJECTION, SOLUTION INTRAVENOUS at 11:57

## 2021-05-10 RX ADMIN — ALBUMIN (HUMAN) 12.5 G: 12.5 INJECTION, SOLUTION INTRAVENOUS at 21:33

## 2021-05-10 RX ADMIN — AMINOCAPROIC ACID 1 G/HR: 250 INJECTION, SOLUTION INTRAVENOUS at 13:54

## 2021-05-10 RX ADMIN — AMINOCAPROIC ACID 5 G: 250 INJECTION, SOLUTION INTRAVENOUS at 08:24

## 2021-05-10 RX ADMIN — MUPIROCIN 1 APPLICATION: 20 OINTMENT TOPICAL at 20:59

## 2021-05-10 RX ADMIN — ALBUMIN, HUMAN INJ 5% 25 G: 5 SOLUTION at 22:30

## 2021-05-10 RX ADMIN — MUPIROCIN 1 APPLICATION: 20 OINTMENT TOPICAL at 06:42

## 2021-05-10 NOTE — ANESTHESIA POSTPROCEDURE EVALUATION
Post-Op Assessment Note    CV Status:  Stable    Pain management: adequate     Mental Status:  Unresponsive   Hydration Status:  Stable   PONV Controlled:  None   Airway Patency:  Patent  Airway: intubated      Post Op Vitals Reviewed: Yes      Staff: Anesthesiologist         No complications documented      BP     Temp     Pulse     Resp      SpO2

## 2021-05-10 NOTE — CONSULTS
Consult - [address] Sandip 77 y o  male MRN: [id number]  Unit/Bed#: [facility] 414-01 Encounter: [id number]    Inpatient consult to Wing Clarke performed by: Kolby Tracy PA-C  Consult ordered by: Beatriz Dillard PA-C          Physician Requesting Consult: Katarzyna Black MD    Reason for Consult / Principal Problem: Coronary artery disease involving native coronary artery of native heart    HPI: Jennifer Monet is a 77 YOM complained of dyspnea on exertion  PCP sent him for a stress test which was abnormal and the follow up cardiac cath showed MVCAD  He has known aortic stenosis from rheumatic fever which had progressed on ECHO to moderate  He was evaluated by [facility] for surgical intervention  Today he presents from a CABG x 5 ( LIMA to LAD, SVG to D1, SVG -Y- to ramus intermedius and OM1, SVG to R PDA) and tissue AVR  PMHx: Severe ischemic cardiomyopathy, L CEA, occluded ANI, DM, HTN, HLD    History obtained from chart review due to patient being intubated and sedated  ---------------------------------------------------------------------------------------------------------------------------------------------------------------------  Impressions:  1  CAD s/p CABG x 5  2  Aortic stenosis s/p tissue AVR  3  HTN  4  HLD  5  Acute blood loss anemia  6  DM2  7  GERD  8  Carotid artery stenosis s/p L CEA    Plan:    Neuro: D/C continuous sedation  ATC tylenol, PRN oxycodone for pain  Trend neuro exam   Delirium precautions  CV: MAP goal >65  SBP goal <130  CI>2 2  Post-op medications: Epinephrine, 1 mcg/min  Wean as able  Volume resuscitation as needed  Monitor rhythm on telemetry  Epicardial pacing wires PRN pacing  Intra-op EVELYN LVEF 45% with PFO    Lung: Check STAT post-op ABG and CXR  Wean vent with spontaneous breathing trial with goal to extubate today  GI: GI prophylaxis with PPI  Bowel regimen  Zofran PRN for nausea  FEN: NPO   Replenish K >4 0, mag >2 0 and calcium >7 0      : Check STAT post-op BMP  Pulliam in place  Monitor UOP with goal >0 5cc/kg/hour  Lasix versus volume resuscitate as needed depending on hemodynamics and volume status  ID: Prophylactic post-op abx  Maintain normothermia  Trend temps  Heme: Check STAT post-op H/H and platelets  Monitor incision site, invasive lines, and chest tube outputs for bleeding  Send coag panel if needed  Endo: Insulin gtt for blood sugar control  Results from last 6 Months   Lab Units 05/07/21  0901 01/20/21  1059   HEMOGLOBIN A1C % 6 6* 6 7*     Disposition: ICU Care   ---------------------------------------------------------------------------------------------------------------------------------------------------------------------  Historical Information   Past Medical History:   Diagnosis Date    Arthritis     generalized    Asthma     seasonal-does not use inhaler    Back pain     Carotid artery stenosis     Diabetes mellitus (HCC)     type    GERD (gastroesophageal reflux disease)     Hiatal hernia     Hyperlipidemia     Hypertension     Peyronie's disease     last assessed [id number]    Seasonal allergies     Wears glasses      Past Surgical History:   Procedure Laterality Date    APPENDECTOMY      CAROTID ENDARTERECTOMY Left 10/29/2020    COLONOSCOPY N/A 1/22/2016    Procedure: COLONOSCOPY;  Surgeon: Dayne Walsh MD;  Location: [facility];   Service:     KNEE ARTHROSCOPY Left     KNEE ARTHROSCOPY W/ MENISCAL REPAIR Right     MT THROMBOENDARTECTMY NECK,NECK INCIS Left 10/29/2020    Procedure: CAROTID ENDARTERECTOMY W/BOVINE PATCH ANGIOPLASTY AND COMPLETION DUPLEX IMAGING;  Surgeon: Pillo Shipman DO;  Location: [facility];  Service: Vascular     Social History   Social History     Substance and Sexual Activity   Alcohol Use Yes    Alcohol/week: 10 0 standard drinks    Types: 7 Glasses of wine, 3 Standard drinks or equivalent per week    Frequency: 4 or more times a week    Drinks per session: 1 or 2    Binge frequency: Daily or almost daily     Social History     Substance and Sexual Activity   Drug Use No     Social History     Tobacco Use   Smoking Status Never Smoker   Smokeless Tobacco Never Used     Family History   Problem Relation Age of Onset    Heart attack Mother [de-identified]        CABG    Kidney failure Father     Diabetes Father         pre-diabetic     I have reviewed this patient's family history and commented on sigificant items within the HPI    ROS: ROS unable to be obtained due to patient being intubated and sedated  Allergies: No Known Allergies    Home Medications:   Prior to Admission medications    Medication Sig Start Date End Date Taking?  Authorizing Provider   aspirin 81 mg chewable tablet Chew 81 mg daily   Yes Historical Provider, MD   carvedilol (COREG) 6 25 mg tablet Take 1 tablet (6 25 mg total) by mouth 2 (two) times a day with meals 4/12/21  Yes Lizzie Trujillo DO   Farxiga 5 MG TABS TAKE ONE TABLET BY MOUTH EVERY DAY 4/10/21  Yes Live Crystal MD   lisinopril-hydrochlorothiazide (PRINZIDE,ZESTORETIC) 10-12 5 MG per tablet Take 1 tablet by mouth 2 (two) times a day 3/8/21  Yes Lizzie Trujillo DO   metFORMIN (GLUCOPHAGE) 1000 MG tablet TAKE ONE TABLET BY MOUTH TWICE A DAY WITH MEALS 2/17/21  Yes Live Crystal MD   mupirocin (BACTROBAN) 2 % nasal ointment into each nostril 2 (two) times a day Start 5 days prior to surgery 4/22/21  Yes Maria Teresa Villafana PA-C   Naproxen Sodium (ALEVE PO) Take 2 tablets by mouth as needed    Yes Historical Provider, MD   pravastatin (PRAVACHOL) 80 mg tablet Take 1 tablet (80 mg total) by mouth daily at bedtime 9/15/20  Yes Live Crystal MD     Inpatient Medications:  Scheduled Meds:  Current Facility-Administered Medications   Medication Dose Route Frequency Provider Last Rate    acetaminophen  650 mg Rectal Q4H PRN David Butler PA-C      acetaminophen  975 mg Oral Q8H David Butler PA-C      aminocaproic acid (AMICAR) IV 1 g/hr Intravenous Continuous Fernygustavo Garcia PA-C 1 g/hr (05/10/21 1354)    amiodarone  200 mg Oral [facility] Tyler Salazar PA-C      aspirin  325 mg Oral Daily Tyler Salazar PA-C      atorvastatin  80 mg Oral Daily With [employer] Aj, PA-C      bisacodyl  10 mg Rectal Daily PRN Tyler Salazar PA-C      calcium chloride  1 g Intravenous Once Tyler Salazar PA-C      cefazolin  2,000 mg Intravenous Q8H Tyler Salazar PA-C      chlorhexidine  15 mL Mouth/Throat BID Tyler Salazar PA-C      dextrose           fentanyl citrate (PF)  50 mcg Intravenous Once Tyler Salazar PA-C      fentanyl citrate (PF)  50 mcg Intravenous Q1H PRN Tyler Salazar PA-C      [START ON 5/11/2021] fondaparinux  2 5 mg Subcutaneous Daily Tyler Salazar PA-C      furosemide  40 mg Intravenous Q6H PRN Tyler Salazar PA-C      HYDROmorphone  0 5 mg Intravenous Q2H PRN Tyler Salazar PA-C      insulin regular (HumuLIN R,NovoLIN R) infusion  0 3-21 Units/hr Intravenous Titrated Tyler Salazar PA-C      lactated ringers  500 mL Intravenous Q30 Min PRN Tyler Salazar PA-C 500 mL (05/10/21 1330)    lactated ringers  125 mL/hr Intravenous Continuous Tyler Salazar PA-C Stopped (05/10/21 1245)    lidocaine (cardiac)  100 mg Intravenous Q30 Min PRN Tyler Salazar PA-C      magnesium sulfate  2 g Intravenous Once Tyler Salazar PA-C      mupirocin  1 application Nasal D92T [address] Tyler Salazar PA-C      niCARdipine  2 5-15 mg/hr Intravenous Titrated Tyler Salazar PA-C Stopped (05/10/21 1345)    ondansetron  4 mg Intravenous Q6H PRN Tyler Salazar PA-C      oxyCODONE  2 5 mg Oral Q4H PRN NATY Cohen-MASTER      oxyCODONE  5 mg Oral Q4H PRN Tyler Salazar PA-C      [START ON 5/11/2021] pantoprazole  40 mg Oral Early Morning Tyler Salazar PA-C      [START ON 5/11/2021] polyethylene glycol  17 g Oral Daily Tyler Salazar PA-C      potassium chloride  20 mEq Intravenous Once PRN Tyler Salazar PA-C  potassium chloride  20 mEq Intravenous Q1H PRN Samuel Posey PA-C      potassium chloride  20 mEq Intravenous Q30 Min PRN Samuel Posey PA-C      sodium chloride  20 mL/hr Intravenous Continuous Samuel Posey PA-C 20 mL/hr (05/10/21 1350)     Continuous Infusions:aminocaproic acid (AMICAR) IV, 1 g/hr, Last Rate: 1 g/hr (05/10/21 1354)  insulin regular (HumuLIN R,NovoLIN R) infusion, 0 3-21 Units/hr  lactated ringers, 125 mL/hr, Last Rate: Stopped (05/10/21 1245)  niCARdipine, 2 5-15 mg/hr, Last Rate: Stopped (05/10/21 1345)  sodium chloride, 20 mL/hr, Last Rate: 20 mL/hr (05/10/21 1350)      PRN Meds:  acetaminophen, 650 mg, Q4H PRN  bisacodyl, 10 mg, Daily PRN  fentanyl citrate (PF), 50 mcg, Q1H PRN  furosemide, 40 mg, Q6H PRN  HYDROmorphone, 0 5 mg, Q2H PRN  lactated ringers, 500 mL, Q30 Min PRN  lidocaine (cardiac), 100 mg, Q30 Min PRN  ondansetron, 4 mg, Q6H PRN  oxyCODONE, 2 5 mg, Q4H PRN  oxyCODONE, 5 mg, Q4H PRN  potassium chloride, 20 mEq, Once PRN  potassium chloride, 20 mEq, Q1H PRN  potassium chloride, 20 mEq, Q30 Min PRN      ---------------------------------------------------------------------------------------------------------------------------------------------------------------------  Vitals:   Vitals:    05/10/21 1345 05/10/21 1400 05/10/21 1414 05/10/21 1415   BP:   114/61    Pulse: 78 80 80 78   Resp:  14   Temp:  (!) 97 2 °F (36 2 °C) (!) 97 2 °F (36 2 °C)    TempSrc:       SpO2: 100% 100%  100%   Weight:       Height:         Arterial Line:  Arterial Line BP: 112/60  Arterial Line MAP (mmHg): 78 mmHg    Temperature: Temp (24hrs), Av 2 °F (36 2 °C), Min:97 1 °F (36 2 °C), Max:97 3 °F (36 3 °C)  Current: Temperature: (!) 97 2 °F (36 2 °C)    Weights: IBW (Ideal Body Weight): 70 7 kg  Body mass index is 23 04 kg/m²      Hemodynamic Monitoring:  PAP: PAP: 21/12, CVP: CVP (mean): 5 mmHg, CO: CO (L/min): 3 8 L/min, CI: CI (L/min/m2): 2 L/min/m2, SVR: SVR (dyne*sec)/cm5: 1325 (dyne*sec)/cm5    Ventilator Settings:  Respiratory    Lab Data (Last 4 hours)    None         O2/Vent Data (Last 4 hours)      05/10 1200           Vent Mode AC/VC       Resp Rate (BPM) (BPM) 14       Vt (mL) (mL) 500       FIO2 (%) (%) 60       PEEP (cmH2O) (cmH2O) 6       Patient safety screen outcome: Failed       MV 6 35                 Labs:   Results from last 7 days   Lab Units 05/10/21  1319 05/10/21  1142 05/10/21  1102   I STAT HEMOGLOBIN g/dl 7 8* 8 8* 9 2*   HEMATOCRIT, ISTAT % 23* 26* 27*   PLATELETS Thousands/uL  --   --  156     Results from last 7 days   Lab Units 05/10/21  1319 05/10/21  1302 05/10/21  1142 05/10/21  1102  21  0901   SODIUM mmol/L  --  143  --   --   --  140   POTASSIUM mmol/L  --  3 4*  --   --   --  4 3   CHLORIDE mmol/L  --  111*  --   --   --  105   CO2 mmol/L  --  24  --   --   --  28   CO2, I-STAT mmol/L 25  --  29 29   < >  --    BUN mg/dL  --  21  --   --   --  24   CREATININE mg/dL  --  0 91  --   --   --  1 05   CALCIUM mg/dL  --  7 8*  --   --   --  9 8   GLUCOSE, ISTAT mg/dl 115  --  126 119   < >  --     < > = values in this interval not displayed  Post-op AB 42/35/191/-/100%  Post-op CXR: Tubes/lines in appropriate position, no obvious PTX I have personally reviewed pertinent films in PACS  Post-op EKG: Sinus bradycardic rhythm, 1st degree AV block, LBBB new from previous This was personally reviewed by myself  Physical Exam  Constitutional:       General: He is not in acute distress  Appearance: He is well-developed  HENT:      Head: Normocephalic  Mouth/Throat:      Mouth: Mucous membranes are dry  Eyes:      Pupils: Pupils are equal, round, and reactive to light  Neck:      Vascular: No JVD  Comments: +CVC  Cardiovascular:      Rate and Rhythm: Normal rate and regular rhythm  Heart sounds: No murmur  Friction rub present  Pulmonary:      Effort: No respiratory distress  Breath sounds: No wheezing or rales  Abdominal:      General: There is no distension  Palpations: Abdomen is soft  Musculoskeletal:      Right lower leg: No edema  Left lower leg: No edema  Skin:     General: Skin is warm and dry  Neurological:      Comments: Intubated and sedated        Invasive lines and devices: Invasive Devices     Central Venous Catheter Line            CVC Central Lines 05/10/21 Triple less than 1 day    Introducer 05/10/21 less than 1 day          Peripheral Intravenous Line            Peripheral IV 05/10/21 Left Hand less than 1 day          Arterial Line            Arterial Line 05/10/21 Right Radial less than 1 day          Line            Pacer Wires less than 1 day    Pacer Wires less than 1 day          Drain            Chest Tube 1 Anterior Mediastinal 32 Fr  less than 1 day    Chest Tube 2 Left Pleural 32 Fr  less than 1 day    Chest Tube 3 Posterior Mediastinal 24 Fr  less than 1 day    NG/OG/Enteral Tube Orogastric 18 Fr Center mouth less than 1 day    Urethral Catheter Non-latex 16 Fr  less than 1 day          Airway            ETT  Hi-Lo; Cuffed;Oral 8 mm less than 1 day              ---------------------------------------------------------------------------------------------------------------------------------------------------------------------  Care Time Delivered:   No Critical Care time spent     SIGNATURE: Eddie Fitzpatrick PA-C  DATE: May 10, 2021  TIME: 2:29 PM

## 2021-05-10 NOTE — INTERVAL H&P NOTE
H&P reviewed  After examining the patient I find no changes in the patients condition since the H&P had been written  Anticipated Length of Stay:  Patient will be admitted on an Inpatient basis with an anticipated length of stay of  greater than 2 midnights  Justification for Hospital Stay:  Post surgical recovery following open heart surgery      Vitals:    05/10/21 0640   BP: (!) 163/110   Pulse: 63   Resp: 18   Temp: (!) 97 3 °F (36 3 °C)   SpO2: 99%

## 2021-05-10 NOTE — TELEPHONE ENCOUNTER
Surgeons office called, need to schedule transition of care appt pt will be d/c'd from [facility] so appt needed for week of 17th

## 2021-05-10 NOTE — OP NOTE
OPERATIVE REPORT  PATIENT NAME: Amada Bob    :  1945  MRN: [id number]  Pt Location: BE OR ROOM 10    SURGERY DATE: 5/10/2021    SURGEON: Le Anders MD    ASSISTANT: Liza Redman PA-C    ADDITIONAL ASSISTANT: n/a    PREOPERATIVE DIAGNOSES:   1  Severe aortic stenosis  2  Multivessel coronary artery disease    POSTOPERATIVE DIAGNOSES:  1  Severe aortic stenosis  2  Multivessel coronary artery disease    NYHA: 3  CCS: 2    PROCEDURE:  1  Aortic valve replacement with a 23mm Dickerson Magna Ease pericardial tissue valve  2  Coronary artery bypass grafting x 5 with left internal mammary artery to left anterior descending artery, saphenous vein graft to diagonal 1, saphenous vein graft - Y -  to ramus intermedius and obtuse marginal 1 and saphenous vein graft to right posterior descending artery    CARDIOPULMONARY BYPASS TIME 146 minutes  CROSSCLAMP TIME 130 minutes  ANESTHESIA General endotracheal anesthesia with transesophageal echocardiogram guidance, Dr Bella Rizo     INDICATIONS:  The patient is a 77y o  year-old male diagnosed with Multivessel coronary artery disease and with clinical and echocardiographic findings consistent with Symptomatic severe aortic stenosis  Coronary angiography showed LAD 60% proximal and mid portion, D1 90%, Ramus 95%, OM1 occluded but fills by collaterals, PDA 90%  I saw the patient in consultation and recommended the procedure described previously  FINDINGS:  1  Intraoperative transesophageal echocardiogram revealed EF 35%, severe AS, mild MS   2  Epiaortic ultrasound showed less than 5 mm non-mobile plaque  3  Aortic valve was trileaflet, heavily calcified  4  Coronary anatomy as described in coronary angiography  5  Final transesophageal echocardiogram demonstrated prosthetic valve with normal function without evidence of perivalvular leak, improved ventricular function         OPERATIVE TECHNIQUE:    The patient was taken to the operating room and placed supine on the operating table  Following the satisfactory induction of general anesthesia and placement of monitoring lines, the patient was prepped and draped in the usual sterile fashion  A time-out procedure was performed  The patient underwent median sternotomy, pericardiotomy, left internal mammary artery harvest with the standard technique and endoscopic left greater saphenous vein harvest, systemic heparinization, and conduit preparation  Epiaortic ultrasound showed less than 5 mm non-mobile plaque  Cannulation for cardiopulmonary bypass was performed with an arterial dispersion cannula, double stage venous cannula and a vented antegrade cardioplegia cannula  Once the ACT was greater than 480 seconds we placed the patient on cardiopulmonary bypass, the ascending aorta was crossclamped and cardiac arrest was obtained without complications with [place] cardioplegia  A left ventricular vent was placed through the right superior pulmonary vein while giving cardioplegia  A CO2 flooded field was used during the entire case  The following grafts were completed, left internal mamamry artery to left anterior descending artery with excellent flow, saphenous vein graft to diagonal 1 with flow of 90 ml/min, saphenous vein graft - Y -  to ramus intermedius and obtuse marginal 1 with flow of 110 ml/min and 90 ml/min respevtivelyand saphenous vein graft to right posterior descending artery with flow of 100 ml/min  All the distal anastomoses were performed in end-to-side fashion with 7-0 Prolene  Aortotomy was performed and the aortic valve was exposed  The aortic valve was found to be trileaflet, heavily calcified  The leaflets were excised and the annulus was debrided of calcium  The annulus was sized, I choose a 23mm Dickerson Magna Ease pericardial tissue valve  Pledgeted 2-0 Ethibond sutures were placed around the aortic valve annulus   The prosthetic valve was brought to the field, the sutures were passed through the sewing ring, the prosthetic valve was seated in a supra annular fashion and the sutures were tied down  Clearance of the coronary ostia was confirmed  Extensive irrigation of the aortic root and LVOT was performed to remove any debris  While de-airing the heart, the aortotomy was closed with 2 layers of running 4-0 Prolene suture  A total of 3 proximal anastomoses were completed on the ascending aorta in an end-to-side fashion using 6-0 Prolene suture  The heart was extensively de-aired and the crossclamp was removed  Atrial and ventricular pacing wires were placed  Following a period of reperfusion, the patient was weaned from cardiopulmonary bypass and decannulated  Protamine was administered with normalization of the ACT  Hemostasis was confirmed in all fields  Thoracostomy tubes were placed  The sternum was closed with stainless steel wires  The fascia, subdermis and skin were closed as multiple layers of running absorbable suture  COMPLICATIONS: None  PACKS/TUBES/DRAINS: Chest tubes x 3  SPECIMENS: Aortic valve  EBL: 200 cc  TRANSFUSION: None  As the attending surgeon, I was present and scrubbed for all critical portions of this procedure  Sponge, needle and instrument counts were reported as correct by the nursing staff  There was no qualified surgical resident available  The assistance of a PA was required to complete this case, specifically for assistance with cannulation, decannulation, construction of the bypass grafts, exposure during aortic valve replacement, and endoscopic saphenous vein harvesting         SIGNATURE: Bruce Aguilar MD  DATE: May 10, 2021  TIME: 12:21 PM

## 2021-05-10 NOTE — ANESTHESIA PREPROCEDURE EVALUATION
Procedure:  CORONARY ARTERY BYPASS GRAFT (CABG) X 4  WITH REPLACEMENT VALVE AORTIC (AVR) (N/A Chest)    Relevant Problems   CARDIO   (+) Benign essential hypertension   (+) Chronic systolic congestive heart failure (HCC)   (+) Coronary artery disease involving native coronary artery of native heart   (+) Hyperlipidemia   (+) Murmur   (+) Rheumatic aortic stenosis   (+) S/P AVR   (+) S/P CABG (coronary artery bypass graft)      PULMONARY   (+) Asthma        Physical Exam    Airway    Mallampati score: II         Dental   No notable dental hx     Cardiovascular      Pulmonary      Other Findings        Anesthesia Plan  ASA Score- 4     Anesthesia Type- general with ASA Monitors  Additional Monitors: pulmonary artery catheter, central venous line and arterial line  Airway Plan: ETT  Comment: I, Dr Camryn Angeles, the attending physician, have personally seen and evaluated the patient prior to anesthetic care  I have reviewed the pre-anesthetic record, and other medical records if appropriate to the anesthetic care  If a CRNA is involved in the case, I have reviewed the CRNA assessment, if present, and agree  The patient is in a suitable condition to proceed with my formulated anesthetic plan          Plan Factors-    Chart reviewed  Induction- intravenous  Postoperative Plan-     Informed Consent- Anesthetic plan and risks discussed with patient  I personally reviewed this patient with the CRNA  Discussed and agreed on the Anesthesia Plan with the CRNA  Mauricio Brady

## 2021-05-10 NOTE — RESPIRATORY THERAPY NOTE
RT Ventilator Management Note  Rolanda Dey 77 y o  male MRN: [id number]  Unit/Bed#: [facility] 414-01 Encounter: [id number]      Daily Screen       5/10/2021  1200             Patient safety screen outcome[de-identified]  Failed            Physical Exam:   Assessment Type: Assess only  General Appearance: Sedated  Respiratory Pattern: Assisted  Chest Assessment: Chest expansion symmetrical  Bilateral Breath Sounds: Clear      Resp Comments: (P) Pt received in room 414, post CABGx5 and AVR  Pt does have a hx of asthma but does not take any pulm meds at home  No further pulm meds needed at this time  Will continue to monitor per resp  protocol

## 2021-05-10 NOTE — ANESTHESIA PROCEDURE NOTES
Introducer/Festus-Analy  Performed by: Charan Zuniga MD  Authorized by: Magalie Pinedo MD     Date/Time: 5/10/2021 7:49 AM  Consent: Written consent obtained  Risks and benefits: risks, benefits and alternatives were discussed  Consent given by: patient  Patient understanding: patient states understanding of the procedure being performed  Patient consent: the patient's understanding of the procedure matches consent given  Procedure consent: procedure consent matches procedure scheduled  Required items: required blood products, implants, devices, and special equipment available  Patient identity confirmed: arm band  Time out: Immediately prior to procedure a "time out" was called to verify the correct patient, procedure, equipment, support staff and site/side marked as required    Indications: vascular access and central pressure monitoring  Location details: right internal jugular  Patient position: Trendelenburg    Sedation:  Patient sedated: yes    Assessment: blood return through all ports and free fluid flow  Preparation: skin prepped with ChloraPrep  Skin prep agent dried: skin prep agent completely dried prior to procedure  Sterile barriers: all five maximum sterile barriers used - cap, mask, sterile gown, sterile gloves, and large sterile sheet  Hand hygiene: hand hygiene performed prior to central venous catheter insertion  Ultrasound guidance: yes  ultrasound permanent image saved  Pre-procedure: landmarks identified  Number of attempts: 1  Successful placement: yes  Post-procedure: line sutured and dressing applied  Patient tolerance: patient tolerated the procedure well with no immediate complications  Comments: Procedure start 0737  Procedure end

## 2021-05-10 NOTE — ANESTHESIA PROCEDURE NOTES
Arterial Line Insertion  Performed by: Isaias Valladares MD  Authorized by: Renny Garcia MD   Consent: Written consent obtained  Risks and benefits: risks, benefits and alternatives were discussed  Consent given by: patient  Patient understanding: patient states understanding of the procedure being performed  Patient consent: the patient's understanding of the procedure matches consent given  Procedure consent: procedure consent matches procedure scheduled  Required items: required blood products, implants, devices, and special equipment available  Patient identity confirmed: arm band  Time out: Immediately prior to procedure a "time out" was called to verify the correct patient, procedure, equipment, support staff and site/side marked as required  Preparation: Patient was prepped and draped in the usual sterile fashion    Indications: multiple ABGs and hemodynamic monitoring  Orientation:  Right  Location: radial artery  Sedation:  Patient sedated: no    Procedure Details:  Needle gauge: 20  Number of attempts: 1    Post-procedure:  Post-procedure: dressing applied  Waveform: good waveform and waveform confirmed  Post-procedure CNS: normal  Patient tolerance: patient tolerated the procedure well with no immediate complications  Comments: Procedure start 0726  Procedure end 0729

## 2021-05-10 NOTE — ANESTHESIA PROCEDURE NOTES
Central Line Insertion  Performed by: Charan Zuniga MD  Authorized by: Magalie Pinedo MD     Date/Time: 5/10/2021 7:49 AM  Catheter Type:  triple lumen  Consent: Written consent obtained  Risks and benefits: risks, benefits and alternatives were discussed  Consent given by: patient  Patient understanding: patient states understanding of the procedure being performed  Patient consent: the patient's understanding of the procedure matches consent given  Procedure consent: procedure consent matches procedure scheduled  Required items: required blood products, implants, devices, and special equipment available  Patient identity confirmed: arm band  Time out: Immediately prior to procedure a "time out" was called to verify the correct patient, procedure, equipment, support staff and site/side marked as required    Indications: vascular access and central pressure monitoring  Location details: right internal jugular  Patient position: Trendelenburg    Sedation:  Patient sedated: yes    Assessment: blood return through all ports and free fluid flow  Preparation: skin prepped with ChloraPrep  Skin prep agent dried: skin prep agent completely dried prior to procedure  Sterile barriers: all five maximum sterile barriers used - cap, mask, sterile gown, sterile gloves, and large sterile sheet  Hand hygiene: hand hygiene performed prior to central venous catheter insertion  Ultrasound guidance: yes  sterile gel and probe cover used in ultrasound-guided central venous catheter insertionultrasound permanent image saved  Pre-procedure: landmarks identified  Number of attempts: 1  Successful placement: yes  Post-procedure: chlorhexidine patch applied,  dressing applied and line sutured  Patient tolerance: patient tolerated the procedure well with no immediate complications  Comments: Procedure start 0737  Procedure end

## 2021-05-10 NOTE — ANESTHESIA PROCEDURE NOTES
Procedure Performed: EVELYN Anesthesia  Start Time:  5/10/2021 7:53 AM        Preanesthesia Checklist    Patient identified, IV assessed, risks and benefits discussed, monitors and equipment assessed, procedure being performed at surgeon's request and anesthesia consent obtained  Procedure    Diagnostic Indications for EVELYN:  assessment of ascending aorta and assessment of surgical repair  Type of EVELYN: complete EVELYN with interpretation  Images Saved: ultrasound permanent image saved  Physician Requesting Echo: Luis Sunshine MD  Location performed: OR  Intubated  Bite block not placed  Heart visualized  Insertion of EVELYN Probe:  Atraumatic  Probe Type:  Multiplane  Modalities:  2D only, color flow mapping, continuous wave Doppler and pulse wave Doppler  Echocardiographic and Doppler Measurements    PREPROCEDURE    LEFT VENTRICLE:  Systolic Function: moderately impaired  Ejection Fraction: 37%  Cavity size: dilated  RIGHT VENTRICLE:  Systolic Function: normal   Cavity size normal  No hypertrophy              AORTIC VALVE:  Leaflets: trileaflet  Leaflet motions restricted  Stenosis: moderate  Regurgitation: mild  MITRAL VALVE:  Leaflets: calcified  Leaflet Motions: restricted  Regurgitation: trace  Stenosis: mild  Mean Gradient: 3 mmHg  TRICUSPID VALVE:  Leaflets: normal  Leaflet Motions: normal  Stenosis: none  Regurgitation: mild  PULMONIC VALVE:  Leaflets: normal  Regurgitation: trace  Stenosis: none  ASCENDING AORTA:  Size:  normal  Dissection not present  AORTIC ARCH:  Size:  normal  dissection not present  Grade 3: atheroma protruding < 0 5 cm into lumen  DESCENDING AORTA:  Size: normal  Dissection not present  Grade 3: atheroma protruding < 0 5 cm into lumen  RIGHT ATRIUM:  Size:  normal      LEFT ATRIUM:  Size: dilated  Spontaneous echo contrast     LEFT ATRIAL APPENDAGE:  Size: normal          ATRIAL SEPTUM:  Intra-atrial septal morphology: patent foramen ovale  Patent foramen ovale shunt: left to right shunt  VENTRICULAR SEPTUM:  Intra-ventricular septum morphology: normal          EPIAORTIC:  Plaque Thickness: 0-5 mm  OTHER FINDINGS:  Pericardium:  normal  Pleural Effusion:  none  POSTPROCEDURE    LEFT VENTRICLE:   Systolic Function: mildly depressed  Ejection Fraction: 45 %  RIGHT VENTRICLE: Unchanged   AORTIC VALVE:   Leaflets: bioprosthetic  Stenosis: none  Mean Gradient: 5 mmHg  Regurgitation: none  Valve Size: 23 mm  MITRAL VALVE: Unchanged   TRICUSPID VALVE: Unchanged   PULMONIC VALVE: Unchanged           ATRIA: Unchanged   AORTA: Unchanged   REMOVAL:  Probe Removal: atraumatic

## 2021-05-11 ENCOUNTER — APPOINTMENT (INPATIENT)
Dept: RADIOLOGY | Facility: HOSPITAL | Age: 76
DRG: 220 | End: 2021-05-11
Payer: MEDICARE

## 2021-05-11 PROBLEM — D69.6 THROMBOCYTOPENIA (HCC): Status: ACTIVE | Noted: 2021-05-11

## 2021-05-11 PROBLEM — I44.0 1ST DEGREE AV BLOCK: Status: ACTIVE | Noted: 2021-05-11

## 2021-05-11 PROBLEM — D62 ACUTE BLOOD LOSS ANEMIA: Status: ACTIVE | Noted: 2021-05-11

## 2021-05-11 PROBLEM — E87.8 HYPERCHLOREMIA: Status: ACTIVE | Noted: 2021-05-11

## 2021-05-11 PROBLEM — I44.7 LBBB (LEFT BUNDLE BRANCH BLOCK): Status: ACTIVE | Noted: 2021-05-11

## 2021-05-11 LAB
ABO GROUP BLD BPU: NORMAL
ANION GAP SERPL CALCULATED.3IONS-SCNC: 9 MMOL/L (ref 4–13)
ATRIAL RATE: 51 BPM
ATRIAL RATE: 64 BPM
ATRIAL RATE: 66 BPM
ATRIAL RATE: 72 BPM
BPU ID: NORMAL
BUN SERPL-MCNC: 17 MG/DL (ref 5–25)
CALCIUM SERPL-MCNC: 8.1 MG/DL (ref 8.3–10.1)
CHLORIDE SERPL-SCNC: 114 MMOL/L (ref 100–108)
CO2 SERPL-SCNC: 20 MMOL/L (ref 21–32)
CREAT SERPL-MCNC: 0.83 MG/DL (ref 0.6–1.3)
CROSSMATCH: NORMAL
ERYTHROCYTE [DISTWIDTH] IN BLOOD BY AUTOMATED COUNT: 14.6 % (ref 11.6–15.1)
GFR SERPL CREATININE-BSD FRML MDRD: 86 ML/MIN/1.73SQ M
GLUCOSE SERPL-MCNC: 106 MG/DL (ref 65–140)
GLUCOSE SERPL-MCNC: 133 MG/DL (ref 65–140)
GLUCOSE SERPL-MCNC: 141 MG/DL (ref 65–140)
GLUCOSE SERPL-MCNC: 145 MG/DL (ref 65–140)
GLUCOSE SERPL-MCNC: 145 MG/DL (ref 65–140)
GLUCOSE SERPL-MCNC: 146 MG/DL (ref 65–140)
GLUCOSE SERPL-MCNC: 154 MG/DL (ref 65–140)
GLUCOSE SERPL-MCNC: 155 MG/DL (ref 65–140)
GLUCOSE SERPL-MCNC: 162 MG/DL (ref 65–140)
GLUCOSE SERPL-MCNC: 163 MG/DL (ref 65–140)
GLUCOSE SERPL-MCNC: 164 MG/DL (ref 65–140)
GLUCOSE SERPL-MCNC: 183 MG/DL (ref 65–140)
GLUCOSE SERPL-MCNC: 190 MG/DL (ref 65–140)
GLUCOSE SERPL-MCNC: 213 MG/DL (ref 65–140)
HCT VFR BLD AUTO: 28.9 % (ref 36.5–49.3)
HGB BLD-MCNC: 9.3 G/DL (ref 12–17)
MAGNESIUM SERPL-MCNC: 2.3 MG/DL (ref 1.6–2.6)
MCH RBC QN AUTO: 29.4 PG (ref 26.8–34.3)
MCHC RBC AUTO-ENTMCNC: 32.2 G/DL (ref 31.4–37.4)
MCV RBC AUTO: 92 FL (ref 82–98)
P AXIS: 113 DEGREES
P AXIS: 127 DEGREES
P AXIS: 218 DEGREES
PLATELET # BLD AUTO: 109 THOUSANDS/UL (ref 149–390)
PMV BLD AUTO: 10.7 FL (ref 8.9–12.7)
POTASSIUM SERPL-SCNC: 4.1 MMOL/L (ref 3.5–5.3)
PR INTERVAL: 179 MS
PR INTERVAL: 179 MS
PR INTERVAL: 217 MS
PR INTERVAL: 861 MS
QRS AXIS: 141 DEGREES
QRS AXIS: 196 DEGREES
QRS AXIS: 197 DEGREES
QRS AXIS: 201 DEGREES
QRSD INTERVAL: 171 MS
QRSD INTERVAL: 175 MS
QT INTERVAL: 504 MS
QT INTERVAL: 517 MS
QT INTERVAL: 538 MS
QT INTERVAL: 554 MS
QTC INTERVAL: 477 MS
QTC INTERVAL: 552 MS
QTC INTERVAL: 564 MS
QTC INTERVAL: 572 MS
RBC # BLD AUTO: 3.16 MILLION/UL (ref 3.88–5.62)
SODIUM SERPL-SCNC: 143 MMOL/L (ref 136–145)
T WAVE AXIS: -1 DEGREES
T WAVE AXIS: -43 DEGREES
T WAVE AXIS: 35 DEGREES
T WAVE AXIS: 7 DEGREES
UNIT DISPENSE STATUS: NORMAL
UNIT PRODUCT CODE: NORMAL
UNIT RH: NORMAL
VENTRICULAR RATE: 51 BPM
VENTRICULAR RATE: 64 BPM
VENTRICULAR RATE: 66 BPM
VENTRICULAR RATE: 72 BPM
WBC # BLD AUTO: 8.29 THOUSAND/UL (ref 4.31–10.16)

## 2021-05-11 PROCEDURE — 82948 REAGENT STRIP/BLOOD GLUCOSE: CPT

## 2021-05-11 PROCEDURE — 93005 ELECTROCARDIOGRAM TRACING: CPT

## 2021-05-11 PROCEDURE — 99024 POSTOP FOLLOW-UP VISIT: CPT | Performed by: THORACIC SURGERY (CARDIOTHORACIC VASCULAR SURGERY)

## 2021-05-11 PROCEDURE — 83735 ASSAY OF MAGNESIUM: CPT | Performed by: PHYSICIAN ASSISTANT

## 2021-05-11 PROCEDURE — 97167 OT EVAL HIGH COMPLEX 60 MIN: CPT

## 2021-05-11 PROCEDURE — 93010 ELECTROCARDIOGRAM REPORT: CPT | Performed by: INTERNAL MEDICINE

## 2021-05-11 PROCEDURE — 71045 X-RAY EXAM CHEST 1 VIEW: CPT

## 2021-05-11 PROCEDURE — 80048 BASIC METABOLIC PNL TOTAL CA: CPT | Performed by: PHYSICIAN ASSISTANT

## 2021-05-11 PROCEDURE — 97163 PT EVAL HIGH COMPLEX 45 MIN: CPT

## 2021-05-11 PROCEDURE — 99222 1ST HOSP IP/OBS MODERATE 55: CPT | Performed by: INTERNAL MEDICINE

## 2021-05-11 PROCEDURE — 94760 N-INVAS EAR/PLS OXIMETRY 1: CPT

## 2021-05-11 PROCEDURE — 97530 THERAPEUTIC ACTIVITIES: CPT

## 2021-05-11 PROCEDURE — 85027 COMPLETE CBC AUTOMATED: CPT | Performed by: PHYSICIAN ASSISTANT

## 2021-05-11 PROCEDURE — 99233 SBSQ HOSP IP/OBS HIGH 50: CPT | Performed by: ANESTHESIOLOGY

## 2021-05-11 RX ORDER — INSULIN GLARGINE 100 [IU]/ML
15 INJECTION, SOLUTION SUBCUTANEOUS
Status: DISCONTINUED | OUTPATIENT
Start: 2021-05-11 | End: 2021-05-13

## 2021-05-11 RX ORDER — AMIODARONE HYDROCHLORIDE 50 MG/ML
INJECTION, SOLUTION INTRAVENOUS
Status: COMPLETED
Start: 2021-05-11 | End: 2021-05-11

## 2021-05-11 RX ORDER — LISINOPRIL 10 MG/1
10 TABLET ORAL DAILY
Status: DISCONTINUED | OUTPATIENT
Start: 2021-05-11 | End: 2021-05-12

## 2021-05-11 RX ORDER — FUROSEMIDE 10 MG/ML
40 INJECTION INTRAMUSCULAR; INTRAVENOUS
Status: DISCONTINUED | OUTPATIENT
Start: 2021-05-11 | End: 2021-05-12

## 2021-05-11 RX ORDER — POTASSIUM CHLORIDE 20 MEQ/1
20 TABLET, EXTENDED RELEASE ORAL 2 TIMES DAILY
Status: DISCONTINUED | OUTPATIENT
Start: 2021-05-11 | End: 2021-05-12

## 2021-05-11 RX ORDER — LISINOPRIL 10 MG/1
10 TABLET ORAL DAILY
Status: DISCONTINUED | OUTPATIENT
Start: 2021-05-11 | End: 2021-05-11

## 2021-05-11 RX ADMIN — CEFAZOLIN SODIUM 2000 MG: 2 SOLUTION INTRAVENOUS at 04:59

## 2021-05-11 RX ADMIN — AMIODARONE HYDROCHLORIDE 150 MG: 50 INJECTION, SOLUTION INTRAVENOUS at 23:45

## 2021-05-11 RX ADMIN — POTASSIUM CHLORIDE 20 MEQ: 14.9 INJECTION, SOLUTION INTRAVENOUS at 00:09

## 2021-05-11 RX ADMIN — CEFAZOLIN SODIUM 2000 MG: 2 SOLUTION INTRAVENOUS at 11:37

## 2021-05-11 RX ADMIN — PANTOPRAZOLE SODIUM 40 MG: 40 TABLET, DELAYED RELEASE ORAL at 06:18

## 2021-05-11 RX ADMIN — LISINOPRIL 10 MG: 10 TABLET ORAL at 08:43

## 2021-05-11 RX ADMIN — MUPIROCIN 1 APPLICATION: 20 OINTMENT TOPICAL at 08:43

## 2021-05-11 RX ADMIN — SODIUM CHLORIDE 3 UNITS/HR: 9 INJECTION, SOLUTION INTRAVENOUS at 21:20

## 2021-05-11 RX ADMIN — POTASSIUM CHLORIDE 20 MEQ: 1500 TABLET, EXTENDED RELEASE ORAL at 08:43

## 2021-05-11 RX ADMIN — NICARDIPINE HYDROCHLORIDE 5 MG/HR: 2.5 INJECTION, SOLUTION INTRAVENOUS at 02:50

## 2021-05-11 RX ADMIN — MUPIROCIN 1 APPLICATION: 20 OINTMENT TOPICAL at 20:49

## 2021-05-11 RX ADMIN — FUROSEMIDE 40 MG: 10 INJECTION, SOLUTION INTRAMUSCULAR; INTRAVENOUS at 16:28

## 2021-05-11 RX ADMIN — FONDAPARINUX SODIUM 2.5 MG: 2.5 INJECTION, SOLUTION SUBCUTANEOUS at 08:44

## 2021-05-11 RX ADMIN — ASPIRIN 325 MG ORAL TABLET 325 MG: 325 PILL ORAL at 08:43

## 2021-05-11 RX ADMIN — ACETAMINOPHEN 975 MG: 325 TABLET, FILM COATED ORAL at 06:18

## 2021-05-11 RX ADMIN — INSULIN GLARGINE 15 UNITS: 100 INJECTION, SOLUTION SUBCUTANEOUS at 21:20

## 2021-05-11 RX ADMIN — ONDANSETRON 4 MG: 2 INJECTION INTRAMUSCULAR; INTRAVENOUS at 05:33

## 2021-05-11 RX ADMIN — OXYCODONE HYDROCHLORIDE 5 MG: 5 TABLET ORAL at 06:18

## 2021-05-11 RX ADMIN — ACETAMINOPHEN 975 MG: 325 TABLET, FILM COATED ORAL at 14:33

## 2021-05-11 RX ADMIN — ATORVASTATIN CALCIUM 80 MG: 80 TABLET, FILM COATED ORAL at 16:27

## 2021-05-11 RX ADMIN — HYDROMORPHONE HYDROCHLORIDE 0.5 MG: 1 INJECTION, SOLUTION INTRAMUSCULAR; INTRAVENOUS; SUBCUTANEOUS at 00:09

## 2021-05-11 RX ADMIN — POTASSIUM CHLORIDE 20 MEQ: 1500 TABLET, EXTENDED RELEASE ORAL at 20:51

## 2021-05-11 RX ADMIN — ACETAMINOPHEN 975 MG: 325 TABLET, FILM COATED ORAL at 20:53

## 2021-05-11 RX ADMIN — POLYETHYLENE GLYCOL 3350 17 G: 17 POWDER, FOR SOLUTION ORAL at 08:43

## 2021-05-11 RX ADMIN — OXYCODONE HYDROCHLORIDE 5 MG: 5 TABLET ORAL at 20:49

## 2021-05-11 NOTE — CONSULTS
Consultation - Liliane Joya 77 y o  male MRN: [id number]    Unit/Bed#: [facility] 419-01 Encounter: [id number]      Assessment/Plan     Assessment: This is a 77y o -year-old male with type 2 diabetes, hypertension, hyperlipidemia, peripheral artery disease, aortic stenosis and CAD who underwent CABG  Plan:  -Type 2 diabetes: controlled with A1c 6 6%  Home regimen consists of metformin 1000 mg twice a day and Farxiga 5 mg daily  Currently patient is on IV insulin infusion  He is off pressors and has a good appetite  Will switch to basal bolus insulin today  Will give Lantus 15 units q h s , 1st dose at 10:00 p m  Tonight  Discontinued IV infusion at 11:00 p m,  Add lispro 5 units t i d  A c  from tomorrow  Use correctional insulin as needed and continue glucose monitoring for further adjustment  Patient will likely be able to be discharged on his home medications  -CAD status post CABG:postoperative day 1,  management per Cardiovascular surgery    -Hypertension taking lisinopril as an outpatient   -Hyperlipidemia: on statin  -Carotid artery stenosis status post L CEA  -Aortic stenosis status post AVR    CC: Diabetes Consult    History of Present Illness     HPI: Liliane Joya is a 77y o  year old male with type 2 diabetes, hypertension, hyperlipidemia, peripheral artery disease and CAD who underwent CABG  Endocrine consulted to assist with diabetes management  Patient was diagnosed with type 2 diabetes in his 35s  Diabetes has been complicated by CAD and PAD  He denies any history of stroke, neuropathy, retinopathy or nephropathy  He was initially on metformin 1000 mg twice a day and Farxiga 5 mg daily was added later on  He checks his fingerstick once a day and it's usually in the 130 mg/dL  He denies any hypoglycemia  His weight has been stable  He reports nocturia but denies excess thirst reviewed he denies blurry vision, numbness or tingling of the feet     Both parents have type 2 diabetes  Inpatient consult to Endocrinology     Performed by  Kristine Smith MD     Authorized by Enrico Hooks PA-C              Review of Systems   Constitutional: Negative for activity change, appetite change, chills, fever and unexpected weight change  HENT: Negative for ear pain, sore throat, trouble swallowing and voice change  Eyes: Negative for pain and visual disturbance  Respiratory: Negative for cough and shortness of breath  Cardiovascular: Negative for chest pain and palpitations  Gastrointestinal: Negative for abdominal pain, constipation, diarrhea and vomiting  Endocrine: Positive for polyuria  Negative for cold intolerance, heat intolerance and polydipsia  Genitourinary: Negative for dysuria and hematuria  Musculoskeletal: Negative for arthralgias and back pain  Skin: Negative for color change and rash  Neurological: Negative for dizziness, seizures, syncope and headaches  Psychiatric/Behavioral: Negative for behavioral problems and confusion  The patient is not nervous/anxious  All other systems reviewed and are negative  Historical Information   Past Medical History:   Diagnosis Date    Arthritis     generalized    Asthma     seasonal-does not use inhaler    Back pain     Carotid artery stenosis     Diabetes mellitus (HCC)     type    GERD (gastroesophageal reflux disease)     Hiatal hernia     Hyperlipidemia     Hypertension     Peyronie's disease     last assessed [id number]    Seasonal allergies     Wears glasses      Past Surgical History:   Procedure Laterality Date    APPENDECTOMY      CAROTID ENDARTERECTOMY Left 10/29/2020    COLONOSCOPY N/A 1/22/2016    Procedure: COLONOSCOPY;  Surgeon: Juan Lubin MD;  Location: [facility] GI [facility];   Service:     KNEE ARTHROSCOPY Left     KNEE ARTHROSCOPY W/ MENISCAL REPAIR Right     RI RPLCMT AORTIC VALVE OPN W/STENTLESS TISSUE VALVE N/A 5/10/2021    Procedure: CORONARY ARTERY BYPASS GRAFT (CABG) X 5 USING LEFT GSV--> DIAGONAL,, RAMUS, PDA ,AND OM1, AND LEFT BALBIR-LAD WITH REPLACEMENT VALVE AORTIC USING 23 MM MURRAY MAGNA EASE(AVR);   Surgeon: Taqueria Barr MD;  Location: [facility];  Service: Cardiac Surgery    NM THROMBOENDARTECTMY NECK,NECK INCIS Left 10/29/2020    Procedure: CAROTID ENDARTERECTOMY W/BOVINE PATCH ANGIOPLASTY AND COMPLETION DUPLEX IMAGING;  Surgeon: Marcio Sung DO;  Location: [facility];  Service: Vascular     Social History   Social History     Substance and Sexual Activity   Alcohol Use Yes    Alcohol/week: 10 0 standard drinks    Types: 7 Glasses of wine, 3 Standard drinks or equivalent per week    Frequency: 4 or more times a week    Drinks per session: 1 or 2    Binge frequency: Daily or almost daily     Social History     Substance and Sexual Activity   Drug Use No     Social History     Tobacco Use   Smoking Status Never Smoker   Smokeless Tobacco Never Used     Family History:   Family History   Problem Relation Age of Onset    Heart attack Mother [de-identified]        CABG    Kidney failure Father     Diabetes Father         pre-diabetic       Meds/Allergies   Current Facility-Administered Medications   Medication Dose Route Frequency Provider Last Rate Last Admin    acetaminophen (TYLENOL) rectal suppository 650 mg  650 mg Rectal Q4H PRN Anne-Marie Conde PA-C        acetaminophen (TYLENOL) tablet 975 mg  975 mg Oral Q8H Mery Davis PA-C   975 mg at 05/11/21 0618    aspirin tablet 325 mg  325 mg Oral Daily Mery Davis PA-C   325 mg at 05/11/21 0843    atorvastatin (LIPITOR) tablet 80 mg  80 mg Oral Daily With [facility] SHEREE Luong        bisacodyl (DULCOLAX) rectal suppository 10 mg  10 mg Rectal Daily PRN Anne-Marie Conde PA-C        fondaparinux (ARIXTRA) subcutaneous injection 2 5 mg  2 5 mg Subcutaneous Daily Mery Davis PA-C   2 5 mg at 05/11/21 0844    furosemide (LASIX) injection 40 mg  40 mg Intravenous BID (diuretic) Anne-Marie Conde PA-C  insulin regular (HumuLIN R,NovoLIN R) 1 Units/mL in sodium chloride 0 9 % 100 mL infusion  0 3-21 Units/hr Intravenous Titrated Didier Coombs PA-C 4 mL/hr at 05/11/21 1209 4 Units/hr at 05/11/21 1209    lisinopril (ZESTRIL) tablet 10 mg  10 mg Oral Daily Mery Davis PA-C   10 mg at 05/11/21 0843    mupirocin (BACTROBAN) 2 % nasal ointment 1 application  1 application Nasal [address] Didier Coombs PA-C   1 application at 37/88/94 0843    niCARdipine (CARDENE) 25 mg (STANDARD CONCENTRATION) in sodium chloride 0 9% 250 mL  2 5-15 mg/hr Intravenous Titrated Didier Coombs PA-C   Stopped at 05/11/21 0953    ondansetron (ZOFRAN) injection 4 mg  4 mg Intravenous Q6H PRN Didier Coombs PA-C   4 mg at 05/11/21 0533    oxyCODONE (ROXICODONE) IR tablet 2 5 mg  2 5 mg Oral Q4H PRN Didier Coombs PA-C        oxyCODONE (ROXICODONE) IR tablet 5 mg  5 mg Oral Q4H PRN Didier Coombs PA-C   5 mg at 05/11/21 0618    pantoprazole (PROTONIX) EC tablet 40 mg  40 mg Oral Early Morning Mery Davis PA-C   40 mg at 05/11/21 0618    polyethylene glycol (MIRALAX) packet 17 g  17 g Oral Daily Mery Davis PA-C   17 g at 05/11/21 0843    potassium chloride (K-DUR,KLOR-CON) CR tablet 20 mEq  20 mEq Oral BID Didier Coombs PA-C   20 mEq at 05/11/21 0843    sodium chloride infusion 0 45 %  20 mL/hr Intravenous Continuous Mery Davis PA-C 20 mL/hr at 05/10/21 1350 20 mL/hr at 05/10/21 1350     No Known Allergies    Objective   Vitals: Blood pressure (!) 125/47, pulse 68, temperature 97 9 °F (36 6 °C), temperature source Oral, resp  rate 14, height 5' 9" (1 753 m), weight 76 7 kg (169 lb 1 5 oz), SpO2 99 %      Intake/Output Summary (Last 24 hours) at 5/11/2021 1215  Last data filed at 5/11/2021 1137  Gross per 24 hour   Intake 6106 6 ml   Output 4810 ml   Net 1296 6 ml     Invasive Devices     Central Venous Catheter Line            CVC Central Lines 05/10/21 Triple 1 day          Peripheral Intravenous Line            Peripheral IV 05/10/21 Left Hand 1 day          Arterial Line            Arterial Line 05/10/21 Right Radial 1 day          Line            Pacer Wires 1 day    Pacer Wires 1 day          Drain            Chest Tube 1 Anterior Mediastinal 32 Fr  1 day    Chest Tube 2 Left Pleural 32 Fr  1 day    Chest Tube 3 Posterior Mediastinal 24 Fr  1 day                Physical Exam  Vitals signs reviewed  Constitutional:       General: He is not in acute distress  Appearance: Normal appearance  He is normal weight  HENT:      Head: Normocephalic and atraumatic  Nose: Nose normal       Mouth/Throat:      Mouth: Mucous membranes are moist    Eyes:      General: No scleral icterus  Extraocular Movements: Extraocular movements intact  Conjunctiva/sclera: Conjunctivae normal       Pupils: Pupils are equal, round, and reactive to light  Neck:      Musculoskeletal: Normal range of motion and neck supple  Cardiovascular:      Rate and Rhythm: Normal rate  Pulses: Normal pulses  Heart sounds: Normal heart sounds  Pulmonary:      Effort: Pulmonary effort is normal       Breath sounds: Normal breath sounds  Abdominal:      General: Bowel sounds are normal  There is no distension  Palpations: Abdomen is soft  Tenderness: There is no abdominal tenderness  Musculoskeletal: Normal range of motion  Right lower leg: No edema  Left lower leg: No edema  Lymphadenopathy:      Cervical: No cervical adenopathy  Skin:     General: Skin is warm  Findings: No lesion or rash  Neurological:      Mental Status: He is alert and oriented to person, place, and time  Gait: Gait normal       Deep Tendon Reflexes: Reflexes normal    Psychiatric:         Mood and Affect: Mood normal          Behavior: Behavior normal          Thought Content:  Thought content normal          Judgment: Judgment normal          The history was obtained from the review of the chart, patient  Lab Results:   Results from last 7 days   Lab Units 05/07/21  0901   HEMOGLOBIN A1C % 6 6*     Lab Results   Component Value Date    WBC 8 29 05/11/2021    HGB 9 3 (L) 05/11/2021    HCT 28 9 (L) 05/11/2021    MCV 92 05/11/2021     (L) 05/11/2021     Lab Results   Component Value Date/Time    BUN 17 05/11/2021 04:11 AM    BUN 18 10/22/2015 10:20 AM     10/22/2015 10:20 AM    K 4 1 05/11/2021 04:11 AM    K 4 4 10/22/2015 10:20 AM     (H) 05/11/2021 04:11 AM     10/22/2015 10:20 AM    CO2 20 (L) 05/11/2021 04:11 AM    CO2 25 05/10/2021 01:19 PM    CREATININE 0 83 05/11/2021 04:11 AM    CREATININE 1 0 10/22/2015 10:20 AM    AST 19 01/20/2021 11:25 AM    ALT 20 01/20/2021 11:25 AM    ALB 4 4 01/20/2021 11:25 AM       POC Glucose (mg/dl)   Date Value   05/11/2021 190 (H)   05/11/2021 183 (H)   05/11/2021 155 (H)   05/11/2021 162 (H)   05/11/2021 146 (H)   05/11/2021 106   05/11/2021 133   05/10/2021 122   05/10/2021 89   05/10/2021 134       Imaging Studies: I have personally reviewed pertinent reports  Portions of the record may have been created with voice recognition software

## 2021-05-11 NOTE — PLAN OF CARE
Problem: OCCUPATIONAL THERAPY ADULT  Goal: Performs self-care activities at highest level of function for planned discharge setting  See evaluation for individualized goals  Description: Treatment Interventions: ADL retraining, Functional transfer training, UE strengthening/ROM, Endurance training, Patient/family training, Equipment evaluation/education, Compensatory technique education, Continued evaluation, Cardiac education, Activityengagement, Energy conservation          See flowsheet documentation for full assessment, interventions and recommendations  Note: Limitation: Decreased ADL status, Decreased endurance, Decreased high-level ADLs, Decreased self-care trans  Prognosis: Good  Assessment: Pt is a 77 y o  male admitted to hospitals on 5/10/2021 w/ CAD s/p "AVR and CABG x5" on 5/10/2021  Pt  has a past medical history of Arthritis, Asthma, Back pain, Carotid artery stenosis, Diabetes mellitus ([address] ), GERD (gastroesophageal reflux disease), Hiatal hernia, Hyperlipidemia, Hypertension, Peyronie's disease, Seasonal allergies, and Wears glasses  Pt with active OT orders and ambulate  orders  Pt resides in a 1 story home with a ramp to enter  Pt lives with his significant other who is able to assist as needed upon d/c  Pt was I w/  ADLS and IADLS, (+) drove, & required no use of DME PTA  Currently pt is mod A for functional transfers and functional mobility, min A for UB ADLS and mod A for LB ADLS  Pt is limited at this time 2*: pain, endurance, activity tolerance, functional mobility, forward functional reach, functional standing tolerance and decreased I w/ ADLS/IADLS  The following Occupational Performance Areas to address include: grooming, bathing/shower, toilet hygiene, dressing, functional mobility and clothing management  Based on the aforementioned OT evaluation, functional performance deficits, and assessments, pt has been identified as a high complexity evaluation   From OT standpoint, anticipate d/c home with family support  Pt to continue to benefit from acute immediate OT services to address the following goals 3-5x/week to  w/in 10-14 days:        OT Discharge Recommendation: No rehabilitation needs(Home with increased social support)  OT - OK to Discharge: Yes(When medically appropriate)

## 2021-05-11 NOTE — CONSULTS
Consultation - Electrophysiology - Cardiology  John Medellin 77 y o  male MRN: [id number]  Unit/Bed#: [facility] 419-01 Encounter: [id number]      Inpatient consult to Electrophysiology  Consult performed by: Anam Waters PA-C  Consult ordered by: Janell Slaughter PA-C          History of Present Illness   Physician Requesting Consult: Ariela Franco MD  Reason for Consult / Principal Problem: New LBBB    Assessment/Plan   1  New LBBB  -Patient POD#1 from CABGx5 and tissue AVR with new LBBB seen on post-op EKG  EKG this morning shows stable QRS at 175 ms  Currently AV sensing with epicardial leads placed intraoperatively  Will continue to monitor on telemetry and with daily EKGs  Continue to hold amiodarone and beta blocker  Will repeat echo on POD#3 to assess left ventricular function  2  Chronic systolic congestive HF  -EVELYN 5/10/21 - Pre procedure EF 37%, Post-procedure EF 45%  -TTE 5/7/21 - EF 55%  -TTE 2/11/2020 - EF 35-40%  3  CAD s/p CABGx5  4  Moderate rheumatic AS s/p tissue AVR  5  HLD  6  HTN  7  Asthma  8  DM2  9  L ICA stenosis s/p L carotid endarterectomy  10  Occluded R ICA        HPI: John Medellin is a 77y o  year old male with PMH as noted above, who presented to [facility] on 5/10/21 for elective CABGx5 (LIMA to LAD, SVG to D1, SVG -Y- to ramus intermedius and OM1, SVG to R PDA) and tissue AVR 23 mm [employer]  Patient was complaining of dyspnea on exertion and chest tightness to his PCP in January 2021  Stress test was abnormal  Cardiac cath showed MV CAD  Patient had known history of aortic stenosis from rheumatic fever as a child  He was scheduled for elective cardiac surgery with Dr Alvin Willett on 5/10/21  Of note, intraoperative EVELYN shows LVEF 37% pre-procedure and 45% post-procedure on low dose epinephrine  A new LBBB was seen on post-op EKG, seen again on POD#1 EKG  Patient currently NSR, HR 60s-70s  Epicardial leads are AV sensing          Primary Cardiologist: Dr Howie Frederick reviewed    EKG:  Pre procedure:       Post procedure:        Review of Systems  ROS as noted above, otherwise 12 point review of systems was performed and is negative  Historical Information   Past Medical History:   Diagnosis Date    Arthritis     generalized    Asthma     seasonal-does not use inhaler    Back pain     Carotid artery stenosis     Diabetes mellitus (HCC)     type    GERD (gastroesophageal reflux disease)     Hiatal hernia     Hyperlipidemia     Hypertension     Peyronie's disease     last assessed [id number]    Seasonal allergies     Wears glasses        Past Surgical History:   Procedure Laterality Date    APPENDECTOMY      CAROTID ENDARTERECTOMY Left 10/29/2020    COLONOSCOPY N/A 1/22/2016    Procedure: COLONOSCOPY;  Surgeon: Dash Solis MD;  Location: [place] [address] GI LAB;   Service:     KNEE ARTHROSCOPY Left     KNEE ARTHROSCOPY W/ MENISCAL REPAIR Right     MA THROMBOENDARTECTMY NECK,NECK INCIS Left 10/29/2020    Procedure: CAROTID ENDARTERECTOMY W/BOVINE PATCH ANGIOPLASTY AND COMPLETION DUPLEX IMAGING;  Surgeon: Fritz Gentile DO;  Location: [facility] OR;  Service: Vascular       Social History     Substance and Sexual Activity   Alcohol Use Yes    Alcohol/week: 10 0 standard drinks    Types: 7 Glasses of wine, 3 Standard drinks or equivalent per week    Frequency: 4 or more times a week    Drinks per session: 1 or 2    Binge frequency: Daily or almost daily     Social History     Substance and Sexual Activity   Drug Use No     Social History     Tobacco Use   Smoking Status Never Smoker   Smokeless Tobacco Never Used       Family History   Problem Relation Age of Onset    Heart attack Mother [de-identified]        CABG    Kidney failure Father     Diabetes Father         pre-diabetic       Meds/Allergies   Hospital Medications:   Current Facility-Administered Medications   Medication Dose Route Frequency    [MAR Hold] acetaminophen (TYLENOL) rectal suppository 650 mg  650 mg Rectal Q4H PRN    [MAR Hold] acetaminophen (TYLENOL) tablet 975 mg  975 mg Oral Q8H    [MAR Hold] aspirin tablet 325 mg  325 mg Oral Daily    [MAR Hold] atorvastatin (LIPITOR) tablet 80 mg  80 mg Oral Daily With Dinner    [MAR Hold] bisacodyl (DULCOLAX) rectal suppository 10 mg  10 mg Rectal Daily PRN    [MAR Hold] ceFAZolin (ANCEF) IVPB (premix in dextrose) 2,000 mg 50 mL  2,000 mg Intravenous Q8H    [MAR Hold] fondaparinux (ARIXTRA) subcutaneous injection 2 5 mg  2 5 mg Subcutaneous Daily    [MAR Hold] furosemide (LASIX) injection 40 mg  40 mg Intravenous BID (diuretic)    insulin regular (HumuLIN R,NovoLIN R) 1 Units/mL in sodium chloride 0 9 % 100 mL infusion  0 3-21 Units/hr Intravenous Titrated    [MAR Hold] lisinopril (ZESTRIL) tablet 10 mg  10 mg Oral Daily    [MAR Hold] mupirocin (BACTROBAN) 2 % nasal ointment 1 application  1 application Nasal Q55T [facility]    niCARdipine (CARDENE) 25 mg (STANDARD CONCENTRATION) in sodium chloride 0 9% 250 mL  2 5-15 mg/hr Intravenous Titrated    [MAR Hold] ondansetron (ZOFRAN) injection 4 mg  4 mg Intravenous Q6H PRN    [MAR Hold] oxyCODONE (ROXICODONE) IR tablet 2 5 mg  2 5 mg Oral Q4H PRN    [MAR Hold] oxyCODONE (ROXICODONE) IR tablet 5 mg  5 mg Oral Q4H PRN    [MAR Hold] pantoprazole (PROTONIX) EC tablet 40 mg  40 mg Oral Early Morning    [MAR Hold] polyethylene glycol (MIRALAX) packet 17 g  17 g Oral Daily    [MAR Hold] potassium chloride (K-DUR,KLOR-CON) CR tablet 20 mEq  20 mEq Oral BID    sodium chloride infusion 0 45 %  20 mL/hr Intravenous Continuous       Home Medications:   Medications Prior to Admission   Medication    aspirin 81 mg chewable tablet    carvedilol (COREG) 6 25 mg tablet    Farxiga 5 MG TABS    lisinopril-hydrochlorothiazide (PRINZIDE,ZESTORETIC) 10-12 5 MG per tablet    metFORMIN (GLUCOPHAGE) 1000 MG tablet    mupirocin (BACTROBAN) 2 % nasal ointment    Naproxen Sodium (ALEVE PO)    pravastatin (PRAVACHOL) 80 mg tablet No Known Allergies      Objective   Vitals: Blood pressure (!) 125/47, pulse 60, temperature 97 9 °F (36 6 °C), temperature source Oral, resp  rate 14, height 5' 9" (1 753 m), weight 76 7 kg (169 lb 1 5 oz), SpO2 96 %  Orthostatic Blood Pressures      Most Recent Value   Blood Pressure  (!) 125/47 filed at 05/11/2021 0843            Intake/Output Summary (Last 24 hours) at 5/11/2021 1122  Last data filed at 5/11/2021 1000  Gross per 24 hour   Intake 8356 6 ml   Output 4760 ml   Net 3596 6 ml       Invasive Devices     Central Venous Catheter Line            CVC Central Lines 05/10/21 Triple 1 day          Peripheral Intravenous Line            Peripheral IV 05/10/21 Left Hand 1 day          Arterial Line            Arterial Line 05/10/21 Right Radial 1 day          Line            Pacer Wires less than 1 day    Pacer Wires less than 1 day          Drain            Chest Tube 1 Anterior Mediastinal 32 Fr  less than 1 day    Chest Tube 2 Left Pleural 32 Fr  less than 1 day    Chest Tube 3 Posterior Mediastinal 24 Fr  less than 1 day                Physical Exam  Vitals signs reviewed  Constitutional:       General: He is not in acute distress  Appearance: He is not ill-appearing or diaphoretic  HENT:      Head: Normocephalic and atraumatic  Right Ear: External ear normal       Left Ear: External ear normal       Nose: Nose normal    Eyes:      General:         Right eye: No discharge  Left eye: No discharge  Neck:      Musculoskeletal: No neck rigidity or muscular tenderness  Cardiovascular:      Rate and Rhythm: Normal rate and regular rhythm  Heart sounds: No murmur  No friction rub  Pulmonary:      Effort: Pulmonary effort is normal       Breath sounds: Examination of the right-lower field reveals decreased breath sounds  Examination of the left-lower field reveals decreased breath sounds  Decreased breath sounds present  No wheezing, rhonchi or rales     Chest:      Comments: Sternal incision dressing clean and dry  Abdominal:      General: There is no distension  Palpations: Abdomen is soft  Tenderness: There is no abdominal tenderness  Musculoskeletal:         General: No deformity or signs of injury  Right lower leg: No edema  Left lower leg: No edema  Skin:     General: Skin is warm and dry  Capillary Refill: Capillary refill takes less than 2 seconds  Coloration: Skin is not jaundiced or pale  Neurological:      General: No focal deficit present  Mental Status: He is alert and oriented to person, place, and time  Mental status is at baseline  Psychiatric:         Mood and Affect: Mood normal          Behavior: Behavior normal          Thought Content: Thought content normal               Lab Results: I have personally reviewed pertinent lab results  Results from last 7 days   Lab Units 05/11/21  0449 05/10/21  2054 05/10/21  1319 05/10/21  1302  05/10/21  1102   WBC Thousand/uL 8 29  --   --   --   --   --    HEMOGLOBIN g/dL 9 3* 10 1*  --  9 9*  --   --    I STAT HEMOGLOBIN g/dl  --   --  7 8*  --    < > 9 2*   HEMATOCRIT % 28 9* 29 7*  --  29 8*  --   --    HEMATOCRIT, ISTAT %  --   --  23*  --    < > 27*   PLATELETS Thousands/uL 109*  --   --  108*  --  156    < > = values in this interval not displayed  Results from last 7 days   Lab Units 05/11/21  0411 05/10/21  2054 05/10/21  1727 05/10/21  1319 05/10/21  1302  05/07/21  0901   POTASSIUM mmol/L 4 1 3 9 3 7  --  3 4*  --  4 3   CHLORIDE mmol/L 114*  --   --   --  111*  --  105   CO2 mmol/L 20*  --   --   --  24  --  28   CO2, I-STAT mmol/L  --   --   --  25  --    < >  --    BUN mg/dL 17  --   --   --  21  --  24   CREATININE mg/dL 0 83  --   --   --  0 91  --  1 05   GLUCOSE, ISTAT mg/dl  --   --   --  115  --    < >  --    CALCIUM mg/dL 8 1*  --   --   --  7 8*  --  9 8    < > = values in this interval not displayed       Results from last 7 days   Lab Units 05/10/21  7844 05/07/21  0901   INR  1 51* 0 95   PTT seconds 38*  --      Results from last 7 days   Lab Units 05/11/21  0509   MAGNESIUM mg/dL 2 3       Imaging: I have personally reviewed pertinent films in PACS   CT chest:  IMPRESSION:     Mild cardiomegaly with no pericardial effusion  Coronary vascular calcifications      2 5 mm nodule in the right upper lobe  Based on current Fleischner Society 2017 Guidelines on incidental pulmonary nodule, no routine follow-up is needed if the patient is considered low risk for lung cancer  If the patient is considered high risk for lung cancer, 12 month follow-up   non-contrast chest CT is recommended      Moderate glenohumeral degenerative changes bilaterally         Cardiac testing:   EVELYN:   PREPROCEDURE 5/10/21     LEFT VENTRICLE:  Systolic Function: moderately impaired  Ejection Fraction: 37%  Cavity size: dilated       RIGHT VENTRICLE:  Systolic Function: normal   Cavity size normal  No hypertrophy      AORTIC VALVE:  Leaflets: trileaflet  Leaflet motions restricted  Stenosis: moderate  Regurgitation: mild       MITRAL VALVE:  Leaflets: calcified  Leaflet Motions: restricted  Regurgitation: trace  Stenosis: mild  Mean Gradient: 3 mmHg       TRICUSPID VALVE:  Leaflets: normal  Leaflet Motions: normal  Stenosis: none  Regurgitation: mild  PULMONIC VALVE:  Leaflets: normal  Regurgitation: trace  Stenosis: none  ASCENDING AORTA:  Size:  normal  Dissection not present        AORTIC ARCH:  Size:  normal  dissection not present  Grade 3: atheroma protruding < 0 5 cm into lumen      DESCENDING AORTA:  Size: normal  Dissection not present  Grade 3: atheroma protruding < 0 5 cm into lumen  RIGHT ATRIUM:  Size:  normal       LEFT ATRIUM:  Size: dilated  Spontaneous echo contrast      LEFT ATRIAL APPENDAGE:  Size: normal      ATRIAL SEPTUM:  Intra-atrial septal morphology: patent foramen ovale  Patent foramen ovale shunt: left to right shunt       VENTRICULAR SEPTUM:  Intra-ventricular septum morphology: normal      EPIAORTIC:  Plaque Thickness: 0-5 mm      OTHER FINDINGS:  Pericardium:  normal  Pleural Effusion:  none            POSTPROCEDURE 5/10/21     LEFT VENTRICLE:   Systolic Function: mildly depressed  Ejection Fraction: 45 %       RIGHT VENTRICLE: Unchanged   AORTIC VALVE: Leaflets: bioprosthetic  Stenosis: none  Mean Gradient: 5 mmHg  Regurgitation: none  Valve Size: 23 mm      MITRAL VALVE: Unchanged   TRICUSPID VALVE: Unchanged       PULMONIC VALVE: Unchanged     ATRIA: Unchanged   AORTA: Unchanged   ECHO: 5/7/21  SUMMARY     LEFT VENTRICLE:  Systolic function was normal  Ejection fraction was estimated to be 55 %  There were no regional wall motion abnormalities  Wall thickness was mildly increased  The changes were consistent with concentric remodeling (increased wall thickness with normal wall mass)  Left ventricular diastolic function parameters were abnormal   Doppler parameters were consistent with high ventricular filling pressure      RIGHT VENTRICLE:  The size was normal   Systolic function was normal      LEFT ATRIUM:  The atrium was mildly dilated      MITRAL VALVE:  There was moderate annular calcification  There was moderate calcification of the anterior and posterior leaflets, involving the leaflet margin more than the base  Transmitral velocity was increased due to valvular stenosis  There was mild stenosis  There was mild regurgitation  Mean transmitral gradient was 3 3 mmHg  Mitral valve area by continuity equation was 1 5 cmï¾²     AORTIC VALVE:  The valve was trileaflet  Leaflets exhibited markedly increased thickness and marked calcification  Transaortic velocity was increased due to valvular stenosis  There was moderate to severe stenosis  There was mild regurgitation  Valve mean gradient was 27 mmHg  Aortic valve area was 1 cmï¾² by the continuity equation    The aortic valve obstructive index (by VTI) was 0 3      TRICUSPID VALVE:  There was mild regurgitation        ECHO: 2/11/2020  SUMMARY:  Blood pressure of 220/100 at time of study     LEFT VENTRICLE:  Systolic function was moderately to markedly reduced by Teichholz  Ejection fraction was estimated in the range of 35 % to 40 % to be 36 %  There were no regional wall motion abnormalities  Wall thickness was mildly increased  Doppler parameters were consistent with abnormal left ventricular relaxation (grade 1 diastolic dysfunction)  Doppler parameters were consistent with elevated mean left atrial filling pressure      LEFT ATRIUM:  The atrium was moderately to markedly dilated      RIGHT ATRIUM:  The atrium was moderately dilated      MITRAL VALVE:  There was mild annular calcification  There was mild regurgitation      AORTIC VALVE:  The valve was trileaflet  Leaflets exhibited mildly increased thickness, mild calcification, moderately reduced cuspal separation, and sclerosis  Transaortic velocity was increased due to valvular stenosis  There was moderate stenosis  There was trace regurgitation  CATH: 4/13/21  CORONARY CIRCULATION:  Left main: The vessel was normal sized and mildly calcified  Angiography showed moderate atherosclerosis  No focal stenosis seen  It trifurcates into LAD, large ramus intermedius and nondominant circumflex system  Left main is calcified  LAD: The vessel was normal sized and mildly calcified  Angiography showed moderate atherosclerosis  Vessel is diffusely disease  Ostial there may be 50-60% stenosis  It gives a medium-size diagonal which has proximal around 90% stenosis  Mid LAD has around 50% stenosis distal vessel is moderately diffusely disease  Circumflex: The vessel was normal sized  It is a non dominant vessel  It is proximally 100% occluded  A obtuse marginal fills from right to left collaterals  Ramus intermedius: The vessel was large sized  Angiography showed moderate atherosclerosis   It has proximal / ostial around 90-95% stenosis  It is a large vessel  Distal vessel is a good target  RCA: The vessel was normal sized  It is moderately calcified  It has ostial around 45 50% stenosis  Mid there is a 35% stenosis  At the bifurcation of RPDA and RPL  RPDA has around 90% stenosis  RPL has around 50% stenosis  RCA gives  collateral to the left circulation to the obtuse marginal      CARDIAC STRUCTURES:  Global left ventricular function was moderately depressed  EF was estimated at 40 %  There was about 15 mm gradient across the aortic valve  Patient will need to get update echo Doppler for LV function as well as aortic valve severity     IMPRESSIONS:  There is a three-vessel coronary artery disease with significant stenosis of large ramus intermedius, medium size circumflex with total occlusion proximally, moderate disease of LAD and severe disease of distal RCA   Patient also had  moderately decreased LV systolic function and about 15 mm gradient across the aortic valve        VTE Prophylaxis: Fondaparinux (Arixtra)

## 2021-05-11 NOTE — OCCUPATIONAL THERAPY NOTE
Occupational Therapy Evaluation     Patient Name: Benny BARNEY Date: 5/11/2021  Problem List  Principal Problem:    Coronary artery disease involving native coronary artery of native heart  Active Problems:    Benign essential hypertension    Controlled diabetes mellitus with diabetic neuropathy (HCC)    Carotid stenosis, left    Chronic systolic congestive heart failure (HCC)    Rheumatic aortic stenosis    S/P AVR    S/P CABG (coronary artery bypass graft)    Hyperchloremia    Acute blood loss anemia    Thrombocytopenia (HCC)    LBBB (left bundle branch block)    1st degree AV block    Past Medical History  Past Medical History:   Diagnosis Date    Arthritis     generalized    Asthma     seasonal-does not use inhaler    Back pain     Carotid artery stenosis     Diabetes mellitus ([facility] )     type    GERD (gastroesophageal reflux disease)     Hiatal hernia     Hyperlipidemia     Hypertension     Peyronie's disease     last assessed [id number]    Seasonal allergies     Wears glasses      Past Surgical History  Past Surgical History:   Procedure Laterality Date    APPENDECTOMY      CAROTID ENDARTERECTOMY Left 10/29/2020    COLONOSCOPY N/A 1/22/2016    Procedure: COLONOSCOPY;  Surgeon: Yuko Harris MD;  Location: [facility] GI LAB; Service:     KNEE ARTHROSCOPY Left     KNEE ARTHROSCOPY W/ MENISCAL REPAIR Right     SC RPLCMT AORTIC VALVE OPN W/STENTLESS TISSUE VALVE N/A 5/10/2021    Procedure: CORONARY ARTERY BYPASS GRAFT (CABG) X 5 USING LEFT GSV--> DIAGONAL,, RAMUS, PDA ,AND OM1, AND LEFT BALBIR-LAD WITH REPLACEMENT VALVE AORTIC USING 23 MM MURRAY MAGNA EASE(AVR);   Surgeon: Ruslan Ramirez MD;  [facility];  Service: Cardiac Surgery    SC THROMBOENDARTECTMY Darolyn Hole INCIS Left 10/29/2020    Procedure: CAROTID ENDARTERECTOMY W/BOVINE PATCH ANGIOPLASTY AND COMPLETION DUPLEX IMAGING;  Surgeon: Marilyn Pritchett DO;  Location: [facility];  Service: Vascular             05/11/21 9244 OT Last Visit   OT Visit Date 05/11/21   Note Type   Note type Evaluation   Restrictions/Precautions   Other Precautions Cardiac/sternal;Multiple lines;Telemetry;O2;Fall Risk;Pain  (CT; a-line)   Pain Assessment   Pain Assessment Tool 0-10   Pain Score No Pain   Home Living   Type of [address] [place] [address] Two level;Stairs to enter with rails   Bathroom Shower/Tub Walk-in shower   Bathroom Toilet Standard   Bathroom Equipment   (denies)   [facility]   Additional Comments Pt reports living in a 2 story home iwth 3+4 RENETTA  Prior Function   Level of [place] Independent with ADLs and functional mobility   Lives With Significant other   Receives Help From Family   ADL Assistance Independent   IADLs Independent   Falls in the last 6 months 0   Vocational Retired   Lifestyle   Autonomy Pt reports being I with ADLS, IADLS and mobility without device PTA  (+)    Reciprocal Relationships Pt lives with his significant other who he reports is able to assist as needed upon d/c   Service to Others Retired   [address] Enjoys golfing   ADL   Where [facility] 4  [address] 3  Moderate Assistance   [address] 4  [address] 3  [address]  4  Minimal Assistance   Transfers   Sit to Stand 3  Moderate assistance   Additional items Assist x 1; Increased time required;Verbal cues   Stand to Sit 3  Moderate assistance   Additional items Assist x 1; Increased time required;Verbal cues   Functional Mobility   Functional Mobility 3  Moderate assistance   Additional Comments Pt demonstrated household mobility with one seated rest break      Additional items Rolling walker   Balance   Static Sitting Fair   Dynamic Sitting Fair -   Static Standing Poor + Dynamic Standing Poor +   Ambulatory Poor   Activity Tolerance   Activity Tolerance Patient limited by fatigue   Medical Staff Made Aware PT Jeremy   Nurse Made Aware RN confirmed okay to see pt and was present throughout   RUE Assessment   RUE Assessment WFL   LUE Assessment   LUE Assessment WFL   Cognition   Overall Cognitive Status WFL   Arousal/Participation Alert; Cooperative   Attention Within functional limits   Orientation Level Oriented X4   Memory Decreased recall of precautions   Following Commands Follows one step commands without difficulty   Comments Pt is very pleasant and cooperative to work with therapy  Assessment   Limitation Decreased ADL status; Decreased endurance;Decreased high-level ADLs; Decreased self-care trans   Prognosis Good   Assessment Pt is a 77 y o  male admitted [facility] on [facility]10[facility]2021 w/ CAD s/p "AVR and CABG x5" on 5/10/2021  Pt  has a past medical history of Arthritis, Asthma, Back pain, Carotid artery stenosis, Diabetes mellitus ([address] ), GERD (gastroesophageal reflux disease), Hiatal hernia, Hyperlipidemia, Hypertension, Peyronie's disease, Seasonal allergies, and Wears glasses  Pt with active OT orders and ambulate  orders  Pt resides in a 1 story home with a ramp to enter  Pt lives with his significant other who is able to assist as needed upon d/c  Pt was I w/  ADLS and IADLS, (+) drove, & required no use of DME PTA  Currently pt is mod A for functional transfers and functional mobility, min A for UB ADLS and mod A for LB ADLS  Pt is limited at this time 2*: pain, endurance, activity tolerance, functional mobility, forward functional reach, functional standing tolerance and decreased I w/ ADLS/IADLS  The following Occupational Performance Areas to address include: grooming, bathing/shower, toilet hygiene, dressing, functional mobility and clothing management   Based on the aforementioned OT evaluation, functional performance deficits, and assessments, pt has been identified as a high complexity evaluation  From OT standpoint, anticipate d/c home with family support  Pt to continue to benefit from acute immediate OT services to address the following goals 3-5x/week to  w/in 10-14 days: Brooks Love Goals   Patient Goals To feel better and return home   LTG Time Frame 10-14   Long Term Goal #1 See goals below   Plan   Treatment Interventions ADL retraining;Functional transfer training;UE strengthening/ROM; Endurance training;Patient/family training;Equipment evaluation/education; Compensatory technique education;Continued evaluation;Cardiac education; Activityengagement; Energy conservation   Goal Expiration Date 21   OT Frequency 3-5x/wk   Recommendation   OT Discharge Recommendation No rehabilitation needs  (Home with increased social support)   OT - OK to Discharge Yes  (When medically appropriate)   AM-PAC Daily Activity Inpatient   Lower Body Dressing 2   Bathing 2   Toileting 2   Upper Body Dressing 3   Grooming 4   Eating 4   Daily Activity Raw Score 17   Daily Activity Standardized Score (Calc for Raw Score >=11) 37 26   AM-PAC Applied Cognition Inpatient   Following a Speech/Presentation 4   Understanding Ordinary Conversation 4   Taking Medications 4   Remembering Where Things Are Placed or Put Away 4   Remembering List of 4-5 Errands 4   Taking Care of Complicated Tasks 4   Applied Cognition Raw Score 24   Applied Cognition Standardized Score 62 21   Modified Salkum Scale   Modified Salkum Scale 4       GOALS    1) Pt will increase activity tolerance to G for 30 min txment sessions    2) Pt will complete UB/LB dressing/self care w/ mod I using adaptive device and DME as needed    3) Pt will complete bathing w/ Mod I w/ use of AE and DME as needed    4) Pt will complete toileting w/ mod I w/ G hygiene/thoroughness using DME as needed    5) Pt will improve functional transfers to Mod I on/off all surfaces using DME as needed w/ G balance/safety     6) Pt will improve functional mobility during ADL/IADL/leisure tasks to Mod I using DME as needed w/ G balance/safety     7) Pt will demonstrate G carryover of pt/caregiver education and training as appropriate w/ mod I     8) Pt will engage in cardiac education using the Recovering After Cardiac Surgery packet w/ G participation and G carryover  9) Pt will demonstrate 100% carryover of precautions s/p review w/ mod I w/ G tolerance/participation t/o functional ADL/IADL/leisure tasks      10) Pt will independently identify and utilize 2-3 coping strategies to increase positive affect and promote overall well-being      11) Pt will engage in ongoing cognitive assessment w/ G participation to assist w/ safe d/c planning/recommendations (as needed)    HOANG Arthur, OTR/L

## 2021-05-11 NOTE — RESTORATIVE TECHNICIAN NOTE
Restorative Specialist Mobility Note       Activity: Ambulate in del cid, Chair     Assistive Device: Front wheel walker

## 2021-05-11 NOTE — PHYSICAL THERAPY NOTE
Physical Therapy Evaluation     Patient's Name: Kenyatta Burgos    Admitting Diagnosis  Rheumatic aortic stenosis [I06 0]  Coronary artery disease involving native coronary artery of native heart, angina presence unspecified [I25 10]    Problem List  Patient Active Problem List   Diagnosis    Benign essential hypertension    Controlled diabetes mellitus with diabetic neuropathy [facility] )    Hyperlipidemia    Murmur    Organic impotence    Mass of right side of neck    ICAO (internal carotid artery occlusion), right    Internal carotid artery occlusion, right    Carotid stenosis, left    Asthma    Status post carotid endarterectomy    Chronic systolic congestive heart failure ([facility] )    Coronary artery disease involving native coronary artery of native heart    Rheumatic aortic stenosis    S/P AVR    S/P CABG (coronary artery bypass graft)    Hyperchloremia    Acute blood loss anemia    Thrombocytopenia (HCC)    LBBB (left bundle branch block)    1st degree AV block       Past Medical History  Past Medical History:   Diagnosis Date    Arthritis     generalized    Asthma     seasonal-does not use inhaler    Back pain     Carotid artery stenosis     Diabetes mellitus ([facility] )     type    GERD (gastroesophageal reflux disease)     Hiatal hernia     Hyperlipidemia     Hypertension     Peyronie's disease     last assessed [id number]    Seasonal allergies     Wears glasses        Past Surgical History  Past Surgical History:   Procedure Laterality Date    APPENDECTOMY      CAROTID ENDARTERECTOMY Left 10/29/2020    COLONOSCOPY N/A 1/22/2016    Procedure: COLONOSCOPY;  Surgeon: Bahman Gutierrez MD;  Location: [facility] GI LAB;   Service:     KNEE ARTHROSCOPY Left     KNEE ARTHROSCOPY W/ MENISCAL REPAIR Right     ND RPLCMT AORTIC VALVE OPN W/STENTLESS TISSUE VALVE N/A 5/10/2021    Procedure: CORONARY ARTERY BYPASS GRAFT (CABG) X 5 USING LEFT GSV--> DIAGONAL,, RAMUS, PDA ,AND OM1, AND LEFT BALBIR-LAD WITH REPLACEMENT VALVE AORTIC USING 23 MM MURRAY MAGNA EASE(AVR); Surgeon: Tash Mccallum MD;  [facility];  Service: Cardiac Surgery    MN THROMBOENDARTECTMY Sin De La Cruz INCIS Left 10/29/2020    Procedure: CAROTID ENDARTERECTOMY W/BOVINE PATCH ANGIOPLASTY AND COMPLETION DUPLEX IMAGING;  Surgeon: DO Bakari;  Location: [facility];  Service: Vascular        05/11/21 0931   PT Last Visit   PT Visit Date 05/11/21   Note Type   Note type Evaluation  (and Tx)   Pain Assessment   Pain Assessment Tool 0-10   Pain Score No Pain   Home Living   Type of [address] [facility] Two level  (2 flights of steps w/ HR from the garage to bedrom/bathroom)   [facility]   Prior Function   Level of [place] Independent with ADLs and functional mobility  (amb w/o AD)   Lives With Significant other  (able to (A))   Restrictions/Precautions   Braces or Orthoses   (denies)   Other Precautions Cardiac/sternal;Multiple lines;Telemetry; Fall Risk;Pain  (CT, a-line)   General   Additional Pertinent History cleared for assessment (spoke to nsg)   Cognition   Overall Cognitive Status WFL   Arousal/Participation Alert   Orientation Level Oriented to person;Oriented to place;Oriented to situation   Memory Within functional limits   Following Commands Follows one step commands without difficulty   Comments Alert; in the chair; responds to questions appropriately; agreeable to mobilize;   RUE Assessment   RUE Assessment WFL  (AROM)   LUE Assessment   LUE Assessment WFL  (AROM)   RLE Assessment   RLE Assessment WFL  (AROM)   Strength RLE   RLE Overall Strength   (good - (grossly))   LLE Assessment   LLE Assessment WFL  (AROM)   Strength LLE   LLE Overall Strength   (good - (grossly))   Transfers   Sit to Stand 3  Moderate assistance   Additional items Assist x 1; Increased time required;Verbal cues   Stand to Sit 3  Moderate assistance   Additional items Assist x 1;Verbal cues   Ambulation/Elevation   Gait pattern Excessively slow; Short stride; Inconsistent ni   Gait Assistance 3  Moderate assist   Additional items Assist x 1;Verbal cues; Tactile cues  (stand by (A) of 2 more staff for lines and chair follow )   Assistive Device Rolling walker   Distance 60 ft   Balance   Static Sitting Fair   Dynamic Sitting Fair -   Static Standing Poor +   Dynamic Standing Poor +   Ambulatory Poor   Activity Tolerance   Activity Tolerance Patient limited by fatigue   Nurse Made Aware spoke to NATASHA Srivastava   Assessment   Prognosis Good   Problem List Decreased strength;Decreased endurance; Impaired balance;Decreased mobility   Assessment Pt is 77 y o  male admitted with Dx of aoronary artery disease involving native coronary artery of native heart and severe aortic stenosis and underwent aortic valve replacement with a 23mm Dickerson Magna Ease pericardial tissue valve and coronary artery bypass grafting x 5 with left internal mammary artery to left anterior descending artery, saphenous vein graft to diagonal 1, saphenous vein graft - Y -  to ramus intermedius and obtuse marginal 1 and saphenous vein graft to right posterior descending artery on 5/10/2021  Pt 's comorbidities affecting POC include: arthritis, asthma, back pain, DM, carotid artery stenosis, and HTN and personal factors of: RENETTA  Pt's clinical presentation is currently unstable/unpredictable which is evident in ongoing telemetry monitoring while in a critical care unit w/ a-line in place, abnormal lab values being monitored/trending, CT in place and inability to progress further w/ mobilization at this time  Pt presents w/ postop guarding, generalized weakness, incl min decreased LE strength, decreased functional endurance and activity tolerance, and impaired balance w/ associated gait deviations requiring use of rw at this time  Will cont to follow pt in PT for progressive mobilization to address above functional deficits and to max level of (I), endurance, and safety  Otherwise, anticipate pt will return home w/ available support upon D/C provided he cont improving w/ mobility skills, safety, and endurance (incl on the steps) and when medically cleared; home PT follow up is recommended at this time; will follow  Barriers to Discharge Inaccessible home environment   Goals   Patient Goals to get stronger   STG Expiration Date 05/21/21   Short Term Goal #1 7-10 days  Pt will amb 300 ft w/ least restrictive assistive device PRN, mod (I) in order to facilitate safe return to premorbid environment and community amb status  Pt will negotiate 14 steps w/ hand rail and SPC PRN, mod (I) in order to navigate between levels of home environment safely  Pt will achieve (I) level w/ bed mob in order to facilitate safety with OOB and back to bed transitions in own living environment  Pt will perform transfers w/ mod (I) to assure (I) and safety w/ functional mobility/transitions w/ all aspects of mobility/locomotion  Pt will participate in LE therex and balance activities to max progression w/ mobility skills  PT Treatment Day 1  (follow up Tx session)   Plan   Treatment/Interventions Functional transfer training;LE strengthening/ROM; Elevations; Therapeutic exercise; Endurance training;Equipment eval/education; Bed mobility;Gait training;Spoke to nursing;OT   PT Frequency Other (Comment)  (4-6x/wk)   Recommendation   PT Discharge Recommendation Home with home health rehabilitation  (home PT)   Equipment Recommended Walker  (at this time; will cont to monitor progress)   Walker Package Recommended Wheeled walker   Irvin 8 in Bed Without Bedrails 2   Lying on Back to Sitting on Edge of Flat Bed 2   Moving Bed to Chair 2   Standing Up From Chair 2   Walk in Room 2   Climb 3-5 Stairs 2   Basic Mobility Inpatient Raw Score 12   Basic Mobility Standardized Score 32 23   Modified Prowers Scale   Modified Prowers Scale 4       Luke Hamlin, PT             PT Tx note    Time In: 09:31  Time Out: 09:40  Total Time: 9 min      S:  Pt is in the chair; agreeable to try further amb  O:  Additional functional mobility training performed as following: sit <--> stand transfers w/ min (A)x1; amb 120 ft w/ rw, min (A)x1 and stand by (A) of 2 more staff for lines and chair follow; pt was reclined in the chair w/ LE elevated at the end of session; pillow placed for (R) UE support; (R) SCD back on; call bell placed w/in reach;     A:  Additional follow up consecutive session performed to progress further w/ mobilization/ambulation distance to max overall strength and endurance and to facilitate progression w/ functional mobility skills and overall level of (I)  Pt was able to amb further distance w/ rw and required less (A) for transfers and amb as well; pt remained in NAD w/ no excessive fatigue or SOB expressed; overall, cont to recommend home PT follow up upon returning home w/ available support pending progress and when medically cleared; will follow  P:  Cont to follow pt 4-6x/week for LE therex, functional mobility training, endurance training and pt education per POC to max functional (I) and safety        Adi Lamar, PT

## 2021-05-11 NOTE — PLAN OF CARE
Problem: Prexisting or High Potential for Compromised Skin Integrity  Goal: Skin integrity is maintained or improved  Description: INTERVENTIONS:  - Identify patients at risk for skin breakdown  - Assess and monitor skin integrity  - Assess and monitor nutrition and hydration status  - Monitor labs   - Assess for incontinence   - Turn and reposition patient  - Assist with mobility/ambulation  - Relieve pressure over bony prominences  - Avoid friction and shearing  - Provide appropriate hygiene as needed including keeping skin clean and dry  - Evaluate need for skin moisturizer/barrier cream  - Collaborate with interdisciplinary team   - Patient/family teaching  - Consider wound care consult   Outcome: Progressing     Problem: CARDIOVASCULAR - ADULT  Goal: Maintains optimal cardiac output and hemodynamic stability  Description: INTERVENTIONS:  - Monitor I/O, vital signs and rhythm  - Monitor for S/S and trends of decreased cardiac output  - Administer and titrate ordered vasoactive medications to optimize hemodynamic stability  - Assess quality of pulses, skin color and temperature  - Assess for signs of decreased coronary artery perfusion  - Instruct patient to report change in severity of symptoms  Outcome: Progressing  Goal: Absence of cardiac dysrhythmias or at baseline rhythm  Description: INTERVENTIONS:  - Continuous cardiac monitoring, vital signs, obtain 12 lead EKG if ordered  - Administer antiarrhythmic and heart rate control medications as ordered  - Monitor electrolytes and administer replacement therapy as ordered  Outcome: Progressing     Problem: RESPIRATORY - ADULT  Goal: Achieves optimal ventilation and oxygenation  Description: INTERVENTIONS:  - Assess for changes in respiratory status  - Assess for changes in mentation and behavior  - Position to facilitate oxygenation and minimize respiratory effort  - Oxygen administered by appropriate delivery if ordered  - Initiate smoking cessation education as indicated  - Encourage broncho-pulmonary hygiene including cough, deep breathe, Incentive Spirometry  - Assess the need for suctioning and aspirate as needed  - Assess and instruct to report SOB or any respiratory difficulty  - Respiratory Therapy support as indicated  Outcome: Progressing     Problem: GENITOURINARY - ADULT  Goal: Maintains or returns to baseline urinary function  Description: INTERVENTIONS:  - Assess urinary function  - Encourage oral fluids to ensure adequate hydration if ordered  - Administer IV fluids as ordered to ensure adequate hydration  - Administer ordered medications as needed  - Offer frequent toileting  - Follow urinary retention protocol if ordered  Outcome: Progressing  Goal: Absence of urinary retention  Description: INTERVENTIONS:  - Assess patients ability to void and empty bladder  - Monitor I/O  - Bladder scan as needed  - Discuss with physician/AP medications to alleviate retention as needed  - Discuss catheterization for long term situations as appropriate  Outcome: Progressing  Goal: Urinary catheter remains patent  Description: INTERVENTIONS:  - Assess patency of urinary catheter  - If patient has a chronic elam, consider changing catheter if non-functioning  - Follow guidelines for intermittent irrigation of non-functioning urinary catheter  Outcome: Progressing     Problem: METABOLIC, FLUID AND ELECTROLYTES - ADULT  Goal: Electrolytes maintained within normal limits  Description: INTERVENTIONS:  - Monitor labs and assess patient for signs and symptoms of electrolyte imbalances  - Administer electrolyte replacement as ordered  - Monitor response to electrolyte replacements, including repeat lab results as appropriate  - Instruct patient on fluid and nutrition as appropriate  Outcome: Progressing  Goal: Fluid balance maintained  Description: INTERVENTIONS:  - Monitor labs   - Monitor I/O and WT  - Instruct patient on fluid and nutrition as appropriate  - Assess for signs & symptoms of volume excess or deficit  Outcome: Progressing  Goal: Glucose maintained within target range  Description: INTERVENTIONS:  - Monitor Blood Glucose as ordered  - Assess for signs and symptoms of hyperglycemia and hypoglycemia  - Administer ordered medications to maintain glucose within target range  - Assess nutritional intake and initiate nutrition service referral as needed  Outcome: Progressing     Problem: SKIN/TISSUE INTEGRITY - ADULT  Goal: Skin integrity remains intact  Description: INTERVENTIONS  - Identify patients at risk for skin breakdown  - Assess and monitor skin integrity  - Assess and monitor nutrition and hydration status  - Monitor labs (i e  albumin)  - Assess for incontinence   - Turn and reposition patient  - Assist with mobility/ambulation  - Relieve pressure over bony prominences  - Avoid friction and shearing  - Provide appropriate hygiene as needed including keeping skin clean and dry  - Evaluate need for skin moisturizer/barrier cream  - Collaborate with interdisciplinary team (i e  Nutrition, Rehabilitation, etc )   - Patient/family teaching  Outcome: Progressing  Goal: Incision(s), wounds(s) or drain site(s) healing without S/S of infection  Description: INTERVENTIONS  - Assess and document risk factors for skin impairment   - Assess and document dressing, incision, wound bed, drain sites and surrounding tissue  - Consider nutrition services referral as needed  - Oral mucous membranes remain intact  - Provide patient/ family education  Outcome: Progressing  Goal: Oral mucous membranes remain intact  Description: INTERVENTIONS  - Assess oral mucosa and hygiene practices  - Implement preventative oral hygiene regimen  - Implement oral medicated treatments as ordered  - Initiate Nutrition services referral as needed  Outcome: Progressing

## 2021-05-11 NOTE — PROGRESS NOTES
Progress Note - Critical Care   Sharilyn Soulier 77 y o  male MRN: [id number]  Unit/Bed#: [facility] 414-01 Encounter: [id number]      Attending Physician: Abhijit Betancourt MD    24 Hour Events/HPI: POD # 1 s/p CABG x 5 and tissue AVR  Post operatively received 1L LR 750cc albumin for hypotension/low filling pressures  Received amicar and 1U PRBC, 1plt, 1 cryo for elevated CT output with improvement  Overnight required cardene for SBP goal  Delined this AM      ROS: Review of Systems   Respiratory: Positive for chest tightness  Cardiovascular: Positive for chest pain  All other systems reviewed and are negative     ---------------------------------------------------------------------------------------------------------------------------------------------------------------------  Impressions:  1  CAD s/p CABG x 5  2  Aortic stenosis s/p tissue AVR  3  HTN  4  HLD  5  Acute blood loss anemia  6  DM2  7  GERD  8   Carotid artery stenosis s/p L CEA    Plan:    Neuro:   · Pain controlled with: ATC tylenol, PRN oxycodone  · Regulate sleep/wake cycle  · Delirium precautions  · CAM-ICU daily  · Trend neuro exam      CV:   · Cardiac infusions: None  · MAP goal > 65  · D/C Arterial line  · Rhythm: Sinus Bradycardia  · Follow rhythm on telemetry  · Hold lopressor given bradycardiac rhythm post operatively  · Consider starting lisinopril for hypertension  · Maintain epicardial pacing wires for pacing needs    Lung:   · Good Room air oxygen saturation; Continue incentive spirometry/Coughing/Deep breathing exercises  · Chest tube output remains persistently high; Continue chest tubes to suction today      GI:   · Continue PPI for stress ulcer prophylaxis  · Continue bowel regimen  · Trend abdominal exam and bowel function    FEN:   · Diuretic plan: Lasix 40 mg IV q BID  · K-dur 20 mEQ PO q BID  · Nutrition/diet plan: Cardiac/DM  · Replenish electrolytes with goals: K >4 0, Mag >2 0, and Phos >3 0    :   · Indwelling Pulliam present: yes   · D/c Pulliam  · Trend UOP and BUN/creat  · Strict I and O    ID:   · Trend temps and WBC count  · Maintain normothermia        Heme:   · Trend hgb and plts    Endo:   · Glycemic control plan: History of diabetes: Continue insulin infusion today   Consult Endocrinology for management    Results from last 6 Months   Lab Units 05/07/21  0901 01/20/21  1059   HEMOGLOBIN A1C % 6 6* 6 7*     MSK/Skin:  · Mobility goal:   · PT consult: yes  · OT consult: yes  · Frequent turning and pressure off-loading  · Local wound care as needed    Disposition:  Transfer to telemetry  ---------------------------------------------------------------------------------------------------------------------------------------------------------------------  Allergies: No Known Allergies  Medications:   Scheduled Meds:  Current Facility-Administered Medications   Medication Dose Route Frequency Provider Last Rate    acetaminophen  650 mg Rectal Q4H PRN Mely Pack, PA-C      acetaminophen  975 mg Oral Q8H Mely Pack, PA-C      amiodarone  200 mg Oral [facility] Mely Pack, PA-C      aspirin  325 mg Oral Daily Mely Pack, PA-C      atorvastatin  80 mg Oral Daily With [employer] Aj, PA-C      bisacodyl  10 mg Rectal Daily PRN Mely Pack, PA-C      calcium chloride  1 g Intravenous Once Mely Pack, PA-C      cefazolin  2,000 mg Intravenous Q8H Mely Pack, PA-C 2,000 mg (05/11/21 1457)    chlorhexidine  15 mL Mouth/Throat BID Mely Pack, PA-C      fentanyl citrate (PF)  50 mcg Intravenous Once Mely Pack, PA-C      fentanyl citrate (PF)  50 mcg Intravenous Q1H PRN Mely Pack, PA-C      fondaparinux  2 5 mg Subcutaneous Daily Mely Pack, PA-C      furosemide  40 mg Intravenous Q6H PRN Mely Pack, PA-C      HYDROmorphone  0 5 mg Intravenous Q2H PRN Mely Pack, PA-C      insulin regular (HumuLIN R,NovoLIN R) infusion  0 3-21 Units/hr Intravenous Titrated Mely Pack, PA-C 1 Units/hr (05/11/21 0415)    lactated ringers  500 mL Intravenous Q30 Min PRN Fayne Dylynette, PA-C Stopped (05/10/21 1345)    lidocaine (cardiac)  100 mg Intravenous Q30 Min PRN Dmitriyyne Dylynette, PA-C      mupirocin  1 application Nasal B86N [facility] Liza Redman, PA-C      niCARdipine  2 5-15 mg/hr Intravenous Titrated Fayne Dyke, PA-C 7 5 mg/hr (05/11/21 0415)    ondansetron  4 mg Intravenous Q6H PRN Fayne Dyke, PA-C      oxyCODONE  2 5 mg Oral Q4H PRN Fayne Dyke, PA-C      oxyCODONE  5 mg Oral Q4H PRN Fayne Dyke, PA-C      pantoprazole  40 mg Oral Early Morning Fayne Dyke, PA-C      polyethylene glycol  17 g Oral Daily Dmitriyyne Dylynette, PA-C      potassium chloride  20 mEq Intravenous Q30 Min PRN Dmitriyyne Dylynette, PA-C      sodium chloride  20 mL/hr Intravenous Continuous Liza Redman, PA-C 20 mL/hr (05/10/21 1350)     VTE Pharmacologic Prophylaxis: Fondaparinux (Arixtra)  VTE Mechanical Prophylaxis: sequential compression device    Continuous Infusions:insulin regular (HumuLIN R,NovoLIN R) infusion, 0 3-21 Units/hr, Last Rate: 1 Units/hr (05/11/21 0415)  niCARdipine, 2 5-15 mg/hr, Last Rate: 7 5 mg/hr (05/11/21 0415)  sodium chloride, 20 mL/hr, Last Rate: 20 mL/hr (05/10/21 1350)      PRN Meds:  acetaminophen, 650 mg, Q4H PRN  bisacodyl, 10 mg, Daily PRN  fentanyl citrate (PF), 50 mcg, Q1H PRN  furosemide, 40 mg, Q6H PRN  HYDROmorphone, 0 5 mg, Q2H PRN  lactated ringers, 500 mL, Q30 Min PRN  lidocaine (cardiac), 100 mg, Q30 Min PRN  ondansetron, 4 mg, Q6H PRN  oxyCODONE, 2 5 mg, Q4H PRN  oxyCODONE, 5 mg, Q4H PRN  potassium chloride, 20 mEq, Q30 Min PRN      Home Medications:   Prior to Admission medications    Medication Sig Start Date End Date Taking?  Authorizing Provider   aspirin 81 mg chewable tablet Chew 81 mg daily   Yes Historical Provider, MD   carvedilol (COREG) 6 25 mg tablet Take 1 tablet (6 25 mg total) by mouth 2 (two) times a day with meals 4/12/21  Yes Lizzie Trujillo, DO Farxiga 5 MG TABS TAKE ONE TABLET BY MOUTH EVERY DAY 4/10/21  Yes Ignacio Beach MD   lisinopril-hydrochlorothiazide (PRINZIDE,ZESTORETIC) 10-12 5 MG per tablet Take 1 tablet by mouth 2 (two) times a day 3/8/21  Yes Lizzie Trujillo DO   metFORMIN (GLUCOPHAGE) 1000 MG tablet TAKE ONE TABLET BY MOUTH TWICE A DAY WITH MEALS 21  Yes Ignacio Beach MD   mupirocin (BACTROBAN) 2 % nasal ointment into each nostril 2 (two) times a day Start 5 days prior to surgery 21  Yes Peter Banegas PA-C   Naproxen Sodium (ALEVE PO) Take 2 tablets by mouth as needed    Yes Historical Provider, MD   pravastatin (PRAVACHOL) 80 mg tablet Take 1 tablet (80 mg total) by mouth daily at bedtime 9/15/20  Yes Ignacio Beach MD     ---------------------------------------------------------------------------------------------------------------------------------------------------------------------  Vitals:   Vitals:    21 0100 21 0200 21 0300 21 0400   BP:       Pulse: 68 68 68 68   Resp: 13 13 12 16   Temp: 98 6 °F (37 °C) 97 9 °F (36 6 °C) 98 1 °F (36 7 °C) 98 2 °F (36 8 °C)   TempSrc: Probe Probe Probe Probe   SpO2: 95% 95% 93% 94%   Weight:       Height:         Arterial Line:  Arterial Line BP: 122/46  Arterial Line MAP (mmHg): 70 mmHg    Tele Rhythm: NSR This was personally reviewed by myself  Respiratory:  SpO2: SpO2: 94 %, SpO2 Activity: SpO2 Activity: At Rest, SpO2 Device: O2 Device: None (Room air)  Nasal Cannula O2 Flow Rate (L/min): 2 L/min    Ventilator:  Respiratory    Lab Data (Last 4 hours)    None         O2/Vent Data (Last 4 hours)    None              Temperature: Temp (24hrs), Av °F (36 7 °C), Min:97 °F (36 1 °C), Max:99 9 °F (37 7 °C)  Current: Temperature: 98 2 °F (36 8 °C)    Weights:   Weight (last 2 days)     Date/Time   Weight    05/10/21 0640   70 8 (156)            Body mass index is 23 04 kg/m²      Hemodynamic Monitoring:  PAP: PAP: 32/14, CVP: CVP (mean): 7 mmHg, CO: CO (L/min): 4 8 L/min, CI: CI (L/min/m2): 2 5 L/min/m2, SVR: SVR (dyne*sec)/cm5: 1066 (dyne*sec)/cm5  PAP: (12-40)/(3-24) 32/14  CO:  [3 4 L/min-6 L/min] 4 8 L/min  CI:  [1 8 L/min/m2-3 2 L/min/m2] 2 5 L/min/m2    Intake and Outputs:    Intake/Output Summary (Last 24 hours) at 5/11/2021 0554  Last data filed at 5/11/2021 0400  Gross per 24 hour   Intake 7499 12 ml   Output 3975 ml   Net 3524 12 ml     I/O last 24 hours: In: 7499 1 [I V :3205 1; Blood:794; IV [id number]:4420]  Out: 5187 [Urine:2535; Blood:600; Chest Tube:840]    UOP: 40-75/hour   BM: 0 in the last 24 hours    Chest tube Output:  Mediastinal tubes: 130 mL/8 hours  780 mL/24 hours   Pleural tubes: 0 mL/8 hours  60 mL/24 hours     Labs:  Results from last 7 days   Lab Units 05/11/21  0449 05/10/21  2054 05/10/21  1319 05/10/21  1302  05/10/21  1102   WBC Thousand/uL 8 29  --   --   --   --   --    HEMOGLOBIN g/dL 9 3* 10 1*  --  9 9*  --   --    I STAT HEMOGLOBIN g/dl  --   --  7 8*  --    < > 9 2*   HEMATOCRIT % 28 9* 29 7*  --  29 8*  --   --    HEMATOCRIT, ISTAT %  --   --  23*  --    < > 27*   PLATELETS Thousands/uL 109*  --   --  108*  --  156    < > = values in this interval not displayed  Results from last 7 days   Lab Units 05/11/21  0411 05/10/21  2054 05/10/21  1727 05/10/21  1319 05/10/21  1302 05/10/21  1142 05/10/21  1102  05/07/21  0901   SODIUM mmol/L 143  --   --   --  143  --   --   --  140   POTASSIUM mmol/L 4 1 3 9 3 7  --  3 4*  --   --   --  4 3   CHLORIDE mmol/L 114*  --   --   --  111*  --   --   --  105   CO2 mmol/L 20*  --   --   --  24  --   --   --  28   CO2, I-STAT mmol/L  --   --   --  25  --  29 29   < >  --    BUN mg/dL 17  --   --   --  21  --   --   --  24   CREATININE mg/dL 0 83  --   --   --  0 91  --   --   --  1 05   CALCIUM mg/dL 8 1*  --   --   --  7 8*  --   --   --  9 8   GLUCOSE, ISTAT mg/dl  --   --   --  115  --  126 119   < >  --     < > = values in this interval not displayed       Baseline Creat: 1 0    Results from last 7 days   Lab Units 05/11/21  0509   MAGNESIUM mg/dL 2 3          Results from last 7 days   Lab Units 05/10/21  1302 05/07/21  0901   INR  1 51* 0 95   PTT seconds 38*  --          Micro:   Blood Culture: No results found for: BLOODCX  Urine Culture: No results found for: URINECX  Sputum Culture: No components found for: SPUTUMCX  Wound Culure: No results found for: WOUNDCULT    Diagnositic Studies:  05/11/21 CXR: pending  This was personally reviewed by myself in PACS   05/11/21 EKG: Sinus rhythm with 1st degree AV block LBBB unchanged from post op, prolonged QTc 552  This was personally reviewed by myself  Nutrition:        Diet Orders   (From admission, onward)             Start     Ordered    05/11/21 0000  Diet Cardiovascular; Cardiac; Fluid Restriction 1800 ML, Consistent Carbohydrate Diet Level 2 (5 carb servings/75 grams CHO/meal)  Diet effective 0500     Question Answer Comment   Diet Type Cardiovascular    Cardiac Cardiac    Other Restriction(s): Fluid Restriction 1800 ML    Other Restriction(s): Consistent Carbohydrate Diet Level 2 (5 carb servings/75 grams CHO/meal)    RD to adjust diet per protocol? No        05/10/21 1248              Physical Exam  Vitals signs reviewed  Constitutional:       General: He is not in acute distress  Appearance: He is well-developed  He is not diaphoretic  HENT:      Head: Normocephalic and atraumatic  Eyes:      Pupils: Pupils are equal, round, and reactive to light  Cardiovascular:      Rate and Rhythm: Normal rate and regular rhythm  Heart sounds: No murmur  Friction rub present  No gallop  Comments: Incision C/D/I  Pulmonary:      Effort: Pulmonary effort is normal  No respiratory distress  Breath sounds: No wheezing or rales  Chest:      Chest wall: Tenderness present  Abdominal:      General: Bowel sounds are normal  There is no distension  Palpations: Abdomen is soft  Tenderness:  There is no abdominal tenderness  Musculoskeletal:      Right lower leg: No edema  Left lower leg: No edema  Skin:     General: Skin is warm and dry  Neurological:      Mental Status: He is alert and oriented to person, place, and time  Comments: Nonfocal        Invasive lines and devices: Invasive Devices     Central Venous Catheter Line            CVC Central Lines 05/10/21 Triple less than 1 day          Peripheral Intravenous Line            Peripheral IV 05/10/21 Left Hand less than 1 day          Arterial Line            Arterial Line 05/10/21 Right Radial less than 1 day          Line            Pacer Wires less than 1 day    Pacer Wires less than 1 day          Drain            Chest Tube 1 Anterior Mediastinal 32 Fr  less than 1 day    Chest Tube 2 Left Pleural 32 Fr  less than 1 day    Chest Tube 3 Posterior Mediastinal 24 Fr  less than 1 day    Urethral Catheter Non-latex 16 Fr  less than 1 day              ---------------------------------------------------------------------------------------------------------------------------------------------------------------------  Code Status: Level 1 - Full Code    Care Time Delivered:   No Critical Care time spent     Collaborative bedside rounds performed with cardiac surgery attending, critical care attending and bedside RN      SIGNATURE: Franc Varela PA-C  DATE: May 11, 2021  TIME: 5:54 AM

## 2021-05-11 NOTE — PLAN OF CARE
Problem: PHYSICAL THERAPY ADULT  Goal: Performs mobility at highest level of function for planned discharge setting  See evaluation for individualized goals  Description: Treatment/Interventions: Functional transfer training, LE strengthening/ROM, Elevations, Therapeutic exercise, Endurance training, Equipment eval/education, Bed mobility, Gait training, Spoke to nursing, OT  Equipment Recommended: Walker(at this time; will cont to monitor progress)       See flowsheet documentation for full assessment, interventions and recommendations  Note: Prognosis: Good  Problem List: Decreased strength, Decreased endurance, Impaired balance, Decreased mobility  Assessment: Pt is 77 y o  male admitted with Dx of aoronary artery disease involving native coronary artery of native heart and severe aortic stenosis and underwent aortic valve replacement with a 23mm Dickerson Magna Ease pericardial tissue valve and coronary artery bypass grafting x 5 with left internal mammary artery to left anterior descending artery, saphenous vein graft to diagonal 1, saphenous vein graft - Y -  to ramus intermedius and obtuse marginal 1 and saphenous vein graft to right posterior descending artery on 5/10/2021  Pt 's comorbidities affecting POC include: arthritis, asthma, back pain, DM, carotid artery stenosis, and HTN and personal factors of: RENETTA  Pt's clinical presentation is currently unstable/unpredictable which is evident in ongoing telemetry monitoring while in a critical care unit w/ a-line in place, abnormal lab values being monitored/trending, CT in place and inability to progress further w/ mobilization at this time  Pt presents w/ postop guarding, generalized weakness, incl min decreased LE strength, decreased functional endurance and activity tolerance, and impaired balance w/ associated gait deviations requiring use of rw at this time   Will cont to follow pt in PT for progressive mobilization to address above functional deficits and to max level of (I), endurance, and safety  Otherwise, anticipate pt will return home w/ available support upon D/C provided he cont improving w/ mobility skills, safety, and endurance (incl on the steps) and when medically cleared; home PT follow up is recommended at this time; will follow  Barriers to Discharge: Inaccessible home environment        PT Discharge Recommendation: Home with home health rehabilitation(home PT)          See flowsheet documentation for full assessment

## 2021-05-11 NOTE — PROGRESS NOTES
Progress Note - Cardiothoracic Surgery   Jennifer Monet 77 y o  male MRN: [id number]  Unit/Bed#: [facility] 414-01 Encounter: [id number]      POD # 1 s/p CABG 5 / AVR    Pt seen/examined  Interval history and data reviewed with critical care team   Pt doing well  No specific complaints    New LBBB      Medications:   Scheduled Meds:  Current Facility-Administered Medications   Medication Dose Route Frequency Provider Last Rate    acetaminophen  650 mg Rectal Q4H PRN Beatriz Fillers, PA-C      acetaminophen  975 mg Oral Q8H Beatriz Fillers, PA-C      amiodarone  200 mg Oral [facility] Beatriz Fillers, PA-C      aspirin  325 mg Oral Daily Beatriz Fillers, PA-C      atorvastatin  80 mg Oral Daily With Marianne Rocha, PA-C      bisacodyl  10 mg Rectal Daily PRN Beatriz Fillers, PA-C      calcium chloride  1 g Intravenous Once Beatriz Fillers, PA-C      cefazolin  2,000 mg Intravenous Q8H Beatriz Fillers, PA-C 2,000 mg (05/11/21 0769)    chlorhexidine  15 mL Mouth/Throat BID Beatriz Fillers, PA-C      fentanyl citrate (PF)  50 mcg Intravenous Once Beatriz Fillers, PA-C      fentanyl citrate (PF)  50 mcg Intravenous Q1H PRN Beatriz Fillers, PA-C      fondaparinux  2 5 mg Subcutaneous Daily Beatriz Fillers, PA-C      furosemide  40 mg Intravenous Q6H PRN Beatriz Fillers, PA-C      HYDROmorphone  0 5 mg Intravenous Q2H PRN Beatriz Fillers, PA-C      insulin regular (HumuLIN R,NovoLIN R) infusion  0 3-21 Units/hr Intravenous Titrated Beatriz Fillers, PA-C 1 Units/hr (05/11/21 0415)    lactated ringers  500 mL Intravenous Q30 Min PRN Beatriz Fillers, PA-C Stopped (05/10/21 1345)    lidocaine (cardiac)  100 mg Intravenous Q30 Min PRN Beatriz Fillers, PA-C      mupirocin  1 application Nasal [address] Beatriz Fillers, PA-C      niCARdipine  2 5-15 mg/hr Intravenous Titrated Beatriz Fillers, PA-C 2 mg/hr (05/11/21 0738)    ondansetron  4 mg Intravenous Q6H PRN NATY Choi-C      oxyCODONE  2 5 mg Oral Q4H PRN Jhony Wells SHEREE Rocha      oxyCODONE  5 mg Oral Q4H PRN Roseanna Riana, PA-C      pantoprazole  40 mg Oral Early Morning Roseanna Riana, PA-C      polyethylene glycol  17 g Oral Daily Roseanna Riana, PA-C      potassium chloride  20 mEq Intravenous Q30 Min PRN Roseanna Riana, PA-C      sodium chloride  20 mL/hr Intravenous Continuous Roseanna Riana, PA-C 20 mL/hr (05/10/21 1350)     Continuous Infusions:insulin regular (HumuLIN R,NovoLIN R) infusion, 0 3-21 Units/hr, Last Rate: 1 Units/hr (05/11/21 3790)  niCARdipine, 2 5-15 mg/hr, Last Rate: 2 mg/hr (05/11/21 9884)  sodium chloride, 20 mL/hr, Last Rate: 20 mL/hr (05/10/21 1350)      PRN Meds:   acetaminophen    bisacodyl    fentanyl citrate (PF)    furosemide    HYDROmorphone    lactated ringers    lidocaine (cardiac)    ondansetron    oxyCODONE    oxyCODONE    potassium chloride    Vitals: Blood pressure 129/63, pulse 68, temperature 98 2 °F (36 8 °C), temperature source Probe, resp  rate (!) 30, height 5' 9" (1 753 m), weight 76 7 kg (169 lb 1 5 oz), SpO2 97 %  ,Body mass index is 24 97 kg/m²  I/O last 24 hours: In: 7747 3 [I V :3403 3;  Blood:794; IV [id number]:4216]  Out: 5635 [Urine:2785; Blood:600; Chest Tube:970]  Invasive Devices     Central Venous Catheter Line            CVC Central Lines 05/10/21 Triple less than 1 day          Peripheral Intravenous Line            Peripheral IV 05/10/21 Left Hand 1 day          Arterial Line            Arterial Line 05/10/21 Right Radial less than 1 day          Line            Pacer Wires less than 1 day    Pacer Wires less than 1 day          Drain            Chest Tube 1 Anterior Mediastinal 32 Fr  less than 1 day    Chest Tube 2 Left Pleural 32 Fr  less than 1 day    Chest Tube 3 Posterior Mediastinal 24 Fr  less than 1 day    Urethral Catheter Non-latex 16 Fr  less than 1 day                  Lab, Imaging and other studies:   Results from last 7 days   Lab Units 05/11/21  0449 05/10/21  1184 05/10/21  2413 05/10/21  1302  05/10/21  1102   WBC Thousand/uL 8 29  --   --   --   --   --    HEMOGLOBIN g/dL 9 3* 10 1*  --  9 9*  --   --    I STAT HEMOGLOBIN g/dl  --   --  7 8*  --    < > 9 2*   HEMATOCRIT % 28 9* 29 7*  --  29 8*  --   --    HEMATOCRIT, ISTAT %  --   --  23*  --    < > 27*   PLATELETS Thousands/uL 109*  --   --  108*  --  156    < > = values in this interval not displayed  Results from last 7 days   Lab Units 05/11/21  0411 05/10/21  2054 05/10/21  1727 05/10/21  1319 05/10/21  1302  05/07/21  0901   POTASSIUM mmol/L 4 1 3 9 3 7  --  3 4*  --  4 3   CHLORIDE mmol/L 114*  --   --   --  111*  --  105   CO2 mmol/L 20*  --   --   --  24  --  28   CO2, I-STAT mmol/L  --   --   --  25  --    < >  --    BUN mg/dL 17  --   --   --  21  --  24   CREATININE mg/dL 0 83  --   --   --  0 91  --  1 05   GLUCOSE, ISTAT mg/dl  --   --   --  115  --    < >  --    CALCIUM mg/dL 8 1*  --   --   --  7 8*  --  9 8    < > = values in this interval not displayed  Results from last 7 days   Lab Units 05/10/21  1302 05/07/21  0901   INR  1 51* 0 95   PTT seconds 38*  --      No results for input(s): PHART, GRO2RRM, PO2ART, QHB5SGC, S6RBLPAE, BEART in the last 72 hours  Plan:    DC [place]/Cordis/Bharati/Pulliam  Continue chest tubes, pacing wires  Transfer to floor  Ambulate  Incentive spirometry  Diuresis  PO ASA/Statin/  Hold B blocker    Consult EP for new LBBB      SIGNATURE: Brandon Cárdenas MD  DATE: May 11, 2021  TIME: 7:39 AM

## 2021-05-12 PROBLEM — D64.9 ANEMIA: Status: ACTIVE | Noted: 2021-05-12

## 2021-05-12 PROBLEM — I48.91 ATRIAL FIBRILLATION WITH RVR (HCC): Status: ACTIVE | Noted: 2021-05-12

## 2021-05-12 PROBLEM — E83.51 HYPOCALCEMIA: Status: ACTIVE | Noted: 2021-05-12

## 2021-05-12 PROBLEM — E87.8 HYPOCHLOREMIA: Status: ACTIVE | Noted: 2021-05-12

## 2021-05-12 LAB
ABO GROUP BLD BPU: NORMAL
ABO GROUP BLD BPU: NORMAL
ANION GAP SERPL CALCULATED.3IONS-SCNC: 7 MMOL/L (ref 4–13)
APTT PPP: 54 SECONDS (ref 23–37)
ATRIAL RATE: 110 BPM
ATRIAL RATE: 113 BPM
ATRIAL RATE: 118 BPM
ATRIAL RATE: 138 BPM
ATRIAL RATE: 88 BPM
BASE EXCESS BLDA CALC-SCNC: 1 MMOL/L (ref -2–3)
BPU ID: NORMAL
BPU ID: NORMAL
BUN SERPL-MCNC: 24 MG/DL (ref 5–25)
CA-I BLD-SCNC: 1.19 MMOL/L (ref 1.12–1.32)
CALCIUM SERPL-MCNC: 7.8 MG/DL (ref 8.3–10.1)
CHLORIDE SERPL-SCNC: 109 MMOL/L (ref 100–108)
CO2 SERPL-SCNC: 24 MMOL/L (ref 21–32)
CREAT SERPL-MCNC: 0.99 MG/DL (ref 0.6–1.3)
CROSSMATCH: NORMAL
CROSSMATCH: NORMAL
ERYTHROCYTE [DISTWIDTH] IN BLOOD BY AUTOMATED COUNT: 14.7 % (ref 11.6–15.1)
GFR SERPL CREATININE-BSD FRML MDRD: 74 ML/MIN/1.73SQ M
GLUCOSE SERPL-MCNC: 144 MG/DL (ref 65–140)
GLUCOSE SERPL-MCNC: 160 MG/DL (ref 65–140)
GLUCOSE SERPL-MCNC: 163 MG/DL (ref 65–140)
GLUCOSE SERPL-MCNC: 174 MG/DL (ref 65–140)
GLUCOSE SERPL-MCNC: 229 MG/DL (ref 65–140)
GLUCOSE SERPL-MCNC: 252 MG/DL (ref 65–140)
HCO3 BLDA-SCNC: 25.8 MMOL/L (ref 22–28)
HCT VFR BLD AUTO: 27.9 % (ref 36.5–49.3)
HCT VFR BLD CALC: 26 % (ref 36.5–49.3)
HGB BLD-MCNC: 8.9 G/DL (ref 12–17)
HGB BLDA-MCNC: 8.8 G/DL (ref 12–17)
INR PPP: 1.16 (ref 0.84–1.19)
KCT BLD-ACNC: 115 SEC (ref 89–137)
KCT BLD-ACNC: 126 SEC (ref 89–137)
KCT BLD-ACNC: 454 SEC (ref 89–137)
KCT BLD-ACNC: 490 SEC (ref 89–137)
KCT BLD-ACNC: 502 SEC (ref 89–137)
KCT BLD-ACNC: 508 SEC (ref 89–137)
KCT BLD-ACNC: 514 SEC (ref 89–137)
KCT BLD-ACNC: 571 SEC (ref 89–137)
MCH RBC QN AUTO: 29.5 PG (ref 26.8–34.3)
MCHC RBC AUTO-ENTMCNC: 31.9 G/DL (ref 31.4–37.4)
MCV RBC AUTO: 92 FL (ref 82–98)
P AXIS: 32 DEGREES
PCO2 BLD: 27 MMOL/L (ref 21–32)
PCO2 BLD: 38.6 MM HG (ref 36–44)
PH BLD: 7.43 [PH] (ref 7.35–7.45)
PLATELET # BLD AUTO: 107 THOUSANDS/UL (ref 149–390)
PMV BLD AUTO: 10.8 FL (ref 8.9–12.7)
PO2 BLD: 267 MM HG (ref 75–129)
POTASSIUM BLD-SCNC: 3.8 MMOL/L (ref 3.5–5.3)
POTASSIUM SERPL-SCNC: 4.2 MMOL/L (ref 3.5–5.3)
PR INTERVAL: 186 MS
PROTHROMBIN TIME: 14.8 SECONDS (ref 11.6–14.5)
QRS AXIS: -31 DEGREES
QRS AXIS: -35 DEGREES
QRS AXIS: -35 DEGREES
QRS AXIS: -37 DEGREES
QRS AXIS: -47 DEGREES
QRSD INTERVAL: 154 MS
QRSD INTERVAL: 156 MS
QRSD INTERVAL: 156 MS
QRSD INTERVAL: 158 MS
QRSD INTERVAL: 158 MS
QT INTERVAL: 336 MS
QT INTERVAL: 340 MS
QT INTERVAL: 342 MS
QT INTERVAL: 346 MS
QT INTERVAL: 350 MS
QTC INTERVAL: 476 MS
QTC INTERVAL: 484 MS
QTC INTERVAL: 485 MS
QTC INTERVAL: 488 MS
QTC INTERVAL: 492 MS
RBC # BLD AUTO: 3.02 MILLION/UL (ref 3.88–5.62)
SAO2 % BLD FROM PO2: 100 % (ref 60–85)
SODIUM BLD-SCNC: 140 MMOL/L (ref 136–145)
SODIUM SERPL-SCNC: 140 MMOL/L (ref 136–145)
SPECIMEN SOURCE: ABNORMAL
SPECIMEN SOURCE: NORMAL
SPECIMEN SOURCE: NORMAL
T WAVE AXIS: 148 DEGREES
T WAVE AXIS: 177 DEGREES
T WAVE AXIS: 179 DEGREES
T WAVE AXIS: 181 DEGREES
T WAVE AXIS: 183 DEGREES
UNIT DISPENSE STATUS: NORMAL
UNIT DISPENSE STATUS: NORMAL
UNIT PRODUCT CODE: NORMAL
UNIT PRODUCT CODE: NORMAL
UNIT RH: NORMAL
UNIT RH: NORMAL
VENTRICULAR RATE: 111 BPM
VENTRICULAR RATE: 118 BPM
VENTRICULAR RATE: 121 BPM
VENTRICULAR RATE: 124 BPM
VENTRICULAR RATE: 129 BPM
WBC # BLD AUTO: 8.43 THOUSAND/UL (ref 4.31–10.16)

## 2021-05-12 PROCEDURE — 85610 PROTHROMBIN TIME: CPT | Performed by: PHYSICIAN ASSISTANT

## 2021-05-12 PROCEDURE — 99024 POSTOP FOLLOW-UP VISIT: CPT | Performed by: THORACIC SURGERY (CARDIOTHORACIC VASCULAR SURGERY)

## 2021-05-12 PROCEDURE — 99232 SBSQ HOSP IP/OBS MODERATE 35: CPT | Performed by: INTERNAL MEDICINE

## 2021-05-12 PROCEDURE — 93010 ELECTROCARDIOGRAM REPORT: CPT | Performed by: INTERNAL MEDICINE

## 2021-05-12 PROCEDURE — 85027 COMPLETE CBC AUTOMATED: CPT | Performed by: PHYSICIAN ASSISTANT

## 2021-05-12 PROCEDURE — 80048 BASIC METABOLIC PNL TOTAL CA: CPT | Performed by: PHYSICIAN ASSISTANT

## 2021-05-12 PROCEDURE — 93005 ELECTROCARDIOGRAM TRACING: CPT

## 2021-05-12 PROCEDURE — 82948 REAGENT STRIP/BLOOD GLUCOSE: CPT

## 2021-05-12 PROCEDURE — 85730 THROMBOPLASTIN TIME PARTIAL: CPT | Performed by: THORACIC SURGERY (CARDIOTHORACIC VASCULAR SURGERY)

## 2021-05-12 PROCEDURE — 94760 N-INVAS EAR/PLS OXIMETRY 1: CPT

## 2021-05-12 RX ORDER — HEPARIN SODIUM 10000 [USP'U]/100ML
3-20 INJECTION, SOLUTION INTRAVENOUS
Status: DISCONTINUED | OUTPATIENT
Start: 2021-05-12 | End: 2021-05-13

## 2021-05-12 RX ORDER — LISINOPRIL 5 MG/1
5 TABLET ORAL DAILY
Status: DISCONTINUED | OUTPATIENT
Start: 2021-05-12 | End: 2021-05-12

## 2021-05-12 RX ORDER — FUROSEMIDE 10 MG/ML
40 INJECTION INTRAMUSCULAR; INTRAVENOUS
Status: DISCONTINUED | OUTPATIENT
Start: 2021-05-12 | End: 2021-05-14 | Stop reason: HOSPADM

## 2021-05-12 RX ORDER — WARFARIN SODIUM 1 MG/1
1 TABLET ORAL
Status: COMPLETED | OUTPATIENT
Start: 2021-05-12 | End: 2021-05-12

## 2021-05-12 RX ORDER — POTASSIUM CHLORIDE 20 MEQ/1
20 TABLET, EXTENDED RELEASE ORAL
Status: DISCONTINUED | OUTPATIENT
Start: 2021-05-12 | End: 2021-05-14 | Stop reason: HOSPADM

## 2021-05-12 RX ORDER — WARFARIN SODIUM 2.5 MG/1
2.5 TABLET ORAL
Status: DISCONTINUED | OUTPATIENT
Start: 2021-05-12 | End: 2021-05-12

## 2021-05-12 RX ADMIN — ACETAMINOPHEN 975 MG: 325 TABLET, FILM COATED ORAL at 13:06

## 2021-05-12 RX ADMIN — Medication 12.5 MG: at 21:28

## 2021-05-12 RX ADMIN — OXYCODONE HYDROCHLORIDE 5 MG: 5 TABLET ORAL at 21:27

## 2021-05-12 RX ADMIN — DEXTROSE 150 MG: 50 INJECTION, SOLUTION INTRAVENOUS at 09:45

## 2021-05-12 RX ADMIN — ACETAMINOPHEN 975 MG: 325 TABLET, FILM COATED ORAL at 05:47

## 2021-05-12 RX ADMIN — HEPARIN SODIUM 12 UNITS/KG/HR: 10000 INJECTION, SOLUTION INTRAVENOUS at 12:59

## 2021-05-12 RX ADMIN — INSULIN LISPRO 2 UNITS: 100 INJECTION, SOLUTION INTRAVENOUS; SUBCUTANEOUS at 11:04

## 2021-05-12 RX ADMIN — FUROSEMIDE 40 MG: 10 INJECTION, SOLUTION INTRAMUSCULAR; INTRAVENOUS at 18:04

## 2021-05-12 RX ADMIN — POTASSIUM CHLORIDE 20 MEQ: 1500 TABLET, EXTENDED RELEASE ORAL at 13:06

## 2021-05-12 RX ADMIN — AMIODARONE HYDROCHLORIDE 1 MG/MIN: 50 INJECTION, SOLUTION INTRAVENOUS at 10:56

## 2021-05-12 RX ADMIN — MUPIROCIN 1 APPLICATION: 20 OINTMENT TOPICAL at 09:08

## 2021-05-12 RX ADMIN — POTASSIUM CHLORIDE 20 MEQ: 1500 TABLET, EXTENDED RELEASE ORAL at 15:42

## 2021-05-12 RX ADMIN — PANTOPRAZOLE SODIUM 40 MG: 40 TABLET, DELAYED RELEASE ORAL at 05:47

## 2021-05-12 RX ADMIN — POLYETHYLENE GLYCOL 3350 17 G: 17 POWDER, FOR SOLUTION ORAL at 09:08

## 2021-05-12 RX ADMIN — OXYCODONE HYDROCHLORIDE 5 MG: 5 TABLET ORAL at 06:00

## 2021-05-12 RX ADMIN — WARFARIN SODIUM 1 MG: 1 TABLET ORAL at 18:04

## 2021-05-12 RX ADMIN — ATORVASTATIN CALCIUM 80 MG: 80 TABLET, FILM COATED ORAL at 15:42

## 2021-05-12 RX ADMIN — MUPIROCIN 1 APPLICATION: 20 OINTMENT TOPICAL at 21:27

## 2021-05-12 RX ADMIN — OXYCODONE HYDROCHLORIDE 5 MG: 5 TABLET ORAL at 15:43

## 2021-05-12 RX ADMIN — INSULIN LISPRO 2 UNITS: 100 INJECTION, SOLUTION INTRAVENOUS; SUBCUTANEOUS at 18:06

## 2021-05-12 RX ADMIN — ASPIRIN 325 MG ORAL TABLET 325 MG: 325 PILL ORAL at 09:07

## 2021-05-12 RX ADMIN — Medication 12.5 MG: at 09:08

## 2021-05-12 RX ADMIN — ACETAMINOPHEN 975 MG: 325 TABLET, FILM COATED ORAL at 21:27

## 2021-05-12 RX ADMIN — INSULIN LISPRO 5 UNITS: 100 INJECTION, SOLUTION INTRAVENOUS; SUBCUTANEOUS at 11:05

## 2021-05-12 RX ADMIN — INSULIN LISPRO 5 UNITS: 100 INJECTION, SOLUTION INTRAVENOUS; SUBCUTANEOUS at 07:29

## 2021-05-12 RX ADMIN — INSULIN LISPRO 1 UNITS: 100 INJECTION, SOLUTION INTRAVENOUS; SUBCUTANEOUS at 06:00

## 2021-05-12 RX ADMIN — INSULIN GLARGINE 15 UNITS: 100 INJECTION, SOLUTION SUBCUTANEOUS at 21:26

## 2021-05-12 RX ADMIN — INSULIN LISPRO 1 UNITS: 100 INJECTION, SOLUTION INTRAVENOUS; SUBCUTANEOUS at 21:26

## 2021-05-12 RX ADMIN — FUROSEMIDE 40 MG: 10 INJECTION, SOLUTION INTRAMUSCULAR; INTRAVENOUS at 13:09

## 2021-05-12 RX ADMIN — AMIODARONE HYDROCHLORIDE 0.5 MG/MIN: 50 INJECTION, SOLUTION INTRAVENOUS at 18:10

## 2021-05-12 RX ADMIN — FONDAPARINUX SODIUM 2.5 MG: 2.5 INJECTION, SOLUTION SUBCUTANEOUS at 09:08

## 2021-05-12 NOTE — PROGRESS NOTES
Progress Note - Electrophysiology-Cardiology (EP)   Amada Bob 77 y o  male MRN: [id number]  Unit/Bed#: [facility] 419-01 Encounter: [id number]      Assessment/Plan:  1  New LBBB   -Continue to monitor with daily EKGs and plan for echo tomorrow  2  New onset post-op atrial fibrillation with RVR   -Ok to start amiodarone infusion and low dose beta blocker     -If patient converts to NSR, will continue to monitor and repeat echo tomorrow to assess post-op EF     -If patient converts and becomes bradycardic, will likely need a pacemaker  (Patient has epicardial pacing wires in place set to backup at 50 bpm)  -If patient remains in afib, plan to cardiovert tomorrow  Will start heparin drip in preparation for procedure    -Will need anticoagulation regardless due to prolonged duration of afib episode  Recommend starting coumadin dosing tonight     -Continue to monitor electrolytes  3  Chronic systolic congestive HF   -EVELYN 5/10/21 - Pre procedure EF 37%, Post-procedure EF 45%   -TTE 5/7/21 - EF 55%   -TTE 2/11/2020 - EF 35-40%   -Repeat echo on 5/13   -Continue diuretics per primary team  4  CAD s/p CABGx5   -ASA/statin/beta blocker  5  Moderate rheumatic AS s/p tissue AVR  6  HLD  7  HTN  8  Asthma  9  DM2   -Endocrinology following  10  L ICA stenosis s/p L carotid endarterectomy  11  Occluded R ICA        Subjective/Objective     Subjective: Patient did not require pacing yesterday  Entered afib with RVR at 2230 last night  Given amio bolus without issue but remained in afib  This morning, heart rates from 100s-150s  Per patient and chart, no history of afib  Patient denies symptoms of palpitations, chest pressure, chest tightness, lightheadedness, presyncope, syncope, N/V  Sitting comfortably in chair upon examination      Objective:     Vitals: /65 (BP Location: Right arm)   Pulse 93   Temp 98 °F (36 7 °C) (Oral)   Resp 20   Ht 5' 9" (1 753 m)   Wt 79 2 kg (174 lb 9 7 oz)   SpO2 100%   BMI 25 78 kg/m² Vitals:    05/11/21 0600 05/12/21 0600   Weight: 76 7 kg (169 lb 1 5 oz) 79 2 kg (174 lb 9 7 oz)     Orthostatic Blood Pressures      Most Recent Value   Blood Pressure  111/65 filed at 05/12/2021 1000   Patient Position - Orthostatic VS  Sitting filed at 05/12/2021 1000            Intake/Output Summary (Last 24 hours) at 5/12/2021 1048  Last data filed at 5/12/2021 1001  Gross per 24 hour   Intake 2007 26 ml   Output 1825 ml   Net 182 26 ml       Invasive Devices     Central Venous Catheter Line            CVC Central Lines 05/10/21 Triple 2 days          Peripheral Intravenous Line            Peripheral IV 05/10/21 Left Hand 2 days          Arterial Line            Arterial Line 05/10/21 Right Radial 2 days          Line            Pacer Wires 1 day    Pacer Wires 1 day          Drain            Chest Tube 1 Anterior Mediastinal 32 Fr  1 day    Chest Tube 2 Left Pleural 32 Fr  1 day    Chest Tube 3 Posterior Mediastinal 24 Fr  1 day                  Scheduled Meds:  Current Facility-Administered Medications   Medication Dose Route Frequency Provider Last Rate    acetaminophen  650 mg Rectal Q4H PRN Jeannette Trevino PA-C      acetaminophen  975 mg Oral Q8H Mery Davis PA-C      amiodarone  1 mg/min Intravenous Continuous Amanda Jones PA-C      Followed by   Bolivar Rosales amiodarone  0 5 mg/min Intravenous Continuous Amanda Jones PA-C      amiodarone (CORDARONE) IV bolus  150 mg Intravenous Once Oneyda Castro PA-C      amiodarone (CORDARONE) IV bolus  150 mg Intravenous Once Evelyne Bishop PA-C      aspirin  325 mg Oral Daily Jeannette Trevino PA-C      atorvastatin  80 mg Oral Daily With [facility] SHEREE Luong      bisacodyl  10 mg Rectal Daily PRN Jeannette Trevino PA-C      fondaparinux  2 5 mg Subcutaneous Daily Jeannette Trevino PA-C      furosemide  40 mg Intravenous TID (diuretic) Amanda Jones PA-C      insulin glargine  15 Units Subcutaneous HS Anna Marie Bhatt MD      insulin lispro  1-5 Units Subcutaneous TID AC Jordan Thornton MD      insulin lispro  1-5 Units Subcutaneous HS Jordan Thornton MD      insulin lispro  5 Units Subcutaneous TID With Meals Jordan Thornton MD      metoprolol tartrate  12 5 mg Oral Q12H [address] Amanda Jones PA-C      mupirocin  1 application Nasal [address] [place], SHEREE      ondansetron  4 mg Intravenous Q6H PRN [place], SHEREE      oxyCODONE  2 5 mg Oral Q4H PRN [place], SHEREE      oxyCODONE  5 mg Oral Q4H PRN [place], SHEREE      pantoprazole  40 mg Oral Early Morning [place], SHEREE      polyethylene glycol  17 g Oral Daily [place], SHEREE      potassium chloride  20 mEq Oral TID With Meals Amanda Jones PA-C       Continuous Infusions:amiodarone, 1 mg/min    Followed by  amiodarone, 0 5 mg/min      PRN Meds:   acetaminophen    bisacodyl    ondansetron    oxyCODONE    oxyCODONE    Review of Systems:  ROS as noted above, otherwise 12 point review of systems was performed and is negative  Physical Exam:   Physical Exam  Vitals signs reviewed  Constitutional:       General: He is not in acute distress  Appearance: He is not ill-appearing or diaphoretic  HENT:      Head: Normocephalic and atraumatic  Right Ear: External ear normal       Left Ear: External ear normal       Nose: Nose normal    Eyes:      General:         Right eye: No discharge  Left eye: No discharge  Neck:      Musculoskeletal: No neck rigidity or muscular tenderness  Cardiovascular:      Rate and Rhythm: Tachycardia present  Rhythm irregularly irregular  Heart sounds: No murmur  No friction rub  Pulmonary:      Effort: Pulmonary effort is normal       Breath sounds: Examination of the right-lower field reveals decreased breath sounds  Examination of the left-lower field reveals decreased breath sounds  Decreased breath sounds present  No wheezing, rhonchi or rales     Chest:      Comments: Sternal incision dress  Chest tubes present  Abdominal:      General: There is no distension  Palpations: Abdomen is soft  Tenderness: There is no abdominal tenderness  Musculoskeletal:         General: No deformity or signs of injury  Right lower leg: No edema  Left lower leg: No edema  Skin:     General: Skin is warm and dry  Capillary Refill: Capillary refill takes less than 2 seconds  Coloration: Skin is not jaundiced or pale  Neurological:      General: No focal deficit present  Mental Status: He is alert and oriented to person, place, and time  Mental status is at baseline  Psychiatric:         Mood and Affect: Mood normal          Behavior: Behavior normal          Thought Content: Thought content normal            Lab Results: I have personally reviewed pertinent lab results  Results from last 7 days   Lab Units 05/12/21  0447 05/11/21  0449 05/10/21  2054  05/10/21  1302   WBC Thousand/uL 8 43 8 29  --   --   --    HEMOGLOBIN g/dL 8 9* 9 3* 10 1*  --  9 9*   I STAT HEMOGLOBIN   --   --   --    < >  --    HEMATOCRIT % 27 9* 28 9* 29 7*  --  29 8*   HEMATOCRIT, ISTAT   --   --   --    < >  --    PLATELETS Thousands/uL 107* 109*  --   --  108*    < > = values in this interval not displayed  Results from last 7 days   Lab Units 05/12/21 0447 05/11/21  0411 05/10/21  2054  05/10/21  1319 05/10/21  1302   POTASSIUM mmol/L 4 2 4 1 3 9   < >  --  3 4*   CHLORIDE mmol/L 109* 114*  --   --   --  111*   CO2 mmol/L 24 20*  --   --   --  24   CO2, I-STAT mmol/L  --   --   --   --  25  --    BUN mg/dL 24 17  --   --   --  21   CREATININE mg/dL 0 99 0 83  --   --   --  0 91   GLUCOSE, ISTAT mg/dl  --   --   --   --  115  --    CALCIUM mg/dL 7 8* 8 1*  --   --   --  7 8*    < > = values in this interval not displayed       Results from last 7 days   Lab Units 05/12/21  0815 05/10/21  1302 05/07/21  0901   INR  1 16 1 51* 0 95   PTT seconds  --  38*  --      Results from last 7 days   Lab Units 05/11/21  0509   MAGNESIUM mg/dL 2 3         Imaging: I have personally reviewed pertinent films in PACS  ECHO: 5/10/21  EVELYN:   PREPROCEDURE 5/10/21     LEFT VENTRICLE:  Systolic Function: moderately impaired  Ejection Fraction: 37%  Cavity size: dilated       RIGHT VENTRICLE:  Systolic Function: normal   Cavity size normal  No hypertrophy       AORTIC VALVE:  Leaflets: trileaflet  Leaflet motions restricted  Stenosis: moderate  Regurgitation: mild       MITRAL VALVE:  Leaflets: calcified  Leaflet Motions: restricted  Regurgitation: trace  Stenosis: mild  Mean Gradient: 3 mmHg       TRICUSPID VALVE:  Leaflets: normal  Leaflet Motions: normal  Stenosis: none  Regurgitation: mild      PULMONIC VALVE:  Scott Dance  Stenosis: none      ASCENDING AORTA:  Size:  normal  Dissection not present        AORTIC ARCH:  Size:  normal  dissection not present  Grade 3: atheroma protruding < 0 5 cm into lumen      DESCENDING AORTA:  Size: normal  Dissection not present  Grade 3: atheroma protruding < 0 5 cm into lumen      RIGHT ATRIUM:  Size:  normal       LEFT ATRIUM:  Size: dilated  Spontaneous echo contrast      LEFT ATRIAL APPENDAGE:  Size: normal       ATRIAL SEPTUM:  Intra-atrial septal morphology: patent foramen ovale  Patent foramen ovale shunt: left to right shunt       VENTRICULAR SEPTUM:  Intra-ventricular septum morphology: normal       EPIAORTIC:  Plaque Thickness: 0-5 mm      OTHER FINDINGS:  Pericardium:  normal  Pleural Effusion:  none            POSTPROCEDURE 5/10/21     LEFT VENTRICLE:   Systolic Function: mildly depressed  Ejection Fraction: 45 %       RIGHT VENTRICLE: Unchanged       AORTIC VALVE: Leaflets: bioprosthetic  Stenosis: none  Mean Gradient: 5 mmHg  Regurgitation: none   Valve Size: 23 mm      MITRAL VALVE: Unchanged       TRICUSPID VALVE: Unchanged       PULMONIC VALVE: Unchanged      ATRIA: Unchanged       AORTA: Unchanged            EKG:             VTE Pharmacologic Prophylaxis: Fondaparinux (Arixtra)  VTE Mechanical Prophylaxis: sequential compression device

## 2021-05-12 NOTE — PROGRESS NOTES
Progress Note - Yaquelin Marrero 77 y o  male MRN: [id number]    Unit/Bed#: [facility] 233-27 Encounter: [id number]      CC: diabetes f/u    Subjective:   Yaquelin Marrero is a 77y o  year old male with type 2 diabetes and CAD who underwent CABG  Feels well  No complaints  No hypoglycemia  He was started on amiodarone drip in dextrose for AFib with RVR  Objective:     Vitals: Blood pressure 107/58, pulse (!) 120, temperature 98 1 °F (36 7 °C), temperature source Oral, resp  rate 21, height 5' 9" (1 753 m), weight 79 2 kg (174 lb 9 7 oz), SpO2 99 %  ,Body mass index is 25 78 kg/m²  Intake/Output Summary (Last 24 hours) at 5/12/2021 1028  Last data filed at 5/12/2021 1001  Gross per 24 hour   Intake 2007 26 ml   Output 1825 ml   Net 182 26 ml       Physical Exam:  General Appearance: awake, appears stated age and cooperative  Head: Normocephalic, without obvious abnormality, atraumatic  Extremities: moves all extremities  Skin: Skin color and temperature normal    Pulm: no labored breathing    Lab, Imaging and other studies: I have personally reviewed pertinent reports  POC Glucose (mg/dl)   Date Value   05/12/2021 163 (H)   05/11/2021 164 (H)   05/11/2021 163 (H)   05/11/2021 145 (H)   05/11/2021 141 (H)   05/11/2021 145 (H)   05/11/2021 213 (H)   05/11/2021 190 (H)   05/11/2021 183 (H)   05/11/2021 155 (H)       Assessment/ Plan:    -Type 2 diabetes: controlled with A1c 6 6%  Home regimen consists of metformin 1000 mg twice a day and Farxiga 5 mg daily  Insulin infusion was discontinued last night and patient was started on Lantus 15 units q h s  And lispro 5 units t i d  A c  Postprandial hyperglycemia is noted  Patient was started on amiodarone drip that is in dextrose likely contributing to hyperglycemia  Will continue Lantus 15 units q h s  And increase lispro to 7 units t i d  A c  Use correctional insulin as needed,  Continue glucose monitoring for further adjustment      Patient will likely be discharged on his home medications  -CAD status post CABG: management per Cardiovascular surgery  - AFib with RVR:  On amiodarone drip    Portions of the record may have been created with voice recognition software

## 2021-05-12 NOTE — CASE MANAGEMENT
CM obtained all the following info about the pt  HOME: Pt resides in a 2-story house w/ 13 steps between floors and 4 steps to enter  LIVES W/: Significant Other (SO) and adult son  : Pt's son Jose Rafael Wright (c: [phone])  INDEPENDENCE: Pt is independent at baseline w/ ambulating and performing his ADLS  DME: Unused rolling walker  HHC: No hx of services reported  I/P REHAB: No hx of placements reported  MENTAL HEALTH: No hx of issues reported  D&A: No hx of issues reported  PCP: Dr Malik Henry through [facility] located [address]  PHARMACY: [facility] in [address]  INCOME SOURCE: Pension and Social Security benefits  INSURANCE: Medicare for healthcare coverage, no secondary healthcare coverage, and [employer] plan for Rx coverage  MEDICAL POA: No one is officially pre-designated  TRANSPORTATION AT D/C: Pt's family will provide transport  CM reviewed d/c planning process including the following: identifying help at home, patient preference for d/c planning needs, Discharge Lounge, [facility] Meds to Bed program, availability of treatment team to discuss questions or concerns patient and/or family may have regarding understanding medications and recognizing signs and symptoms once discharged  CM also encouraged patient to follow up with all recommended appointments after discharge  Patient advised of importance for patient and family to participate in managing patients medical well being  Patient/caregiver received discharge checklist  Content reviewed  Patient/caregiver encouraged to participate in discharge plan of care prior to discharge home

## 2021-05-12 NOTE — CASE MANAGEMENT
Pt is S/P AVR and S/P CABG x 5  Pt is recommended to have in-home post-op skilled nursing care via an [facility] agency for his aftercare plan  CM spoke to pt about this recommendation  Pt is agreeable w/ recommendation  CM provided pt w/ freedom of choice for [facility] agencies  Pt requested referral to [facility] [address] [place] [address] referral to [facility] of [address] [place] [address]     CM to follow

## 2021-05-12 NOTE — PROGRESS NOTES
Progress Note - Cardiothoracic Surgery   Marvell Gaucher 77 y o  male MRN: [id number]  Unit/Bed#: [facility] 419-01 Encounter: [id number]    Aortic stenosis, Rheumatic, Coronary artery disease  S/P aortic valve replacement and coronary artery bypass grafting; POD # 2      24 Hour Events: Seen by EP, no amio/beta blocker at this time, TTE POD 3 to determine POC  A fib w/ RVR since last PM, given amio bolus w/o issue  Transitioned to SQ insulin per endo  No other events  No complaints  Tolerating diet  (+) flatus, (-) BM, last BM day prior sx  Ambulating well  Pain controlled      Medications:   Scheduled Meds:  Current Facility-Administered Medications   Medication Dose Route Frequency Provider Last Rate    acetaminophen  650 mg Rectal Q4H PRN Ferny Hand, PA-C      acetaminophen  975 mg Oral Q8H Ferny Hand, PA-C      amiodarone (CORDARONE) IV bolus  150 mg Intravenous Once RAMÓN MinorC      aspirin  325 mg Oral Daily Ferny Hand, PA-MASTER      atorvastatin  80 mg Oral Daily With [facility], PA-C      bisacodyl  10 mg Rectal Daily PRN Ferny Hand, PA-C      fondaparinux  2 5 mg Subcutaneous Daily Ferny Hand, PA-C      furosemide  40 mg Intravenous BID (diuretic) Giovana Schaumann Burkett, PA-C      insulin glargine  15 Units Subcutaneous HS Arturo Fernandez MD      insulin lispro  1-5 Units Subcutaneous TID AC Arturo Fernandez MD      insulin lispro  1-5 Units Subcutaneous HS Arturo Fernandez MD      insulin lispro  5 Units Subcutaneous TID With Meals Arturo Fernandez MD      lisinopril  10 mg Oral Daily Ferny Hand, PA-C      mupirocin  1 application Nasal [id number] Ferny Hand, PA-C      niCARdipine  2 5-15 mg/hr Intravenous Titrated Ferny Hand, PALESLIE Stopped (05/12/21 0601)    ondansetron  4 mg Intravenous Q6H PRN Ferny Hand, PA-C      oxyCODONE  2 5 mg Oral Q4H PRN Ferny Hand, PA-C      oxyCODONE  5 mg Oral Q4H PRN Ferny Hand, PA-C      pantoprazole 40 mg Oral Early Morning Cherl Ting, PA-C      polyethylene glycol  17 g Oral Daily Cherl Ting, PA-C      potassium chloride  20 mEq Oral BID Cherl Ting, PA-C      sodium chloride  20 mL/hr Intravenous Continuous Cherl Ting, PA-C Stopped (05/11/21 2311)     Continuous Infusions:niCARdipine, 2 5-15 mg/hr, Last Rate: Stopped (05/12/21 0601)  sodium chloride, 20 mL/hr, Last Rate: Stopped (05/11/21 2311)      PRN Meds:   acetaminophen    bisacodyl    ondansetron    oxyCODONE    oxyCODONE    Vitals:   Vitals:    05/12/21 0323 05/12/21 0500 05/12/21 0600 05/12/21 0700   BP: 124/64 115/67  113/73   BP Location: Right arm   Right arm   Pulse: (!) 106   (!) 113   Resp: 18   21   Temp: 98 7 °F (37 1 °C)   98 1 °F (36 7 °C)   TempSrc: Oral   Oral   SpO2: 96%   97%   Weight:   79 2 kg (174 lb 9 7 oz)    Height:         Bp x24hrs: 110-120/60-80    Telemetry: Atrial Fibrillation; Heart Rate: 115 (since 2230); no other events/24hrs    Respiratory:   SpO2: SpO2: 97 %, SpO2 Activity: SpO2 Activity: At Rest, SpO2 Device: O2 Device: None (Room air); Room Air    Intake/Output:   I/O       05/10 0701 - 05/11 0700 05/11 0701 - 05/12 0700 05/12 0701 - 05/13 0700    P  O   1830     I V  (mL/kg) 3403 3 (44 4) 436 6 (5 5)     Blood 794      NG/GT 0      IV Piggyback 2950 50     Cell Saver 600      Total Intake(mL/kg) 7747 3 (101) 2316 6 (29 3)     Urine (mL/kg/hr) 2785 (1 5) 950 (0 5)     Emesis/NG output 0      Blood 600      Chest Tube 970 500     Total Output 4355 1450     Net +3392 3 +866 6                UOP - 300cc/8hrs; 950cc/24hrs    Chest tube Output:    Mediastinal tubes: 60 mL/8 hours  310 mL/24 hours   Pleural tubes: 0 mL/8 hours  190 mL/24 hours     Weights:   Weight (last 2 days)     Date/Time   Weight    05/12/21 0600   79 2 (174 6)    05/11/21 0600   76 7 (169 09)    05/10/21 0640   70 8 (156)            Admit weight: 70 8kg - up 8 5kg from admission weight    Results:   Results from last 7 days Lab Units 05/12/21  0447 05/11/21  0449 05/10/21  2054  05/10/21  1302   WBC Thousand/uL 8 43 8 29  --   --   --    HEMOGLOBIN g/dL 8 9* 9 3* 10 1*  --  9 9*   I STAT HEMOGLOBIN   --   --   --    < >  --    HEMATOCRIT % 27 9* 28 9* 29 7*  --  29 8*   HEMATOCRIT, ISTAT   --   --   --    < >  --    PLATELETS Thousands/uL 107* 109*  --   --  108*    < > = values in this interval not displayed  Results from last 7 days   Lab Units 05/12/21  0447 05/11/21  0411 05/10/21  2054  05/10/21  1319 05/10/21  1302   SODIUM mmol/L 140 143  --   --   --  143   POTASSIUM mmol/L 4 2 4 1 3 9   < >  --  3 4*   CHLORIDE mmol/L 109* 114*  --   --   --  111*   CO2 mmol/L 24 20*  --   --   --  24   CO2, I-STAT mmol/L  --   --   --   --  25  --    BUN mg/dL 24 17  --   --   --  21   CREATININE mg/dL 0 99 0 83  --   --   --  0 91   GLUCOSE, ISTAT mg/dl  --   --   --   --  115  --    CALCIUM mg/dL 7 8* 8 1*  --   --   --  7 8*    < > = values in this interval not displayed  Results from last 7 days   Lab Units 05/10/21  1302 05/07/21  0901   INR  1 51* 0 95   PTT seconds 38*  --      Point of care glucose: 163 - 164 - 163 - 145    Studies:  No new studies    I have personally reviewed pertinent reports  and I have personally reviewed pertinent films in PACS    Invasive Lines/Tubes:  Invasive Devices     Central Venous Catheter Line            CVC Central Lines 05/10/21 Triple 1 day          Peripheral Intravenous Line            Peripheral IV 05/10/21 Left Hand 2 days          Arterial Line            Arterial Line 05/10/21 Right Radial 1 day          Line            Pacer Wires 1 day    Pacer Wires 1 day          Drain            Chest Tube 1 Anterior Mediastinal 32 Fr  1 day    Chest Tube 2 Left Pleural 32 Fr  1 day    Chest Tube 3 Posterior Mediastinal 24 Fr  1 day                Physical Exam:    HEENT/NECK:  Normocephalic  Atraumatic  No jugular venous distention      Cardiac: Irregularly irregular rate and rhythm, Tachycardic and Pericardial friction rub  Pulmonary:  Breath sounds clear bilaterally and No rales/rhonchi/wheezes  Abdomen:  Non-tender, Non-distended and Normal bowel sounds  Incisions: Sternum is stable  Incision dressed with Acticoat  No erythema or drainage and Saphenectomy incision dressed with Acticoat  No erythema or drainage  Extremities: Extremities warm/dry and Trace edema B/L  Neuro: Alert and oriented X 3  Skin: Warm/Dry, without rashes or lesions  Assessment:  Principal Problem:    Coronary artery disease involving native coronary artery of native heart  Active Problems:    Benign essential hypertension    Controlled diabetes mellitus with diabetic neuropathy (HCC)    Carotid stenosis, left    Chronic systolic congestive heart failure (HCC)    Rheumatic aortic stenosis    S/P AVR    S/P CABG (coronary artery bypass graft)    Hyperchloremia    Acute blood loss anemia    Thrombocytopenia (HCC)    LBBB (left bundle branch block)    1st degree AV block       Aortic stenosis, Rheumatic, Coronary artery disease  S/P aortic valve replacement and coronary artery bypass grafting; POD # 2    Plan:    1  Cardiac:   Atrial Fibrillation; HR/BP well-controlled  Lopressor, 12 5mg PO BID if ok w/ EP  Lisinopril 10mg PO QDay -- discontinue in anticipation of possible beta blocker today if ok w/ EP  Amio bolus/gtt if ok w/ EP  TTE POD 3 per EP  Continue ASA and Statin therapy  Maintain epicardial pacing wires for conversion pause concerns  Maintain central IV access today for amio infusion  Continue DVT prophylaxis    2  Pulmonary:   Good Room air oxygen saturation; Continue incentive spirometry/Coughing/Deep breathing exercises  Chest tube output remains persistently high; Continue chest tubes to suction today    3  Renal:   Intake/Output net: (+)866 6 mL/24 hours  Continue diuresis   Lasix 40 mg IV BID -- increase to TID  Potassium Chloride 20 mEq PO BID - increase to TID  Post op Creatinine stable;  Follow up labs prn    4  Neuro:  Neurologically intact; No active issues  Incisional pain well-controlled  Continue Tylenol, 975 mg PO q 8, standing dose  Continue Oxycodone, 2 5 to 5 mg PO q 4 hours prn pain    5  GI:  Tolerating TLC 2 3 gm sodium diet  Maintain 1800 mL daily fluid restriction   Continue stool softeners and prn suppository  Continue GI prophylaxis    6  Endo:   History of diabetes; Continue SQ insulin therapy as directed by endocrinology physician  Insulin administration teaching for home therapy ordered    7    Hematology:    Post-operative acute blood loss anemia; Hemoglobin 8 9; trend prn   Thrombocytopenia   Hyperchloremia   Hypocalcemia    8  Disposition:      Ambulating independently, Anticipate discharge to home once medically stable     VTE Pharmacologic Prophylaxis: Fondaparinux (Arixtra)  VTE Mechanical Prophylaxis: sequential compression device    Collaborative rounds completed with SOCORRO Isabel    Plan of care discussed with bedside nurse    SIGNATURE: Keith Sprague PA-C  DATE: May 12, 2021  TIME: 7:44 AM

## 2021-05-13 ENCOUNTER — APPOINTMENT (INPATIENT)
Dept: NON INVASIVE DIAGNOSTICS | Facility: HOSPITAL | Age: 76
DRG: 220 | End: 2021-05-13
Payer: MEDICARE

## 2021-05-13 PROBLEM — E87.8 HYPOCHLOREMIA: Status: RESOLVED | Noted: 2021-05-12 | Resolved: 2021-05-13

## 2021-05-13 LAB
ANION GAP SERPL CALCULATED.3IONS-SCNC: 1 MMOL/L (ref 4–13)
APTT PPP: 68 SECONDS (ref 23–37)
APTT PPP: 69 SECONDS (ref 23–37)
BUN SERPL-MCNC: 28 MG/DL (ref 5–25)
CALCIUM SERPL-MCNC: 8.2 MG/DL (ref 8.3–10.1)
CHLORIDE SERPL-SCNC: 107 MMOL/L (ref 100–108)
CO2 SERPL-SCNC: 29 MMOL/L (ref 21–32)
CREAT SERPL-MCNC: 1.06 MG/DL (ref 0.6–1.3)
ERYTHROCYTE [DISTWIDTH] IN BLOOD BY AUTOMATED COUNT: 14.4 % (ref 11.6–15.1)
GFR SERPL CREATININE-BSD FRML MDRD: 68 ML/MIN/1.73SQ M
GLUCOSE SERPL-MCNC: 118 MG/DL (ref 65–140)
GLUCOSE SERPL-MCNC: 130 MG/DL (ref 65–140)
GLUCOSE SERPL-MCNC: 132 MG/DL (ref 65–140)
GLUCOSE SERPL-MCNC: 165 MG/DL (ref 65–140)
GLUCOSE SERPL-MCNC: 232 MG/DL (ref 65–140)
HCT VFR BLD AUTO: 27.6 % (ref 36.5–49.3)
HGB BLD-MCNC: 9.1 G/DL (ref 12–17)
INR PPP: 1.19 (ref 0.84–1.19)
MCH RBC QN AUTO: 30.7 PG (ref 26.8–34.3)
MCHC RBC AUTO-ENTMCNC: 33 G/DL (ref 31.4–37.4)
MCV RBC AUTO: 93 FL (ref 82–98)
PLATELET # BLD AUTO: 124 THOUSANDS/UL (ref 149–390)
PMV BLD AUTO: 10.6 FL (ref 8.9–12.7)
POTASSIUM SERPL-SCNC: 4.3 MMOL/L (ref 3.5–5.3)
PROTHROMBIN TIME: 15.1 SECONDS (ref 11.6–14.5)
RBC # BLD AUTO: 2.96 MILLION/UL (ref 3.88–5.62)
SODIUM SERPL-SCNC: 137 MMOL/L (ref 136–145)
WBC # BLD AUTO: 8.79 THOUSAND/UL (ref 4.31–10.16)

## 2021-05-13 PROCEDURE — 99024 POSTOP FOLLOW-UP VISIT: CPT | Performed by: PHYSICIAN ASSISTANT

## 2021-05-13 PROCEDURE — 93321 DOPPLER ECHO F-UP/LMTD STD: CPT | Performed by: INTERNAL MEDICINE

## 2021-05-13 PROCEDURE — 97530 THERAPEUTIC ACTIVITIES: CPT

## 2021-05-13 PROCEDURE — 85730 THROMBOPLASTIN TIME PARTIAL: CPT | Performed by: THORACIC SURGERY (CARDIOTHORACIC VASCULAR SURGERY)

## 2021-05-13 PROCEDURE — 85610 PROTHROMBIN TIME: CPT | Performed by: THORACIC SURGERY (CARDIOTHORACIC VASCULAR SURGERY)

## 2021-05-13 PROCEDURE — 93308 TTE F-UP OR LMTD: CPT | Performed by: INTERNAL MEDICINE

## 2021-05-13 PROCEDURE — 80048 BASIC METABOLIC PNL TOTAL CA: CPT | Performed by: THORACIC SURGERY (CARDIOTHORACIC VASCULAR SURGERY)

## 2021-05-13 PROCEDURE — 82948 REAGENT STRIP/BLOOD GLUCOSE: CPT

## 2021-05-13 PROCEDURE — 97535 SELF CARE MNGMENT TRAINING: CPT

## 2021-05-13 PROCEDURE — 93325 DOPPLER ECHO COLOR FLOW MAPG: CPT | Performed by: INTERNAL MEDICINE

## 2021-05-13 PROCEDURE — 99232 SBSQ HOSP IP/OBS MODERATE 35: CPT | Performed by: INTERNAL MEDICINE

## 2021-05-13 PROCEDURE — 93005 ELECTROCARDIOGRAM TRACING: CPT

## 2021-05-13 PROCEDURE — 85027 COMPLETE CBC AUTOMATED: CPT | Performed by: THORACIC SURGERY (CARDIOTHORACIC VASCULAR SURGERY)

## 2021-05-13 PROCEDURE — 93308 TTE F-UP OR LMTD: CPT

## 2021-05-13 RX ORDER — LABETALOL 20 MG/4 ML (5 MG/ML) INTRAVENOUS SYRINGE
Status: COMPLETED
Start: 2021-05-13 | End: 2021-05-13

## 2021-05-13 RX ORDER — AMIODARONE HYDROCHLORIDE 200 MG/1
200 TABLET ORAL
Status: DISCONTINUED | OUTPATIENT
Start: 2021-05-13 | End: 2021-05-14 | Stop reason: HOSPADM

## 2021-05-13 RX ORDER — LABETALOL 20 MG/4 ML (5 MG/ML) INTRAVENOUS SYRINGE
10 ONCE
Status: COMPLETED | OUTPATIENT
Start: 2021-05-13 | End: 2021-05-13

## 2021-05-13 RX ORDER — WARFARIN SODIUM 2.5 MG/1
2.5 TABLET ORAL
Status: DISCONTINUED | OUTPATIENT
Start: 2021-05-13 | End: 2021-05-13

## 2021-05-13 RX ORDER — INSULIN GLARGINE 100 [IU]/ML
12 INJECTION, SOLUTION SUBCUTANEOUS
Status: DISCONTINUED | OUTPATIENT
Start: 2021-05-13 | End: 2021-05-14 | Stop reason: HOSPADM

## 2021-05-13 RX ORDER — WARFARIN SODIUM 5 MG/1
5 TABLET ORAL
Status: COMPLETED | OUTPATIENT
Start: 2021-05-13 | End: 2021-05-13

## 2021-05-13 RX ORDER — LISINOPRIL 5 MG/1
5 TABLET ORAL DAILY
Status: DISCONTINUED | OUTPATIENT
Start: 2021-05-13 | End: 2021-05-14

## 2021-05-13 RX ADMIN — AMIODARONE HYDROCHLORIDE 200 MG: 200 TABLET ORAL at 11:22

## 2021-05-13 RX ADMIN — INSULIN GLARGINE 12 UNITS: 100 INJECTION, SOLUTION SUBCUTANEOUS at 21:33

## 2021-05-13 RX ADMIN — ACETAMINOPHEN 975 MG: 325 TABLET, FILM COATED ORAL at 13:22

## 2021-05-13 RX ADMIN — MUPIROCIN 1 APPLICATION: 20 OINTMENT TOPICAL at 21:33

## 2021-05-13 RX ADMIN — ACETAMINOPHEN 975 MG: 325 TABLET, FILM COATED ORAL at 21:32

## 2021-05-13 RX ADMIN — FUROSEMIDE 40 MG: 10 INJECTION, SOLUTION INTRAMUSCULAR; INTRAVENOUS at 11:22

## 2021-05-13 RX ADMIN — LABETALOL 20 MG/4 ML (5 MG/ML) INTRAVENOUS SYRINGE 10 MG: at 06:11

## 2021-05-13 RX ADMIN — POLYETHYLENE GLYCOL 3350 17 G: 17 POWDER, FOR SOLUTION ORAL at 08:53

## 2021-05-13 RX ADMIN — ASPIRIN 325 MG ORAL TABLET 325 MG: 325 PILL ORAL at 08:53

## 2021-05-13 RX ADMIN — POTASSIUM CHLORIDE 20 MEQ: 1500 TABLET, EXTENDED RELEASE ORAL at 16:20

## 2021-05-13 RX ADMIN — PANTOPRAZOLE SODIUM 40 MG: 40 TABLET, DELAYED RELEASE ORAL at 05:53

## 2021-05-13 RX ADMIN — ACETAMINOPHEN 975 MG: 325 TABLET, FILM COATED ORAL at 05:53

## 2021-05-13 RX ADMIN — LISINOPRIL 5 MG: 5 TABLET ORAL at 10:33

## 2021-05-13 RX ADMIN — WARFARIN SODIUM 5 MG: 5 TABLET ORAL at 18:39

## 2021-05-13 RX ADMIN — Medication 12.5 MG: at 21:33

## 2021-05-13 RX ADMIN — ATORVASTATIN CALCIUM 80 MG: 80 TABLET, FILM COATED ORAL at 16:20

## 2021-05-13 RX ADMIN — INSULIN LISPRO 2 UNITS: 100 INJECTION, SOLUTION INTRAVENOUS; SUBCUTANEOUS at 21:33

## 2021-05-13 RX ADMIN — POTASSIUM CHLORIDE 20 MEQ: 1500 TABLET, EXTENDED RELEASE ORAL at 08:53

## 2021-05-13 RX ADMIN — INSULIN LISPRO 1 UNITS: 100 INJECTION, SOLUTION INTRAVENOUS; SUBCUTANEOUS at 16:20

## 2021-05-13 RX ADMIN — AMIODARONE HYDROCHLORIDE 0.5 MG/MIN: 50 INJECTION, SOLUTION INTRAVENOUS at 06:05

## 2021-05-13 RX ADMIN — MUPIROCIN 1 APPLICATION: 20 OINTMENT TOPICAL at 08:53

## 2021-05-13 RX ADMIN — Medication 12.5 MG: at 08:53

## 2021-05-13 RX ADMIN — AMIODARONE HYDROCHLORIDE 200 MG: 200 TABLET ORAL at 16:20

## 2021-05-13 RX ADMIN — FUROSEMIDE 40 MG: 10 INJECTION, SOLUTION INTRAMUSCULAR; INTRAVENOUS at 05:53

## 2021-05-13 RX ADMIN — AMIODARONE HYDROCHLORIDE 200 MG: 200 TABLET ORAL at 10:02

## 2021-05-13 RX ADMIN — FUROSEMIDE 40 MG: 10 INJECTION, SOLUTION INTRAMUSCULAR; INTRAVENOUS at 18:40

## 2021-05-13 RX ADMIN — POTASSIUM CHLORIDE 20 MEQ: 1500 TABLET, EXTENDED RELEASE ORAL at 11:22

## 2021-05-13 RX ADMIN — OXYCODONE HYDROCHLORIDE 5 MG: 5 TABLET ORAL at 11:21

## 2021-05-13 NOTE — OCCUPATIONAL THERAPY NOTE
[address] Progress Note     Patient Name: Amada BUTCHERN Date: 5/13/2021  Problem List  Principal Problem:    Coronary artery disease involving native coronary artery of native heart  Active Problems:    Benign essential hypertension    Controlled diabetes mellitus with diabetic neuropathy (HCC)    Carotid stenosis, left    Chronic systolic congestive heart failure (HCC)    Rheumatic aortic stenosis    S/P AVR    S/P CABG (coronary artery bypass graft)    Hyperchloremia    Acute blood loss anemia    Thrombocytopenia (HCC)    LBBB (left bundle branch block)    1st degree AV block    Anemia    Hypocalcemia    Atrial fibrillation with RVR ([address] )            05/13/21 0836   OT Last Visit   OT Visit Date 05/13/21   Note Type   Note Type Treatment   Restrictions/Precautions   Weight Bearing Precautions Per Order No   Other Precautions Cardiac/sternal;Multiple lines;Telemetry  (CT)   General   Response to Previous Treatment Patient with no complaints from previous session   Lifestyle   Autonomy Pt reports being I with ADLS, IADLS and mobility without device PTA  (+)    Reciprocal Relationships Pt lives with his significant other who he reports is able to assist as needed upon d/c   Service to Others Retired   [facility] [employer]   Pain Assessment   Pain Assessment Tool 0-10   Pain Score No Pain   ADL   Where Assessed Chair   LB Dressing Assistance 5  Supervision/Setup   LB Dressing Deficit Setup;Supervision/safety; Increased time to complete; Don/doff R sock; Don/doff L sock   Transfers   Sit to Stand 5  Supervision   Stand to Sit 5  Supervision   Functional Mobility   Functional Mobility 5  Supervision   Additional Comments Pt demonstrated household mobility with no rest breaks  Additional items Rolling walker   Cognition   Overall Cognitive Status WFL   Arousal/Participation Alert; Cooperative   Attention Within functional limits   Orientation Level Oriented X4   Memory Within functional limits   Following Commands Follows one step commands without difficulty   Comments Pt is very pleasant and cooperative  Additional Activities   Additional Activities   (Cardiac Education)   Additional Activities Comments Pt participated in additional OT session focusing on cardiac education  OT provided Pt w/ Recovering After Cardiac Surgery Packet and educated Pt regarding: sternal precautions, cardiac precautions, lifting restrictions, safe activity engagement, energy conservation, lifestyle modifications, stress management and cardiac rehabilitation programs  Pt's questions were addressed after discussion of the packet  Provided Pt w/ contact information regarding OT department if questions arise  Activity Tolerance   Activity Tolerance Patient tolerated treatment well   Medical Staff Made Aware RN confirmed okay to see pt   Assessment   Assessment Patient participated in Skilled OT session this date with interventions consisting of ADL re training with the use of correct body mechanics, deep breathing technique, maintaining sternal precautions and  therapeutic activities to: increase activity tolerance   Patient agreeable to OT treatment session, upon arrival patient was found seated OOB to Chair  In comparison to previous session, patient with improvements in functional mobility and ADLS overall  From OT standpoint, recommendation at time of d/c would be Home with family support  Pt reports that he has no concerns about returning home at this time  Recommend continued participation in [facility] and functional mobility with staff  No further acute OT needs, d/c OT  Plan   Treatment Interventions ADL retraining;Functional transfer training; Endurance training;Cardiac education   Goal Expiration Date 05/25/21   OT Treatment Day 1   OT Frequency 3-5x/wk   Recommendation   OT Discharge Recommendation No rehabilitation needs  (Home with increased social support)   OT - OK to Discharge Yes  (When medically appropriate)   AM-PAC Daily Activity Inpatient   Lower Body Dressing 3   Bathing 3   Toileting 3   Upper Body Dressing 4   Grooming 4   Eating 4   Daily Activity Raw Score 21   Daily Activity Standardized Score (Calc for Raw Score >=11) 44 27   AM-PAC Applied Cognition Inpatient   Following a Speech/Presentation 4   Understanding Ordinary Conversation 4   Taking Medications 4   Remembering Where Things Are Placed or Put Away 4   Remembering List of 4-5 Errands 4   Taking Care of Complicated Tasks 4   Applied Cognition Raw Score 24   Applied Cognition Standardized Score 62 21   Modified Wyatt Scale   Modified Wyatt Scale 4       Davina Ramos, HOANG, OTR/L

## 2021-05-13 NOTE — PLAN OF CARE
Problem: PHYSICAL THERAPY ADULT  Goal: Performs mobility at highest level of function for planned discharge setting  See evaluation for individualized goals  Description: Treatment/Interventions: Functional transfer training, LE strengthening/ROM, Elevations, Therapeutic exercise, Endurance training, Equipment eval/education, Bed mobility, Gait training, Spoke to nursing, OT  Equipment Recommended: Walker(at this time; will cont to monitor progress)       See flowsheet documentation for full assessment, interventions and recommendations  Outcome: Progressing  Note: Prognosis: Good  Problem List: Decreased strength, Decreased endurance, Impaired balance, Decreased mobility  Assessment: Pt demonstrated overall improvement in functional endurance and mobility tolerance progressing to (S) level w/ transfers and amb while using rw and ambulating further distances as well; pt still exhibits min overall weakness and gait dysfunction (likely due to above and in light of multiple lines) --> will cont to follow to address; otherwise, cont to recommend home PT follow up upon returning home w/ available support upon D/C pending additional progress and when medically cleared; will follow  Barriers to Discharge: Inaccessible home environment        PT Discharge Recommendation: Home with home health rehabilitation(home PT)          See flowsheet documentation for full assessment

## 2021-05-13 NOTE — PROCEDURES
05/13/21    Procedure: Chest tube removal    Chest tubes removed in routine fashion without incident  Insertion site dressed with Acticoat  Yaquelin Marrero tolerated the procedure well  Nurse notified  Nusrat Dial PA-C  DATE: May 13, 2021  TIME: 12:31 PM  05/13/21    Procedure: Epicardial Pacing Wire removal    Yaquelin Marrero was returned to bed and informed of mandatory one hour post-procedure bed rest   The assigned nurse was notified  Epicardial pacing wires removed in routine fashion, without incident  The patient tolerated the procedure well  Vital signs ordered  q 15 minutes for one hour, as per protocol      Nusrat Dial PA-C  DATE: May 13, 2021  TIME: 12:31 PM

## 2021-05-13 NOTE — PROGRESS NOTES
Progress Note - Electrophysiology-Cardiology (EP)   July Farris 77 y o  male MRN: [id number]  Unit/Bed#: [facility] 419-01 Encounter: [id number]      Assessment/Plan:  1  New LBBB   -Continue to monitor with daily EKGs and plan for echo today after chest tube removal  QRS duration has remained stable at 178 ms     -Likely plan for live event monitor at discharge  2  New onset post-op atrial fibrillation with RVR   -Currently NSR, no cardioversion required  OK to stop heparin drip    -Continue low dose beta blocker and PO amiodarone  Plan to discharge with amiodarone taper: 200 mg PO TID x 2 weeks, then 200 mg PO QD x 3 months   -Will need anticoagulation due to prolonged duration of afib episode  Continue Coumadin in post-op setting     -Continue to monitor electrolytes  3  Chronic systolic congestive HF   -EVELYN 5/10/21 - Pre procedure EF 37%, Post-procedure EF 45%   -TTE 5/7/21 - EF 55%   -TTE 2/11/2020 - EF 35-40%   -Repeat echo on 5/13 pending   -Continue diuretics per primary team  4  CAD s/p CABGx5   -ASA/statin/beta blocker  5  Moderate rheumatic AS s/p tissue AVR  6  HLD  7  HTN  8  Asthma  9  DM2  10  L ICA stenosis s/p L carotid endarterectomy  11  Occluded R ICA        Subjective/Objective     Subjective: Upon examination today, patient sitting comfortably in chair  He offers no complaints  He denies palpitations, chest pain/heaviness/tightness, SOB, lightheadedness, presyncope, syncope, N/V  He is tolerating diet, ambulating, and pain is well controlled      Objective:     Vitals: /75   Pulse 62   Temp 98 °F (36 7 °C) (Oral)   Resp 20   Ht 5' 9" (1 753 m)   Wt 78 5 kg (173 lb 1 oz)   SpO2 98%   BMI 25 56 kg/m²   Vitals:    05/12/21 0600 05/13/21 0600   Weight: 79 2 kg (174 lb 9 7 oz) 78 5 kg (173 lb 1 oz)     Orthostatic Blood Pressures      Most Recent Value   Blood Pressure  150/75 filed at 05/13/2021 1034   Patient Position - Orthostatic VS  Sitting filed at 05/13/2021 0700 Intake/Output Summary (Last 24 hours) at 5/13/2021 1251  Last data filed at 5/13/2021 1222  Gross per 24 hour   Intake 1731 04 ml   Output 2720 ml   Net -988 96 ml       Invasive Devices     Central Venous Catheter Line            CVC Central Lines 05/10/21 Triple 3 days          Peripheral Intravenous Line            Peripheral IV 05/10/21 Left Hand 3 days                  Scheduled Meds:  Current Facility-Administered Medications   Medication Dose Route Frequency Provider Last Rate    acetaminophen  650 mg Rectal Q4H PRN [facility], PA-MASTER      acetaminophen  975 mg Oral Q8H [facility], PA-MASTER      amiodarone  200 mg Oral TID With Meals Melani Boyd, SHEREE      aspirin  325 mg Oral Daily [facility], SHEREE      atorvastatin  80 mg Oral Daily With [facility] SHEREE Luong      bisacodyl  10 mg Rectal Daily PRN [facility], SHEREE      furosemide  40 mg Intravenous TID (diuretic) Amanda Jones PA-C      insulin glargine  15 Units Subcutaneous HS Ross Kunz MD      insulin lispro  1-5 Units Subcutaneous TID AC Ross Kunz MD      insulin lispro  1-5 Units Subcutaneous HS Ross Kunz MD      insulin lispro  7 Units Subcutaneous TID With Meals Ross Kunz MD      lisinopril  5 mg Oral Daily Amanda Jones PA-C      metoprolol tartrate  12 5 mg Oral Q12H [facility] Amanda Jones PA-C      mupirocin  1 application Nasal S63E [facility] Mery Davis PA-C      ondansetron  4 mg Intravenous Q6H PRN [facility], SHEREE      oxyCODONE  2 5 mg Oral Q4H PRN [facility], SHEREE      oxyCODONE  5 mg Oral Q4H PRN [facility], SHEREE      pantoprazole  40 mg Oral Early Morning [facility], SHEREE      polyethylene glycol  17 g Oral Daily [facility], SHEREE      potassium chloride  20 mEq Oral TID With Meals Amanda Jones PA-C      warfarin  5 mg Oral Once (warfarin) Amanda Jones PA-C       Continuous Infusions:   PRN Meds:   acetaminophen    bisacodyl   ondansetron    oxyCODONE    oxyCODONE    Review of Systems:  ROS as noted above, otherwise 12 point review of systems was performed and is negative  Physical Exam:   Physical Exam  Vitals signs reviewed  Constitutional:       General: He is not in acute distress  Appearance: He is not ill-appearing or diaphoretic  HENT:      Head: Normocephalic and atraumatic  Right Ear: External ear normal       Left Ear: External ear normal       Nose: Nose normal    Eyes:      General:         Right eye: No discharge  Left eye: No discharge  Neck:      Musculoskeletal: No neck rigidity or muscular tenderness  Cardiovascular:      Rate and Rhythm: Normal rate and regular rhythm  Heart sounds: No murmur  No friction rub  Pulmonary:      Effort: Pulmonary effort is normal       Breath sounds: Examination of the right-lower field reveals decreased breath sounds  Examination of the left-lower field reveals decreased breath sounds  Decreased breath sounds present  No wheezing, rhonchi or rales  Chest:      Comments: Sternal incision dress  Chest tubes present  Abdominal:      General: There is no distension  Palpations: Abdomen is soft  Tenderness: There is no abdominal tenderness  Musculoskeletal:         General: No deformity or signs of injury  Right lower leg: No edema  Left lower leg: No edema  Skin:     General: Skin is warm and dry  Capillary Refill: Capillary refill takes less than 2 seconds  Coloration: Skin is not jaundiced or pale  Neurological:      General: No focal deficit present  Mental Status: He is alert and oriented to person, place, and time  Mental status is at baseline  Psychiatric:         Mood and Affect: Mood normal          Behavior: Behavior normal          Thought Content: Thought content normal            Lab Results: I have personally reviewed pertinent lab results      Results from last 7 days   Lab Units 05/13/21  1545 05/12/21  0447 05/11/21  0449   WBC Thousand/uL 8 79 8 43 8 29   HEMOGLOBIN g/dL 9 1* 8 9* 9 3*   HEMATOCRIT % 27 6* 27 9* 28 9*   PLATELETS Thousands/uL 124* 107* 109*     Results from last 7 days   Lab Units 05/13/21  0300 05/12/21  0447 05/11/21  0411  05/10/21  1319   POTASSIUM mmol/L 4 3 4 2 4 1   < >  --    CHLORIDE mmol/L 107 109* 114*  --   --    CO2 mmol/L 29 24 20*  --   --    CO2, I-STAT mmol/L  --   --   --   --  25   BUN mg/dL 28* 24 17  --   --    CREATININE mg/dL 1 06 0 99 0 83  --   --    GLUCOSE, ISTAT mg/dl  --   --   --   --  115   CALCIUM mg/dL 8 2* 7 8* 8 1*  --   --     < > = values in this interval not displayed  Results from last 7 days   Lab Units 05/13/21  0846 05/13/21  0247 05/12/21  1921 05/12/21  0815 05/10/21  1302   INR   --  1 19  --  1 16 1 51*   PTT seconds 68* 69* 54*  --  38*     Results from last 7 days   Lab Units 05/11/21  0509   MAGNESIUM mg/dL 2 3         Imaging: I have personally reviewed pertinent films in PACS  ECHO: 5/10/21  EVELYN:   PREPROCEDURE 5/10/21     LEFT VENTRICLE:  Systolic Function: moderately impaired  Ejection Fraction: 37%  Cavity size: dilated       RIGHT VENTRICLE:  Systolic Function: normal   Cavity size normal  No hypertrophy       AORTIC VALVE:  Leaflets: trileaflet  Leaflet motions restricted  Stenosis: moderate  Regurgitation: mild       MITRAL VALVE:  Leaflets: calcified  Leaflet Motions: restricted  Regurgitation: trace  Stenosis: mild  Mean Gradient: 3 mmHg       TRICUSPID VALVE:  Leaflets: normal  Leaflet Motions: normal  Stenosis: none  Regurgitation: mild      PULMONIC VALVE:  Isbell Sera   Stenosis: none      ASCENDING AORTA:  Size:  normal  Dissection not present        AORTIC ARCH:  Size:  normal  dissection not present  Grade 3: atheroma protruding < 0 5 cm into lumen      DESCENDING AORTA:  Size: normal  Dissection not present  Grade 3: atheroma protruding < 0 5 cm into lumen      RIGHT ATRIUM:  Size:  normal     LEFT ATRIUM:  Size: dilated  Spontaneous echo contrast      LEFT ATRIAL APPENDAGE:  Size: normal       ATRIAL SEPTUM:  Intra-atrial septal morphology: patent foramen ovale  Patent foramen ovale shunt: left to right shunt       VENTRICULAR SEPTUM:  Intra-ventricular septum morphology: normal       EPIAORTIC:  Plaque Thickness: 0-5 mm      OTHER FINDINGS:  Pericardium:  normal  Pleural Effusion:  none            POSTPROCEDURE 5/10/21     LEFT VENTRICLE:   Systolic Function: mildly depressed  Ejection Fraction: 45 %       RIGHT VENTRICLE: Unchanged       AORTIC VALVE: Leaflets: bioprosthetic  Stenosis: none  Mean Gradient: 5 mmHg  Regurgitation: none   Valve Size: 23 mm      MITRAL VALVE: Unchanged       TRICUSPID VALVE: Unchanged       PULMONIC VALVE: Unchanged      ATRIA: Unchanged       AORTA: Unchanged            EKG:             VTE Pharmacologic Prophylaxis: Fondaparinux (Arixtra)  VTE Mechanical Prophylaxis: sequential compression device

## 2021-05-13 NOTE — PROGRESS NOTES
Patient hypertensive this morning at 0600  He is asymptomatic [place] and cuff do correlate sbp 179-183/90s with HR NSR 70  CC AP made aware, gave labetalol 10mg x 1 dose and blood pressure much improved  Will continue to monitor

## 2021-05-13 NOTE — PROGRESS NOTES
Progress Note - Mary Cleveland 77 y o  male MRN: [id number]    Unit/Bed#: [facility] 847-01 Encounter: [id number]      CC: diabetes f/u    Subjective:   Mary Cleveland is a 77y o  year old male with type 2 diabetes and CAD who underwent CABG  Feels well  No complaints  No hypoglycemia  Objective:     Vitals: Blood pressure 150/75, pulse 62, temperature 98 °F (36 7 °C), temperature source Oral, resp  rate 20, height 5' 9" (1 753 m), weight 78 5 kg (173 lb 1 oz), SpO2 98 %  ,Body mass index is 25 56 kg/m²  Intake/Output Summary (Last 24 hours) at 5/13/2021 1252  Last data filed at 5/13/2021 1222  Gross per 24 hour   Intake 1731 04 ml   Output 2720 ml   Net -988 96 ml       Physical Exam:  General Appearance: awake, appears stated age and cooperative  Head: Normocephalic, without obvious abnormality, atraumatic  Extremities: moves all extremities  Skin: Skin color and temperature normal    Pulm: no labored breathing    Lab, Imaging and other studies: I have personally reviewed pertinent reports  POC Glucose (mg/dl)   Date Value   05/13/2021 130   05/13/2021 118   05/12/2021 174 (H)   05/12/2021 229 (H)   05/12/2021 252 (H)   05/12/2021 163 (H)   05/11/2021 164 (H)   05/11/2021 163 (H)   05/11/2021 145 (H)   05/11/2021 141 (H)       Assessment/Plan:    -Type 2 diabetes: controlled with A1c 6 6%  Home regimen consists of metformin 1000 mg twice a day and Farxiga 5 mg daily  Patient is currently on lantus 15 units QHS and Lispro 7 Units TID-AC  Amiodarone drip has been stopped and blood glucose are better controlled  Will decrease Lantus to 12 units TID-AC  Decrease Lispro to 5 units TID-AC   Use correctional insulin as needed,  Continue glucose monitoring for further adjustment      Patient will likely be discharged on his home medications  -CAD status post CABG: management per Cardiovascular surgery             Portions of the record may have been created with voice recognition software

## 2021-05-13 NOTE — PLAN OF CARE
Problem: OCCUPATIONAL THERAPY ADULT  Goal: Performs self-care activities at highest level of function for planned discharge setting  See evaluation for individualized goals  Description: Treatment Interventions: ADL retraining, Functional transfer training, UE strengthening/ROM, Endurance training, Patient/family training, Equipment evaluation/education, Compensatory technique education, Continued evaluation, Cardiac education, Activityengagement, Energy conservation          See flowsheet documentation for full assessment, interventions and recommendations  Outcome: Progressing  Note: Limitation: Decreased ADL status, Decreased endurance, Decreased high-level ADLs, Decreased self-care trans  Prognosis: Good  Assessment: Patient participated in Skilled OT session this date with interventions consisting of ADL re training with the use of correct body mechanics, deep breathing technique, maintaining sternal precautions and  therapeutic activities to: increase activity tolerance   Patient agreeable to OT treatment session, upon arrival patient was found seated OOB to Chair  In comparison to previous session, patient with improvements in functional mobility and ADLS overall  From OT standpoint, recommendation at time of d/c would be Home with family support  Pt reports that he has no concerns about returning home at this time  Recommend continued participation in [facility] and functional mobility with staff  No further acute OT needs, d/c OT        OT Discharge Recommendation: No rehabilitation needs(Home with increased social support)  OT - OK to Discharge: Yes(When medically appropriate)

## 2021-05-13 NOTE — PLAN OF CARE
Problem: Prexisting or High Potential for Compromised Skin Integrity  Goal: Skin integrity is maintained or improved  Description: INTERVENTIONS:  - Identify patients at risk for skin breakdown  - Assess and monitor skin integrity  - Assess and monitor nutrition and hydration status  - Monitor labs   - Assess for incontinence   - Turn and reposition patient  - Assist with mobility/ambulation  - Relieve pressure over bony prominences  - Avoid friction and shearing  - Provide appropriate hygiene as needed including keeping skin clean and dry  - Evaluate need for skin moisturizer/barrier cream  - Collaborate with interdisciplinary team   - Patient/family teaching  - Consider wound care consult   Outcome: Progressing     Problem: CARDIOVASCULAR - ADULT  Goal: Maintains optimal cardiac output and hemodynamic stability  Description: INTERVENTIONS:  - Monitor I/O, vital signs and rhythm  - Monitor for S/S and trends of decreased cardiac output  - Administer and titrate ordered vasoactive medications to optimize hemodynamic stability  - Assess quality of pulses, skin color and temperature  - Assess for signs of decreased coronary artery perfusion  - Instruct patient to report change in severity of symptoms  Outcome: Progressing  Goal: Absence of cardiac dysrhythmias or at baseline rhythm  Description: INTERVENTIONS:  - Continuous cardiac monitoring, vital signs, obtain 12 lead EKG if ordered  - Administer antiarrhythmic and heart rate control medications as ordered  - Monitor electrolytes and administer replacement therapy as ordered  Outcome: Progressing     Problem: RESPIRATORY - ADULT  Goal: Achieves optimal ventilation and oxygenation  Description: INTERVENTIONS:  - Assess for changes in respiratory status  - Assess for changes in mentation and behavior  - Position to facilitate oxygenation and minimize respiratory effort  - Oxygen administered by appropriate delivery if ordered  - Initiate smoking cessation education as indicated  - Encourage broncho-pulmonary hygiene including cough, deep breathe, Incentive Spirometry  - Assess the need for suctioning and aspirate as needed  - Assess and instruct to report SOB or any respiratory difficulty  - Respiratory Therapy support as indicated  Outcome: Progressing     Problem: GENITOURINARY - ADULT  Goal: Maintains or returns to baseline urinary function  Description: INTERVENTIONS:  - Assess urinary function  - Encourage oral fluids to ensure adequate hydration if ordered  - Administer IV fluids as ordered to ensure adequate hydration  - Administer ordered medications as needed  - Offer frequent toileting  - Follow urinary retention protocol if ordered  Outcome: Progressing  Goal: Absence of urinary retention  Description: INTERVENTIONS:  - Assess patients ability to void and empty bladder  - Monitor I/O  - Bladder scan as needed  - Discuss with physician/AP medications to alleviate retention as needed  - Discuss catheterization for long term situations as appropriate  Outcome: Progressing  Goal: Urinary catheter remains patent  Description: INTERVENTIONS:  - Assess patency of urinary catheter  - If patient has a chronic elam, consider changing catheter if non-functioning  - Follow guidelines for intermittent irrigation of non-functioning urinary catheter  Outcome: Progressing     Problem: METABOLIC, FLUID AND ELECTROLYTES - ADULT  Goal: Electrolytes maintained within normal limits  Description: INTERVENTIONS:  - Monitor labs and assess patient for signs and symptoms of electrolyte imbalances  - Administer electrolyte replacement as ordered  - Monitor response to electrolyte replacements, including repeat lab results as appropriate  - Instruct patient on fluid and nutrition as appropriate  Outcome: Progressing  Goal: Fluid balance maintained  Description: INTERVENTIONS:  - Monitor labs   - Monitor I/O and WT  - Instruct patient on fluid and nutrition as appropriate  - Assess for signs & symptoms of volume excess or deficit  Outcome: Progressing  Goal: Glucose maintained within target range  Description: INTERVENTIONS:  - Monitor Blood Glucose as ordered  - Assess for signs and symptoms of hyperglycemia and hypoglycemia  - Administer ordered medications to maintain glucose within target range  - Assess nutritional intake and initiate nutrition service referral as needed  Outcome: Progressing     Problem: SKIN/TISSUE INTEGRITY - ADULT  Goal: Skin integrity remains intact  Description: INTERVENTIONS  - Identify patients at risk for skin breakdown  - Assess and monitor skin integrity  - Assess and monitor nutrition and hydration status  - Monitor labs (i e  albumin)  - Assess for incontinence   - Turn and reposition patient  - Assist with mobility/ambulation  - Relieve pressure over bony prominences  - Avoid friction and shearing  - Provide appropriate hygiene as needed including keeping skin clean and dry  - Evaluate need for skin moisturizer/barrier cream  - Collaborate with interdisciplinary team (i e  Nutrition, Rehabilitation, etc )   - Patient/family teaching  Outcome: Progressing  Goal: Incision(s), wounds(s) or drain site(s) healing without S/S of infection  Description: INTERVENTIONS  - Assess and document risk factors for skin impairment   - Assess and document dressing, incision, wound bed, drain sites and surrounding tissue  - Consider nutrition services referral as needed  - Oral mucous membranes remain intact  - Provide patient/ family education  Outcome: Progressing  Goal: Oral mucous membranes remain intact  Description: INTERVENTIONS  - Assess oral mucosa and hygiene practices  - Implement preventative oral hygiene regimen  - Implement oral medicated treatments as ordered  - Initiate Nutrition services referral as needed  Outcome: Progressing     Problem: Potential for Falls  Goal: Patient will remain free of falls  Description: INTERVENTIONS:  - Assess patient frequently for physical needs  -  Identify cognitive and physical deficits and behaviors that affect risk of falls    -  [place] fall precautions as indicated by assessment   - Educate patient/family on patient safety including physical limitations  - Instruct patient to call for assistance with activity based on assessment  - Modify environment to reduce risk of injury  - Consider OT/PT consult to assist with strengthening/mobility  Outcome: Progressing

## 2021-05-13 NOTE — PROGRESS NOTES
Progress Note - Cardiothoracic Surgery   Rafal Cabral 77 y o  male MRN: [id number]  Unit/Bed#: [facility] 419-01 Encounter: [id number]  Aortic stenosis, Rheumatic, Coronary artery disease  S/P aortic valve replacement and coronary artery bypass grafting; POD # 3    24 Hour Events: Seen by zohreh YLNN for amio bolus/gtt & beta blocker, heparin gtt started in case neede for cardioversion today, converted to NSR 1630, Coumadin dosed last PM  Given Labetolol 10mg IV x1 this AM for HTN  No other events  No complaints      Medications:   Scheduled Meds:  Current Facility-Administered Medications   Medication Dose Route Frequency Provider Last Rate    acetaminophen  650 mg Rectal Q4H PRN Cherie Donnell, PA-C      acetaminophen  975 mg Oral Q8H Cherie Donnell, PA-C      amiodarone  0 5 mg/min Intravenous Continuous Amanda Jones PA-C 0 5 mg/min (05/13/21 1257)    amiodarone (CORDARONE) IV bolus  150 mg Intravenous Once Oneyda Castro PA-C      aspirin  325 mg Oral Daily Cherie SHEREE Jacobo      atorvastatin  80 mg Oral Daily With [facility] SHEREE Luong      bisacodyl  10 mg Rectal Daily PRN Cherienathaniel Jacobo PA-C      furosemide  40 mg Intravenous TID (diuretic) Amanda Jones PA-C      heparin (porcine)  3-20 Units/kg/hr (Order-Specific) Intravenous Titrated Amanda Jones PA-C 14 Units/kg/hr (05/12/21 2200)    insulin glargine  15 Units Subcutaneous HS Shelley Bob MD      insulin lispro  1-5 Units Subcutaneous TID AC Shelley Bob MD      insulin lispro  1-5 Units Subcutaneous HS Shelley Bob MD      insulin lispro  7 Units Subcutaneous TID With Meals Shelley Bob MD      metoprolol tartrate  12 5 mg Oral Q12H [address] Amanda Jones PA-C      mupirocin  1 application Nasal [id number] [address] Mery Davis PA-C      ondansetron  4 mg Intravenous Q6H PRN Cherie DonnellSHEREE eli      oxyCODONE  2 5 mg Oral Q4H PRN Cherie DonnellNATY eli-MASTER      oxyCODONE  5 mg Oral Q4H PRN Cherie Jacobo PA-C      pantoprazole  40 mg Oral Early Morning Lisset Mckeon PA-C      polyethylene glycol  17 g Oral Daily Lisset Mckeon PA-C      potassium chloride  20 mEq Oral TID With Meals Amanda Jones PA-C       Continuous Infusions:amiodarone, 0 5 mg/min, Last Rate: 0 5 mg/min (05/13/21 0605)  heparin (porcine), 3-20 Units/kg/hr (Order-Specific), Last Rate: 14 Units/kg/hr (05/12/21 2200)      PRN Meds:   acetaminophen    bisacodyl    ondansetron    oxyCODONE    oxyCODONE    Vitals:   Vitals:    05/13/21 0600 05/13/21 0615 05/13/21 0634 05/13/21 0700   BP: (!) 186/97 160/76 118/68 119/67   BP Location:    Right arm   Pulse: 70 59 59 59   Resp:    20   Temp:    98 °F (36 7 °C)   TempSrc:    Oral   SpO2:    98%   Weight: 78 5 kg (173 lb 1 oz)      Height:         Bp x24hrs: 110-180/60-90    Telemetry: NSR; Heart Rate: 68 w/ wide complex; NSR since 1630 yesterday    Respiratory:   SpO2: SpO2: 98 %, SpO2 Activity: SpO2 Activity: At Rest, SpO2 Device: O2 Device: None (Room air); Room Air    Intake/Output:   I/O       05/11 0701 - 05/12 0700 05/12 0701 - 05/13 0700 05/13 0701 - 05/14 0700    P  O  1830 1080     I V  (mL/kg) 436 6 (5 5) 551 (7)     Blood       NG/GT       IV Piggyback 50      Cell Saver       Total Intake(mL/kg) 2316 6 (29 3) 1631 (20 8)     Urine (mL/kg/hr) 950 (0 5) 2775 (1 5)     Emesis/NG output       Blood       Chest Tube 500 210     Total Output 1450 2985     Net +866 6 -1354            Unmeasured Urine Occurrence  1 x         UOP - 700cc/8hrs; 2775cc/24hrs w/ 1 unmeasured occurance    Chest tube Output:    Mediastinal tubes: 10 mL/8 hours  100 mL/24 hours   Pleural tubes: 0 mL/8 hours  110 mL/24 hours     Weights:   Weight (last 2 days)     Date/Time   Weight    05/13/21 0600   78 5 (173 06)    05/12/21 0600   79 2 (174 6)    05/11/21 0600   76 7 (169 09)            Admit weight: 70 8kg - up up 8kg from admission weight    Results:   Results from last 7 days   Lab Units 05/13/21  0240 05/12/21  1844 05/11/21  0449   WBC Thousand/uL 8 79 8 43 8 29   HEMOGLOBIN g/dL 9 1* 8 9* 9 3*   HEMATOCRIT % 27 6* 27 9* 28 9*   PLATELETS Thousands/uL 124* 107* 109*     Results from last 7 days   Lab Units 05/13/21  0300 05/12/21  0447 05/11/21  0411  05/10/21  1319   SODIUM mmol/L 137 140 143  --   --    POTASSIUM mmol/L 4 3 4 2 4 1   < >  --    CHLORIDE mmol/L 107 109* 114*  --   --    CO2 mmol/L 29 24 20*  --   --    CO2, I-STAT mmol/L  --   --   --   --  25   BUN mg/dL 28* 24 17  --   --    CREATININE mg/dL 1 06 0 99 0 83  --   --    GLUCOSE, ISTAT mg/dl  --   --   --   --  115   CALCIUM mg/dL 8 2* 7 8* 8 1*  --   --     < > = values in this interval not displayed  Results from last 7 days   Lab Units 05/13/21  0247 05/12/21  1921 05/12/21  0815 05/10/21  1302   INR  1 19  --  1 16 1 51*   PTT seconds 69* 54*  --  38*     Coumadin mg 5/12 - 1          Point of care glucose: 118 - 174 - 229    Studies:  No new studies    I have personally reviewed pertinent reports  and I have personally reviewed pertinent films in PACS    Invasive Lines/Tubes:  Invasive Devices     Central Venous Catheter Line            CVC Central Lines 05/10/21 Triple 3 days          Peripheral Intravenous Line            Peripheral IV 05/10/21 Left Hand 3 days          Arterial Line            Arterial Line 05/10/21 Right Radial 3 days          Line            Pacer Wires 2 days    Pacer Wires 2 days          Drain            Chest Tube 1 Anterior Mediastinal 32 Fr  2 days    Chest Tube 2 Left Pleural 32 Fr  2 days    Chest Tube 3 Posterior Mediastinal 24 Fr  2 days                Physical Exam:    HEENT/NECK:  Normocephalic  Atraumatic  No jugular venous distention  Cardiac: Regular rate and rhythm and No murmurs/rubs/gallops  Pulmonary:  Breath sounds clear bilaterally and No rales/rhonchi/wheezes  Abdomen:  Non-tender, Non-distended and Normal bowel sounds  Incisions: Sternum is stable  Incision is clean, dry, and intact    and Saphenectomy incison is clean, dry, and intact  Extremities: Extremities warm/dry and Trace edema B/L  Neuro: Alert and oriented X 3  Skin: Warm/Dry, without rashes or lesions  Assessment:  Principal Problem:    Coronary artery disease involving native coronary artery of native heart  Active Problems:    Benign essential hypertension    Controlled diabetes mellitus with diabetic neuropathy ([facility])    Carotid stenosis, left    Chronic systolic congestive heart failure ([facility])    Rheumatic aortic stenosis    S/P AVR    S/P CABG (coronary artery bypass graft)    Hyperchloremia    Acute blood loss anemia    Thrombocytopenia ([facility])    LBBB (left bundle branch block)    1st degree AV block    Anemia    Hypochloremia    Hypocalcemia    Atrial fibrillation with RVR ([facility])       Aortic stenosis, Rheumatic, Coronary artery disease  S/P aortic valve replacement and coronary artery bypass grafting; POD # 3    Plan:    1  Cardiac:   NSR; HR/BP well-controlled  Lopressor, 12 5mg PO BID -- d/w EP increasing  Continue amio gtt, start oral amio  D/w EP need for heparin gtt as no cardioversion plan today  TTE today per EP  INR 1 19, dose Coumadin tonight  Continue ASA and Statin therapy  Maintain epicardial pacing wires for PRN pacing -- d/w EP discontinuing today  Maintain central IV access today for blood draws/amio infusion  Continue DVT prophylaxis    2  Pulmonary:   Good Room air oxygen saturation; Continue incentive spirometry/Coughing/Deep breathing exercises  Chest tube drainage diminished; D/C today    3  Renal:   Intake/Output net: (-)1354 mL/24 hours  Continue diuresis   Lasix 40 mg IV TID  Potassium Chloride 20 mEq PO TID  Post op Creatinine stable; Follow up labs prn    4  Neuro:  Neurologically intact; No active issues  Incisional pain well-controlled  Continue Tylenol, 975 mg PO q 8, standing dose  Continue Oxycodone, 2 5 to 5 mg PO q 4 hours prn pain    5   GI:  Tolerating TLC 2 3 gm sodium diet  Maintain 1800 mL daily fluid restriction   Continue stool softeners and prn suppository  Continue GI prophylaxis    6  Endo:   History of diabetes; Continue SQ insulin therapy as directed by endocrinology physician  Insulin administration teaching for home therapy ordered    7    Hematology:    Post-operative acute blood loss anemia; Hemoglobin 9 1; trend prn              Thrombocytopenia              Hypocalcemia    8  Disposition:      Ambulating independently, Anticipate discharge to home once medically stable     VTE Pharmacologic Prophylaxis: Heparin and Warfarin (Coumadin)  VTE Mechanical Prophylaxis: sequential compression device    Collaborative rounds completed with SOCORRO Javed    Plan of care discussed with bedside nurse    SIGNATURE: Roro Denis PA-C  DATE: May 13, 2021  TIME: 8:50 AM

## 2021-05-13 NOTE — PHYSICAL THERAPY NOTE
PHYSICAL THERAPY NOTE          Patient Name: Katarzyna Edwards  [id number] Date: 5/13/2021 05/13/21 1055   PT Last Visit   PT Visit Date 05/13/21   Note Type   Note Type Treatment   Pain Assessment   Pain Assessment Tool Pain Assessment not indicated - pt denies pain   Pain Score No Pain   Restrictions/Precautions   Other Precautions Cardiac/sternal;Multiple lines;Telemetry  (CT)   General   Chart Reviewed Yes   Additional Pertinent History cleared for Tx session (spoke to ns)   Response to Previous Treatment Patient with no complaints from previous session  Cognition   Overall Cognitive Status WFL   Arousal/Participation Alert; Cooperative   Attention Within functional limits   Orientation Level Oriented to person;Oriented to place;Oriented to situation   Memory Decreased recall of precautions   Following Commands Follows one step commands without difficulty   Subjective   Subjective Alert; in the chair; agreeable to perform therex and mobilize;   Transfers   Sit to Stand 5  Supervision   Stand to Sit 5  Supervision   Ambulation/Elevation   Gait pattern Excessively slow; Short stride   Gait Assistance 5  Supervision   Additional items Assist x 1;Verbal cues; Tactile cues   Assistive Device Rolling walker   Distance 2 x 270 ft w/ standing rest period in between   Stair Management Assistance Not tested  (not appropriate at this time)   Balance   Static Sitting Fair +   Static Standing 1800 [address] -   Activity Tolerance   Activity Tolerance Patient tolerated treatment well   Nurse Made Aware spoke to [facility]   Exercises   Knee AROM Long Arc Quad Sitting;15 reps;AROM; Bilateral   Ankle Pumps Sitting;15 reps;AROM; Bilateral   Marching Sitting;10 reps;AROM; Bilateral   Equipment Use   Comments (R) SCD re-activated; call bell w/in reach   Assessment   Prognosis Good   Problem List Decreased strength;Decreased endurance; Impaired balance;Decreased mobility   Assessment Pt demonstrated overall improvement in functional endurance and mobility tolerance progressing to (S) level w/ transfers and amb while using rw and ambulating further distances as well; pt still exhibits min overall weakness and gait dysfunction (likely due to above and in light of multiple lines) --> will cont to follow to address; otherwise, cont to recommend home PT follow up upon returning home w/ available support upon D/C pending additional progress and when medically cleared; will follow  Barriers to Discharge Inaccessible home environment   Goals   Patient Goals to go home   STG Expiration Date 05/21/21   PT Treatment Day 2   Plan   Treatment/Interventions Functional transfer training;LE strengthening/ROM; Elevations; Therapeutic exercise; Endurance training;Equipment eval/education; Bed mobility;Gait training;Spoke to nursing   Progress Progressing toward goals   PT Frequency Other (Comment)  (4-6x/wk)   Recommendation   PT Discharge Recommendation Home with home health rehabilitation  (home PT)   Equipment Recommended Walker  (at this time)   Amrita Avelar [address]   Turning in Bed Without Bedrails 3   Lying on Back to Sitting on Edge of Flat Bed 3   Moving Bed to Chair 3   Standing Up From Chair 3   Walk in Room 3   Climb 3-5 Stairs 2   Basic Mobility Inpatient Raw Score 17   Basic Mobility Standardized Score 39 67       Rea Walker, PT

## 2021-05-14 ENCOUNTER — CLINICAL SUPPORT (OUTPATIENT)
Dept: CARDIOLOGY CLINIC | Facility: CLINIC | Age: 76
End: 2021-05-14
Payer: MEDICARE

## 2021-05-14 ENCOUNTER — TRANSITIONAL CARE MANAGEMENT (OUTPATIENT)
Dept: FAMILY MEDICINE CLINIC | Facility: CLINIC | Age: 76
End: 2021-05-14

## 2021-05-14 ENCOUNTER — TELEPHONE (OUTPATIENT)
Dept: CARDIOLOGY CLINIC | Facility: CLINIC | Age: 76
End: 2021-05-14

## 2021-05-14 ENCOUNTER — ANTICOAG VISIT (OUTPATIENT)
Dept: CARDIOLOGY CLINIC | Facility: CLINIC | Age: 76
End: 2021-05-14

## 2021-05-14 VITALS
RESPIRATION RATE: 18 BRPM | OXYGEN SATURATION: 100 % | HEART RATE: 74 BPM | BODY MASS INDEX: 24.73 KG/M2 | WEIGHT: 167 LBS | HEIGHT: 69 IN | DIASTOLIC BLOOD PRESSURE: 82 MMHG | SYSTOLIC BLOOD PRESSURE: 172 MMHG | TEMPERATURE: 98.2 F

## 2021-05-14 DIAGNOSIS — I48.91 ATRIAL FIBRILLATION WITH RVR (HCC): ICD-10-CM

## 2021-05-14 DIAGNOSIS — I44.7 LBBB (LEFT BUNDLE BRANCH BLOCK): Primary | ICD-10-CM

## 2021-05-14 DIAGNOSIS — R00.1 BRADYCARDIA: ICD-10-CM

## 2021-05-14 PROBLEM — E83.51 HYPOCALCEMIA: Status: RESOLVED | Noted: 2021-05-12 | Resolved: 2021-05-14

## 2021-05-14 PROBLEM — E87.8 HYPERCHLOREMIA: Status: RESOLVED | Noted: 2021-05-11 | Resolved: 2021-05-14

## 2021-05-14 LAB
ANION GAP SERPL CALCULATED.3IONS-SCNC: 4 MMOL/L (ref 4–13)
ATRIAL RATE: 64 BPM
ATRIAL RATE: 79 BPM
BUN SERPL-MCNC: 25 MG/DL (ref 5–25)
CALCIUM SERPL-MCNC: 8.6 MG/DL (ref 8.3–10.1)
CHLORIDE SERPL-SCNC: 103 MMOL/L (ref 100–108)
CO2 SERPL-SCNC: 30 MMOL/L (ref 21–32)
CREAT SERPL-MCNC: 1.11 MG/DL (ref 0.6–1.3)
ERYTHROCYTE [DISTWIDTH] IN BLOOD BY AUTOMATED COUNT: 14 % (ref 11.6–15.1)
GFR SERPL CREATININE-BSD FRML MDRD: 65 ML/MIN/1.73SQ M
GLUCOSE SERPL-MCNC: 116 MG/DL (ref 65–140)
GLUCOSE SERPL-MCNC: 130 MG/DL (ref 65–140)
GLUCOSE SERPL-MCNC: 231 MG/DL (ref 65–140)
HCT VFR BLD AUTO: 28 % (ref 36.5–49.3)
HGB BLD-MCNC: 9.2 G/DL (ref 12–17)
INR PPP: 1.18 (ref 0.84–1.19)
MCH RBC QN AUTO: 30.2 PG (ref 26.8–34.3)
MCHC RBC AUTO-ENTMCNC: 32.9 G/DL (ref 31.4–37.4)
MCV RBC AUTO: 92 FL (ref 82–98)
P AXIS: -3 DEGREES
P AXIS: 110 DEGREES
PLATELET # BLD AUTO: 130 THOUSANDS/UL (ref 149–390)
PMV BLD AUTO: 10.3 FL (ref 8.9–12.7)
POTASSIUM SERPL-SCNC: 4 MMOL/L (ref 3.5–5.3)
PR INTERVAL: 138 MS
PR INTERVAL: 150 MS
PROTHROMBIN TIME: 15 SECONDS (ref 11.6–14.5)
QRS AXIS: -40 DEGREES
QRS AXIS: 86 DEGREES
QRSD INTERVAL: 174 MS
QRSD INTERVAL: 178 MS
QT INTERVAL: 474 MS
QT INTERVAL: 504 MS
QTC INTERVAL: 543 MS
QTC INTERVAL: 544 MS
RBC # BLD AUTO: 3.05 MILLION/UL (ref 3.88–5.62)
SODIUM SERPL-SCNC: 137 MMOL/L (ref 136–145)
T WAVE AXIS: 119 DEGREES
T WAVE AXIS: 259 DEGREES
VENTRICULAR RATE: 70 BPM
VENTRICULAR RATE: 79 BPM
WBC # BLD AUTO: 6.51 THOUSAND/UL (ref 4.31–10.16)

## 2021-05-14 PROCEDURE — 99211 OFF/OP EST MAY X REQ PHY/QHP: CPT | Performed by: PHYSICIAN ASSISTANT

## 2021-05-14 PROCEDURE — 93010 ELECTROCARDIOGRAM REPORT: CPT | Performed by: INTERNAL MEDICINE

## 2021-05-14 PROCEDURE — 93005 ELECTROCARDIOGRAM TRACING: CPT

## 2021-05-14 PROCEDURE — 82948 REAGENT STRIP/BLOOD GLUCOSE: CPT

## 2021-05-14 PROCEDURE — 85610 PROTHROMBIN TIME: CPT | Performed by: PHYSICIAN ASSISTANT

## 2021-05-14 PROCEDURE — 85027 COMPLETE CBC AUTOMATED: CPT | Performed by: PHYSICIAN ASSISTANT

## 2021-05-14 PROCEDURE — 99232 SBSQ HOSP IP/OBS MODERATE 35: CPT | Performed by: PHYSICIAN ASSISTANT

## 2021-05-14 PROCEDURE — 97116 GAIT TRAINING THERAPY: CPT

## 2021-05-14 PROCEDURE — 99024 POSTOP FOLLOW-UP VISIT: CPT | Performed by: THORACIC SURGERY (CARDIOTHORACIC VASCULAR SURGERY)

## 2021-05-14 PROCEDURE — 99024 POSTOP FOLLOW-UP VISIT: CPT | Performed by: PHYSICIAN ASSISTANT

## 2021-05-14 PROCEDURE — 80048 BASIC METABOLIC PNL TOTAL CA: CPT | Performed by: PHYSICIAN ASSISTANT

## 2021-05-14 RX ORDER — POTASSIUM CHLORIDE 20 MEQ/1
20 TABLET, EXTENDED RELEASE ORAL
Qty: 7 TABLET | Refills: 1 | Status: SHIPPED | OUTPATIENT
Start: 2021-05-14 | End: 2021-05-14

## 2021-05-14 RX ORDER — FONDAPARINUX SODIUM 2.5 MG/.5ML
2.5 INJECTION SUBCUTANEOUS EVERY 24 HOURS
Status: DISCONTINUED | OUTPATIENT
Start: 2021-05-14 | End: 2021-05-14 | Stop reason: HOSPADM

## 2021-05-14 RX ORDER — LISINOPRIL 10 MG/1
10 TABLET ORAL DAILY
Status: DISCONTINUED | OUTPATIENT
Start: 2021-05-14 | End: 2021-05-14 | Stop reason: HOSPADM

## 2021-05-14 RX ORDER — WARFARIN SODIUM 2.5 MG/1
TABLET ORAL
Qty: 60 TABLET | Refills: 2 | Status: SHIPPED | OUTPATIENT
Start: 2021-05-14 | End: 2021-08-27

## 2021-05-14 RX ORDER — ACETAMINOPHEN 325 MG/1
TABLET ORAL
Refills: 0 | COMMUNITY
Start: 2021-05-14

## 2021-05-14 RX ORDER — POTASSIUM CHLORIDE 20 MEQ/1
20 TABLET, EXTENDED RELEASE ORAL DAILY
Qty: 7 TABLET | Refills: 1 | Status: SHIPPED | OUTPATIENT
Start: 2021-05-14 | End: 2021-06-14

## 2021-05-14 RX ORDER — ATORVASTATIN CALCIUM 80 MG/1
80 TABLET, FILM COATED ORAL
Qty: 30 TABLET | Refills: 2 | Status: SHIPPED | OUTPATIENT
Start: 2021-05-14 | End: 2021-07-12 | Stop reason: SDUPTHER

## 2021-05-14 RX ORDER — BLOOD SUGAR DIAGNOSTIC
STRIP MISCELLANEOUS
Qty: 100 EACH | Refills: 2 | Status: SHIPPED | OUTPATIENT
Start: 2021-05-14

## 2021-05-14 RX ORDER — DOCUSATE SODIUM 100 MG/1
100 CAPSULE, LIQUID FILLED ORAL 2 TIMES DAILY
Qty: 60 CAPSULE | Refills: 0 | Status: SHIPPED | OUTPATIENT
Start: 2021-05-14 | End: 2022-02-03

## 2021-05-14 RX ORDER — GLUCOSAMINE HCL/CHONDROITIN SU 500-400 MG
CAPSULE ORAL
Qty: 100 EACH | Refills: 2 | Status: SHIPPED | OUTPATIENT
Start: 2021-05-14 | End: 2021-08-03

## 2021-05-14 RX ORDER — LISINOPRIL 10 MG/1
10 TABLET ORAL DAILY
Qty: 30 TABLET | Refills: 2 | Status: SHIPPED | OUTPATIENT
Start: 2021-05-15 | End: 2021-06-14

## 2021-05-14 RX ORDER — SYRINGE AND NEEDLE,INSULIN,1ML 31 GX5/16"
SYRINGE, EMPTY DISPOSABLE MISCELLANEOUS
Qty: 1 KIT | Refills: 0 | Status: SHIPPED | OUTPATIENT
Start: 2021-05-14

## 2021-05-14 RX ORDER — TORSEMIDE 20 MG/1
20 TABLET ORAL DAILY
Qty: 7 TABLET | Refills: 1 | Status: SHIPPED | OUTPATIENT
Start: 2021-05-14 | End: 2021-06-14

## 2021-05-14 RX ORDER — OXYCODONE HYDROCHLORIDE 5 MG/1
TABLET ORAL
Qty: 30 TABLET | Refills: 0 | Status: SHIPPED | OUTPATIENT
Start: 2021-05-14 | End: 2021-06-14

## 2021-05-14 RX ORDER — ASPIRIN 81 MG/1
81 TABLET, CHEWABLE ORAL DAILY
Qty: 30 TABLET | Refills: 2 | Status: SHIPPED | OUTPATIENT
Start: 2021-05-14

## 2021-05-14 RX ORDER — LANCETS 28 GAUGE
EACH MISCELLANEOUS
Qty: 100 EACH | Refills: 2 | Status: SHIPPED | OUTPATIENT
Start: 2021-05-14

## 2021-05-14 RX ORDER — PANTOPRAZOLE SODIUM 40 MG/1
40 TABLET, DELAYED RELEASE ORAL
Qty: 30 TABLET | Refills: 0 | Status: SHIPPED | OUTPATIENT
Start: 2021-05-15 | End: 2021-08-03

## 2021-05-14 RX ORDER — ASPIRIN 325 MG
325 TABLET ORAL DAILY
Qty: 30 TABLET | Refills: 2 | Status: SHIPPED | OUTPATIENT
Start: 2021-05-15 | End: 2021-05-14 | Stop reason: HOSPADM

## 2021-05-14 RX ORDER — AMIODARONE HYDROCHLORIDE 200 MG/1
TABLET ORAL
Qty: 112 TABLET | Refills: 0 | Status: SHIPPED | OUTPATIENT
Start: 2021-05-14 | End: 2021-06-14

## 2021-05-14 RX ORDER — POLYETHYLENE GLYCOL 3350 17 G/17G
17 POWDER, FOR SOLUTION ORAL DAILY
Qty: 30 EACH | Refills: 0 | Status: SHIPPED | OUTPATIENT
Start: 2021-05-15 | End: 2021-08-03

## 2021-05-14 RX ADMIN — ACETAMINOPHEN 975 MG: 325 TABLET, FILM COATED ORAL at 06:37

## 2021-05-14 RX ADMIN — PANTOPRAZOLE SODIUM 40 MG: 40 TABLET, DELAYED RELEASE ORAL at 06:37

## 2021-05-14 RX ADMIN — FONDAPARINUX SODIUM 2.5 MG: 2.5 INJECTION, SOLUTION SUBCUTANEOUS at 08:36

## 2021-05-14 RX ADMIN — INSULIN LISPRO 2 UNITS: 100 INJECTION, SOLUTION INTRAVENOUS; SUBCUTANEOUS at 12:13

## 2021-05-14 RX ADMIN — AMIODARONE HYDROCHLORIDE 200 MG: 200 TABLET ORAL at 12:11

## 2021-05-14 RX ADMIN — Medication 12.5 MG: at 08:37

## 2021-05-14 RX ADMIN — AMIODARONE HYDROCHLORIDE 200 MG: 200 TABLET ORAL at 06:37

## 2021-05-14 RX ADMIN — FUROSEMIDE 40 MG: 10 INJECTION, SOLUTION INTRAMUSCULAR; INTRAVENOUS at 12:11

## 2021-05-14 RX ADMIN — FUROSEMIDE 40 MG: 10 INJECTION, SOLUTION INTRAMUSCULAR; INTRAVENOUS at 06:37

## 2021-05-14 RX ADMIN — POTASSIUM CHLORIDE 20 MEQ: 1500 TABLET, EXTENDED RELEASE ORAL at 06:37

## 2021-05-14 RX ADMIN — LISINOPRIL 10 MG: 10 TABLET ORAL at 08:37

## 2021-05-14 RX ADMIN — POTASSIUM CHLORIDE 20 MEQ: 1500 TABLET, EXTENDED RELEASE ORAL at 12:11

## 2021-05-14 RX ADMIN — ASPIRIN 325 MG ORAL TABLET 325 MG: 325 PILL ORAL at 08:37

## 2021-05-14 RX ADMIN — MUPIROCIN 1 APPLICATION: 20 OINTMENT TOPICAL at 08:37

## 2021-05-14 RX ADMIN — POLYETHYLENE GLYCOL 3350 17 G: 17 POWDER, FOR SOLUTION ORAL at 08:37

## 2021-05-14 NOTE — PLAN OF CARE
Problem: PHYSICAL THERAPY ADULT  Goal: Performs mobility at highest level of function for planned discharge setting  See evaluation for individualized goals  Description: Treatment/Interventions: Functional transfer training, LE strengthening/ROM, Elevations, Therapeutic exercise, Endurance training, Equipment eval/education, Bed mobility, Gait training, Spoke to nursing, OT  Equipment Recommended: Walker(at this time; will cont to monitor progress)       See flowsheet documentation for full assessment, interventions and recommendations  Outcome: Completed  Note: Prognosis: Good  Problem List: Decreased strength, Decreased endurance, Impaired balance, Decreased mobility  Assessment: Pt seen for session for setup, transfers, gait and stair training, repositioning  Pt cooperative, willing to mobilize  Needs less assist for all mobility tasks, and did well w/ stairs  will sign off at this time, as pt has met goals  Pt may mobilize w/ restirative while here  Barriers to Discharge: Inaccessible home environment        PT Discharge Recommendation: Home with home health rehabilitation     PT - OK to Discharge: Yes    See flowsheet documentation for full assessment

## 2021-05-14 NOTE — PROGRESS NOTES
Progress Note - Electrophysiology-Cardiology (EP)   Scott Bello 77 y o  male MRN: [id number]  Unit/Bed#: [facility] 419-01 Encounter: [id number]      Assessment/Plan:  1  New LBBB   -No progression of QRS duration seen on serial EKGs   -TTE 5/14/21: EF 45%   -Plan for event monitor at discharge, appointment made at [place] [id number] office for 2pm  2  New onset post-op atrial fibrillation with RVR   -Has maintained NSR >24hrs   -Continue low dose beta blocker and PO amiodarone   -Plan to discharge with amiodarone taper: 200 mg PO TID x 2 weeks, then 200 mg PO QD x 3 months   -Will need anticoagulation due to prolonged duration of afib episode  Continue Coumadin in post-op setting  3  Chronic systolic congestive HF   -TTE 5/14/21: EF 45%    -EVELYN 5/10/21 - Pre procedure EF 37%, Post-procedure EF 45%  4  CAD s/p CABGx5   -ASA/statin/beta blocker  5  Moderate rheumatic AS s/p tissue AVR  6  HLD  7  HTN  8  Asthma  9  DM2  10  L ICA stenosis s/p L carotid endarterectomy  11  Occluded R ICA        Subjective/Objective     Subjective: Upon examination today, patient is sitting up comfortably in his chair enjoying his lunch  He denies cardiac symptoms such as chest pain/heaviness/tightness, palpitations, cough, SOB, lightheadedness, presyncope, syncope, N/V  He has a good diet, pain is well controlled, and he is ambulating well  He is ready for discharge home today      Objective:     Vitals: BP (!) 172/82 (BP Location: Right arm)   Pulse 74   Temp 98 2 °F (36 8 °C) (Oral)   Resp 18   Ht 5' 9" (1 753 m)   Wt 75 8 kg (167 lb)   SpO2 100%   BMI 24 66 kg/m²   Vitals:    05/13/21 0600 05/14/21 0541   Weight: 78 5 kg (173 lb 1 oz) 75 8 kg (167 lb)     Orthostatic Blood Pressures      Most Recent Value   Blood Pressure  (!) 172/82 filed at 05/14/2021 0700   Patient Position - Orthostatic VS  Sitting filed at 05/14/2021 0700            Intake/Output Summary (Last 24 hours) at 5/14/2021 0801  Last data filed at 5/14/2021 0647  Gross per 24 hour   Intake 1080 ml   Output 3960 ml   Net -2880 ml       Invasive Devices     Central Venous Catheter Line            CVC Central Lines 05/10/21 Triple 4 days          Peripheral Intravenous Line            Peripheral IV 05/10/21 Left Hand 4 days                  Scheduled Meds:  Current Facility-Administered Medications   Medication Dose Route Frequency Provider Last Rate    acetaminophen  650 mg Rectal Q4H PRN McDowell Dear, SHEREE      acetaminophen  975 mg Oral Q8H McDowell Dear, SHEREE      amiodarone  200 mg Oral TID With [employer], PA-C      aspirin  325 mg Oral Daily McDowell Dear, SHEREE      atorvastatin  80 mg Oral Daily With [facility], SHEREE      bisacodyl  10 mg Rectal Daily PRN McDowell Dear, SHEREE      fondaparinux  2 5 mg Subcutaneous Q24H Amanda Jones PA-C      furosemide  40 mg Intravenous TID (diuretic) Amanda Joens PA-C      insulin glargine  12 Units Subcutaneous HS Jordan Thornton MD      insulin lispro  1-5 Units Subcutaneous TID AC Jordan Thornton MD      insulin lispro  1-5 Units Subcutaneous HS Jordan Thornton MD      insulin lispro  5 Units Subcutaneous TID With Meals Jordan Thornton MD      lisinopril  10 mg Oral Daily Amanda Jones PA-C      metoprolol tartrate  12 5 mg Oral Q12H [facility] Amanda Jones PA-C      mupirocin  1 application Nasal W53R [facility] Mery Davis PA-C      ondansetron  4 mg Intravenous Q6H PRN Oscar Dear, SHEREE      oxyCODONE  2 5 mg Oral Q4H PRN Oscar Dear, SHEREE      oxyCODONE  5 mg Oral Q4H PRN Oscar Dear, SHEREE      pantoprazole  40 mg Oral Early Morning McDowell DeaSHEREE dixon      polyethylene glycol  17 g Oral Daily McDowell Dear, SHEREE      potassium chloride  20 mEq Oral TID With Meals Amanda Jones PA-C       Continuous Infusions:   PRN Meds:   acetaminophen    bisacodyl    ondansetron    oxyCODONE    oxyCODONE    Review of Systems:  ROS as noted above, otherwise 12 point review of systems was performed and is negative  Physical Exam:   Physical Exam  Vitals signs reviewed  Constitutional:       General: He is not in acute distress  Appearance: He is not ill-appearing or diaphoretic  HENT:      Head: Normocephalic and atraumatic  Right Ear: External ear normal       Left Ear: External ear normal       Nose: Nose normal    Eyes:      General:         Right eye: No discharge  Left eye: No discharge  Neck:      Musculoskeletal: No neck rigidity or muscular tenderness  Cardiovascular:      Rate and Rhythm: Normal rate and regular rhythm  Heart sounds: No murmur  No friction rub  Pulmonary:      Effort: Pulmonary effort is normal       Breath sounds: Normal breath sounds  No wheezing, rhonchi or rales  Chest:      Comments: Sternal incision c/d/i    Abdominal:      General: There is no distension  Palpations: Abdomen is soft  Tenderness: There is no abdominal tenderness  Musculoskeletal:         General: No deformity or signs of injury  Right lower leg: No edema  Left lower leg: No edema  Skin:     General: Skin is warm and dry  Capillary Refill: Capillary refill takes less than 2 seconds  Coloration: Skin is not jaundiced or pale  Neurological:      General: No focal deficit present  Mental Status: He is alert and oriented to person, place, and time  Mental status is at baseline  Psychiatric:         Mood and Affect: Mood normal          Behavior: Behavior normal          Thought Content: Thought content normal            Lab Results: I have personally reviewed pertinent lab results      Results from last 7 days   Lab Units 05/14/21  0433 05/13/21  0240 05/12/21  0447   WBC Thousand/uL 6 51 8 79 8 43   HEMOGLOBIN g/dL 9 2* 9 1* 8 9*   HEMATOCRIT % 28 0* 27 6* 27 9*   PLATELETS Thousands/uL 130* 124* 107*     Results from last 7 days   Lab Units 05/14/21  0433 05/13/21  0300 05/12/21  0447  05/10/21  1319   POTASSIUM mmol/L 4 0 4 3 4 2   < >  --    CHLORIDE mmol/L 103 107 109*   < >  --    CO2 mmol/L 30 29 24   < >  --    CO2, I-STAT mmol/L  --   --   --   --  25   BUN mg/dL 25 28* 24   < >  --    CREATININE mg/dL 1 11 1 06 0 99   < >  --    GLUCOSE, ISTAT mg/dl  --   --   --   --  115   CALCIUM mg/dL 8 6 8 2* 7 8*   < >  --     < > = values in this interval not displayed  Results from last 7 days   Lab Units 05/14/21  0433 05/13/21  0846 05/13/21  0247 05/12/21  1921 05/12/21  0815   INR  1 18  --  1 19  --  1 16   PTT seconds  --  68* 69* 54*  --      Results from last 7 days   Lab Units 05/11/21  0509   MAGNESIUM mg/dL 2 3         Imaging: I have personally reviewed pertinent films in PACS  ECHO: 5/10/21  EVELYN:   PREPROCEDURE 5/10/21     LEFT VENTRICLE:  Systolic Function: moderately impaired  Ejection Fraction: 37%  Cavity size: dilated       RIGHT VENTRICLE:  Systolic Function: normal   Cavity size normal  No hypertrophy       AORTIC VALVE:  Leaflets: trileaflet  Leaflet motions restricted  Stenosis: moderate  Regurgitation: mild       MITRAL VALVE:  Leaflets: calcified  Leaflet Motions: restricted  Regurgitation: trace  Stenosis: mild  Mean Gradient: 3 mmHg       TRICUSPID VALVE:  Leaflets: normal  Leaflet Motions: normal  Stenosis: none  Regurgitation: mild      PULMONIC VALVE:  Sallyanne Fried  Stenosis: none      ASCENDING AORTA:  Size:  normal  Dissection not present        AORTIC ARCH:  Size:  normal  dissection not present  Grade 3: atheroma protruding < 0 5 cm into lumen      DESCENDING AORTA:  Size: normal  Dissection not present  Grade 3: atheroma protruding < 0 5 cm into lumen      RIGHT ATRIUM:  Size:  normal       LEFT ATRIUM:  Size: dilated  Spontaneous echo contrast      LEFT ATRIAL APPENDAGE:  Size: normal       ATRIAL SEPTUM:  Intra-atrial septal morphology: patent foramen ovale   Patent foramen ovale shunt: left to right shunt       VENTRICULAR SEPTUM:  Intra-ventricular septum morphology: normal       EPIAORTIC:  Plaque Thickness: 0-5 mm      OTHER FINDINGS:  Pericardium:  normal  Pleural Effusion:  none            POSTPROCEDURE 5/10/21     LEFT VENTRICLE:   Systolic Function: mildly depressed  Ejection Fraction: 45 %       RIGHT VENTRICLE: Unchanged       AORTIC VALVE: Leaflets: bioprosthetic  Stenosis: none  Mean Gradient: 5 mmHg  Regurgitation: none   Valve Size: 23 mm      MITRAL VALVE: Unchanged       TRICUSPID VALVE: Unchanged       PULMONIC VALVE: Unchanged      ATRIA: Unchanged       AORTA: Unchanged            EKG:                 VTE Pharmacologic Prophylaxis: Fondaparinux (Arixtra)  VTE Mechanical Prophylaxis: sequential compression device

## 2021-05-14 NOTE — TELEPHONE ENCOUNTER
Pt is s/p TAVR  30day biotel monitor was ordered  Pt has medicare  Don't think he needs auth but forwarding to you just in case

## 2021-05-14 NOTE — PHYSICAL THERAPY NOTE
Physical Therapy Treatment Note       05/14/21 1030   PT Last Visit   PT Visit Date 05/14/21   Note Type   Note Type Treatment   Pain Assessment   Pain Assessment Tool 0-10   Pain Score 3   Pain Location/Orientation Location: Chest   Patient's Stated Pain Goal No pain   Hospital Pain Intervention(s) Ambulation/increased activity   Restrictions/Precautions   Weight Bearing Precautions Per Order No   Other Precautions Cardiac/sternal;Pain;Multiple lines   General   Chart Reviewed Yes   Family/Caregiver Present No   Cognition   Overall Cognitive Status WFL   Arousal/Participation Responsive   Attention Attends with cues to redirect   Orientation Level Oriented X4   Memory Unable to assess   Following Commands Follows one step commands without difficulty   Subjective   Subjective states he feels better overall   Transfers   Sit to Stand 5  Supervision   Additional items Assist x 1   Stand to Sit 5  Supervision   Additional items Assist x 1   Ambulation/Elevation   Gait pattern   (mild sway, no LOB)   Gait Assistance 5  Supervision   Additional items Assist x 1   Assistive Device None   Distance 70'x2 to and from stairs, followed by 250'x2   Stair Management Assistance 5  Supervision   Additional items Assist x 1   Stair Management Technique One rail R;Nonreciprocal   Number of Stairs 14   Balance   Static Sitting Normal   Dynamic Sitting Good   Static Standing Good   Dynamic Standing Good   Ambulatory Fair +   Endurance Deficit   Endurance Deficit No   Activity Tolerance   Activity Tolerance Patient tolerated treatment well;Treatment limited secondary to medical complications (Comment)   Nurse Made Aware yes   Assessment   Prognosis Good   Assessment Pt seen for session for setup, transfers, gait and stair training, repositioning  Pt cooperative, willing to mobilize  Needs less assist for all mobility tasks, and did well w/ stairs  will sign off at this time, as pt has met goals    Pt may mobilize w/ restirative while here   Goals   PT Treatment Day 3   Plan   PT Frequency   (d/c PT)   Recommendation   PT Discharge Recommendation Home with home health rehabilitation   Equipment Recommended   (doubtful he needs RW)   PT - OK to Discharge Yes   [address] Mobility Inpatient   Turning in Bed Without Bedrails 4   Lying on Back to Sitting on Edge of Flat Bed 4   Moving Bed to Chair 4   Standing Up From Chair 4   Walk in Room 4   Climb 3-5 Stairs 3   Basic Mobility Inpatient Raw Score 23   Basic Mobility Standardized Score 50 88   Hilary Rm PT, DPT CSRS

## 2021-05-14 NOTE — PROGRESS NOTES
Progress Note - Cardiothoracic Surgery   Sharilyn Soulier 77 y o  male MRN: [id number]  Unit/Bed#: [facility] 419-01 Encounter: [id number]  Aortic stenosis, Rheumatic, Coronary artery disease  S/P aortic valve replacement and coronary artery bypass grafting; POD # 4    24 Hour Events: No events  No complaints      Medications:   Scheduled Meds:  Current Facility-Administered Medications   Medication Dose Route Frequency Provider Last Rate    acetaminophen  650 mg Rectal Q4H PRN Cherl Ting, PA-MASTER      acetaminophen  975 mg Oral Q8H Cherl Ting, PA-MASTER      amiodarone  200 mg Oral TID With Meals Amanda Jones PA-C      aspirin  325 mg Oral Daily Cherl Ting, SHEREE      atorvastatin  80 mg Oral Daily With [facility] SHEREE Luong      bisacodyl  10 mg Rectal Daily PRN Cherl Ting, SHEREE      furosemide  40 mg Intravenous TID (diuretic) Amanda Jones PA-C      insulin glargine  12 Units Subcutaneous HS Vandana Alfredo MD      insulin lispro  1-5 Units Subcutaneous TID AC Vandana Alfredo MD      insulin lispro  1-5 Units Subcutaneous HS Vandana Alfredo MD      insulin lispro  5 Units Subcutaneous TID With Meals Vandana Alfredo MD      lisinopril  5 mg Oral Daily Amanda Jones PA-C      metoprolol tartrate  12 5 mg Oral Q12H [facility] Amanda Jones PA-C      mupirocin  1 application Nasal R90B [facility] Carol Maguire PA-C      ondansetron  4 mg Intravenous Q6H PRN Cherl Ting, SHEREE      oxyCODONE  2 5 mg Oral Q4H PRN Cherl Ting, SHEREE      oxyCODONE  5 mg Oral Q4H PRN Cherl Ting, SHEREE      pantoprazole  40 mg Oral Early Morning Cherl Ting, PA-MASTER      polyethylene glycol  17 g Oral Daily Cherl Ting, PA-MASTER      potassium chloride  20 mEq Oral TID With Meals Amanda Jones PA-C       Continuous Infusions:   PRN Meds:   acetaminophen    bisacodyl    ondansetron    oxyCODONE    oxyCODONE    Vitals:   Vitals:    05/14/21 0400 05/14/21 0435 05/14/21 0500 05/14/21 0541   BP: (!) 187/90 162/90    BP Location:  Right arm     Pulse:  66 66    Resp:  16     Temp:  98 6 °F (37 °C)     TempSrc:  Oral     SpO2: 98% 100%     Weight:    75 8 kg (167 lb)   Height:         Bp x24hrs: 110-180/50-80    Telemetry: NSR; Heart Rate: 76; no events/24hrs    Respiratory:   SpO2: SpO2: 100 %, SpO2 Activity: SpO2 Activity: At Rest, SpO2 Device: O2 Device: None (Room air); Room Air    Intake/Output:   I/O       05/12 0701 - 05/13 0700 05/13 0701 - 05/14 0700 05/14 0701 - 05/15 0700    P  O  1080 1080     I V  (mL/kg) 551 (7)      IV Piggyback       Total Intake(mL/kg) 1631 (20 8) 1080 (14 3)     Urine (mL/kg/hr) 2775 (1 5) 3925 (2 2)     Chest Tube 210 35     Total Output 2985 3960     Net -1354 -2880            Unmeasured Urine Occurrence 1 x          UOP - 450cc/8hrs; 3925cc/24hrs    Chest tube Output:  CTs & EPWs have been discontinued    Weights:   Weight (last 2 days)     Date/Time   Weight    05/14/21 0541   75 8 (167)    05/13/21 0600   78 5 (173 06)    05/12/21 0600   79 2 (174 6)            Admit weight: 70 8kg - up 5kg from admission weight    Results:   Results from last 7 days   Lab Units 05/14/21  0433 05/13/21  0240 05/12/21  0447   WBC Thousand/uL 6 51 8 79 8 43   HEMOGLOBIN g/dL 9 2* 9 1* 8 9*   HEMATOCRIT % 28 0* 27 6* 27 9*   PLATELETS Thousands/uL 130* 124* 107*     Results from last 7 days   Lab Units 05/14/21  0433 05/13/21  0300 05/12/21  0447  05/10/21  1319   SODIUM mmol/L 137 137 140   < >  --    POTASSIUM mmol/L 4 0 4 3 4 2   < >  --    CHLORIDE mmol/L 103 107 109*   < >  --    CO2 mmol/L 30 29 24   < >  --    CO2, I-STAT mmol/L  --   --   --   --  25   BUN mg/dL 25 28* 24   < >  --    CREATININE mg/dL 1 11 1 06 0 99   < >  --    GLUCOSE, ISTAT mg/dl  --   --   --   --  115   CALCIUM mg/dL 8 6 8 2* 7 8*   < >  --     < > = values in this interval not displayed       Results from last 7 days   Lab Units 05/14/21  0433 05/13/21  0846 05/13/21  0247 05/12/21  1921 05/12/21  0815   INR 1 18  --  1 19  --  1 16   PTT seconds  --  68* 69* 54*  --      Coumadin mg 5/13 - 5  5/12 - 1          Point of care glucose: 116 - 232 - 165    Studies:  TTE 5/13: EF 45%, no RWMA, grade 1 DD, ventricular septal movement c/w LBBB, LA/RA mildly dilated, marked MAC, mild to mod calcification of MV leaflets, trace MR, AVR well seated w/o malfunction (mean 10 65), trace TR    I have personally reviewed pertinent reports  and I have personally reviewed pertinent films in PACS    Invasive Lines/Tubes:  Invasive Devices     Central Venous Catheter Line            CVC Central Lines 05/10/21 Triple 3 days          Peripheral Intravenous Line            Peripheral IV 05/10/21 Left Hand 4 days                Physical Exam:    HEENT/NECK:  Normocephalic  Atraumatic  No jugular venous distention  Cardiac: Regular rate and rhythm and No murmurs/rubs/gallops  Pulmonary:  Breath sounds clear bilaterally and No rales/rhonchi/wheezes  Abdomen:  Non-tender, Non-distended and Normal bowel sounds  Incisions: Sternum is stable  Incision is clean, dry, and intact  and Saphenectomy incison is clean, dry, and intact  Extremities: Extremities warm/dry and Trace edema B/L  Neuro: Alert and oriented X 3  Skin: Warm/Dry, without rashes or lesions  Assessment:  Principal Problem:    Coronary artery disease involving native coronary artery of native heart  Active Problems:    Benign essential hypertension    Controlled diabetes mellitus with diabetic neuropathy ([facility])    Carotid stenosis, left    Chronic systolic congestive heart failure (HCC)    Rheumatic aortic stenosis    S/P AVR    S/P CABG (coronary artery bypass graft)    Hyperchloremia    Acute blood loss anemia    Thrombocytopenia ([facility])    LBBB (left bundle branch block)    1st degree AV block    Anemia    Hypocalcemia    Atrial fibrillation with RVR ([facility])     Aortic stenosis, Rheumatic, Coronary artery disease   S/P aortic valve replacement and coronary artery bypass grafting; POD # 4    Plan:    1  Cardiac:   NSR; HTN intermittently  Lopressor, 12 5mg PO BID   Lisinopril 5mg PO QDay -- increase to 10mg  Amio taper at discharge  INR 1 18, dose Coumadin tonight  TTE w/ EF 45%, no LifeVest at discharge w/ new LBBB  Continue ASA and Statin therapy  Epicardial pacing wires out  Central IV access no longer required; Remove central venous catheter today  Continue DVT prophylaxis    2  Pulmonary:   Good Room air oxygen saturation; Continue incentive spirometry/Coughing/Deep breathing exercises  Chest tubes have been discontinued    3  Renal:   Intake/Output net: (-)2880 mL/24 hours  Continue diuresis   Lasix 40 mg IV TID  Potassium Chloride 20 mEq PO TID  Post op Creatinine stable; Follow up labs prn    4  Neuro:  Neurologically intact; No active issues  Incisional pain well-controlled  Continue Tylenol, 975 mg PO q 8, standing dose  Continue Oxycodone, 2 5 to 5 mg PO q 4 hours prn pain    5  GI:  Tolerating TLC 2 3 gm sodium diet  Maintain 1800 mL daily fluid restriction   Continue stool softeners and prn suppository  Continue GI prophylaxis    6  Endo:   History of diabetes; Continue SQ insulin therapy as directed by endocrinology physician  Insulin administration teaching for home therapy ordered    7    Hematology:    Post-operative acute blood loss anemia; Hemoglobin 9 2; trend prn   Thrombocytopenia    8  Disposition:      Ambulating independently, Anticipate discharge to home today     VTE Pharmacologic Prophylaxis: Fondaparinux (Arixtra) and Warfarin (Coumadin)  VTE Mechanical Prophylaxis: sequential compression device    Collaborative rounds completed with SOCORRO Puentes    Plan of care discussed with bedside nurse    SIGNATURE: Maria Fernanda Mccullough PA-C  DATE: May 14, 2021  TIME: 7:20 AM

## 2021-05-14 NOTE — PLAN OF CARE
Problem: Prexisting or High Potential for Compromised Skin Integrity  Goal: Skin integrity is maintained or improved  Description: INTERVENTIONS:  - Identify patients at risk for skin breakdown  - Assess and monitor skin integrity  - Assess and monitor nutrition and hydration status  - Monitor labs   - Assess for incontinence   - Turn and reposition patient  - Assist with mobility/ambulation  - Relieve pressure over bony prominences  - Avoid friction and shearing  - Provide appropriate hygiene as needed including keeping skin clean and dry  - Evaluate need for skin moisturizer/barrier cream  - Collaborate with interdisciplinary team   - Patient/family teaching  - Consider wound care consult   Outcome: Progressing     Problem: CARDIOVASCULAR - ADULT  Goal: Maintains optimal cardiac output and hemodynamic stability  Description: INTERVENTIONS:  - Monitor I/O, vital signs and rhythm  - Monitor for S/S and trends of decreased cardiac output  - Administer and titrate ordered vasoactive medications to optimize hemodynamic stability  - Assess quality of pulses, skin color and temperature  - Assess for signs of decreased coronary artery perfusion  - Instruct patient to report change in severity of symptoms  Outcome: Progressing  Goal: Absence of cardiac dysrhythmias or at baseline rhythm  Description: INTERVENTIONS:  - Continuous cardiac monitoring, vital signs, obtain 12 lead EKG if ordered  - Administer antiarrhythmic and heart rate control medications as ordered  - Monitor electrolytes and administer replacement therapy as ordered  Outcome: Progressing     Problem: RESPIRATORY - ADULT  Goal: Achieves optimal ventilation and oxygenation  Description: INTERVENTIONS:  - Assess for changes in respiratory status  - Assess for changes in mentation and behavior  - Position to facilitate oxygenation and minimize respiratory effort  - Oxygen administered by appropriate delivery if ordered  - Initiate smoking cessation education as indicated  - Encourage broncho-pulmonary hygiene including cough, deep breathe, Incentive Spirometry  - Assess the need for suctioning and aspirate as needed  - Assess and instruct to report SOB or any respiratory difficulty  - Respiratory Therapy support as indicated  Outcome: Progressing     Problem: GENITOURINARY - ADULT  Goal: Maintains or returns to baseline urinary function  Description: INTERVENTIONS:  - Assess urinary function  - Encourage oral fluids to ensure adequate hydration if ordered  - Administer IV fluids as ordered to ensure adequate hydration  - Administer ordered medications as needed  - Offer frequent toileting  - Follow urinary retention protocol if ordered  Outcome: Progressing  Goal: Absence of urinary retention  Description: INTERVENTIONS:  - Assess patients ability to void and empty bladder  - Monitor I/O  - Bladder scan as needed  - Discuss with physician/AP medications to alleviate retention as needed  - Discuss catheterization for long term situations as appropriate  Outcome: Progressing  Goal: Urinary catheter remains patent  Description: INTERVENTIONS:  - Assess patency of urinary catheter  - If patient has a chronic elam, consider changing catheter if non-functioning  - Follow guidelines for intermittent irrigation of non-functioning urinary catheter  Outcome: Progressing     Problem: METABOLIC, FLUID AND ELECTROLYTES - ADULT  Goal: Electrolytes maintained within normal limits  Description: INTERVENTIONS:  - Monitor labs and assess patient for signs and symptoms of electrolyte imbalances  - Administer electrolyte replacement as ordered  - Monitor response to electrolyte replacements, including repeat lab results as appropriate  - Instruct patient on fluid and nutrition as appropriate  Outcome: Progressing  Goal: Fluid balance maintained  Description: INTERVENTIONS:  - Monitor labs   - Monitor I/O and WT  - Instruct patient on fluid and nutrition as appropriate  - Assess for signs & symptoms of volume excess or deficit  Outcome: Progressing  Goal: Glucose maintained within target range  Description: INTERVENTIONS:  - Monitor Blood Glucose as ordered  - Assess for signs and symptoms of hyperglycemia and hypoglycemia  - Administer ordered medications to maintain glucose within target range  - Assess nutritional intake and initiate nutrition service referral as needed  Outcome: Progressing     Problem: SKIN/TISSUE INTEGRITY - ADULT  Goal: Skin integrity remains intact  Description: INTERVENTIONS  - Identify patients at risk for skin breakdown  - Assess and monitor skin integrity  - Assess and monitor nutrition and hydration status  - Monitor labs (i e  albumin)  - Assess for incontinence   - Turn and reposition patient  - Assist with mobility/ambulation  - Relieve pressure over bony prominences  - Avoid friction and shearing  - Provide appropriate hygiene as needed including keeping skin clean and dry  - Evaluate need for skin moisturizer/barrier cream  - Collaborate with interdisciplinary team (i e  Nutrition, Rehabilitation, etc )   - Patient/family teaching  Outcome: Progressing  Goal: Incision(s), wounds(s) or drain site(s) healing without S/S of infection  Description: INTERVENTIONS  - Assess and document risk factors for skin impairment   - Assess and document dressing, incision, wound bed, drain sites and surrounding tissue  - Consider nutrition services referral as needed  - Oral mucous membranes remain intact  - Provide patient/ family education  Outcome: Progressing  Goal: Oral mucous membranes remain intact  Description: INTERVENTIONS  - Assess oral mucosa and hygiene practices  - Implement preventative oral hygiene regimen  - Implement oral medicated treatments as ordered  - Initiate Nutrition services referral as needed  Outcome: Progressing     Problem: Potential for Falls  Goal: Patient will remain free of falls  Description: INTERVENTIONS:  - Assess patient frequently for physical needs  -  Identify cognitive and physical deficits and behaviors that affect risk of falls    -  [place] fall precautions as indicated by assessment   - Educate patient/family on patient safety including physical limitations  - Instruct patient to call for assistance with activity based on assessment  - Modify environment to reduce risk of injury  - Consider OT/PT consult to assist with strengthening/mobility  Outcome: Progressing

## 2021-05-14 NOTE — DISCHARGE SUMMARY
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 0]  Coronary artery disease involving native coronary artery of native heart, angina presence unspecified [I25 10]    Primary Discharge Diagnosis:   Aortic stenosis, Rheumatic, Coronary artery disease  S/P aortic valve replacement and coronary artery bypass grafting;    Secondary Discharge Diagnosis:   1  Carotid disease s/p L CEA and occluded ANI  2  NIDDM2  3  HTN  4  HTN  5  Arthritis    Attending: SOCORRO Townsend  Consulting Physician(s):   Cardiology  Medical/Critical Care  Occupational Therapy  Physical Therapy  Electrophysiology  Endocrinology    Procedures Performed:   Procedure(s):  CORONARY ARTERY BYPASS GRAFT (CABG) X 5 USING LEFT GSV--> DIAGONAL,, RAMUS, PDA ,AND OM1, AND LEFT BALBIR-LAD WITH REPLACEMENT VALVE AORTIC USING 23 MM MURRAY MAGNA EASE(AVR)     Hospital Course:   5/10: Elective admission for AVR/CABG x5  Transferred to ICU supported with Epi  Wean towards extubation  EKG w/ SB & new LBBB  Coagulopatic in ICU, coags ordered, Amicar ordered, given 2 PRBC/1 plt/1 cryo  PM: extubated, coagulopathy resolved     5/11: Overnight required cardene for SBP goal  Delined this AM  EKG continues with new LBBB and prolong QTc 552, Hgb 9 3 (10 1)  Continue to hold BB and amio  Add home lisinopril, lasix 40 mg BID, consult EP and endocrine  Transfer to tele  5/12: Seen by EP, no amio/beta blocker at this time, TTE POD 3 to determine POC  A fib w/ RVR since last PM, given amio bolus w/o issue, per EP ok for amio bolus/gtt & start Lopressor 12 5mg BID, discontinue Lisinopril 10mg QDay, NPO at midnight for cardioversion if still in a fib  Transitioned to SQ insulin per endo  Net (+) 866 6, increase Lasix to 40mg IV TID   Maintain CTs & EPWs for high output & possible conversion pause  5/13: converted to NSR 1630, stop heparin/amio gtt, start amio PO, INR 1 19, dose Coumadin 5mg tonight  Discontinue CTs & EPWs  TTE for EF eval s/[ CT removal per EP  HTN, add Lisinopril 10mg PO QDay, Home tomorrow  Pm: remains in NSR      5/14: No events  No complaints  INR 1 18, dose Coumadin 5mg tonight  Zio patch per EP  Stable for discharge to home  Condition at Discharge:   good     Discharge Physical Exam:    Please see the documented physical exam from this morning's progress note for details  Discharge Data:  Results from last 7 days   Lab Units 05/14/21 0433 05/13/21  0240 05/12/21  0447   WBC Thousand/uL 6 51 8 79 8 43   HEMOGLOBIN g/dL 9 2* 9 1* 8 9*   HEMATOCRIT % 28 0* 27 6* 27 9*   PLATELETS Thousands/uL 130* 124* 107*     Results from last 7 days   Lab Units 05/14/21  0433 05/13/21  0300 05/12/21  0447  05/10/21  1319   POTASSIUM mmol/L 4 0 4 3 4 2   < >  --    CHLORIDE mmol/L 103 107 109*   < >  --    CO2 mmol/L 30 29 24   < >  --    CO2, I-STAT mmol/L  --   --   --   --  25   BUN mg/dL 25 28* 24   < >  --    CREATININE mg/dL 1 11 1 06 0 99   < >  --    GLUCOSE, ISTAT mg/dl  --   --   --   --  115   CALCIUM mg/dL 8 6 8 2* 7 8*   < >  --     < > = values in this interval not displayed  Results from last 7 days   Lab Units 05/14/21  0433 05/13/21  0846 05/13/21  0247 05/12/21  1921 05/12/21  0815   INR  1 18  --  1 19  --  1 16   PTT seconds  --  68* 69* 54*  --        Discharge instructions/Information to patient and family:   See after visit summary for information provided to patient and family  Kiley Irwin was educated on restrictions regarding driving and lifting, and techniques of proper incisional care  They were specifically counselled on signs and symptoms of an incisional infection, and advised to contact our service immediately should they develop fevers, sweats, chill, redness or drainage at the site of any incisions      Provisions for Follow-Up Care:  See after visit summary for information related to follow-up care and any pertinent home health orders  Disposition:  Home w/ HHC & home PT (has RW from [facility])    Planned Readmission:   No    Discharge Medications:  See after visit summary for reconciled discharge medications provided to patient and family  Hope Jerry was provided contact information and scheduled a follow up appointment with SOCORRO Edouard  Additionally, follow up appointments have been scheduled for their primary care physician and primary cardiologist   Contact information was provided  Upon admission, troponins were Not checked    Hope Jerry was counseled on the importance of avoiding tobacco products  As with all patients whom have undergone open heart surgery, tobacco cessation medication was contraindicated at the time of discharge  ACE/ARB was Prescribed at discharge    Beta Blocker was Prescribed at discharge      Amio taper for PAF  Zio patch for new LBBB per EP  The patient was discharged on ongoing diuretic therapy with Torsemide 20 mg, PO QD and Potassium Chloride 20 mEq, PO QD  They were advised to continue these medications for 7 days, unless otherwise directed  Oral hyperglycemic agents at discharge per endocrinology  No refills needed per patient on PTA mediations  Glucometer dispensed  Karel London is being discharged on anticoagulation therapy for treatment of atrial fibrillation  As they are new to Coumadin therapy, arrangements have been made for Dr Jean Carlos Suarez office to monitor INR levels and provide dosing instructions  Their office was notified by [facility] and facsimile report which itemized the patients daily INRs and correlating Coumadin doses during their hospitalization  Hope Jerry has been prescribed Coumadin, 2 5 mg tabs, with 60 tablets being dispensed  They have been advised to take 5 mg daily, unless otherwise directed    Follow up PT/INR will be ordered at the discretion of the Coumadin clinic  Narcotic pain medication was prescribed in the form of Oxycodone  Prior to prescribing, their prescription profile was reviewed on the [place] department of health prescription drug monitoring program  Tylenol scheduled also recommended  The patient was informed that following their postoperative surgical evaluation, they will be referred to outpatient cardiac rehabilitation  They were counseled that this program is run by specialists who will help them safely strengthen their heart and prevent more heart disease  Cardiac rehabilitation will include exercise, relaxation, stress management, and heart-healthy nutrition  Caregivers will also check to make sure their medication regimen is working  During this admission, the patient was questioned on their use of tobacco, alcohol, and illicit/non-prescription drug use in the  previous 24 months  During this time frame they admit to using unhealthy alcohol use  As such they have been counseled on the importance of cessation and abstinence  I spent 30 minutes discharging the patient  This time was spent on the day of discharge  I had direct contact with the patient on the day of discharge  Additional documentation is required if more than 30 minutes were spent on discharge       Tesha Lyn PA-C  DATE: May 14, 2021  TIME: 11:17 AM

## 2021-05-14 NOTE — CASE MANAGEMENT
Pt is cleared for d/c by Cardiothoracic Surgery SHEREE Pitt  RN Marjan Matthews was notified of pt's d/c order  Pt is accepted for services by [facility] [address] [place] [address] for his aftercare plan  The pt and his son Lisa Esquivel were both informed of d/c  Son will transport pt home later this day, pickup time TBD  IMM signed by pt on 5/13/21  No chart copy required  CM to follow

## 2021-05-14 NOTE — PROGRESS NOTES
Cruz Dotson is here today under the direction of Josselin Schwartz PA-C for 30 day Biotel patch  Patch applied and all instructions for use given to patient in office

## 2021-05-18 ENCOUNTER — OFFICE VISIT (OUTPATIENT)
Dept: CARDIOLOGY CLINIC | Facility: CLINIC | Age: 76
End: 2021-05-18
Payer: MEDICARE

## 2021-05-18 VITALS
SYSTOLIC BLOOD PRESSURE: 126 MMHG | WEIGHT: 158.5 LBS | HEART RATE: 74 BPM | DIASTOLIC BLOOD PRESSURE: 84 MMHG | TEMPERATURE: 97.9 F | OXYGEN SATURATION: 92 % | BODY MASS INDEX: 23.47 KG/M2 | HEIGHT: 69 IN

## 2021-05-18 DIAGNOSIS — I44.7 LBBB (LEFT BUNDLE BRANCH BLOCK): ICD-10-CM

## 2021-05-18 DIAGNOSIS — I48.91 ATRIAL FIBRILLATION WITH RVR (HCC): ICD-10-CM

## 2021-05-18 DIAGNOSIS — I10 BENIGN ESSENTIAL HYPERTENSION: ICD-10-CM

## 2021-05-18 DIAGNOSIS — I50.42 CHRONIC COMBINED SYSTOLIC AND DIASTOLIC CONGESTIVE HEART FAILURE (HCC): ICD-10-CM

## 2021-05-18 DIAGNOSIS — E78.2 MIXED HYPERLIPIDEMIA: ICD-10-CM

## 2021-05-18 DIAGNOSIS — I65.21 ICAO (INTERNAL CAROTID ARTERY OCCLUSION), RIGHT: ICD-10-CM

## 2021-05-18 DIAGNOSIS — I25.10 CORONARY ARTERY DISEASE INVOLVING NATIVE CORONARY ARTERY OF NATIVE HEART, UNSPECIFIED WHETHER ANGINA PRESENT: Primary | ICD-10-CM

## 2021-05-18 DIAGNOSIS — I48.91 ATRIAL FIBRILLATION WITH RVR (HCC): Primary | ICD-10-CM

## 2021-05-18 PROCEDURE — 99214 OFFICE O/P EST MOD 30 MIN: CPT | Performed by: INTERNAL MEDICINE

## 2021-05-18 PROCEDURE — 93000 ELECTROCARDIOGRAM COMPLETE: CPT | Performed by: INTERNAL MEDICINE

## 2021-05-18 NOTE — PROGRESS NOTES
Cardiology   Spring Strickland DO, Luiz Keys MD, Zarina Cantu MD, Jennifer Ellis MD, [facility] [place]  -------------------------------------------------------------------  [facility]  [address], [address], [address] [place] [address], [address]  [phone]    Cardiology Follow Up  Sharilyn Soulier  1945  [id number]          Assessment/Plan:    1  Coronary artery disease involving native coronary artery of native heart, unspecified whether angina present    2  Benign essential hypertension    3  ICAO (internal carotid artery occlusion), right    4  Chronic combined systolic and diastolic congestive heart failure ([address] )    5  LBBB (left bundle branch block)    6  Mixed hyperlipidemia    7  Atrial fibrillation with RVR ([address] )      -  Patient recovering well post procedure  He has no lower extremity edema present on exam today  He is in sinus rhythm with left bundle-branch block  He is on amiodarone and Coumadin which will likely remain for 3 months postprocedure  Currently, has a 30 day bio Tel  Monitor in place   - May discontinue torsemide and potassium once 7 day course complete  - Activity/driving restrictions reviewed with him  - May begin cardiac rehab in 3 weeks  - BP controlled on current Rx     - Continue atorvastatin 80 mg daily  - Continue lisinopril and metoprolol  - Continue farxiga      - Follow up in 3 months  Will be seeing CT surgery next month  Interval History:     Sharilyn Soulier is 77 y o  male here for followup of recent hospitalization  On 4/6/21, the patient underwent nuclear stress test last visit because of congestive heart failure  He had an ejection fraction of 35-40% seen on echocardiogram   He was not having any symptoms of chest pain or shortness of breath  Nuclear stress test was done which showed ischemia of the lateral and inferoseptal walls    Cardiac catheterization was done afterwards which showed severe multivessel CAD  He subsequently underwent CABGx5 (LIMA to LAD, SVG to D1, SVG -Y- to ramus intermedius and OM1, SVG to R PDA) and tissue AVR 23 mm [facility] Ease    Procedure complicated by a new left bundle-branch block on postop ECG and atrial fibrillation  He converted to sinus rhythm with IV amiodarone  Patient had a 30 day biotel monitor placed after discharge  Since discharge, he has pain at sternotomy site but otherwise is feeling well  He denies any shortness of breath, LE edema, orthopnea or PND  He denies any palpitations, syncope or near syncope  Past Medical History:   Diagnosis Date    Arthritis     generalized    Asthma     seasonal-does not use inhaler    Back pain     Carotid artery stenosis     Diabetes mellitus (HCC)     type    GERD (gastroesophageal reflux disease)     Hiatal hernia     Hyperlipidemia     Hypertension     Peyronie's disease     last assessed [id number]    Seasonal allergies     Wears glasses      Social History     Socioeconomic History    Marital status: Single     Spouse name: Not on file    Number of children: Not on file    Years of education: Not on file    Highest education level: Not on file   Occupational History    Not on file   Social Needs    Financial resource strain: Not on file    Food insecurity     Worry: Not on file     Inability: Not on file    Transportation needs     Medical: Not on file     Non-medical: Not on file   Tobacco Use    Smoking status: Never Smoker    Smokeless tobacco: Never Used   Substance and Sexual Activity    Alcohol use:  Yes     Alcohol/week: 10 0 standard drinks     Types: 7 Glasses of wine, 3 Standard drinks or equivalent per week     Frequency: 4 or more times a week     Drinks per session: 1 or 2     Binge frequency: Daily or almost daily    Drug use: No    Sexual activity: Yes     Partners: Female   Lifestyle    Physical activity     Days per week: Not on file     Minutes per session: Not on file    Stress: Not on file   Relationships    Social connections     Talks on phone: Not on file     Gets together: Not on file     Attends Rastafari service: Not on file     Active member of club or organization: Not on file     Attends meetings of clubs or organizations: Not on file     Relationship status: Not on file    Intimate partner violence     Fear of current or ex partner: Not on file     Emotionally abused: Not on file     Physically abused: Not on file     Forced sexual activity: Not on file   Other Topics Concern    Not on file   Social History Narrative    Daily caffeinnated coffee       Family History   Problem Relation Age of Onset    Heart attack Mother [de-identified]        CABG    Kidney failure Father     Diabetes Father         pre-diabetic     Past Surgical History:   Procedure Laterality Date    APPENDECTOMY      CAROTID ENDARTERECTOMY Left 10/29/2020    COLONOSCOPY N/A 1/22/2016    Procedure: COLONOSCOPY;  Surgeon: Sixto Argueta MD;  Location: [place] [address] GI LAB; Service:     KNEE ARTHROSCOPY Left     KNEE ARTHROSCOPY W/ MENISCAL REPAIR Right     FL RPLCMT AORTIC VALVE OPN W/STENTLESS TISSUE VALVE N/A 5/10/2021    Procedure: CORONARY ARTERY BYPASS GRAFT (CABG) X 5 USING LEFT GSV--> DIAGONAL,, RAMUS, PDA ,AND OM1, AND LEFT BALBIR-LAD WITH REPLACEMENT VALVE AORTIC USING 23 MM MURRAY MAGNA EASE(AVR); Surgeon: Reagan Vela MD;  Location:  MAIN OR;  Service: Cardiac Surgery    FL THROMBOENDARTECTMY Isaias Pellet INCIS Left 10/29/2020    Procedure: CAROTID ENDARTERECTOMY W/BOVINE PATCH ANGIOPLASTY AND COMPLETION DUPLEX IMAGING;  Surgeon: Juan José Triplett DO;  Location: [facility];  Service: Vascular       Current Outpatient Medications:     acetaminophen (TYLENOL) 325 mg tablet, Take 1-2 tablets Q4-6 hours PRN pain  Do not take more than 4 grams in 24 hours  , Disp: , Rfl: 0    Alcohol Swabs 70 % PADS, Test TID & QHS as directed , Disp: 100 each, Rfl: 2    amiodarone 200 mg tablet, Take 1 tablet TID x2 weeks then QDay for a total of 3 months, Disp: 112 tablet, Rfl: 0    aspirin 81 mg chewable tablet, Chew 1 tablet (81 mg total) daily, Disp: 30 tablet, Rfl: 2    atorvastatin (LIPITOR) 80 mg tablet, Take 1 tablet (80 mg total) by mouth daily with dinner, Disp: 30 tablet, Rfl: 2    Blood Glucose Monitoring Suppl (FreeStyle InsuLinx System) w/Device KIT, Test TID & QHS as directed , Disp: 1 kit, Rfl: 0    docusate sodium (COLACE) 100 mg capsule, Take 1 capsule (100 mg total) by mouth 2 (two) times a day Hold for soft stools  , Disp: 60 capsule, Rfl: 0    Farxiga 5 MG TABS, TAKE ONE TABLET BY MOUTH EVERY DAY, Disp: 30 tablet, Rfl: 1    FreeStyle InsuLinx Test test strip, Test TID & QHS as directed , Disp: 100 each, Rfl: 2    Lancets (freestyle) lancets, Test TID & QHS as directed , Disp: 100 each, Rfl: 2    lisinopril (ZESTRIL) 10 mg tablet, Take 1 tablet (10 mg total) by mouth daily, Disp: 30 tablet, Rfl: 2    metFORMIN (GLUCOPHAGE) 1000 MG tablet, TAKE ONE TABLET BY MOUTH TWICE A DAY WITH MEALS, Disp: 180 tablet, Rfl: 2    metoprolol tartrate (LOPRESSOR) 25 mg tablet, Take 0 5 tablets (12 5 mg total) by mouth every 12 (twelve) hours, Disp: 30 tablet, Rfl: 2    oxyCODONE (ROXICODONE) 5 mg immediate release tablet, Take 1-2 tablets Q4-6 hours PRN pain , Disp: 30 tablet, Rfl: 0    pantoprazole (PROTONIX) 40 mg tablet, Take 1 tablet (40 mg total) by mouth daily in the early morning, Disp: 30 tablet, Rfl: 0    polyethylene glycol (MIRALAX) 17 g packet, Take 17 g by mouth daily Hold for soft stools  , Disp: 30 each, Rfl: 0    potassium chloride (K-DUR,KLOR-CON) 20 mEq tablet, Take 1 tablet (20 mEq total) by mouth daily for 7 days, Disp: 7 tablet, Rfl: 1    torsemide (DEMADEX) 20 mg tablet, Take 1 tablet (20 mg total) by mouth daily for 7 days Unless otherwise directed , Disp: 7 tablet, Rfl: 1    warfarin (COUMADIN) 2 5 mg tablet, Take 2 tablets (5mg) PO QHS unless otherwise directed , Disp: 60 tablet, Rfl: 2        Review of Systems:  Review of Systems   Constitutional: Negative for chills and fever  Respiratory: Negative for cough, chest tightness and shortness of breath  Cardiovascular: Positive for chest pain  Negative for palpitations and leg swelling  Musculoskeletal: Positive for myalgias  Negative for arthralgias  All other systems reviewed and are negative  Physical Exam:  Vitals:  Vitals:    05/18/21 1359   BP: 126/84   BP Location: Left arm   Patient Position: Sitting   Cuff Size: Standard   Pulse: 74   Temp: 97 9 °F (36 6 °C)   TempSrc: Temporal   SpO2: 92%   Weight: 71 9 kg (158 lb 8 oz)   Height: 5' 9" (1 753 m)     Physical Exam   Constitutional: He appears healthy  No distress  Eyes: Pupils are equal, round, and reactive to light  Conjunctivae are normal    Neck: Normal range of motion  Neck supple  No JVD present  Cardiovascular: Normal rate, regular rhythm and normal heart sounds  Exam reveals no gallop and no friction rub  No murmur heard  Pulmonary/Chest: Effort normal and breath sounds normal  He has no wheezes  He has no rales  Sternotomy site healing well   Musculoskeletal:         General: No tenderness, deformity or edema  Neurological: He is alert and oriented to person, place, and time  Skin: Skin is warm and dry  Cardiographics:  EKG: Personally reviewed    Normal sinus rhythm at 75 beats per minute with left bundle-branch block  Last known EF: 40-45%    This note was completed in part utilizing M-Modal Fluency Direct Software  Grammatical errors, random word insertions, spelling mistakes, and incomplete sentences can be an occasional consequence of this system secondary to software limitations, ambient noise, and hardware issues  If you have any questions or concerns about the content, text, or information contained within the body of this dictation, please contact the provider for clarification

## 2021-05-19 ENCOUNTER — TELEPHONE (OUTPATIENT)
Dept: CARDIAC SURGERY | Facility: CLINIC | Age: 76
End: 2021-05-19

## 2021-05-19 NOTE — PROGRESS NOTES
5/18 AND 5/19 L/M THAT HE WAS TO GET INR   SPOKE TO PT ON 5/18 AND TOLD HIM TO TEST   HE SAID HE WAS GOING TO GO ON 5/19/21   HAVENT RECEIVED ANYTHING  HE STATED HE WAS GO[facility]

## 2021-05-19 NOTE — TELEPHONE ENCOUNTER
Attempted to call patient to perform post op follow up, no answer, left voicemail to call our office

## 2021-05-20 ENCOUNTER — APPOINTMENT (OUTPATIENT)
Dept: LAB | Facility: CLINIC | Age: 76
End: 2021-05-20
Payer: MEDICARE

## 2021-05-20 DIAGNOSIS — Z95.1 S/P CABG (CORONARY ARTERY BYPASS GRAFT): ICD-10-CM

## 2021-05-20 DIAGNOSIS — Z95.2 S/P AVR: ICD-10-CM

## 2021-05-20 LAB
INR PPP: 3.8 (ref 0.84–1.19)
PROTHROMBIN TIME: 37.2 SECONDS (ref 11.6–14.5)

## 2021-05-20 PROCEDURE — 85610 PROTHROMBIN TIME: CPT

## 2021-05-20 PROCEDURE — 36415 COLL VENOUS BLD VENIPUNCTURE: CPT

## 2021-05-21 ENCOUNTER — ANTICOAG VISIT (OUTPATIENT)
Dept: CARDIOLOGY CLINIC | Facility: CLINIC | Age: 76
End: 2021-05-21

## 2021-05-21 ENCOUNTER — TELEPHONE (OUTPATIENT)
Dept: CARDIAC SURGERY | Facility: CLINIC | Age: 76
End: 2021-05-21

## 2021-05-21 DIAGNOSIS — I48.91 ATRIAL FIBRILLATION WITH RVR (HCC): ICD-10-CM

## 2021-05-21 NOTE — TELEPHONE ENCOUNTER
PO date= 5/10  D/C date= 5/14    Patient is doing well with no complaints  He denies CP or SOB  He is staying active by walking  He is having appropriate chest soreness  Denies redness/drainage from chest or leg incision  He has been weighing himself and states it remains stable  Denies LE edema  He continues taking his amio taper and currently with zio patch  All questions answered and concerns addressed  Reminded on upcoming appointments

## 2021-05-24 ENCOUNTER — APPOINTMENT (OUTPATIENT)
Dept: LAB | Facility: CLINIC | Age: 76
End: 2021-05-24
Payer: MEDICARE

## 2021-05-24 LAB
INR PPP: 4.5 (ref 0.84–1.19)
PROTHROMBIN TIME: 42.4 SECONDS (ref 11.6–14.5)

## 2021-05-24 PROCEDURE — 36415 COLL VENOUS BLD VENIPUNCTURE: CPT

## 2021-05-24 PROCEDURE — 85610 PROTHROMBIN TIME: CPT

## 2021-05-25 ENCOUNTER — ANTICOAG VISIT (OUTPATIENT)
Dept: CARDIOLOGY CLINIC | Facility: CLINIC | Age: 76
End: 2021-05-25

## 2021-05-25 DIAGNOSIS — I48.91 ATRIAL FIBRILLATION WITH RVR (HCC): ICD-10-CM

## 2021-05-27 ENCOUNTER — TELEPHONE (OUTPATIENT)
Dept: CARDIOLOGY CLINIC | Facility: CLINIC | Age: 76
End: 2021-05-27

## 2021-05-27 NOTE — TELEPHONE ENCOUNTER
Physical therapist from [facility] called states when she was with pt today bp was 173 94 and when she took it again it was 184/94

## 2021-05-28 DIAGNOSIS — I10 BENIGN ESSENTIAL HYPERTENSION: Primary | ICD-10-CM

## 2021-06-01 ENCOUNTER — APPOINTMENT (OUTPATIENT)
Dept: LAB | Facility: CLINIC | Age: 76
End: 2021-06-01
Payer: MEDICARE

## 2021-06-02 ENCOUNTER — ANTICOAG VISIT (OUTPATIENT)
Dept: CARDIOLOGY CLINIC | Facility: CLINIC | Age: 76
End: 2021-06-02

## 2021-06-02 DIAGNOSIS — I48.91 ATRIAL FIBRILLATION WITH RVR (HCC): ICD-10-CM

## 2021-06-09 ENCOUNTER — APPOINTMENT (OUTPATIENT)
Dept: LAB | Facility: CLINIC | Age: 76
End: 2021-06-09
Payer: MEDICARE

## 2021-06-10 ENCOUNTER — ANTICOAG VISIT (OUTPATIENT)
Dept: CARDIOLOGY CLINIC | Facility: CLINIC | Age: 76
End: 2021-06-10

## 2021-06-10 ENCOUNTER — OFFICE VISIT (OUTPATIENT)
Dept: CARDIAC SURGERY | Facility: CLINIC | Age: 76
End: 2021-06-10

## 2021-06-10 VITALS
OXYGEN SATURATION: 98 % | BODY MASS INDEX: 23.49 KG/M2 | RESPIRATION RATE: 18 BRPM | HEART RATE: 70 BPM | WEIGHT: 158.6 LBS | SYSTOLIC BLOOD PRESSURE: 192 MMHG | HEIGHT: 69 IN | TEMPERATURE: 99.1 F | DIASTOLIC BLOOD PRESSURE: 90 MMHG

## 2021-06-10 DIAGNOSIS — I06.0 RHEUMATIC AORTIC STENOSIS: ICD-10-CM

## 2021-06-10 DIAGNOSIS — Z95.2 S/P AVR: Primary | ICD-10-CM

## 2021-06-10 DIAGNOSIS — I48.91 ATRIAL FIBRILLATION WITH RVR (HCC): ICD-10-CM

## 2021-06-10 DIAGNOSIS — I25.10 CORONARY ARTERY DISEASE INVOLVING NATIVE CORONARY ARTERY OF NATIVE HEART, UNSPECIFIED WHETHER ANGINA PRESENT: ICD-10-CM

## 2021-06-10 DIAGNOSIS — Z48.89 ENCOUNTER FOR POSTOPERATIVE CARE: ICD-10-CM

## 2021-06-10 DIAGNOSIS — Z95.1 S/P CABG (CORONARY ARTERY BYPASS GRAFT): ICD-10-CM

## 2021-06-10 PROCEDURE — 99024 POSTOP FOLLOW-UP VISIT: CPT | Performed by: NURSE PRACTITIONER

## 2021-06-10 NOTE — PROGRESS NOTES
POST OP FOLLOW UP VISIT    Procedure: 5/10/21  1  Aortic valve replacement with a 23mm Dickerson Magna Ease pericardial tissue valve  2  Coronary artery bypass grafting x 5 with left internal mammary artery to left anterior descending artery, saphenous vein graft to diagonal 1, saphenous vein graft - Y -  to ramus intermedius and obtuse marginal 1 and saphenous vein graft to right posterior descending artery    History: Aicha Payne is a 77y o  year old male who presents to our office today for routine follow up care from aortic valve replacement and coronary artery bypass grafting  Patient tolerated procedure well  Post op course remarkable new LLB and prolonger QTc  He subsequently develeopd Afib w/ RVR treated with Amiodarone and Beta Blocker with conversion to NSR  He was anticoagulated with Coumadin  He was evaluated by EP who recommended out patient event monitor  EF on echo was 35% perop and up to 45% on post op echo  Patient discharged to home on POD # 4  Patient has had out patient follow up with his cardiologist   Today patient states he feels well  He is walking everyday and denies SOB, HYATT, angina, lightheadedness, palpitations, weight gain, edema, fever or chills  He is checking is BP at home  He is becoming increasingly hypertensive  PB is elevated today as well  He just trurned in his even recorder a few days ago  He was on Labetalol a few months ago but it was stopped and replaced with Coreg by his Cardiologist   He was discharged home after surgery on Metoprolol and Lisinopril  Vital Signs:   Vitals:    06/10/21 1543 06/10/21 1548   BP: (!) 184/90 (!) 192/90   BP Location: Left arm Right arm   Patient Position: Sitting    Cuff Size: Standard    Pulse: 70    Resp: 18    Temp: 99 1 °F (37 3 °C)    TempSrc: Tympanic    SpO2: 98%    Weight: 71 9 kg (158 lb 9 6 oz)    Height: 5' 9" (1 753 m)        Home Medications:   Prior to Admission medications    Medication Sig Start Date End Date Taking? Authorizing Provider   acetaminophen (TYLENOL) 325 mg tablet Take 1-2 tablets Q4-6 hours PRN pain  Do not take more than 4 grams in 24 hours  5/14/21  Yes Amanda Jones PA-C   Alcohol Swabs 70 % PADS Test TID & QHS as directed  5/14/21  Yes Amanda Jones PA-C   amiodarone 200 mg tablet Take 1 tablet TID x2 weeks then QDay for a total of 3 months 5/14/21 7/14/21 Yes Amanda Jones PA-C   aspirin 81 mg chewable tablet Chew 1 tablet (81 mg total) daily 5/14/21  Yes Amanda Jones PA-C   atorvastatin (LIPITOR) 80 mg tablet Take 1 tablet (80 mg total) by mouth daily with dinner 5/14/21  Yes Amanda Jones PA-C   Blood Glucose Monitoring Suppl (FreeStyle InsuLinx System) w/Device KIT Test TID & QHS as directed  5/14/21  Yes Amanda Jones PA-C   docusate sodium (COLACE) 100 mg capsule Take 1 capsule (100 mg total) by mouth 2 (two) times a day Hold for soft stools  5/14/21 6/13/21 Yes Amanda Jones PA-C   Farxiga 5 MG TABS TAKE ONE TABLET BY MOUTH EVERY DAY 4/10/21  Yes Joao Russell MD   FreeStyle InsuLinx Test test strip Test TID & QHS as directed  5/14/21  Yes Amanda Jones PA-C   Lancets (freestyle) lancets Test TID & QHS as directed  5/14/21  Yes Amanda Jones PA-C   lisinopril (ZESTRIL) 10 mg tablet Take 1 tablet (10 mg total) by mouth daily 5/15/21  Yes Amanda Jones PA-C   metFORMIN (GLUCOPHAGE) 1000 MG tablet TAKE ONE TABLET BY MOUTH TWICE A DAY WITH MEALS 2/17/21  Yes Joao Russell MD   metoprolol tartrate (LOPRESSOR) 25 mg tablet Take 1 tablet (25 mg total) by mouth every 12 (twelve) hours 5/28/21  Yes Lizzie Trujillo DO   pantoprazole (PROTONIX) 40 mg tablet Take 1 tablet (40 mg total) by mouth daily in the early morning 5/15/21 6/14/21 Yes Amanda Jones PA-C   polyethylene glycol (MIRALAX) 17 g packet Take 17 g by mouth daily Hold for soft stools  5/15/21 6/14/21 Yes Amanda Jones PA-C   warfarin (COUMADIN) 2 5 mg tablet Take 2 tablets (5mg) PO QHS unless otherwise directed   5/14/21  Yes Amanda Jones PA-C   metoprolol tartrate (LOPRESSOR) 25 mg tablet Take 0 5 tablets (12 5 mg total) by mouth every 12 (twelve) hours  Patient not taking: Reported on 6/10/2021 5/14/21   Amanda Jones PA-C   oxyCODONE (ROXICODONE) 5 mg immediate release tablet Take 1-2 tablets Q4-6 hours PRN pain  Patient not taking: Reported on 6/10/2021 5/14/21   Amanda Jones PA-C   potassium chloride (K-DUR,KLOR-CON) 20 mEq tablet Take 1 tablet (20 mEq total) by mouth daily for 7 days 5/14/21 5/21/21  Amanda Jones PA-C   torsemide (DEMADEX) 20 mg tablet Take 1 tablet (20 mg total) by mouth daily for 7 days Unless otherwise directed  5/14/21 5/21/21  Amanda Jones PA-C       Physical Exam:  General: Alert, oriented, well developed, no acute idstress  HEENT/NECK:  PERRLA  No jugular venous distention  Cardiac:Regular rate and rhythm, No murmurs rubs or gallops  Pulmonary:Breath sounds clear bilaterally  Abdomen:  Non-tender, Non-distended  Positive bowel sounds  Upper extremities: 2+ radial pulses; brisk capillary refill  Lower extremities: Extremities warm/dry  PT/DP pules 2+ bilaterally  No edema B/L  Incisions: Sternum is stable  Incision is clean, dry, and intact  Saphenectomy incision is clean, dry, and intact  Musculoskeletal: MAEE, no deficits, stable gait  Neuro: Alert and oriented X 3  Sensation is grossly intact  No focal deficits  Skin: Warm/Dry, without rashes or lesions  Lab Results:   Lab Results   Component Value Date    INR 1 72 (H) 06/09/2021    INR 2 79 (H) 06/01/2021    INR 4 50 (H) 05/24/2021    PROTIME 20 1 (H) 06/09/2021    PROTIME 29 2 (H) 06/01/2021    PROTIME 42 4 (H) 05/24/2021     Lab Results   Component Value Date    HGBA1C 6 6 (H) 05/07/2021       Assessment: Aortic stenosis, Rheumatic, Coronary artery disease   S/P aortic valve replacement and coronary artery bypass grafting;    Plan:     Karli Dee is making progress in his recovery aortic valve replacement and coronary artery bypass grafting  He is now 1 month post op  Incisions are well-healed and his sternum is stable  Weight and VS are stable  I reviewed medications and instructed him to increase his Lisinopril to 20 mg daily  I discussed the benefits of participating in cardiac rehab and have cleared him to begin the outpatient  program  He may resume driving and I reviewed his lifting restriction of no more than 25 lbs for an additional 2 months, until he reaches 12 weeks post-op  I advised him to call Dr Joao Ford office tomorrow to report his home BP readiness and to schedule an appt for HTN management  Michele Mendoza  does not need to return to our office for follow-up at this point  I have advised him to call with any new concerns that may arise  He should maintain routine follow-up with his cardiologist and PCP for ongoing medical care  Michele Mendoza was comfortable with our recommendations and his questions were answered to his satisfaction      Routine referral to gastroenterology for colonoscopy screening was not indicated, as the patient is over 77years old    Melissa Bowl, [address]  6/10/21  4:00PM

## 2021-06-10 NOTE — LETTER
Aileen 10, 2021     MD Radha [address]  [address] [facility]    Patient: Tamara Frankel   YOB: 1945   Date of Visit: 6/10/2021       Dear Dr Ronit Tirado: Thank you for referring Terrance Cowden to me for evaluation  Below are my notes for this consultation  If you have questions, please do not hesitate to call me  I look forward to following your patient along with you  Sincerely,        Sheyla Larios MD        CC: MD Neema Gonzales, [address]  6/10/2021  4:35 PM  Attested   POST OP FOLLOW UP VISIT    Procedure: 5/10/21  1  Aortic valve replacement with a 23mm Dickerson Magna Ease pericardial tissue valve  2  Coronary artery bypass grafting x 5 with left internal mammary artery to left anterior descending artery, saphenous vein graft to diagonal 1, saphenous vein graft - Y -  to ramus intermedius and obtuse marginal 1 and saphenous vein graft to right posterior descending artery    History: Tamara Frankel is a 77y o  year old male who presents to our office today for routine follow up care from aortic valve replacement and coronary artery bypass grafting  Patient tolerated procedure well  Post op course remarkable new LLB and prolonger QTc  He subsequently develeopd Afib w/ RVR treated with Amiodarone and Beta Blocker with conversion to NSR  He was anticoagulated with Coumadin  He was evaluated by EP who recommended out patient event monitor  EF on echo was 35% perop and up to 45% on post op echo  Patient discharged to home on POD # 4  Patient has had out patient follow up with his cardiologist   Today patient states he feels well  He is walking everyday and denies SOB, HYATT, angina, lightheadedness, palpitations, weight gain, edema, fever or chills  He is checking is BP at home  He is becoming increasingly hypertensive  PB is elevated today as well  He just trurned in his even recorder a few days ago    He was on Labetalol a few months ago but it was stopped and replaced with Coreg by his Cardiologist   He was discharged home after surgery on Metoprolol and Lisinopril  Vital Signs:   Vitals:    06/10/21 1543 06/10/21 1548   BP: (!) 184/90 (!) 192/90   BP Location: Left arm Right arm   Patient Position: Sitting    Cuff Size: Standard    Pulse: 70    Resp: 18    Temp: 99 1 °F (37 3 °C)    TempSrc: Tympanic    SpO2: 98%    Weight: 71 9 kg (158 lb 9 6 oz)    Height: 5' 9" (1 753 m)        Home Medications:   Prior to Admission medications    Medication Sig Start Date End Date Taking? Authorizing Provider   acetaminophen (TYLENOL) 325 mg tablet Take 1-2 tablets Q4-6 hours PRN pain  Do not take more than 4 grams in 24 hours  5/14/21  Yes Amanda Jones PA-C   Alcohol Swabs 70 % PADS Test TID & QHS as directed  5/14/21  Yes Amanda Jones PA-C   amiodarone 200 mg tablet Take 1 tablet TID x2 weeks then QDay for a total of 3 months 5/14/21 7/14/21 Yes Amanda Jones PA-C   aspirin 81 mg chewable tablet Chew 1 tablet (81 mg total) daily 5/14/21  Yes Amanda Jones PA-C   atorvastatin (LIPITOR) 80 mg tablet Take 1 tablet (80 mg total) by mouth daily with dinner 5/14/21  Yes Amanda Jones PA-C   Blood Glucose Monitoring Suppl (FreeStyle InsuLinx System) w/Device KIT Test TID & QHS as directed  5/14/21  Yes Amanda Jones PA-C   docusate sodium (COLACE) 100 mg capsule Take 1 capsule (100 mg total) by mouth 2 (two) times a day Hold for soft stools  5/14/21 6/13/21 Yes Amanda Jones PA-C   Farxiga 5 MG TABS TAKE ONE TABLET BY MOUTH EVERY DAY 4/10/21  Yes José Deluna MD   FreeStyle InsuLinx Test test strip Test TID & QHS as directed  5/14/21  Yes Amanda Jones PA-C   Lancets (freestyle) lancets Test TID & QHS as directed   5/14/21  Yes Amanda Jones PA-C   lisinopril (ZESTRIL) 10 mg tablet Take 1 tablet (10 mg total) by mouth daily 5/15/21  Yes Amanda Jones PA-C   metFORMIN (GLUCOPHAGE) 1000 MG tablet TAKE ONE TABLET BY MOUTH TWICE A DAY WITH MEALS 2/17/21  Yes Gina Hidalgo MD   metoprolol tartrate (LOPRESSOR) 25 mg tablet Take 1 tablet (25 mg total) by mouth every 12 (twelve) hours 5/28/21  Yes Lizzie Trujillo DO   pantoprazole (PROTONIX) 40 mg tablet Take 1 tablet (40 mg total) by mouth daily in the early morning 5/15/21 6/14/21 Yes Amanda Jones PA-C   polyethylene glycol (MIRALAX) 17 g packet Take 17 g by mouth daily Hold for soft stools  5/15/21 6/14/21 Yes mAanda Jones PA-C   warfarin (COUMADIN) 2 5 mg tablet Take 2 tablets (5mg) PO QHS unless otherwise directed  5/14/21  Yes Amanda Jones PA-C   metoprolol tartrate (LOPRESSOR) 25 mg tablet Take 0 5 tablets (12 5 mg total) by mouth every 12 (twelve) hours  Patient not taking: Reported on 6/10/2021 5/14/21   Amanda Jones PA-C   oxyCODONE (ROXICODONE) 5 mg immediate release tablet Take 1-2 tablets Q4-6 hours PRN pain  Patient not taking: Reported on 6/10/2021 5/14/21   Amanda Jones PA-C   potassium chloride (K-DUR,KLOR-CON) 20 mEq tablet Take 1 tablet (20 mEq total) by mouth daily for 7 days 5/14/21 5/21/21  Amanda Jones PA-C   torsemide (DEMADEX) 20 mg tablet Take 1 tablet (20 mg total) by mouth daily for 7 days Unless otherwise directed  5/14/21 5/21/21  Amanda Jones PA-C       Physical Exam:  General: Alert, oriented, well developed, no acute idstress  HEENT/NECK:  PERRLA  No jugular venous distention  Cardiac:Regular rate and rhythm, No murmurs rubs or gallops  Pulmonary:Breath sounds clear bilaterally  Abdomen:  Non-tender, Non-distended  Positive bowel sounds  Upper extremities: 2+ radial pulses; brisk capillary refill  Lower extremities: Extremities warm/dry  PT/DP pules 2+ bilaterally  No edema B/L  Incisions: Sternum is stable  Incision is clean, dry, and intact  Saphenectomy incision is clean, dry, and intact  Musculoskeletal: MAEE, no deficits, stable gait  Neuro: Alert and oriented X 3  Sensation is grossly intact    No focal deficits  Skin: Warm/Dry, without rashes or lesions  Lab Results:   Lab Results   Component Value Date    INR 1 72 (H) 06/09/2021    INR 2 79 (H) 06/01/2021    INR 4 50 (H) 05/24/2021    PROTIME 20 1 (H) 06/09/2021    PROTIME 29 2 (H) 06/01/2021    PROTIME 42 4 (H) 05/24/2021     Lab Results   Component Value Date    HGBA1C 6 6 (H) 05/07/2021       Assessment: Aortic stenosis, Rheumatic, Coronary artery disease  S/P aortic valve replacement and coronary artery bypass grafting;    Plan:     Anamaria Shultz is making progress in his recovery aortic valve replacement and coronary artery bypass grafting  He is now 1 month post op  Incisions are well-healed and his sternum is stable  Weight and VS are stable  I reviewed medications and instructed him to increase his Lisinopril to 20 mg daily  I discussed the benefits of participating in cardiac rehab and have cleared him to begin the outpatient  program  He may resume driving and I reviewed his lifting restriction of no more than 25 lbs for an additional 2 months, until he reaches 12 weeks post-op  I advised him to call Dr Martina Rice office tomorrow to report his home BP readiness and to schedule an appt for HTN management  Anamaria Shultz  does not need to return to our office for follow-up at this point  I have advised him to call with any new concerns that may arise  He should maintain routine follow-up with his cardiologist and PCP for ongoing medical care  Anamaria Shultz was comfortable with our recommendations and his questions were answered to his satisfaction  Routine referral to gastroenterology for colonoscopy screening was not indicated, as the patient is over 77years old    Kenneth Jin  6/10/21  4:00PM  Attestation signed by Tash Mccallum MD at 6/10/2021  5:04 PM:  Patient seen and evaluated with Kenneth Joseph / SHEREE  I agree with the above assessment and plan with the following additions  The patient is a 58-year-old man now a month status post CABG 5 AVR    He is recovering well without complications  He is hypertensive with blood pressure in the 180 today  Plan:  Increase Lisinopril to 20mg/day  Follow up with cardiology and PCP  Cardiac rehab    This note was completed in part utilizing m-modal fluency direct voice recognition software  Grammatical errors, random word insertion, spelling mistakes, and incomplete sentences may be an occasional consequence of the system secondary to software limitations, ambient noise and hardware issues  At the time of dictation, efforts were made to edit, clarify and /or correct errors  Please read the chart carefully and recognize, using context, where substitutions have occurred  If you have any questions or concerns about the context, text or information contained within the body of this dictation, please contact myself, the provider, for further clarification

## 2021-06-14 ENCOUNTER — OFFICE VISIT (OUTPATIENT)
Dept: CARDIOLOGY CLINIC | Facility: CLINIC | Age: 76
End: 2021-06-14
Payer: MEDICARE

## 2021-06-14 VITALS
SYSTOLIC BLOOD PRESSURE: 174 MMHG | HEIGHT: 69 IN | TEMPERATURE: 98.6 F | DIASTOLIC BLOOD PRESSURE: 98 MMHG | HEART RATE: 63 BPM | BODY MASS INDEX: 23.99 KG/M2 | WEIGHT: 162 LBS | OXYGEN SATURATION: 97 %

## 2021-06-14 DIAGNOSIS — I44.7 LBBB (LEFT BUNDLE BRANCH BLOCK): ICD-10-CM

## 2021-06-14 DIAGNOSIS — I25.10 CORONARY ARTERY DISEASE INVOLVING NATIVE CORONARY ARTERY OF NATIVE HEART, UNSPECIFIED WHETHER ANGINA PRESENT: ICD-10-CM

## 2021-06-14 DIAGNOSIS — I10 BENIGN ESSENTIAL HYPERTENSION: Primary | ICD-10-CM

## 2021-06-14 DIAGNOSIS — E78.2 MIXED HYPERLIPIDEMIA: ICD-10-CM

## 2021-06-14 PROCEDURE — 99214 OFFICE O/P EST MOD 30 MIN: CPT | Performed by: INTERNAL MEDICINE

## 2021-06-14 PROCEDURE — 93000 ELECTROCARDIOGRAM COMPLETE: CPT | Performed by: INTERNAL MEDICINE

## 2021-06-14 PROCEDURE — 1124F ACP DISCUSS-NO DSCNMKR DOCD: CPT | Performed by: INTERNAL MEDICINE

## 2021-06-14 RX ORDER — LISINOPRIL AND HYDROCHLOROTHIAZIDE 25; 20 MG/1; MG/1
1 TABLET ORAL DAILY
Qty: 30 TABLET | Refills: 2 | Status: SHIPPED | OUTPATIENT
Start: 2021-06-14 | End: 2021-08-09 | Stop reason: SDUPTHER

## 2021-06-14 RX ORDER — CARVEDILOL 6.25 MG/1
6.25 TABLET ORAL 2 TIMES DAILY WITH MEALS
Qty: 60 TABLET | Refills: 2
Start: 2021-06-14 | End: 2021-11-22 | Stop reason: SDUPTHER

## 2021-06-14 NOTE — PROGRESS NOTES
Cardiology   Honorio Lopez DO, Mora Longoria MD, Ronel Philip MD, Krishna Aguirre MD, [facility] - [place]  -------------------------------------------------------------------  [facility]  [address], [address], [address]  [place], [facility], [facility]  [phone]    Cardiology Follow Up  Nan Caldera  1945  [id number]          Assessment/Plan:    1  Benign essential hypertension    2  LBBB (left bundle branch block)    3  Coronary artery disease involving native coronary artery of native heart, unspecified whether angina present    4  Mixed hyperlipidemia      -   Blood pressure markedly elevated  Will adjust medications to his preprocedure regimen of lisinopril / hydrochlorothiazide 20/25 mg daily  Also discontinue metoprolol and begin carvedilol 6 25 mg b i d  Marquis Manifold -   Discontinue amiodarone  -   Will check blood work including TSH and CMP with next INR check  -   Continue Coumadin  Will discontinue in 2 months  -   Will monitor blood pressure during cardiac rehab  Will follow-up in 2-3 weeks for blood pressure recheck  - Continue atorvastatin 80 mg daily  - Continue farxiga  Interval History:     Nan Caldera is 77 y o  male here for Evaluation of hypertension  Patient has had markedly elevated blood pressure over the past few weeks  Has been high with visiting nurse assessment  He has been monitoring regularly and had blood pressure  Over 200/100 this week  He has no complaints  He denies any chest pain or shortness of breath  He denies any lower extremity edema, orthopnea or paroxysmal nocturnal dyspnea  He does have a history of hypertension  Earlier this year, blood pressure was controlled with carvedilol, lisinopril and hydrochlorothiazide  These medications were changed while hospitalized for CABG  On 4/6/21, the patient underwent nuclear stress test last visit because of congestive heart failure    He had an ejection fraction of 35-40% seen on echocardiogram   He was not having any symptoms of chest pain or shortness of breath  Nuclear stress test was done which showed ischemia of the lateral and inferoseptal walls  Cardiac catheterization was done afterwards which showed severe multivessel CAD  He subsequently underwent CABGx5 (LIMA to LAD, SVG to D1, SVG -Y- to ramus intermedius and OM1, SVG to R PDA) and tissue AVR 23 mm [facility] Ease    Procedure complicated by a new left bundle-branch block on postop ECG and atrial fibrillation  He converted to sinus rhythm with IV amiodarone  Patient had a 30 day biotel monitor placed after discharge  There were no events noted  Past Medical History:   Diagnosis Date    Arthritis     generalized    Asthma     seasonal-does not use inhaler    Back pain     Carotid artery stenosis     Diabetes mellitus (HCC)     type    GERD (gastroesophageal reflux disease)     Hiatal hernia     Hyperlipidemia     Hypertension     Peyronie's disease     last assessed [id number]    Seasonal allergies     Wears glasses      Social History     Socioeconomic History    Marital status: Single     Spouse name: Not on file    Number of children: Not on file    Years of education: Not on file    Highest education level: Not on file   Occupational History    Not on file   Tobacco Use    Smoking status: Never Smoker    Smokeless tobacco: Never Used   Vaping Use    Vaping Use: Never used   Substance and Sexual Activity    Alcohol use:  Yes     Alcohol/week: 10 0 standard drinks     Types: 7 Glasses of wine, 3 Standard drinks or equivalent per week    Drug use: No    Sexual activity: Yes     Partners: Female   Other Topics Concern    Not on file   Social History Narrative    Daily caffeinnated coffee      Social Determinants of Health     Financial Resource Strain:     Difficulty of Paying Living Expenses:    Food Insecurity:     Worried About Running Out of Food in the Last Year:    [address] in the Last Year:    Transportation Needs:     Lack of Transportation (Medical):  Lack of Transportation (Non-Medical):    Physical Activity:     Days of Exercise per Week:     Minutes of Exercise per Session:    Stress:     Feeling of Stress :    Social Connections:     Frequency of Communication with Friends and Family:     Frequency of Social Gatherings with Friends and Family:     Attends Sikhism Services:     Active Member of Clubs or Organizations:     Attends Club or Organization Meetings:     Marital Status:    Intimate Partner Violence:     Fear of Current or Ex-Partner:     Emotionally Abused:     Physically Abused:     Sexually Abused:       Family History   Problem Relation Age of Onset    Heart attack Mother [de-identified]        CABG    Kidney failure Father     Diabetes Father         pre-diabetic     Past Surgical History:   Procedure Laterality Date    APPENDECTOMY      CAROTID ENDARTERECTOMY Left 10/29/2020    COLONOSCOPY N/A 1/22/2016    Procedure: COLONOSCOPY;  Surgeon: Mary Ann Jones MD;  Location: [place] [address] GI LAB; Service:     KNEE ARTHROSCOPY Left     KNEE ARTHROSCOPY W/ MENISCAL REPAIR Right     NV RPLCMT AORTIC VALVE OPN W/STENTLESS TISSUE VALVE N/A 5/10/2021    Procedure: CORONARY ARTERY BYPASS GRAFT (CABG) X 5 USING LEFT GSV--> DIAGONAL,, RAMUS, PDA ,AND OM1, AND LEFT BALBIR-LAD WITH REPLACEMENT VALVE AORTIC USING 23 MM MURRAY MAGNA EASE(AVR); Surgeon: Jase Roman MD;  Location:  MAIN OR;  Service: Cardiac Surgery    NV THROMBOENDARTECTMY Elizabeth Dar INCIS Left 10/29/2020    Procedure: CAROTID ENDARTERECTOMY W/BOVINE PATCH ANGIOPLASTY AND COMPLETION DUPLEX IMAGING;  Surgeon: Nani Elizabeth DO;  Location: [facility];  Service: Vascular       Current Outpatient Medications:     acetaminophen (TYLENOL) 325 mg tablet, Take 1-2 tablets Q4-6 hours PRN pain  Do not take more than 4 grams in 24 hours  , Disp: , Rfl: 0    Alcohol Swabs 70 % PADS, Test TID & QHS as directed , Disp: 100 each, Rfl: 2    aspirin 81 mg chewable tablet, Chew 1 tablet (81 mg total) daily, Disp: 30 tablet, Rfl: 2    atorvastatin (LIPITOR) 80 mg tablet, Take 1 tablet (80 mg total) by mouth daily with dinner, Disp: 30 tablet, Rfl: 2    Blood Glucose Monitoring Suppl (FreeStyle InsuLinx System) w/Device KIT, Test TID & QHS as directed , Disp: 1 kit, Rfl: 0    Farxiga 5 MG TABS, TAKE ONE TABLET BY MOUTH EVERY DAY, Disp: 30 tablet, Rfl: 1    FreeStyle InsuLinx Test test strip, Test TID & QHS as directed , Disp: 100 each, Rfl: 2    Lancets (freestyle) lancets, Test TID & QHS as directed , Disp: 100 each, Rfl: 2    metFORMIN (GLUCOPHAGE) 1000 MG tablet, TAKE ONE TABLET BY MOUTH TWICE A DAY WITH MEALS, Disp: 180 tablet, Rfl: 2    pantoprazole (PROTONIX) 40 mg tablet, Take 1 tablet (40 mg total) by mouth daily in the early morning, Disp: 30 tablet, Rfl: 0    polyethylene glycol (MIRALAX) 17 g packet, Take 17 g by mouth daily Hold for soft stools  , Disp: 30 each, Rfl: 0    warfarin (COUMADIN) 2 5 mg tablet, Take 2 tablets (5mg) PO QHS unless otherwise directed , Disp: 60 tablet, Rfl: 2    carvedilol (COREG) 6 25 mg tablet, Take 1 tablet (6 25 mg total) by mouth 2 (two) times a day with meals, Disp: 60 tablet, Rfl: 2    docusate sodium (COLACE) 100 mg capsule, Take 1 capsule (100 mg total) by mouth 2 (two) times a day Hold for soft stools  , Disp: 60 capsule, Rfl: 0    lisinopril-hydrochlorothiazide (PRINZIDE,ZESTORETIC) 20-25 MG per tablet, Take 1 tablet by mouth daily, Disp: 30 tablet, Rfl: 2        Review of Systems:  Review of Systems   Constitutional: Negative for chills and fever  HENT: Negative for ear pain and sore throat  Eyes: Negative for pain and visual disturbance  Respiratory: Negative for cough and shortness of breath  Cardiovascular: Negative for chest pain, palpitations and leg swelling  Gastrointestinal: Negative for abdominal pain and vomiting  Genitourinary: Negative for dysuria and hematuria  Musculoskeletal: Negative for arthralgias and back pain  Skin: Negative for color change and rash  Neurological: Negative for seizures and syncope  All other systems reviewed and are negative  Physical Exam:  Vitals:  Vitals:    06/14/21 1431   BP: (!) 174/98   BP Location: Right arm   Patient Position: Sitting   Cuff Size: Standard   Pulse: 63   Temp: 98 6 °F (37 °C)   SpO2: 97%   Weight: 73 5 kg (162 lb)   Height: 5' 9" (1 753 m)     Physical Exam   Constitutional: He appears healthy  No distress  Eyes: Pupils are equal, round, and reactive to light  Conjunctivae are normal    Neck: No JVD present  Cardiovascular: Normal rate, regular rhythm and normal heart sounds  Exam reveals no gallop and no friction rub  No murmur heard  Pulmonary/Chest: Effort normal and breath sounds normal  He has no wheezes  He has no rales  Musculoskeletal:         General: No tenderness, deformity or edema  Cervical back: Normal range of motion and neck supple  Neurological: He is alert and oriented to person, place, and time  Skin: Skin is warm and dry  Cardiographics:  EKG: Personally reviewed    Normal sinus rhythm at 75 beats per minute with left bundle-branch block  Last known EF: 40-45%    This note was completed in part utilizing M-Modal Fluency Direct Software  Grammatical errors, random word insertions, spelling mistakes, and incomplete sentences can be an occasional consequence of this system secondary to software limitations, ambient noise, and hardware issues  If you have any questions or concerns about the content, text, or information contained within the body of this dictation, please contact the provider for clarification

## 2021-06-16 ENCOUNTER — APPOINTMENT (OUTPATIENT)
Dept: LAB | Facility: CLINIC | Age: 76
End: 2021-06-16
Payer: MEDICARE

## 2021-06-16 LAB
ALBUMIN SERPL BCP-MCNC: 4 G/DL (ref 3.5–5)
ALP SERPL-CCNC: 105 U/L (ref 46–116)
ALT SERPL W P-5'-P-CCNC: 21 U/L (ref 12–78)
ANION GAP SERPL CALCULATED.3IONS-SCNC: 8 MMOL/L (ref 4–13)
AST SERPL W P-5'-P-CCNC: 22 U/L (ref 5–45)
BILIRUB SERPL-MCNC: 0.46 MG/DL (ref 0.2–1)
BUN SERPL-MCNC: 21 MG/DL (ref 5–25)
CALCIUM SERPL-MCNC: 9.4 MG/DL (ref 8.3–10.1)
CHLORIDE SERPL-SCNC: 104 MMOL/L (ref 100–108)
CO2 SERPL-SCNC: 27 MMOL/L (ref 21–32)
CREAT SERPL-MCNC: 1.14 MG/DL (ref 0.6–1.3)
GFR SERPL CREATININE-BSD FRML MDRD: 63 ML/MIN/1.73SQ M
GLUCOSE P FAST SERPL-MCNC: 126 MG/DL (ref 65–99)
POTASSIUM SERPL-SCNC: 4.2 MMOL/L (ref 3.5–5.3)
PROT SERPL-MCNC: 7.4 G/DL (ref 6.4–8.2)
SODIUM SERPL-SCNC: 139 MMOL/L (ref 136–145)
TSH SERPL DL<=0.05 MIU/L-ACNC: 3.07 UIU/ML (ref 0.36–3.74)

## 2021-06-16 PROCEDURE — 84443 ASSAY THYROID STIM HORMONE: CPT | Performed by: INTERNAL MEDICINE

## 2021-06-16 PROCEDURE — 80053 COMPREHEN METABOLIC PANEL: CPT | Performed by: INTERNAL MEDICINE

## 2021-06-17 ENCOUNTER — ANTICOAG VISIT (OUTPATIENT)
Dept: CARDIOLOGY CLINIC | Facility: CLINIC | Age: 76
End: 2021-06-17

## 2021-06-17 DIAGNOSIS — E11.9 TYPE 2 DIABETES MELLITUS WITHOUT COMPLICATION, WITHOUT LONG-TERM CURRENT USE OF INSULIN (HCC): ICD-10-CM

## 2021-06-17 DIAGNOSIS — I48.91 ATRIAL FIBRILLATION WITH RVR (HCC): Primary | ICD-10-CM

## 2021-06-17 RX ORDER — DAPAGLIFLOZIN 5 MG/1
5 TABLET, FILM COATED ORAL DAILY
Qty: 90 TABLET | Refills: 1 | Status: SHIPPED | OUTPATIENT
Start: 2021-06-17 | End: 2021-12-30 | Stop reason: SDUPTHER

## 2021-06-17 NOTE — TELEPHONE ENCOUNTER
Dr Oviedo ,    Patient is calling for a refill on Farxiga 5mg  Pharmacy is [facility] in [place]

## 2021-06-17 NOTE — TELEPHONE ENCOUNTER
I have attempted to call this patient multiple times to see how he is feeling after his open-heart  Are we able to check up on him  Tell him I left a voice message is  Just want to see how he is feeling after all that stuff that was performed on him    Refill will be sent to [facility]

## 2021-06-23 ENCOUNTER — APPOINTMENT (OUTPATIENT)
Dept: LAB | Facility: CLINIC | Age: 76
End: 2021-06-23
Payer: MEDICARE

## 2021-06-24 ENCOUNTER — TELEPHONE (OUTPATIENT)
Dept: VASCULAR SURGERY | Facility: CLINIC | Age: 76
End: 2021-06-24

## 2021-06-24 ENCOUNTER — ANTICOAG VISIT (OUTPATIENT)
Dept: CARDIOLOGY CLINIC | Facility: CLINIC | Age: 76
End: 2021-06-24

## 2021-06-24 DIAGNOSIS — I48.91 ATRIAL FIBRILLATION WITH RVR (HCC): Primary | ICD-10-CM

## 2021-06-24 NOTE — TELEPHONE ENCOUNTER
Attempted to contact patient to schedule 9 month office visit to review carotid doppler results  Requested patient call [phone] option 3 to schedule appointment(s)  Surgeon - Jennifer Vizcaino (NPI: [id number])  Date of Surgery - 10/29/20  All appointments must be scheduled by, 04/18/22  ! !!! Patient must be scheduled for their 9-month office visit before the follow-up window close date !!!!    CRUZ/ [facility] VQI Protocol  patients must be scheduled for the following    [] 3-month Doppler -     [x] 9-month Doppler Expected - scheduled for 08/09/21    [x] 9-month OV after Doppler - due on or after 08/09/21          Scheduling Reminders  1  Insurance requires, 9-month doppler to be scheduled 6-months and 2-days after the 3-month doppler  2  Appointment notes MUST be entered in the following format:  a  VQI FORMS NEEDED VQI LTFU (CRUZ or CEA) (Date of Surgery) CV Duplex (Date of Doppler) (Location of Doppler)  3  If unable to schedule, a recall MUST be entered  >>Please route this encounter to Whit Gonzalez, Rosemary Arizmendi, and Susan Luna  <<

## 2021-06-29 ENCOUNTER — CLINICAL SUPPORT (OUTPATIENT)
Dept: CARDIAC REHAB | Facility: CLINIC | Age: 76
End: 2021-06-29
Payer: MEDICARE

## 2021-06-29 DIAGNOSIS — Z95.1 S/P CABG (CORONARY ARTERY BYPASS GRAFT): ICD-10-CM

## 2021-06-29 DIAGNOSIS — Z95.2 S/P AVR: ICD-10-CM

## 2021-06-29 DIAGNOSIS — E11.40 CONTROLLED TYPE 2 DIABETES MELLITUS WITH DIABETIC NEUROPATHY, WITHOUT LONG-TERM CURRENT USE OF INSULIN (HCC): ICD-10-CM

## 2021-06-29 PROCEDURE — 93797 PHYS/QHP OP CAR RHAB WO ECG: CPT

## 2021-06-29 NOTE — PROGRESS NOTES
Cardiac Rehabilitation Plan of Care   Initial Care Plan          Today's date: 2021   # of Exercise Sessions Completed: Initial  Patient name: John Medellin      : 1945  Age: 77 y o  MRN: [id number]  Referring Physician: Barbette Gitelman,*  Cardiologist: Betzaida Powers  Provider: Kevon Edwards  Clinician: Melvi Ratliff RN    Dx:   Encounter Diagnoses   Name Primary?  S/P AVR     S/P CABG (coronary artery bypass graft)     Controlled type 2 diabetes mellitus with diabetic neuropathy, without long-term current use of insulin ([facility] )      Date of onset: 5/10/2021      SUMMARY OF PROGRESS:  Completed initial evaluation  Explained cardiac rehabilitation, cardiac risk factors, how the heart functions, Ejection fraction, afib, heart healthy diet, Diabetes and exercise  Completed sub max treadmill test  Obtained BMI and waist measurements  Telemetry monitor NSR with BBB, 1AVB, PAC and PVC at rest and with exercise  Exercise MET level 2  2  RPE 4-6  Tolerated exercise well  Denied any complaint of chest discomfort  Ambulates with unsteady gait  Mid sternal chest incision and three drain sites clean, dry and intact  No redness or drainage noted  He has a large mass on right neck he stated MD aware  Pt made aware of financial responsibility of 20% of the cardiac rehabilitation  Provided him with handbook for cardiac rehabilitation  Will continue to monitor  Medication compliance: Yes   Comments: Pt reports to be compliant with medications  Fall Risk: Low   Comments: Ambulates with a steady gait with no assist device and Denies a fall in the past 6 months    EKG Interpretation: NSR 1AVB   PAC, PVC      EXERCISE ASSESSMENT and PLAN    Current Exercise Program in Rehab:       Frequency:  days/week    Minutes: 5         METS: 2 2            HR: 67-97   RPE: 4-7         Modalities: Treadmill      Exercise Progression 30 Day Goals :    Frequency: 3 days/week of cardiac rehab     Supplement with home exercise 2+ days/wk as tolerated    Minutes: 30-50                            >150 mins/wk of moderate intensity exercise   METS: 2 0-4 5   HR:     RPE: 4-6   Modalities: Treadmill, NuStep and Recumbent bike    Strength training: Will be added following at least 8 weeks post surgery and 8-10 monitored sessions   Modalities: Arm Curl, Wall push-ups, Front Raises and Shoulder Shrugs    Home Exercise: Type: walking , Frequency: 5-7 days/week, Duration: 30 mins    Goals: Attend Rehab regularly and increase endurance to return to normal activities such as yard work, and golfing    Progression Toward Goals:  Reviewed Pt goals and determined plan of care, Will continue to educate and progress as tolerated  Education: benefit of exercise for CAD risk factors, AHA guidelines to achieve >150 mins/wk of moderate exercise, RPE scale and class: Risk Factors for Heart Disease   Plan:education on home exercise guidelines and home exercise 30+ mins 2 days opposite CR  Readiness to change: Action:  (Changing behavior)      NUTRITION ASSESSMENT AND PLAN    Weight control:    Starting weight: 156   Current weight:     Waist circumference:    Startin   Current:      Diabetes: T2D, Patient reported fasting , No insulin  A1c: 5 5    last measured:     Lipid management: Discussed diet and lipid management and Last lipid profile 2021  Chol 155    HDL 57  LDL 75    Goals:choose low sodium processed foods    Progression Toward Goals: Reviewed Pt goals and determined plan of care, Will continue to educate and progress as tolerated      Education: heart healthy eating  low sodium diet  exercise and diabetes management   Plan: Education class: Reading Food Labels, Education Class: Heart Healthy Eating and replace unhealthy snacks with fruits & vegs  Readiness to change: Preparation:  (Getting ready to change)       PSYCHOSOCIAL ASSESSMENT AND PLAN    Emotional:  Depression assessment:  PHQ-9 = 5-9 = Mild Depression            Anxiety measure:  DENIS-7 = 0-4  = Not anxious  Self-reported stress level:  1  Social support: Very Good, Patient reports excellent emotional/social support from family and Patient has friends available for support when needed     Goals:  improved sleep and increased energy    Progression Toward Goals: Reviewed Pt goals and determined plan of care, Will continue to educate and progress as tolerated  Education: signs/sxs of depression, benefits of a positive support system and stress management techniques  Plan: Class: Stress and Your Health, Refer to [employer], Practice relaxation techniques, Exercise, Spend time outdoors, Read a Book and Keep a positive mindset  Readiness to change: Preparation:  (Getting ready to change)       OTHER CORE COMPONENTS     Tobacco:   Social History     Tobacco Use   Smoking Status Never Smoker   Smokeless Tobacco Never Used       Tobacco Use Intervention:   N/A:  Patient is a non-smoker     Anginal Symptoms:  None   NTG use: No prescription    Blood pressure:    Restin/82   Exercise: 162/80    Goals: consistent BP < 130/80, reduced dietary sodium <2300mg, moderate intensity exercise >150 mins/wk and medication compliance    Progression Toward Goals: Reviewed Pt goals and determined plan of care, Will continue to educate and progress as tolerated      Education:  understanding high blood pressure and it's relationship to CAD, low sodium diet and HTN and Education class: Understanding Heart Disease  Plan: Class: Understanding Heart Disease, Class: Common Heart Medications, Avoid Processed foods, engage in regular exercise and check labels for sodium content  Readiness to change: Preparation:  (Getting ready to change)

## 2021-06-29 NOTE — PROGRESS NOTES
CARDIAC REHAB ASSESSMENT    Today's date: 2021  Patient name: John Medellin     : [birth year]       MRN: [id number]  PCP: Osmani Lange MD  Referring Physician: Barbette Gitelman,*  Cardiologist: Betzaida Powers  Surgeon: Barbette Gitelman  Dx:   Encounter Diagnoses   Name Primary?  S/P AVR     S/P CABG (coronary artery bypass graft)     Controlled type 2 diabetes mellitus with diabetic neuropathy, without long-term current use of insulin ([facility])        Date of onset: 5/10/2021  Cultural needs: none    Height:    Wt Readings from Last 1 Encounters:   21 73 5 kg (162 lb)      Weight:   Ht Readings from Last 1 Encounters:   21 5' 9" (1 753 m)     Medical History:   Past Medical History:   Diagnosis Date    Arthritis     generalized    Asthma     seasonal-does not use inhaler    Back pain     Carotid artery stenosis     Diabetes mellitus ([address] )     type    GERD (gastroesophageal reflux disease)     Hiatal hernia     Hyperlipidemia     Hypertension     Peyronie's disease     last assessed [id number]    Seasonal allergies     Wears glasses          Physical Limitations: chronic discomfort bilateral shoulders and left knee     Fall Risk: Low   Comments: Ambulates with a steady gait with no assist device and Denies a fall in the past 6 months    Anginal Equivalent: None/denies angina   NTG use: No prescription    Risk Factors   Cholesterol: Yes  Smoking: Never used  HTN: Yes  DM: Type 2   average   diet controlled  insulin  Obesity: No   Inactivity: No  Stress:  perceived  stress: 10   Stressors:financial   Goals for Stress Management:Practice Relaxation Techniques, Read, Exercise and Keep a positive mindset    Family History:  Family History   Problem Relation Age of Onset    Heart attack Mother [de-identified]        CABG    Kidney failure Father     Diabetes Father         pre-diabetic       Allergies: Patient has no known allergies    ETOH:   Social History     Substance and Sexual Activity   Alcohol Use Yes    Alcohol/week: 10 0 standard drinks    Types: 7 Glasses of wine, 3 Standard drinks or equivalent per week         Current Medications:   Current Outpatient Medications   Medication Sig Dispense Refill    acetaminophen (TYLENOL) 325 mg tablet Take 1-2 tablets Q4-6 hours PRN pain  Do not take more than 4 grams in 24 hours  0    Alcohol Swabs 70 % PADS Test TID & QHS as directed  100 each 2    aspirin 81 mg chewable tablet Chew 1 tablet (81 mg total) daily 30 tablet 2    atorvastatin (LIPITOR) 80 mg tablet Take 1 tablet (80 mg total) by mouth daily with dinner 30 tablet 2    Blood Glucose Monitoring Suppl (FreeStyle InsuLinx System) w/Device KIT Test TID & QHS as directed  1 kit 0    carvedilol (COREG) 6 25 mg tablet Take 1 tablet (6 25 mg total) by mouth 2 (two) times a day with meals 60 tablet 2    Dapagliflozin Propanediol (Farxiga) 5 MG TABS Take 1 tablet (5 mg total) by mouth daily 90 tablet 1    docusate sodium (COLACE) 100 mg capsule Take 1 capsule (100 mg total) by mouth 2 (two) times a day Hold for soft stools  60 capsule 0    FreeStyle InsuLinx Test test strip Test TID & QHS as directed  100 each 2    Lancets (freestyle) lancets Test TID & QHS as directed  100 each 2    lisinopril-hydrochlorothiazide (PRINZIDE,ZESTORETIC) 20-25 MG per tablet Take 1 tablet by mouth daily 30 tablet 2    metFORMIN (GLUCOPHAGE) 1000 MG tablet TAKE ONE TABLET BY MOUTH TWICE A DAY WITH MEALS 180 tablet 2    pantoprazole (PROTONIX) 40 mg tablet Take 1 tablet (40 mg total) by mouth daily in the early morning 30 tablet 0    polyethylene glycol (MIRALAX) 17 g packet Take 17 g by mouth daily Hold for soft stools  30 each 0    warfarin (COUMADIN) 2 5 mg tablet Take 2 tablets (5mg) PO QHS unless otherwise directed  60 tablet 2     No current facility-administered medications for this visit           Functional Status Prior to Diagnosis for Treatment   Occupation: retired  Recreation: golfing, yard work, reading  ADLs: No limitations  [place]: No limitations  Exercise: none    Current Functional Status  Occupation: retired  Recreation: yard work minimal, reading  ADLs:resumed all ADLs within sternal precautions  [place]: resumed all ADLs within sternal precautions  Exercise: started walking 30 minutes most days      Patient Specific Goals:  Attend Cr regularly    Short Term Program Goals: increase endurance to return to normal activities yard work    Long Term Goals: increase endurance to return to golfing    Ability to reach goals/rehabilitation potential:  Very Good     Projected return to function: 8-12 weeks  Objective tests: sub-max TM ETT      Nutritional   Reviewed details of Rate your Plate  Discussed key elements of heart healthy eating  Reviewed patient goals for dietary modifications and their clinical implications  Reviewed most recent lipid profile       Goals for dietary modification: increase fruits and vegetables      Emotional/Social  Patient reports he/she is coping well with good social support and denies depression or anxiety  Provided contact information for [facility]  Provided [place] information    Marital status: single    Domestic Violence Screening: lives with  feels sfae and free of harm    Comments: initial evaluation completed

## 2021-06-30 ENCOUNTER — APPOINTMENT (OUTPATIENT)
Dept: LAB | Facility: CLINIC | Age: 76
End: 2021-06-30
Payer: MEDICARE

## 2021-07-01 ENCOUNTER — ANTICOAG VISIT (OUTPATIENT)
Dept: CARDIOLOGY CLINIC | Facility: CLINIC | Age: 76
End: 2021-07-01

## 2021-07-01 DIAGNOSIS — I48.91 ATRIAL FIBRILLATION WITH RVR (HCC): Primary | ICD-10-CM

## 2021-07-02 ENCOUNTER — CLINICAL SUPPORT (OUTPATIENT)
Dept: CARDIAC REHAB | Facility: CLINIC | Age: 76
End: 2021-07-02
Payer: MEDICARE

## 2021-07-02 DIAGNOSIS — Z95.2 S/P AVR: ICD-10-CM

## 2021-07-02 DIAGNOSIS — Z95.1 S/P CABG (CORONARY ARTERY BYPASS GRAFT): ICD-10-CM

## 2021-07-02 PROCEDURE — 93798 PHYS/QHP OP CAR RHAB W/ECG: CPT

## 2021-07-07 ENCOUNTER — CLINICAL SUPPORT (OUTPATIENT)
Dept: CARDIAC REHAB | Facility: CLINIC | Age: 76
End: 2021-07-07
Payer: MEDICARE

## 2021-07-07 DIAGNOSIS — Z95.1 S/P CABG (CORONARY ARTERY BYPASS GRAFT): ICD-10-CM

## 2021-07-07 DIAGNOSIS — Z95.2 S/P AVR: ICD-10-CM

## 2021-07-07 PROCEDURE — 93798 PHYS/QHP OP CAR RHAB W/ECG: CPT

## 2021-07-08 ENCOUNTER — ANTICOAG VISIT (OUTPATIENT)
Dept: CARDIOLOGY CLINIC | Facility: CLINIC | Age: 76
End: 2021-07-08

## 2021-07-08 ENCOUNTER — APPOINTMENT (OUTPATIENT)
Dept: LAB | Facility: CLINIC | Age: 76
End: 2021-07-08
Payer: MEDICARE

## 2021-07-08 DIAGNOSIS — I48.91 ATRIAL FIBRILLATION WITH RVR (HCC): Primary | ICD-10-CM

## 2021-07-09 ENCOUNTER — CLINICAL SUPPORT (OUTPATIENT)
Dept: CARDIAC REHAB | Facility: CLINIC | Age: 76
End: 2021-07-09
Payer: MEDICARE

## 2021-07-09 DIAGNOSIS — Z95.1 S/P CABG (CORONARY ARTERY BYPASS GRAFT): ICD-10-CM

## 2021-07-09 DIAGNOSIS — Z95.2 S/P AVR: ICD-10-CM

## 2021-07-09 PROCEDURE — 93798 PHYS/QHP OP CAR RHAB W/ECG: CPT

## 2021-07-12 ENCOUNTER — OFFICE VISIT (OUTPATIENT)
Dept: CARDIOLOGY CLINIC | Facility: CLINIC | Age: 76
End: 2021-07-12
Payer: MEDICARE

## 2021-07-12 ENCOUNTER — CLINICAL SUPPORT (OUTPATIENT)
Dept: CARDIAC REHAB | Facility: CLINIC | Age: 76
End: 2021-07-12
Payer: MEDICARE

## 2021-07-12 VITALS
OXYGEN SATURATION: 98 % | HEIGHT: 69 IN | SYSTOLIC BLOOD PRESSURE: 122 MMHG | BODY MASS INDEX: 23.4 KG/M2 | TEMPERATURE: 98.3 F | HEART RATE: 68 BPM | WEIGHT: 158 LBS | DIASTOLIC BLOOD PRESSURE: 84 MMHG

## 2021-07-12 DIAGNOSIS — Z95.2 S/P AVR: ICD-10-CM

## 2021-07-12 DIAGNOSIS — E11.40 CONTROLLED TYPE 2 DIABETES MELLITUS WITH DIABETIC NEUROPATHY, WITHOUT LONG-TERM CURRENT USE OF INSULIN (HCC): ICD-10-CM

## 2021-07-12 DIAGNOSIS — I48.0 PAF (PAROXYSMAL ATRIAL FIBRILLATION) (HCC): ICD-10-CM

## 2021-07-12 DIAGNOSIS — Z95.1 S/P CABG (CORONARY ARTERY BYPASS GRAFT): ICD-10-CM

## 2021-07-12 DIAGNOSIS — E78.2 MIXED HYPERLIPIDEMIA: Primary | ICD-10-CM

## 2021-07-12 PROCEDURE — 99214 OFFICE O/P EST MOD 30 MIN: CPT | Performed by: INTERNAL MEDICINE

## 2021-07-12 PROCEDURE — 93798 PHYS/QHP OP CAR RHAB W/ECG: CPT

## 2021-07-12 RX ORDER — ATORVASTATIN CALCIUM 80 MG/1
80 TABLET, FILM COATED ORAL
Qty: 90 TABLET | Refills: 2 | Status: SHIPPED | OUTPATIENT
Start: 2021-07-12 | End: 2022-06-23 | Stop reason: SDUPTHER

## 2021-07-12 NOTE — PROGRESS NOTES
Cardiology   Adriana , , Zack Simms MD, Gerson Lemon MD, Honorio Welsh MD, [facility] [place]  -------------------------------------------------------------------  [facility]  [address], [facility],[address], [address] [place] [address], [facility]  [phone]    Cardiology Follow Up  Selena Moore  1945  [id number]          Assessment/Plan:    1  Mixed hyperlipidemia    2  S/P AVR    3  S/P CABG (coronary artery bypass graft)    4  Controlled type 2 diabetes mellitus with diabetic neuropathy, without long-term current use of insulin ([facility] [address] )    5  PAF (paroxysmal atrial fibrillation) ([facility])      -   Blood pressure  Significantly improved with medication changes  Will continue current medication regimen of lisinopril / hydrochlorothiazide 20/25 mg daily and carvedilol 6 25 mg b i d  Byron Garcia -   Discontinued amiodarone last visit  -   Will check blood work including TSH and CMP with next INR check  -   Continue Coumadin  Will  Repeat Holter monitor in 1 month  If no atrial fibrillation present, will discontinue at that time  -   Will monitor blood pressure during cardiac rehab  - Continue atorvastatin 80 mg daily  - Continue farxiga  Interval History:     Selena Moore is 77 y o  male here for follow up of hypertension  Blood pressure was markedly elevated during his last visit  Medications were adjusted with resumption of lisinopril / hydrochlorothiazide along with switching metoprolol to carvedilol  Since then, blood pressure has been significantly improved  He has blood pressure of 122/84 today  During cardiac rehab, blood pressure has been well controlled as well  He is feeling less fatigued  He denies any chest pain, shortness of breath, lower extremity edema, orthopnea or paroxysmal nocturnal dyspnea  On 4/6/21, the patient underwent nuclear stress test last visit because of congestive heart failure    He had an ejection fraction of 35-40% seen on echocardiogram   He was not having any symptoms of chest pain or shortness of breath  Nuclear stress test was done which showed ischemia of the lateral and inferoseptal walls  Cardiac catheterization was done afterwards which showed severe multivessel CAD  He subsequently underwent CABGx5 (LIMA to LAD, SVG to D1, SVG -Y- to ramus intermedius and OM1, SVG to R PDA) and tissue AVR 23 mm Jose Alfredo [place] Ease    Procedure complicated by a new left bundle-branch block on postop ECG and atrial fibrillation  He converted to sinus rhythm with IV amiodarone  Patient had a 30 day biotel monitor placed after discharge  There were no events noted  Past Medical History:   Diagnosis Date    Arthritis     generalized    Asthma     seasonal-does not use inhaler    Back pain     Carotid artery stenosis     Diabetes mellitus (HCC)     type    GERD (gastroesophageal reflux disease)     Hiatal hernia     Hyperlipidemia     Hypertension     Peyronie's disease     last assessed [id number]    Seasonal allergies     Wears glasses      Social History     Socioeconomic History    Marital status: Single     Spouse name: Not on file    Number of children: Not on file    Years of education: Not on file    Highest education level: Not on file   Occupational History    Not on file   Tobacco Use    Smoking status: Never Smoker    Smokeless tobacco: Never Used   Vaping Use    Vaping Use: Never used   Substance and Sexual Activity    Alcohol use:  Yes     Alcohol/week: 10 0 standard drinks     Types: 7 Glasses of wine, 3 Standard drinks or equivalent per week    Drug use: No    Sexual activity: Yes     Partners: Female   Other Topics Concern    Not on file   Social History Narrative    Daily caffeinnated coffee      Social Determinants of Health     Financial Resource Strain:     Difficulty of Paying Living Expenses:    Food Insecurity:     Worried About Running Out of Food in the Last Year:    [address] in the Last Year:    Transportation Needs:     Lack of Transportation (Medical):  Lack of Transportation (Non-Medical):    Physical Activity:     Days of Exercise per Week:     Minutes of Exercise per Session:    Stress:     Feeling of Stress :    Social Connections:     Frequency of Communication with Friends and Family:     Frequency of Social Gatherings with Friends and Family:     Attends Sikh Services:     Active Member of Clubs or Organizations:     Attends Club or Organization Meetings:     Marital Status:    Intimate Partner Violence:     Fear of Current or Ex-Partner:     Emotionally Abused:     Physically Abused:     Sexually Abused:       Family History   Problem Relation Age of Onset    Heart attack Mother [de-identified]        CABG    Kidney failure Father     Diabetes Father         pre-diabetic     Past Surgical History:   Procedure Laterality Date    APPENDECTOMY      CAROTID ENDARTERECTOMY Left 10/29/2020    COLONOSCOPY N/A 1/22/2016    Procedure: COLONOSCOPY;  Surgeon: Justino Garg MD;  Location: [facility] GI [facility]; Service:     KNEE ARTHROSCOPY Left     KNEE ARTHROSCOPY W/ MENISCAL REPAIR Right     TX RPLCMT AORTIC VALVE OPN W/STENTLESS TISSUE VALVE N/A 5/10/2021    Procedure: CORONARY ARTERY BYPASS GRAFT (CABG) X 5 USING LEFT GSV--> DIAGONAL,, RAMUS, PDA ,AND OM1, AND LEFT BALBIR-LAD WITH REPLACEMENT VALVE AORTIC USING 23 MM MURRAY MAGNA EASE(AVR); Surgeon: Robert Barth MD;  Location:  MAIN OR;  Service: Cardiac Surgery    TX THROMBOENDARTECTMY Kylee Jose Luis INCIS Left 10/29/2020    Procedure: CAROTID ENDARTERECTOMY W/BOVINE PATCH ANGIOPLASTY AND COMPLETION DUPLEX IMAGING;  Surgeon: Connor Hernandez DO;  Location: [facility] Main OR;  Service: Vascular       Current Outpatient Medications:     acetaminophen (TYLENOL) 325 mg tablet, Take 1-2 tablets Q4-6 hours PRN pain  Do not take more than 4 grams in 24 hours  , Disp: , Rfl: 0    aspirin 81 mg chewable tablet, Chew 1 tablet (81 mg total) daily, Disp: 30 tablet, Rfl: 2    atorvastatin (LIPITOR) 80 mg tablet, Take 1 tablet (80 mg total) by mouth daily with dinner, Disp: 90 tablet, Rfl: 2    Blood Glucose Monitoring Suppl (FreeStyle InsuLinx System) w/Device KIT, Test TID & QHS as directed , Disp: 1 kit, Rfl: 0    carvedilol (COREG) 6 25 mg tablet, Take 1 tablet (6 25 mg total) by mouth 2 (two) times a day with meals, Disp: 60 tablet, Rfl: 2    Dapagliflozin Propanediol (Farxiga) 5 MG TABS, Take 1 tablet (5 mg total) by mouth daily, Disp: 90 tablet, Rfl: 1    FreeStyle InsuLinx Test test strip, Test TID & QHS as directed , Disp: 100 each, Rfl: 2    Lancets (freestyle) lancets, Test TID & QHS as directed , Disp: 100 each, Rfl: 2    lisinopril-hydrochlorothiazide (PRINZIDE,ZESTORETIC) 20-25 MG per tablet, Take 1 tablet by mouth daily, Disp: 30 tablet, Rfl: 2    metFORMIN (GLUCOPHAGE) 1000 MG tablet, TAKE ONE TABLET BY MOUTH TWICE A DAY WITH MEALS, Disp: 180 tablet, Rfl: 2    warfarin (COUMADIN) 2 5 mg tablet, Take 2 tablets (5mg) PO QHS unless otherwise directed , Disp: 60 tablet, Rfl: 2    Alcohol Swabs 70 % PADS, Test TID & QHS as directed  (Patient not taking: Reported on 7/12/2021), Disp: 100 each, Rfl: 2    docusate sodium (COLACE) 100 mg capsule, Take 1 capsule (100 mg total) by mouth 2 (two) times a day Hold for soft stools  , Disp: 60 capsule, Rfl: 0    pantoprazole (PROTONIX) 40 mg tablet, Take 1 tablet (40 mg total) by mouth daily in the early morning, Disp: 30 tablet, Rfl: 0    polyethylene glycol (MIRALAX) 17 g packet, Take 17 g by mouth daily Hold for soft stools  , Disp: 30 each, Rfl: 0        Review of Systems:  Review of Systems   Constitutional: Negative for chills and fever  HENT: Negative for ear pain and sore throat  Eyes: Negative for pain and visual disturbance  Respiratory: Negative for cough and shortness of breath      Cardiovascular: Negative for chest pain and palpitations  Gastrointestinal: Negative for abdominal pain and vomiting  Genitourinary: Negative for dysuria and hematuria  Musculoskeletal: Negative for arthralgias and back pain  Skin: Negative for color change and rash  Neurological: Negative for seizures and syncope  All other systems reviewed and are negative  Physical Exam:  Vitals:  Vitals:    07/12/21 1402   BP: 122/84   BP Location: Left arm   Patient Position: Sitting   Cuff Size: Standard   Pulse: 68   Temp: 98 3 °F (36 8 °C)   TempSrc: Temporal   SpO2: 98%   Weight: 71 7 kg (158 lb)   Height: 5' 9" (1 753 m)     Physical Exam   Constitutional: He appears healthy  No distress  Eyes: Pupils are equal, round, and reactive to light  Conjunctivae are normal    Neck: No JVD present  Cardiovascular: Normal rate, regular rhythm and normal heart sounds  Exam reveals no gallop and no friction rub  No murmur heard  Pulmonary/Chest: Effort normal and breath sounds normal  He has no wheezes  He has no rales  Sternotomy site healed well   Musculoskeletal:         General: No tenderness, deformity or edema  Cervical back: Normal range of motion and neck supple  Neurological: He is alert and oriented to person, place, and time  Skin: Skin is warm and dry  Cardiographics:  EKG: Personally reviewed    Normal sinus rhythm at 75 beats per minute with left bundle-branch block  Last known EF: 40-45%    This note was completed in part utilizing M-Modal Fluency Direct Software  Grammatical errors, random word insertions, spelling mistakes, and incomplete sentences can be an occasional consequence of this system secondary to software limitations, ambient noise, and hardware issues  If you have any questions or concerns about the content, text, or information contained within the body of this dictation, please contact the provider for clarification

## 2021-07-12 NOTE — TELEPHONE ENCOUNTER
Called pt and left a voice mail  Please schedule pt for OV after CV is done on 08/09/21 Gave pt phone number [phone] opt 3 for scheduling or if they are to call back today 07/12/21 or Friday 07/16/21 they can dial ext  [phone]    Please schedule from recall, the proper format for a VQI visit is already entered

## 2021-07-14 ENCOUNTER — CLINICAL SUPPORT (OUTPATIENT)
Dept: CARDIAC REHAB | Facility: CLINIC | Age: 76
End: 2021-07-14
Payer: MEDICARE

## 2021-07-14 DIAGNOSIS — Z95.2 S/P AVR: ICD-10-CM

## 2021-07-14 DIAGNOSIS — Z95.1 S/P CABG (CORONARY ARTERY BYPASS GRAFT): ICD-10-CM

## 2021-07-14 PROCEDURE — 93798 PHYS/QHP OP CAR RHAB W/ECG: CPT

## 2021-07-15 ENCOUNTER — ANTICOAG VISIT (OUTPATIENT)
Dept: CARDIOLOGY CLINIC | Facility: CLINIC | Age: 76
End: 2021-07-15

## 2021-07-15 ENCOUNTER — APPOINTMENT (OUTPATIENT)
Dept: LAB | Facility: CLINIC | Age: 76
End: 2021-07-15
Payer: MEDICARE

## 2021-07-15 DIAGNOSIS — I48.91 ATRIAL FIBRILLATION WITH RVR (HCC): Primary | ICD-10-CM

## 2021-07-16 ENCOUNTER — CLINICAL SUPPORT (OUTPATIENT)
Dept: CARDIAC REHAB | Facility: CLINIC | Age: 76
End: 2021-07-16
Payer: MEDICARE

## 2021-07-16 DIAGNOSIS — Z95.2 S/P AVR: ICD-10-CM

## 2021-07-16 DIAGNOSIS — Z95.1 S/P CABG (CORONARY ARTERY BYPASS GRAFT): ICD-10-CM

## 2021-07-16 PROCEDURE — 93798 PHYS/QHP OP CAR RHAB W/ECG: CPT

## 2021-07-19 ENCOUNTER — CLINICAL SUPPORT (OUTPATIENT)
Dept: CARDIAC REHAB | Facility: CLINIC | Age: 76
End: 2021-07-19
Payer: MEDICARE

## 2021-07-19 DIAGNOSIS — Z95.1 S/P CABG (CORONARY ARTERY BYPASS GRAFT): ICD-10-CM

## 2021-07-19 DIAGNOSIS — Z95.2 S/P AVR: ICD-10-CM

## 2021-07-19 PROCEDURE — 93798 PHYS/QHP OP CAR RHAB W/ECG: CPT

## 2021-07-21 ENCOUNTER — CLINICAL SUPPORT (OUTPATIENT)
Dept: CARDIAC REHAB | Facility: CLINIC | Age: 76
End: 2021-07-21
Payer: MEDICARE

## 2021-07-21 DIAGNOSIS — Z95.2 S/P AVR: ICD-10-CM

## 2021-07-21 DIAGNOSIS — Z95.1 S/P CABG (CORONARY ARTERY BYPASS GRAFT): ICD-10-CM

## 2021-07-21 PROCEDURE — 93798 PHYS/QHP OP CAR RHAB W/ECG: CPT

## 2021-07-22 ENCOUNTER — APPOINTMENT (OUTPATIENT)
Dept: LAB | Facility: CLINIC | Age: 76
End: 2021-07-22
Payer: MEDICARE

## 2021-07-22 LAB
CHOLEST SERPL-MCNC: 133 MG/DL (ref 50–200)
EST. AVERAGE GLUCOSE BLD GHB EST-MCNC: 137 MG/DL
HBA1C MFR BLD: 6.4 %
HDLC SERPL-MCNC: 59 MG/DL
LDLC SERPL CALC-MCNC: 54 MG/DL (ref 0–100)
NONHDLC SERPL-MCNC: 74 MG/DL
TRIGL SERPL-MCNC: 98 MG/DL

## 2021-07-22 PROCEDURE — 80061 LIPID PANEL: CPT | Performed by: INTERNAL MEDICINE

## 2021-07-22 PROCEDURE — 36415 COLL VENOUS BLD VENIPUNCTURE: CPT | Performed by: INTERNAL MEDICINE

## 2021-07-22 PROCEDURE — 83036 HEMOGLOBIN GLYCOSYLATED A1C: CPT | Performed by: INTERNAL MEDICINE

## 2021-07-23 ENCOUNTER — TELEPHONE (OUTPATIENT)
Dept: CARDIOLOGY CLINIC | Facility: CLINIC | Age: 76
End: 2021-07-23

## 2021-07-23 ENCOUNTER — APPOINTMENT (OUTPATIENT)
Dept: CARDIAC REHAB | Facility: CLINIC | Age: 76
End: 2021-07-23
Payer: MEDICARE

## 2021-07-23 ENCOUNTER — ANTICOAG VISIT (OUTPATIENT)
Dept: CARDIOLOGY CLINIC | Facility: CLINIC | Age: 76
End: 2021-07-23

## 2021-07-23 DIAGNOSIS — I48.91 ATRIAL FIBRILLATION WITH RVR (HCC): Primary | ICD-10-CM

## 2021-07-23 NOTE — TELEPHONE ENCOUNTER
----- Message from Patria Casillas DO sent at 7/23/2021  2:11 PM EDT -----  Can you please let the patient know blood work looked good    His cholesterol is very well controlled and is sugar is at goal

## 2021-07-26 ENCOUNTER — CLINICAL SUPPORT (OUTPATIENT)
Dept: CARDIAC REHAB | Facility: CLINIC | Age: 76
End: 2021-07-26
Payer: MEDICARE

## 2021-07-26 DIAGNOSIS — Z95.1 S/P CABG (CORONARY ARTERY BYPASS GRAFT): ICD-10-CM

## 2021-07-26 DIAGNOSIS — Z95.2 S/P AVR: ICD-10-CM

## 2021-07-26 PROCEDURE — 93798 PHYS/QHP OP CAR RHAB W/ECG: CPT

## 2021-07-26 NOTE — PROGRESS NOTES
Cardiac Rehabilitation Plan of Care   30 Day Reassessment          Today's date: 2021   # of Exercise Sessions Completed: 10  Patient name: Yoan Montero      : 1945  Age: 77 y o  MRN: [id number]  Referring Physician: Mehrdad Boyd PA-C  Cardiologist: Michael Pastrana  Provider: Hank Cardona  Clinician: Andrew Ratliff RN    Dx:   Encounter Diagnoses   Name Primary?  S/P AVR     S/P CABG (coronary artery bypass graft)      Date of onset: 5/10/2021      SUMMARY OF PROGRESS:  Mr Eva Kenny completed 10 sessions of the cardiac rehabilitation program  Explained cardiac risk factors, how the heart functions, Ejection fraction, afib, heart healthy diet, Diabetes and exercise  Completed sub max treadmill test  Obtained BMI and waist measurements  Telemetry monitor NSR with BBB, 1AVB, PAC and PVC at rest and with exercise  Exercise MET level 3 0-3 5  RPE 4  Tolerated exercise well  Denied any complaint of chest discomfort  Mid sternal chest incision and three drain sites clean, dry and intact  No redness or drainage noted  Chronic discomfort in left knee  He has a large mass on right neck he stated MD aware  Pt made aware of financial responsibility of 20% of the cardiac rehabilitation  He stated he has noticed an increase in endurance  Provided him with handbook for cardiac rehabilitation  Will continue to monitor  Medication compliance: Yes   Comments: Pt reports to be compliant with medications  Fall Risk: Low   Comments: Ambulates with a steady gait with no assist device and Denies a fall in the past 6 months    EKG Interpretation: NSR 1AVB   PAC, PVC      EXERCISE ASSESSMENT and PLAN    Current Exercise Program in Rehab:       Frequency: 3 days/week    Minutes: 31-43         METS: 3-3 5           HR: 118-152   RPE: 4         Modalities: Treadmill, NuStep and Recumbent bike      Exercise Progression 30 Day Goals :    Frequency: 3 days/week of cardiac rehab     Supplement with home exercise 2+ days/wk as tolerated    Minutes: 30-50                            >150 mins/wk of moderate intensity exercise   METS: 3 0-6 0   HR:     RPE: 4-6   Modalities: Treadmill, NuStep and Recumbent bike    Strength training: Will be added following at least 8 weeks post surgery and 8-10 monitored sessions   Modalities: Arm Curl, Wall push-ups, Front Raises and Shoulder Shrugs    Home Exercise: Type: walking , Frequency: 5-7 days/week, Duration: 30 mins    Goals: Attend Rehab regularly and increase endurance to return to normal activities such as yard work, and golfing    Progression Toward Goals:  Reviewed Pt goals and determined plan of care, Will continue to educate and progress as tolerated  Education: benefit of exercise for CAD risk factors, AHA guidelines to achieve >150 mins/wk of moderate exercise, RPE scale and class: Risk Factors for Heart Disease   Plan:education on home exercise guidelines and home exercise 30+ mins 2 days opposite CR  Readiness to change: Action:  (Changing behavior)      NUTRITION ASSESSMENT AND PLAN    Weight control:    Starting weight: 156   Current weight:   157  Waist circumference:    Startin   Current:      Diabetes: T2D, Patient reported fasting , No insulin  A1c: 5 5    last measured:     Lipid management: Discussed diet and lipid management and Last lipid profile 2021  Chol 155    HDL 57  LDL 75    Goals:choose low sodium processed foods    Progression Toward Goals: Reviewed Pt goals and determined plan of care, Will continue to educate and progress as tolerated      Education: heart healthy eating  low sodium diet  exercise and diabetes management    Healthy food choices  Plan: Education class: Reading Food Labels, Education Class: Heart Healthy Eating and replace unhealthy snacks with fruits & vegs  Readiness to change: Preparation:  (Getting ready to change)       PSYCHOSOCIAL ASSESSMENT AND PLAN    Emotional:  Depression assessment:  PHQ-9 = 5-9 = Mild Depression            Anxiety measure:  DENIS-7 = 0-4  = Not anxious  Self-reported stress level:  1  Social support: Very Good, Patient reports excellent emotional/social support from family and Patient has friends available for support when needed     Goals:  improved sleep and increased energy    Progression Toward Goals: Reviewed Pt goals and determined plan of care, Will continue to educate and progress as tolerated  Education: signs/sxs of depression, benefits of a positive support system and stress management techniques  Plan: Class: Stress and Your Health, Refer to [employer], Practice relaxation techniques, Exercise, Spend time outdoors, Read a Book and Keep a positive mindset  Readiness to change: Preparation:  (Getting ready to change)       OTHER CORE COMPONENTS     Tobacco:   Social History     Tobacco Use   Smoking Status Never Smoker   Smokeless Tobacco Never Used       Tobacco Use Intervention:   N/A:  Patient is a non-smoker     Anginal Symptoms:  None   NTG use: No prescription    Blood pressure:    Restin//82   Exercise: 120//80    Goals: consistent BP < 130/80, reduced dietary sodium <2300mg, moderate intensity exercise >150 mins/wk and medication compliance    Progression Toward Goals: Reviewed Pt goals and determined plan of care, Will continue to educate and progress as tolerated      Education:  understanding high blood pressure and it's relationship to CAD, low sodium diet and HTN and Education class: Understanding Heart Disease, common heart medications  Plan: Class: Understanding Heart Disease, Class: Common Heart Medications, Avoid Processed foods, engage in regular exercise and check labels for sodium content  Readiness to change: Preparation:  (Getting ready to change)

## 2021-07-27 NOTE — TELEPHONE ENCOUNTER
Called pt and left a voice mail  Please schedule pt for OV after CV is done on 08/09/21 Gave pt phone number [phone] opt 3 for scheduling  Please schedule from recall, the proper format for a VQI visit is already entered  This is the second attempt

## 2021-07-28 ENCOUNTER — CLINICAL SUPPORT (OUTPATIENT)
Dept: CARDIAC REHAB | Facility: CLINIC | Age: 76
End: 2021-07-28
Payer: MEDICARE

## 2021-07-28 DIAGNOSIS — Z95.2 S/P AVR: ICD-10-CM

## 2021-07-28 DIAGNOSIS — Z95.1 S/P CABG (CORONARY ARTERY BYPASS GRAFT): ICD-10-CM

## 2021-07-28 PROCEDURE — 93798 PHYS/QHP OP CAR RHAB W/ECG: CPT

## 2021-07-29 ENCOUNTER — APPOINTMENT (OUTPATIENT)
Dept: LAB | Facility: CLINIC | Age: 76
End: 2021-07-29
Payer: MEDICARE

## 2021-07-30 ENCOUNTER — CLINICAL SUPPORT (OUTPATIENT)
Dept: CARDIAC REHAB | Facility: CLINIC | Age: 76
End: 2021-07-30
Payer: MEDICARE

## 2021-07-30 ENCOUNTER — ANTICOAG VISIT (OUTPATIENT)
Dept: CARDIOLOGY CLINIC | Facility: CLINIC | Age: 76
End: 2021-07-30

## 2021-07-30 DIAGNOSIS — Z95.2 S/P AVR: ICD-10-CM

## 2021-07-30 DIAGNOSIS — I48.91 ATRIAL FIBRILLATION WITH RVR (HCC): Primary | ICD-10-CM

## 2021-07-30 DIAGNOSIS — Z95.1 S/P CABG (CORONARY ARTERY BYPASS GRAFT): ICD-10-CM

## 2021-07-30 PROCEDURE — 93798 PHYS/QHP OP CAR RHAB W/ECG: CPT

## 2021-08-02 ENCOUNTER — CLINICAL SUPPORT (OUTPATIENT)
Dept: CARDIAC REHAB | Facility: CLINIC | Age: 76
End: 2021-08-02
Payer: MEDICARE

## 2021-08-02 DIAGNOSIS — Z95.1 S/P CABG (CORONARY ARTERY BYPASS GRAFT): ICD-10-CM

## 2021-08-02 DIAGNOSIS — Z95.2 S/P AVR: ICD-10-CM

## 2021-08-02 PROCEDURE — 93798 PHYS/QHP OP CAR RHAB W/ECG: CPT

## 2021-08-03 ENCOUNTER — TELEPHONE (OUTPATIENT)
Dept: FAMILY MEDICINE CLINIC | Facility: CLINIC | Age: 76
End: 2021-08-03

## 2021-08-03 ENCOUNTER — OFFICE VISIT (OUTPATIENT)
Dept: FAMILY MEDICINE CLINIC | Facility: CLINIC | Age: 76
End: 2021-08-03
Payer: MEDICARE

## 2021-08-03 VITALS
HEIGHT: 69 IN | TEMPERATURE: 98.3 F | OXYGEN SATURATION: 97 % | DIASTOLIC BLOOD PRESSURE: 98 MMHG | HEART RATE: 90 BPM | RESPIRATION RATE: 18 BRPM | BODY MASS INDEX: 23.52 KG/M2 | SYSTOLIC BLOOD PRESSURE: 216 MMHG | WEIGHT: 158.8 LBS

## 2021-08-03 DIAGNOSIS — I25.10 CORONARY ARTERY DISEASE INVOLVING NATIVE CORONARY ARTERY OF NATIVE HEART, UNSPECIFIED WHETHER ANGINA PRESENT: ICD-10-CM

## 2021-08-03 DIAGNOSIS — I48.91 ATRIAL FIBRILLATION WITH RVR (HCC): ICD-10-CM

## 2021-08-03 DIAGNOSIS — I10 BENIGN ESSENTIAL HYPERTENSION: ICD-10-CM

## 2021-08-03 DIAGNOSIS — E11.9 TYPE 2 DIABETES MELLITUS WITHOUT COMPLICATION, WITHOUT LONG-TERM CURRENT USE OF INSULIN (HCC): Primary | ICD-10-CM

## 2021-08-03 PROCEDURE — 99214 OFFICE O/P EST MOD 30 MIN: CPT | Performed by: FAMILY MEDICINE

## 2021-08-03 NOTE — PROGRESS NOTES
Rocky Armstrong 1945 male MRN: [id number]    FAMILY PRACTICE OFFICE VISIT  [facility] - [address]      ASSESSMENT/PLAN  Rocky Armstrong is a 77 y o  male presents to the office for    Diagnoses and all orders for this visit:    Type 2 diabetes mellitus without complication, without long-term current use of insulin (HCC)  -     Microalbumin / creatinine urine ratio    Benign essential hypertension    Atrial fibrillation with RVR (HCC)    Coronary artery disease involving native coronary artery of native heart, unspecified whether angina present      Patient's blood pressure is elevated today  I would like him to go home and take his blood pressure in the home setting  Patient usually gets white coat syndrome here at our office  If it continues to be elevated will increase his carvedilol  Patient is to continue on all medications as prescribed with no changes until this reading is given  Currently patient is rate controlled in our office in demonstrating no signs of AFib  History of coronary artery disease status post CABGs  Patient is to continue management per his Cardiology team   Type 2 diabetes recent A1c within normal limits  Patient does demonstrate some decrease in microfilament over the right greater toe  Scheduled to see his eye doctor in September    BMI Counseling: Body mass index is 23 45 kg/m²  The BMI is above normal  Nutrition recommendations include consuming healthier snacks               Future Appointments   Date Time Provider Nirmal [place]   8/4/2021 10:00 AM [facility] CARDIAC EXERCISE ROOM [place] [address] [place] [facility] [address]   8/6/2021 10:00 AM [place] CARDIAC [place]ISE ROOM [place] [address]   8/9/2021  8:00 AM [place] VASCULAR 2 [address]   8/9/2021 10:00 AM [place] CARDIAC EXERCISE ROOM [place] [address]   8/11/2021 10:00 AM [place] CARDIAC EXERCISE ROOM [place] [address]   8/13/2021  9:00 AM [place] HOLTER [address]   8/13/2021 10:00 AM Ingrid Porter 32 EXERCISE ROOM [facility] [address] Rhb [address]   8/16/2021 10:00 AM Ingrid German 32 EXERCISE ROOM [facility] Card Rhb [facility] [address]   8/18/2021 10:00 AM [place] CARDIAC EXERCISE ROOM [facility] Card Rhb [facility] [address]   8/20/2021 10:00 AM [place] CARDIAC EXERCISE ROOM [facility] [address]   8/23/2021 10:00 AM [place] CARDIAC EXERCISE ROOM [place] [address]   8/25/2021 10:00 AM [place] CARDIAC EXERCISE ROOM [place] [address]   8/27/2021 10:00 AM [place] CARDIAC EXERCISE ROOM [place] [facility]   8/27/2021 11:20 AM DO MARC Treviño [facility]   8/30/2021 10:00 AM [place] CARDIAC EXERCISE ROOM [place] Card Rhb [facility] [address]   9/1/2021 10:00 AM [place] CARDIAC EXERCISE ROOM [place] [address]   9/3/2021  9:00 AM SHEREE Masters [facility]   9/3/2021 10:00 AM [address] Card Rhb [facility] [address]   9/8/2021 10:00 AM [address] Card Rhb [facility] [address]   9/10/2021 10:00 AM [place] CARDIAC EXERCISE ROOM [facility] [address] Rhb [address]   9/13/2021 10:00 AM [place] CARDIAC EXERCISE ROOM [place] [address]   9/15/2021 10:00 AM [place] CARDIAC EXERCISE ROOM [place] [address] [place]   9/17/2021 10:00 AM [place] CARDIAC EXERCISE ROOM [place] [address] [place]   9/20/2021 10:00 AM [place] CARDIAC EXERCISE ROOM [place] [address]   9/22/2021 10:00 AM [place] CARDIAC EXERCISE ROOM [place] [address]          SUBJECTIVE  CC: Diabetes (patient here for diabetic check, reminded patient eye exam due 8/19)      HPI:  Kamron Plasencia is a 77 y o  male who presents for  A follow-up appointment  Patient has been currently going to cardiac rehab after AVR/CABG  Patient states he has been doing very well at rehab and has asked for them to increase his velocity so he can try to work harder to get to his baseline  Patient denies any shortness of breath or chest tightness  Patient states that he just recently saw the dermatologist who biopsied his right toe to see what type of fungal infection is developing    Patient states his blood pressures been very well controlled at home  And as a rehab appointments he is unsure why his blood pressure so elevated today  He thinks it might be secondary to white coat syndrome  Denies any other acute concerns today  Taking all his diabetic medications as prescribed without any difficulties  Review of Systems   Constitutional: Negative for activity change, appetite change, chills, fatigue and fever  HENT: Negative for congestion  Respiratory: Negative for cough, chest tightness and shortness of breath  Cardiovascular: Negative for chest pain and leg swelling  Gastrointestinal: Negative for abdominal distention, abdominal pain, constipation, diarrhea, nausea and vomiting  All other systems reviewed and are negative  Historical Information   The patient history was reviewed as follows:  Past Medical History:   Diagnosis Date    Arthritis     generalized    Asthma     seasonal-does not use inhaler    Back pain     Carotid artery stenosis     Diabetes mellitus (HCC)     type    GERD (gastroesophageal reflux disease)     Hiatal hernia     Hyperlipidemia     Hypertension     Peyronie's disease     last assessed [id number]    Seasonal allergies     Wears glasses          Medications:     Current Outpatient Medications:     acetaminophen (TYLENOL) 325 mg tablet, Take 1-2 tablets Q4-6 hours PRN pain  Do not take more than 4 grams in 24 hours  , Disp: , Rfl: 0    aspirin 81 mg chewable tablet, Chew 1 tablet (81 mg total) daily, Disp: 30 tablet, Rfl: 2    atorvastatin (LIPITOR) 80 mg tablet, Take 1 tablet (80 mg total) by mouth daily with dinner, Disp: 90 tablet, Rfl: 2    Blood Glucose Monitoring Suppl (FreeStyle [employer] System) w/Device KIT, Test TID & QHS as directed , Disp: 1 kit, Rfl: 0    carvedilol (COREG) 6 25 mg tablet, Take 1 tablet (6 25 mg total) by mouth 2 (two) times a day with meals, Disp: 60 tablet, Rfl: 2    Dapagliflozin Propanediol (Farxiga) 5 MG TABS, Take 1 tablet (5 mg total) by mouth daily, Disp: 90 tablet, Rfl: 1    docusate sodium (COLACE) 100 mg capsule, Take 1 capsule (100 mg total) by mouth 2 (two) times a day Hold for soft stools  , Disp: 60 capsule, Rfl: 0    FreeStyle Insu[employer]x Test test strip, Test TID & QHS as directed , Disp: 100 each, Rfl: 2    Lancets (freestyle) lancets, Test TID & QHS as directed , Disp: 100 each, Rfl: 2    lisinopril-hydrochlorothiazide (PRINZIDE,ZESTORETIC) 20-25 MG per tablet, Take 1 tablet by mouth daily, Disp: 30 tablet, Rfl: 2    metFORMIN (GLUCOPHAGE) 1000 MG tablet, TAKE ONE TABLET BY MOUTH TWICE A DAY WITH MEALS, Disp: 180 tablet, Rfl: 2    warfarin (COUMADIN) 2 5 mg tablet, Take 2 tablets (5mg) PO QHS unless otherwise directed , Disp: 60 tablet, Rfl: 2    Alcohol Swabs 70 % PADS, Test TID & QHS as directed  (Patient not taking: Reported on 7/12/2021), Disp: 100 each, Rfl: 2    pantoprazole (PROTONIX) 40 mg tablet, Take 1 tablet (40 mg total) by mouth daily in the early morning (Patient not taking: Reported on 8/3/2021), Disp: 30 tablet, Rfl: 0    polyethylene glycol (MIRALAX) 17 g packet, Take 17 g by mouth daily Hold for soft stools  (Patient not taking: Reported on 8/3/2021), Disp: 30 each, Rfl: 0    No Known Allergies    OBJECTIVE  Vitals:   Vitals:    08/03/21 1409   BP: (!) 216/98   BP Location: Left arm   Patient Position: Sitting   Cuff Size: Standard   Pulse: 90   Resp: 18   Temp: 98 3 °F (36 8 °C)   TempSrc: Temporal   SpO2: 97%   Weight: 72 kg (158 lb 12 8 oz)   Height: 5' 9" (1 753 m)         Physical Exam  Vitals reviewed  Constitutional:       Appearance: He is well-developed  HENT:      Head: Normocephalic and atraumatic  Eyes:      Conjunctiva/sclera: Conjunctivae normal       Pupils: Pupils are equal, round, and reactive to light  Cardiovascular:      Rate and Rhythm: Normal rate and regular rhythm  Pulses: no weak pulses          Dorsalis pedis pulses are 2+ on the right side and 2+ on the left side          Posterior tibial pulses are 2+ on the right side and 2+ on the left side  Heart sounds: Normal heart sounds  Pulmonary:      Effort: Pulmonary effort is normal  No respiratory distress  Breath sounds: Normal breath sounds  Musculoskeletal:         General: Normal range of motion  Cervical back: Normal range of motion and neck supple  Feet:    Feet:      Right foot:      Skin integrity: No ulcer, skin breakdown, erythema, warmth, callus or dry skin  Left foot:      Skin integrity: No ulcer, skin breakdown, erythema, warmth, callus or dry skin  Skin:     General: Skin is warm  Capillary Refill: Capillary refill takes less than 2 seconds  Neurological:      Mental Status: He is alert and oriented to person, place, and time  Patient's shoes and socks removed  Right Foot/Ankle   Right Foot Inspection  Skin Exam: skin normal and skin intact no dry skin, no warmth, no callus, no erythema, no maceration, no abnormal color, no pre-ulcer, no ulcer and no callus                          Toe Exam: ROM and strength within normal limitsno swelling  Sensory   Vibration: intact  Proprioception: intact   Monofilament testing: diminished and intact  Vascular  Capillary refills: < 3 seconds  The right DP pulse is 2+  The right PT pulse is 2+  Left Foot/Ankle  Left Foot Inspection  Skin Exam: skin normal and skin intactno dry skin, no warmth, no erythema, no maceration, normal color, no pre-ulcer, no ulcer and no callus                         Toe Exam: ROM and strength within normal limitsno swelling                   Sensory   Vibration: intact  Proprioception: intact  Monofilament: intact  Vascular  Capillary refills: < 3 seconds  The left DP pulse is 2+  The left PT pulse is 2+  Assign Risk Category:  No deformity present; No loss of protective sensation;  No weak pulses       Risk: 0            Arturo Simon MD,   [facility]  8/3/2021

## 2021-08-03 NOTE — TELEPHONE ENCOUNTER
Dr Dixon Mendoza,    Patient is calling you back with his blood pressure reading today   148/90    Just an [employer]

## 2021-08-03 NOTE — TELEPHONE ENCOUNTER
Ok better   Please have him take his blood pressure at night time and in the Am and report back to us tomorrow morning

## 2021-08-04 ENCOUNTER — TELEPHONE (OUTPATIENT)
Dept: FAMILY MEDICINE CLINIC | Facility: CLINIC | Age: 76
End: 2021-08-04

## 2021-08-04 ENCOUNTER — CLINICAL SUPPORT (OUTPATIENT)
Dept: CARDIAC REHAB | Facility: CLINIC | Age: 76
End: 2021-08-04
Payer: MEDICARE

## 2021-08-04 DIAGNOSIS — Z95.1 S/P CABG (CORONARY ARTERY BYPASS GRAFT): ICD-10-CM

## 2021-08-04 DIAGNOSIS — Z95.2 S/P AVR: ICD-10-CM

## 2021-08-04 PROCEDURE — 93798 PHYS/QHP OP CAR RHAB W/ECG: CPT

## 2021-08-04 NOTE — TELEPHONE ENCOUNTER
Dr Bard Quan    Patient is calling with his blood pressure reading this morning 124/68 and the second after his cardiac rehab was 118/74    Just an Citizen [place]

## 2021-08-04 NOTE — TELEPHONE ENCOUNTER
Perfect please advise the patient that he does not need to take his blood pressure daily  He can take it as needed as of now

## 2021-08-06 ENCOUNTER — CLINICAL SUPPORT (OUTPATIENT)
Dept: CARDIAC REHAB | Facility: CLINIC | Age: 76
End: 2021-08-06
Payer: MEDICARE

## 2021-08-06 DIAGNOSIS — Z95.2 S/P AVR: ICD-10-CM

## 2021-08-06 DIAGNOSIS — Z95.1 S/P CABG (CORONARY ARTERY BYPASS GRAFT): ICD-10-CM

## 2021-08-06 PROCEDURE — 93798 PHYS/QHP OP CAR RHAB W/ECG: CPT

## 2021-08-09 ENCOUNTER — HOSPITAL ENCOUNTER (OUTPATIENT)
Dept: RADIOLOGY | Facility: HOSPITAL | Age: 76
Discharge: HOME | End: 2021-08-09
Payer: MEDICARE

## 2021-08-09 ENCOUNTER — CLINICAL SUPPORT (OUTPATIENT)
Dept: CARDIAC REHAB | Facility: CLINIC | Age: 76
End: 2021-08-09
Payer: MEDICARE

## 2021-08-09 DIAGNOSIS — Z95.2 S/P AVR: ICD-10-CM

## 2021-08-09 DIAGNOSIS — I10 BENIGN ESSENTIAL HYPERTENSION: ICD-10-CM

## 2021-08-09 DIAGNOSIS — Z95.1 S/P CABG (CORONARY ARTERY BYPASS GRAFT): ICD-10-CM

## 2021-08-09 DIAGNOSIS — I65.22 CAROTID STENOSIS, LEFT: ICD-10-CM

## 2021-08-09 DIAGNOSIS — I65.21 INTERNAL CAROTID ARTERY OCCLUSION, RIGHT: ICD-10-CM

## 2021-08-09 PROCEDURE — 93880 EXTRACRANIAL BILAT STUDY: CPT

## 2021-08-09 PROCEDURE — 93798 PHYS/QHP OP CAR RHAB W/ECG: CPT

## 2021-08-09 RX ORDER — LISINOPRIL AND HYDROCHLOROTHIAZIDE 25; 20 MG/1; MG/1
1 TABLET ORAL DAILY
Qty: 90 TABLET | Refills: 3 | Status: SHIPPED | OUTPATIENT
Start: 2021-08-09 | End: 2022-07-28 | Stop reason: SDUPTHER

## 2021-08-09 NOTE — TELEPHONE ENCOUNTER
NEED REFILLS OF THE FOLLOWING MEDS:    lisinopril-hydrochlorothiazide (PRINZIDE,ZESTORETIC) 20-25 MG per tablet  90-DAY SUPPLY WITH REFILLS   TOOK LAST PILL TODAY

## 2021-08-10 ENCOUNTER — CLINICAL SUPPORT (OUTPATIENT)
Dept: CARDIOLOGY CLINIC | Facility: CLINIC | Age: 76
End: 2021-08-10
Payer: MEDICARE

## 2021-08-10 DIAGNOSIS — I48.91 ATRIAL FIBRILLATION WITH RVR (HCC): ICD-10-CM

## 2021-08-10 DIAGNOSIS — I44.7 LBBB (LEFT BUNDLE BRANCH BLOCK): ICD-10-CM

## 2021-08-10 DIAGNOSIS — R00.1 BRADYCARDIA: ICD-10-CM

## 2021-08-10 PROCEDURE — 93228 REMOTE 30 DAY ECG REV/REPORT: CPT | Performed by: INTERNAL MEDICINE

## 2021-08-11 ENCOUNTER — CLINICAL SUPPORT (OUTPATIENT)
Dept: CARDIAC REHAB | Facility: CLINIC | Age: 76
End: 2021-08-11
Payer: MEDICARE

## 2021-08-11 DIAGNOSIS — Z95.2 S/P AVR: ICD-10-CM

## 2021-08-11 DIAGNOSIS — Z95.1 S/P CABG (CORONARY ARTERY BYPASS GRAFT): ICD-10-CM

## 2021-08-11 PROCEDURE — 93880 EXTRACRANIAL BILAT STUDY: CPT | Performed by: SURGERY

## 2021-08-11 PROCEDURE — 93798 PHYS/QHP OP CAR RHAB W/ECG: CPT

## 2021-08-12 ENCOUNTER — APPOINTMENT (OUTPATIENT)
Dept: LAB | Facility: CLINIC | Age: 76
End: 2021-08-12
Payer: MEDICARE

## 2021-08-13 ENCOUNTER — ANTICOAG VISIT (OUTPATIENT)
Dept: CARDIOLOGY CLINIC | Facility: CLINIC | Age: 76
End: 2021-08-13

## 2021-08-13 ENCOUNTER — HOSPITAL ENCOUNTER (OUTPATIENT)
Dept: NON INVASIVE DIAGNOSTICS | Facility: HOSPITAL | Age: 76
Discharge: HOME | End: 2021-08-13
Attending: INTERNAL MEDICINE
Payer: MEDICARE

## 2021-08-13 ENCOUNTER — CLINICAL SUPPORT (OUTPATIENT)
Dept: CARDIAC REHAB | Facility: CLINIC | Age: 76
End: 2021-08-13
Payer: MEDICARE

## 2021-08-13 DIAGNOSIS — I48.91 ATRIAL FIBRILLATION WITH RVR (HCC): Primary | ICD-10-CM

## 2021-08-13 DIAGNOSIS — I48.0 PAF (PAROXYSMAL ATRIAL FIBRILLATION) (HCC): ICD-10-CM

## 2021-08-13 DIAGNOSIS — Z95.2 S/P AVR: ICD-10-CM

## 2021-08-13 DIAGNOSIS — Z95.1 S/P CABG (CORONARY ARTERY BYPASS GRAFT): ICD-10-CM

## 2021-08-13 PROCEDURE — 93225 XTRNL ECG REC<48 HRS REC: CPT

## 2021-08-13 PROCEDURE — 93226 XTRNL ECG REC<48 HR SCAN A/R: CPT

## 2021-08-13 PROCEDURE — 93798 PHYS/QHP OP CAR RHAB W/ECG: CPT

## 2021-08-16 ENCOUNTER — CLINICAL SUPPORT (OUTPATIENT)
Dept: CARDIAC REHAB | Facility: CLINIC | Age: 76
End: 2021-08-16
Payer: MEDICARE

## 2021-08-16 DIAGNOSIS — Z95.1 S/P CABG (CORONARY ARTERY BYPASS GRAFT): ICD-10-CM

## 2021-08-16 DIAGNOSIS — Z95.2 S/P AVR: ICD-10-CM

## 2021-08-16 PROCEDURE — 93798 PHYS/QHP OP CAR RHAB W/ECG: CPT

## 2021-08-18 ENCOUNTER — CLINICAL SUPPORT (OUTPATIENT)
Dept: CARDIAC REHAB | Facility: CLINIC | Age: 76
End: 2021-08-18
Payer: MEDICARE

## 2021-08-18 DIAGNOSIS — Z95.1 S/P CABG (CORONARY ARTERY BYPASS GRAFT): ICD-10-CM

## 2021-08-18 DIAGNOSIS — Z95.2 S/P AVR: ICD-10-CM

## 2021-08-18 PROCEDURE — 93798 PHYS/QHP OP CAR RHAB W/ECG: CPT

## 2021-08-20 ENCOUNTER — CLINICAL SUPPORT (OUTPATIENT)
Dept: CARDIAC REHAB | Facility: CLINIC | Age: 76
End: 2021-08-20
Payer: MEDICARE

## 2021-08-20 ENCOUNTER — TELEPHONE (OUTPATIENT)
Dept: CARDIOLOGY CLINIC | Facility: CLINIC | Age: 76
End: 2021-08-20

## 2021-08-20 DIAGNOSIS — Z95.1 S/P CABG (CORONARY ARTERY BYPASS GRAFT): ICD-10-CM

## 2021-08-20 DIAGNOSIS — Z95.2 S/P AVR: ICD-10-CM

## 2021-08-20 PROCEDURE — 93798 PHYS/QHP OP CAR RHAB W/ECG: CPT

## 2021-08-20 PROCEDURE — 93227 XTRNL ECG REC<48 HR R&I: CPT | Performed by: INTERNAL MEDICINE

## 2021-08-20 NOTE — TELEPHONE ENCOUNTER
----- Message from DO Lucy sent at 8/20/2021  1:58 PM EDT -----  Can you please let the patient know holter was normal

## 2021-08-23 ENCOUNTER — CLINICAL SUPPORT (OUTPATIENT)
Dept: CARDIAC REHAB | Facility: CLINIC | Age: 76
End: 2021-08-23
Payer: MEDICARE

## 2021-08-23 DIAGNOSIS — Z95.1 S/P CABG (CORONARY ARTERY BYPASS GRAFT): ICD-10-CM

## 2021-08-23 DIAGNOSIS — Z95.2 S/P AVR: ICD-10-CM

## 2021-08-23 PROCEDURE — 93798 PHYS/QHP OP CAR RHAB W/ECG: CPT

## 2021-08-23 NOTE — PROGRESS NOTES
Cardiac Rehabilitation Plan of Care   60 Day Reassessment          Today's date: 2021   # of Exercise Sessions Completed: 22  Patient name: Robles Lopez      : 1945  Age: 77 y o  MRN: [id number]  Referring Physician: Emmet Litten, PA-C  Cardiologist: Treasa Boas  Provider: Shannon Renee  Clinician: Jo Ratliff RN    Dx:   Encounter Diagnoses   Name Primary?  S/P AVR     S/P CABG (coronary artery bypass graft)      Date of onset: 5/10/2021      SUMMARY OF PROGRESS:  Mr Jennifer Hernández completed 22 sessions of the cardiac rehabilitation program  Explained cardiac risk factors, how the heart functions, Ejection fraction, afib, heart healthy diet, Diabetes and exercise  Completed sub max treadmill test  Obtained BMI and waist measurements  Telemetry monitor NSR with BBB, 1AVB, PAC and PVC at rest and with exercise  Exercise MET level 2 6-4 0 MET's  RPE 4  Tolerated exercise well  Denied any complaint of chest discomfort  Mid sternal chest incision and three drain sites clean, dry and intact  No redness or drainage noted  Chronic discomfort in left knee  He has a large mass on right neck he stated MD aware  Pt made aware of financial responsibility of 20% of the cardiac rehabilitation  He stated he has noticed an increase in endurance and has returned to [employer]  Provided him with handbook for cardiac rehabilitation  Will continue to monitor  Medication compliance: Yes   Comments: Pt reports to be compliant with medications  Fall Risk: Low   Comments: Ambulates with a steady gait with no assist device and Denies a fall in the past 6 months    EKG Interpretation: NSR 1AVB   PAC, PVC      EXERCISE ASSESSMENT and PLAN    Current Exercise Program in Rehab:       Frequency: 3 days/week    Minutes: 31-43         METS: 2 6-4 0          HR:    RPE: 3-4         Modalities: Treadmill, UBE, NuStep and Recumbent bike      Exercise Progression 30 Day Goals :    Frequency: 3 days/week of cardiac rehab Supplement with home exercise 2+ days/wk as tolerated    Minutes: 30-50                            >150 mins/wk of moderate intensity exercise   METS: 3 0-6 0   HR:     RPE: 4-6   Modalities: Treadmill, UBE, NuStep and Recumbent bike    Strength training: Will be added following at least 8 weeks post surgery and 8-10 monitored sessions   Modalities: Arm Curl, Wall push-ups, Front Raises and Shoulder Shrugs    Home Exercise: Type: walking , Frequency: 5-7 days/week, Duration: 30 mins    Goals: Attend Rehab regularly and increase endurance to return to normal activities such as yard work, and golfing    Progression Toward Goals:  Reviewed Pt goals and determined plan of care, Will continue to educate and progress as tolerated  Education: benefit of exercise for CAD risk factors, AHA guidelines to achieve >150 mins/wk of moderate exercise, RPE scale and class: Risk Factors for Heart Disease   Plan:education on home exercise guidelines and home exercise 30+ mins 2 days opposite CR  Readiness to change: Action:  (Changing behavior)      NUTRITION ASSESSMENT AND PLAN    Weight control:    Starting weight: 156   Current weight:   156  Waist circumference:    Startin   Current:      Diabetes: T2D, Patient reported fasting , No insulin  A1c: 5 5    last measured:     Lipid management: Discussed diet and lipid management and Last lipid profile 2021  Chol 155    HDL 57  LDL 75    Goals:choose low sodium processed foods    Progression Toward Goals: Reviewed Pt goals and determined plan of care, Will continue to educate and progress as tolerated      Education: heart healthy eating  low sodium diet  exercise and diabetes management    Healthy food choices  Plan: Education class: Reading Food Labels, Education Class: Heart Healthy Eating and replace unhealthy snacks with fruits & vegs  Readiness to change: Preparation:  (Getting ready to change)       PSYCHOSOCIAL ASSESSMENT AND PLAN    Emotional:  Depression assessment:  PHQ-9 = 5-9 = Mild Depression            Anxiety measure:  DENIS-7 = 0-4  = Not anxious  Self-reported stress level:  1  Social support: Very Good, Patient reports excellent emotional/social support from family and Patient has friends available for support when needed     Goals:  improved sleep and increased energy    Progression Toward Goals: Reviewed Pt goals and determined plan of care, Will continue to educate and progress as tolerated  Education: signs/sxs of depression, benefits of a positive support system and stress management techniques  Plan: Class: Stress and Your Health, Refer to [employer], Practice relaxation techniques, Exercise, Spend time outdoors, Read a Book and Keep a positive mindset  Readiness to change: Preparation:  (Getting ready to change)       OTHER CORE COMPONENTS     Tobacco:   Social History     Tobacco Use   Smoking Status Never Smoker   Smokeless Tobacco Never Used       Tobacco Use Intervention:   N/A:  Patient is a non-smoker     Anginal Symptoms:  None   NTG use: No prescription    Blood pressure:    Restin//74   Exercise: 120//80    Goals: consistent BP < 130/80, reduced dietary sodium <2300mg, moderate intensity exercise >150 mins/wk and medication compliance    Progression Toward Goals: Reviewed Pt goals and determined plan of care, Will continue to educate and progress as tolerated      Education:  understanding high blood pressure and it's relationship to CAD, low sodium diet and HTN and Education class: Understanding Heart Disease, common heart medications, cardiac risk factots  Plan: Class: Understanding Heart Disease, Class: Common Heart Medications, Avoid Processed foods, engage in regular exercise and check labels for sodium content  Readiness to change: Preparation:  (Getting ready to change)

## 2021-08-24 ENCOUNTER — APPOINTMENT (OUTPATIENT)
Dept: LAB | Facility: CLINIC | Age: 76
End: 2021-08-24
Payer: MEDICARE

## 2021-08-25 ENCOUNTER — ANTICOAG VISIT (OUTPATIENT)
Dept: CARDIOLOGY CLINIC | Facility: CLINIC | Age: 76
End: 2021-08-25

## 2021-08-25 ENCOUNTER — CLINICAL SUPPORT (OUTPATIENT)
Dept: CARDIAC REHAB | Facility: CLINIC | Age: 76
End: 2021-08-25
Payer: MEDICARE

## 2021-08-25 DIAGNOSIS — Z95.2 S/P AVR: ICD-10-CM

## 2021-08-25 DIAGNOSIS — Z95.1 S/P CABG (CORONARY ARTERY BYPASS GRAFT): ICD-10-CM

## 2021-08-25 DIAGNOSIS — I48.91 ATRIAL FIBRILLATION WITH RVR (HCC): Primary | ICD-10-CM

## 2021-08-25 PROCEDURE — 93798 PHYS/QHP OP CAR RHAB W/ECG: CPT

## 2021-08-27 ENCOUNTER — CLINICAL SUPPORT (OUTPATIENT)
Dept: CARDIAC REHAB | Facility: CLINIC | Age: 76
End: 2021-08-27
Payer: MEDICARE

## 2021-08-27 ENCOUNTER — ANTICOAG VISIT (OUTPATIENT)
Dept: CARDIOLOGY CLINIC | Facility: CLINIC | Age: 76
End: 2021-08-27

## 2021-08-27 ENCOUNTER — OFFICE VISIT (OUTPATIENT)
Dept: CARDIOLOGY CLINIC | Facility: CLINIC | Age: 76
End: 2021-08-27
Payer: MEDICARE

## 2021-08-27 VITALS
WEIGHT: 157.4 LBS | BODY MASS INDEX: 23.31 KG/M2 | SYSTOLIC BLOOD PRESSURE: 146 MMHG | HEART RATE: 86 BPM | OXYGEN SATURATION: 98 % | DIASTOLIC BLOOD PRESSURE: 80 MMHG | TEMPERATURE: 97.8 F | HEIGHT: 69 IN

## 2021-08-27 DIAGNOSIS — I25.10 CORONARY ARTERY DISEASE INVOLVING NATIVE CORONARY ARTERY OF NATIVE HEART WITHOUT ANGINA PECTORIS: ICD-10-CM

## 2021-08-27 DIAGNOSIS — I44.7 LBBB (LEFT BUNDLE BRANCH BLOCK): ICD-10-CM

## 2021-08-27 DIAGNOSIS — I10 BENIGN ESSENTIAL HYPERTENSION: Primary | ICD-10-CM

## 2021-08-27 DIAGNOSIS — I48.91 ATRIAL FIBRILLATION WITH RVR (HCC): Primary | ICD-10-CM

## 2021-08-27 DIAGNOSIS — Z95.2 S/P AVR: ICD-10-CM

## 2021-08-27 DIAGNOSIS — I48.0 PAF (PAROXYSMAL ATRIAL FIBRILLATION) (HCC): ICD-10-CM

## 2021-08-27 DIAGNOSIS — E78.2 MIXED HYPERLIPIDEMIA: ICD-10-CM

## 2021-08-27 DIAGNOSIS — Z95.1 S/P CABG (CORONARY ARTERY BYPASS GRAFT): ICD-10-CM

## 2021-08-27 PROCEDURE — 93798 PHYS/QHP OP CAR RHAB W/ECG: CPT

## 2021-08-27 PROCEDURE — 99214 OFFICE O/P EST MOD 30 MIN: CPT | Performed by: INTERNAL MEDICINE

## 2021-08-27 NOTE — PROGRESS NOTES
Cardiology   Terrie Britt DO, Esmer Dutton MD, Nitesh Laughlin MD, Victoria Isabel MD, [facility] [place]  -------------------------------------------------------------------  [facility]  [address], [place],[address], [address] [place] [address], [address] [place] [address]  [phone]    Cardiology Follow Up  Clarisse Gilmore  1945  [id number]          Assessment/Plan:    1  Benign essential hypertension    2  LBBB (left bundle branch block)    3  Coronary artery disease involving native coronary artery of native heart without angina pectoris    4  S/P CABG (coronary artery bypass graft)    5  PAF (paroxysmal atrial fibrillation) ([address] )    6  S/P AVR    7  Mixed hyperlipidemia      -   Blood pressure improved with medication changes  Will continue current medication regimen of lisinopril / hydrochlorothiazide 20/25 mg daily and carvedilol 6 25 mg b i d  [place] Settle -   Discontinue coumadin  Holter monitor did not show any atrial fibrillation episodes and he does not have palpitations  Atrial fibrillation occurred post procedure  -   Will monitor blood pressure during cardiac rehab  - Continue atorvastatin 80 mg daily  - Continue farxiga  Interval History:     Clarisse Gilmore is 77 y o  male here for follow up of hypertension  Blood pressure was markedly elevated previously  Medications were adjusted with resumption of lisinopril / hydrochlorothiazide along with switching metoprolol to carvedilol  Since then, blood pressure has been significantly improved  He has blood pressure well controlled during cardiac rehab  He is feeling less fatigued  He denies any chest pain, shortness of breath, lower extremity edema, orthopnea or paroxysmal nocturnal dyspnea  On 4/6/21, the patient underwent nuclear stress test  because of congestive heart failure    He had an ejection fraction of 35-40% seen on echocardiogram   He was not having any symptoms of chest pain or shortness of breath  Nuclear stress test was done which showed ischemia of the lateral and inferoseptal walls  Cardiac catheterization was done afterwards which showed severe multivessel CAD  He subsequently underwent CABGx5 (LIMA to LAD, SVG to D1, SVG -Y- to ramus intermedius and OM1, SVG to R PDA) and tissue AVR 23 mm [facility] Ease    Procedure complicated by a new left bundle-branch block on postop ECG and atrial fibrillation  He converted to sinus rhythm with IV amiodarone  Patient had a 30 day biotel monitor placed after discharge  There were no events noted  Past Medical History:   Diagnosis Date    Arthritis     generalized    Asthma     seasonal-does not use inhaler    Atrial fibrillation with RVR ([facility] ) 5/12/2021    Back pain     Carotid artery stenosis     Diabetes mellitus (HCC)     type    GERD (gastroesophageal reflux disease)     Hiatal hernia     Hyperlipidemia     Hypertension     Peyronie's disease     last assessed [id number]    Seasonal allergies     Wears glasses      Social History     Socioeconomic History    Marital status: Single     Spouse name: Not on file    Number of children: Not on file    Years of education: Not on file    Highest education level: Not on file   Occupational History    Not on file   Tobacco Use    Smoking status: Never Smoker    Smokeless tobacco: Never Used   Vaping Use    Vaping Use: Never used   Substance and Sexual Activity    Alcohol use:  Yes     Alcohol/week: 10 0 standard drinks     Types: 7 Glasses of wine, 3 Standard drinks or equivalent per week    Drug use: No    Sexual activity: Yes     Partners: Female   Other Topics Concern    Not on file   Social History Narrative    Daily caffeinnated coffee      Social Determinants of Health     Financial Resource Strain:     Difficulty of Paying Living Expenses:    Food Insecurity:     Worried About Running Out of Food in the Last Year:     Ran Out of Food in the Last Year:    Transportation Needs:     Lack of Transportation (Medical):  Lack of Transportation (Non-Medical):    Physical Activity:     Days of Exercise per Week:     Minutes of Exercise per Session:    Stress:     Feeling of Stress :    Social Connections:     Frequency of Communication with Friends and Family:     Frequency of Social Gatherings with Friends and Family:     Attends Anglican Services:     Active Member of Clubs or Organizations:     Attends Club or Organization Meetings:     Marital Status:    Intimate Partner Violence:     Fear of Current or Ex-Partner:     Emotionally Abused:     Physically Abused:     Sexually Abused:       Family History   Problem Relation Age of Onset    Heart attack Mother [de-identified]        CABG    Kidney failure Father     Diabetes Father         pre-diabetic     Past Surgical History:   Procedure Laterality Date    APPENDECTOMY      CAROTID ENDARTERECTOMY Left 10/29/2020    COLONOSCOPY N/A 1/22/2016    Procedure: COLONOSCOPY;  Surgeon: Rivas Bassett MD;  Location: [facility]; Service:     KNEE ARTHROSCOPY Left     KNEE ARTHROSCOPY W/ MENISCAL REPAIR Right     [place] RPLCMT AORTIC VALVE OPN W/STENTLESS TISSUE VALVE N/A 5/10/2021    Procedure: CORONARY ARTERY BYPASS GRAFT (CABG) X 5 USING LEFT GSV--> DIAGONAL,, RAMUS, PDA ,AND OM1, AND LEFT BALBIR-LAD WITH REPLACEMENT VALVE AORTIC USING 23 MM MURRAY MAGNA EASE(AVR); Surgeon: Lizett Gomes MD;  Location:  MAIN OR;  Service: Cardiac Surgery    [place] THROMBOENDARTECTMY Arun Canary INCIS Left 10/29/2020    Procedure: CAROTID ENDARTERECTOMY W/BOVINE PATCH ANGIOPLASTY AND COMPLETION DUPLEX IMAGING;  Surgeon: David Robesron DO;  Location: [facility];  Service: Vascular       Current Outpatient Medications:     acetaminophen (TYLENOL) 325 mg tablet, Take 1-2 tablets Q4-6 hours PRN pain  Do not take more than 4 grams in 24 hours  , Disp: , Rfl: 0    aspirin 81 mg chewable tablet, Chew 1 tablet (81 mg total) daily, Disp: 30 tablet, Rfl: 2    atorvastatin (LIPITOR) 80 mg tablet, Take 1 tablet (80 mg total) by mouth daily with dinner, Disp: 90 tablet, Rfl: 2    Blood Glucose Monitoring Suppl (FreeStyle InsuLinx System) w/Device KIT, Test TID & QHS as directed , Disp: 1 kit, Rfl: 0    carvedilol (COREG) 6 25 mg tablet, Take 1 tablet (6 25 mg total) by mouth 2 (two) times a day with meals, Disp: 60 tablet, Rfl: 2    Dapagliflozin Propanediol (Farxiga) 5 MG TABS, Take 1 tablet (5 mg total) by mouth daily, Disp: 90 tablet, Rfl: 1    FreeStyle InsuLinx Test test strip, Test TID & QHS as directed , Disp: 100 each, Rfl: 2    Lancets (freestyle) lancets, Test TID & QHS as directed , Disp: 100 each, Rfl: 2    lisinopril-hydrochlorothiazide (PRINZIDE,ZESTORETIC) 20-25 MG per tablet, Take 1 tablet by mouth daily, Disp: 90 tablet, Rfl: 3    metFORMIN (GLUCOPHAGE) 1000 MG tablet, TAKE ONE TABLET BY MOUTH TWICE A DAY WITH MEALS, Disp: 180 tablet, Rfl: 2    docusate sodium (COLACE) 100 mg capsule, Take 1 capsule (100 mg total) by mouth 2 (two) times a day Hold for soft stools  , Disp: 60 capsule, Rfl: 0        Review of Systems:  Review of Systems   Respiratory: Negative for shortness of breath  Cardiovascular: Negative for chest pain, palpitations and leg swelling  All other systems reviewed and are negative  Physical Exam:  Vitals:  Vitals:    08/27/21 1117   BP: 146/80   BP Location: Left arm   Patient Position: Sitting   Cuff Size: Standard   Pulse: 86   Temp: 97 8 °F (36 6 °C)   TempSrc: Temporal   SpO2: 98%   Weight: 71 4 kg (157 lb 6 4 oz)   Height: 5' 9" (1 753 m)     Physical Exam   Constitutional: He appears healthy  No distress  Eyes: Pupils are equal, round, and reactive to light  Conjunctivae are normal    Neck: No JVD present  Cardiovascular: Normal rate, regular rhythm and normal heart sounds  Exam reveals no gallop and no friction rub  No murmur heard    Pulmonary/Chest: Effort normal and breath sounds normal  He has no wheezes  He has no rales  Musculoskeletal:         General: No tenderness, deformity or edema  Cervical back: Normal range of motion and neck supple  Neurological: He is alert and oriented to person, place, and time  Skin: Skin is warm and dry  Cardiographics:  EKG: Personally reviewed    Normal sinus rhythm at 75 beats per minute with left bundle-branch block  Last known EF: 40-45%    This note was completed in part utilizing [employer] Software  Grammatical errors, random word insertions, spelling mistakes, and incomplete sentences can be an occasional consequence of this system secondary to software limitations, ambient noise, and hardware issues  If you have any questions or concerns about the content, text, or information contained within the body of this dictation, please contact the provider for clarification

## 2021-08-30 ENCOUNTER — APPOINTMENT (OUTPATIENT)
Dept: CARDIAC REHAB | Facility: CLINIC | Age: 76
End: 2021-08-30
Payer: MEDICARE

## 2021-08-31 ENCOUNTER — APPOINTMENT (OUTPATIENT)
Dept: CARDIAC REHAB | Facility: CLINIC | Age: 76
End: 2021-08-31
Payer: MEDICARE

## 2021-09-01 ENCOUNTER — CLINICAL SUPPORT (OUTPATIENT)
Dept: CARDIAC REHAB | Facility: CLINIC | Age: 76
End: 2021-09-01
Payer: MEDICARE

## 2021-09-01 DIAGNOSIS — Z95.1 S/P CABG (CORONARY ARTERY BYPASS GRAFT): ICD-10-CM

## 2021-09-01 DIAGNOSIS — Z95.2 S/P AVR: ICD-10-CM

## 2021-09-01 PROCEDURE — 93798 PHYS/QHP OP CAR RHAB W/ECG: CPT

## 2021-09-03 ENCOUNTER — CLINICAL SUPPORT (OUTPATIENT)
Dept: CARDIAC REHAB | Facility: CLINIC | Age: 76
End: 2021-09-03
Payer: MEDICARE

## 2021-09-03 ENCOUNTER — OFFICE VISIT (OUTPATIENT)
Dept: VASCULAR SURGERY | Facility: CLINIC | Age: 76
End: 2021-09-03
Payer: MEDICARE

## 2021-09-03 VITALS
SYSTOLIC BLOOD PRESSURE: 148 MMHG | HEART RATE: 60 BPM | BODY MASS INDEX: 22.96 KG/M2 | HEIGHT: 69 IN | WEIGHT: 155 LBS | DIASTOLIC BLOOD PRESSURE: 88 MMHG | TEMPERATURE: 98.2 F

## 2021-09-03 DIAGNOSIS — I10 BENIGN ESSENTIAL HYPERTENSION: ICD-10-CM

## 2021-09-03 DIAGNOSIS — I65.21 INTERNAL CAROTID ARTERY OCCLUSION, RIGHT: ICD-10-CM

## 2021-09-03 DIAGNOSIS — Z95.1 S/P CABG (CORONARY ARTERY BYPASS GRAFT): ICD-10-CM

## 2021-09-03 DIAGNOSIS — Z98.890 STATUS POST CAROTID ENDARTERECTOMY: ICD-10-CM

## 2021-09-03 DIAGNOSIS — E11.40 CONTROLLED TYPE 2 DIABETES MELLITUS WITH DIABETIC NEUROPATHY, WITHOUT LONG-TERM CURRENT USE OF INSULIN (HCC): ICD-10-CM

## 2021-09-03 DIAGNOSIS — I65.22 CAROTID STENOSIS, LEFT: Primary | ICD-10-CM

## 2021-09-03 DIAGNOSIS — Z95.2 S/P AVR: ICD-10-CM

## 2021-09-03 PROCEDURE — 99214 OFFICE O/P EST MOD 30 MIN: CPT | Performed by: PHYSICIAN ASSISTANT

## 2021-09-03 PROCEDURE — 93798 PHYS/QHP OP CAR RHAB W/ECG: CPT

## 2021-09-03 NOTE — PROGRESS NOTES
Assessment/Plan:    LEFT carotid stenosis  Status post LEFT carotid endarterectomy  -     VAS carotid complete study; Future    RIGHT internal carotid artery occlusion  -     VAS carotid complete study; Future    -doing well; asymptomatic CRUZ  -since he has last seen, he underwent CAB and AVR    -CV duplex 8/9/21: R ICA occlusion, L widely patent endart site (134/46)    Discussion:  Patient is 8 months s/p LEFT carotid endarterectomy  He is doing well and fully functional  Since he was last seen, he was found to have rheumatic heart disease with coronary artery disease requiring CAB and bio aortic valve replacement (5/10/21 )  He reports that he is in cardiac rehab and doing very well participating in walking on the treadmill, NuStep, biking, etc   She has no chest her leg limiting symptoms  We reviewed his carotid duplex study which is stable  The LEFT ICA endarterectomy site open and there is known right carotid artery is chronically occluded  -continue with regular activity and cardiac rehab   -maintain good diabetes and blood pressure control  -we reviewed the symptoms of stroke for which he should call 911  -continue with best medical therapy on asa/high-dose statin   -check carotid duplex in 6 months and office visit in 1 year      Benign essential hypertension    Controlled type 2 diabetes mellitus with diabetic neuropathy, without long-term current use of insulin ([facility] )          Subjective:      Patient ID: Barb Silverman is a 77 y o  male  Patient presents today to review carotid study s/p L CEA 10/29/20  Denies any s/s of CVA  HPI    Barb Silverman 80-year-old male HTN,  Diabetes with neuropathy,  Rheumatic heart disease, CAD s/p AoVR and CAB,  Carotid artery disease s/p L CEA (DIM, 10/29/20) and occluded ANI who presents for vascular follow up and review recent testing  Mr Johny Kincaid is status post 8 months after left carotid endarterectomy  He returns to the office today for re-evaluation  He has no symptoms of TIA/stroke  He is fully functional without chest or leg limiting symptoms  Since he was last seen he was found to have rheumatic heart disease with coronary artery disease requiring CAB and bio aortic valve replacement (5/10/21 )  He reports that he is in cardiac rehab and doing very well participating in walking on the treadmill, NuStep, biking, etc      Mr Jai Roper is eager to exercise, participate in cardiac rehab and make healthy lifestyle changes  We reviewed his recent labs and testing  The carotid duplex study which shows patent L ICA endart site and known right carotid artery is chronically occluded  The study is stable and as expected  -VQI  -A1c 6 4  -LDL 75      CV duplex 8/9/21:   Risk Factors: The patient has history of HTN, Diabetes (NIDDM (Diet)) and HLD  Clinical:  Right Pressure:  132/64 mm Hg, Left Pressure:  134/64 mm Hg  FINDINGS:     Segment      Right                               Left                        Impression  PSV  EDV (cm/s)  Ratio  PSV  EDV (cm/s)  Ratio    Dist  ICA    Occluded                             92          33   0 51    Mid  ICA     Occluded                            134          46   0 74    Prox  ICA    Occluded                             94          28   0 52    Dist CCA                 107           8         162          38           Mid CCA                  112          11   1 59  180          33   1 83    Prox CCA                  70           6          98          22           Ext Carotid              123           0   1 11  312          22   1 73    Prox Vert                 67          20          64          13           Subclavian                82           0         134           0                    CONCLUSION:     Impression  RIGHT:  Known total occlusion noted in the internal carotid artery  Plaque is  heterogenous/homogenous and irregular  Vertebral artery flow is antegrade   There is no significant subclavian artery  disease  LEFT:  Widely patent internal carotid artery and endarterectomy site  Vertebral artery flow is antegrade  There is no significant subclavian artery  disease  Compared to previous study on 2/9/2021, there is no change  Recommend repeat testing in 6month as per protocol unless otherwise indicated  The following portions of the patient's history were reviewed and updated as appropriate: allergies, current medications, past family history, past medical history, past social history, past surgical history and problem list     Review of Systems   Constitutional: Negative  HENT: Negative  Eyes: Negative  Respiratory: Negative  Cardiovascular: Negative  Gastrointestinal: Negative  Endocrine: Negative  Genitourinary: Negative  Musculoskeletal: Negative  Skin: Negative  Allergic/Immunologic: Negative  Neurological: Negative  Hematological: Negative  Psychiatric/Behavioral: Negative  Objective:      /88 (BP Location: Right arm, Patient Position: Sitting)   Pulse 60   Temp 98 2 °F (36 8 °C) (Tympanic)   Ht 5' 9" (1 753 m)   Wt 70 3 kg (155 lb)   BMI 22 89 kg/m²       Healed left neck surgical scar  RRR S1S2 soft sm, ao valve   No edema       Physical Exam  Vitals and nursing note reviewed  Constitutional:       Appearance: He is well-developed  HENT:      Head: Normocephalic and atraumatic  Eyes:      Pupils: Pupils are equal, round, and reactive to light  Neck:      Thyroid: No thyromegaly  Vascular: No JVD  Trachea: Trachea normal    Cardiovascular:      Rate and Rhythm: Normal rate and regular rhythm  Pulses:           Carotid pulses are 2+ on the right side and 2+ on the left side  Radial pulses are 2+ on the right side and 2+ on the left side  Heart sounds: Normal heart sounds, S1 normal and S2 normal  No murmur heard  No friction rub  No gallop      Pulmonary:      Effort: Pulmonary effort is normal  No accessory muscle usage or respiratory distress  Breath sounds: Normal breath sounds  No wheezing or rales  Abdominal:      General: Bowel sounds are normal  There is no distension  Palpations: Abdomen is soft  Tenderness: There is no abdominal tenderness  Musculoskeletal:         General: No deformity  Normal range of motion  Cervical back: Neck supple  Skin:     General: Skin is warm and dry  Findings: No lesion or rash  Nails: There is no clubbing  Neurological:      Mental Status: He is alert and oriented to person, place, and time  Comments: Grossly normal    Psychiatric:         Behavior: Behavior is cooperative  VQI CEA Long-Term Follow-Up    Myocardial Infarction - No    Neurological Event - No    Reperfusion Symptoms - No    Modified Johnston Score - 0 - No symptoms         Functional Status - Full           I have reviewed and made appropriate changes to the review of systems input by the medical assistant      Vitals:    09/03/21 0924   BP: 148/88   BP Location: Right arm   Patient Position: Sitting   Pulse: 60   Temp: 98 2 °F (36 8 °C)   TempSrc: Tympanic   Weight: 70 3 kg (155 lb)   Height: 5' 9" (1 753 m)       Patient Active Problem List   Diagnosis    Benign essential hypertension    Controlled diabetes mellitus with diabetic neuropathy (HCC)    Hyperlipidemia    Murmur    Organic impotence    Mass of right side of neck    ICAO (internal carotid artery occlusion), right    Internal carotid artery occlusion, right    Carotid stenosis, left    Asthma    Status post carotid endarterectomy    Chronic systolic congestive heart failure (HCC)    Coronary artery disease involving native coronary artery of native heart    Rheumatic aortic stenosis    S/P AVR    S/P CABG (coronary artery bypass graft)    Acute blood loss anemia    Thrombocytopenia (HCC)    LBBB (left bundle branch block)    1st degree AV block    Anemia    Encounter for postoperative care       Past Surgical History:   Procedure Laterality Date    APPENDECTOMY      CAROTID ENDARTERECTOMY Left 10/29/2020    COLONOSCOPY N/A 1/22/2016    Procedure: COLONOSCOPY;  Surgeon: Shawn Freeman MD;  [facility]; Service:     KNEE ARTHROSCOPY Left     KNEE ARTHROSCOPY W/ MENISCAL REPAIR Right     [place] RPLCMT AORTIC VALVE OPN W/STENTLESS TISSUE VALVE N/A 5/10/2021    Procedure: CORONARY ARTERY BYPASS GRAFT (CABG) X 5 USING LEFT GSV--> DIAGONAL,, RAMUS, PDA ,AND OM1, AND LEFT BALBIR-LAD WITH REPLACEMENT VALVE AORTIC USING 23 MM MURRAY MAGNA EASE(AVR); Surgeon: Anayeli Kelsey MD;  [facility]: [facility];  Service: Cardiac Surgery    IL THROMBOENDARTECTMY NECK,NECK INCIS Left 10/29/2020    Procedure: CAROTID ENDARTERECTOMY W/BOVINE PATCH ANGIOPLASTY AND COMPLETION DUPLEX IMAGING;  Surgeon: Kesha Atwood DO;  Location: [facility];  Service: Vascular       Family History   Problem Relation Age of Onset    Heart attack Mother [de-identified]        CABG    Kidney failure Father     Diabetes Father         pre-diabetic       Social History     Socioeconomic History    Marital status: Single     Spouse name: Not on file    Number of children: Not on file    Years of education: Not on file    Highest education level: Not on file   Occupational History    Not on file   Tobacco Use    Smoking status: Never Smoker    Smokeless tobacco: Never Used   Vaping Use    Vaping Use: Never used   Substance and Sexual Activity    Alcohol use:  Yes     Alcohol/week: 10 0 standard drinks     Types: 7 Glasses of wine, 3 Standard drinks or equivalent per week    Drug use: No    Sexual activity: Yes     Partners: Female   Other Topics Concern    Not on file   Social History Narrative    Daily caffeinnated coffee      Social Determinants of Health     Financial Resource Strain:     Difficulty of Paying Living Expenses:    Food Insecurity:     Worried About Running Out of Food in the Last Year:     Ran Out of Food in the Last Year:    Transportation Needs:     Lack of Transportation (Medical):  Lack of Transportation (Non-Medical):    Physical Activity:     Days of Exercise per Week:     Minutes of Exercise per Session:    Stress:     Feeling of Stress :    Social Connections:     Frequency of Communication with Friends and Family:     Frequency of Social Gatherings with Friends and Family:     Attends Evangelical Services:     Active Member of Clubs or Organizations:     Attends Club or Organization Meetings:     Marital Status:    Intimate Partner Violence:     Fear of Current or Ex-Partner:     Emotionally Abused:     Physically Abused:     Sexually Abused:        No Known Allergies      Current Outpatient Medications:     acetaminophen (TYLENOL) 325 mg tablet, Take 1-2 tablets Q4-6 hours PRN pain  Do not take more than 4 grams in 24 hours  , Disp: , Rfl: 0    aspirin 81 mg chewable tablet, Chew 1 tablet (81 mg total) daily, Disp: 30 tablet, Rfl: 2    atorvastatin (LIPITOR) 80 mg tablet, Take 1 tablet (80 mg total) by mouth daily with dinner, Disp: 90 tablet, Rfl: 2    Blood Glucose Monitoring Suppl (FreeStyle InsuLinx System) w/Device KIT, Test TID & QHS as directed , Disp: 1 kit, Rfl: 0    carvedilol (COREG) 6 25 mg tablet, Take 1 tablet (6 25 mg total) by mouth 2 (two) times a day with meals, Disp: 60 tablet, Rfl: 2    Dapagliflozin Propanediol (Farxiga) 5 MG TABS, Take 1 tablet (5 mg total) by mouth daily, Disp: 90 tablet, Rfl: 1    FreeStyle InsuLinx Test test strip, Test TID & QHS as directed , Disp: 100 each, Rfl: 2    Lancets (freestyle) lancets, Test TID & QHS as directed , Disp: 100 each, Rfl: 2    lisinopril-hydrochlorothiazide (PRINZIDE,ZESTORETIC) 20-25 MG per tablet, Take 1 tablet by mouth daily, Disp: 90 tablet, Rfl: 3    metFORMIN (GLUCOPHAGE) 1000 MG tablet, TAKE ONE TABLET BY MOUTH TWICE A DAY WITH MEALS, Disp: 180 tablet, Rfl: 2    docusate sodium (COLACE) 100 mg capsule, Take 1 capsule (100 mg total) by mouth 2 (two) times a day Hold for soft stools   (Patient not taking: Reported on 9/3/2021), Disp: 60 capsule, Rfl: 0

## 2021-09-03 NOTE — PATIENT INSTRUCTIONS
Carotid artery disease    Patient is status post 8 months after left carotid endarterectomy  He returns to the office today for re-evaluation  He is fully functional   Since he was last seen he was found to have rheumatic heart disease with coronary artery disease requiring CAB and bio aortic valve replacement (5/10/21 )  He reports that he is in cardiac rehab and doing very well participating in walking on the treadmill, NuStep, biking, etc   He has no chest her leg limiting symptoms  We reviewed his carotid duplex study which shows the right carotid artery is chronically occluded  The left carotid surgical site is open and looks good  Recommend continued healthy lifestyle changes with good diabetic diet and continued regular activity       -maintain good diabetes and blood pressure control  -we reviewed the symptoms of stroke for which he should call 911  -continue with best medical therapy on asa/high-dose statin   -check carotid duplex in 6 months and office visit in 1 year        Symptoms of stroke:  - Unable to speak or understand speech  - Unable to move one side of the body (arm or leg)  - Visual changes  - Call 911 for any symptoms of stroke

## 2021-09-07 ENCOUNTER — CLINICAL SUPPORT (OUTPATIENT)
Dept: CARDIAC REHAB | Facility: CLINIC | Age: 76
End: 2021-09-07
Payer: MEDICARE

## 2021-09-07 DIAGNOSIS — Z95.2 S/P AVR: ICD-10-CM

## 2021-09-07 DIAGNOSIS — Z95.1 S/P CABG (CORONARY ARTERY BYPASS GRAFT): ICD-10-CM

## 2021-09-07 PROCEDURE — 93798 PHYS/QHP OP CAR RHAB W/ECG: CPT

## 2021-09-08 ENCOUNTER — CLINICAL SUPPORT (OUTPATIENT)
Dept: CARDIAC REHAB | Facility: CLINIC | Age: 76
End: 2021-09-08
Payer: MEDICARE

## 2021-09-08 DIAGNOSIS — Z95.1 S/P CABG (CORONARY ARTERY BYPASS GRAFT): ICD-10-CM

## 2021-09-08 DIAGNOSIS — Z95.2 S/P AVR: ICD-10-CM

## 2021-09-08 PROCEDURE — 93798 PHYS/QHP OP CAR RHAB W/ECG: CPT

## 2021-09-10 ENCOUNTER — CLINICAL SUPPORT (OUTPATIENT)
Dept: CARDIAC REHAB | Facility: CLINIC | Age: 76
End: 2021-09-10
Payer: MEDICARE

## 2021-09-10 DIAGNOSIS — Z95.2 S/P AVR: ICD-10-CM

## 2021-09-10 DIAGNOSIS — Z95.1 S/P CABG (CORONARY ARTERY BYPASS GRAFT): ICD-10-CM

## 2021-09-10 PROCEDURE — 93798 PHYS/QHP OP CAR RHAB W/ECG: CPT

## 2021-09-13 ENCOUNTER — CLINICAL SUPPORT (OUTPATIENT)
Dept: CARDIAC REHAB | Facility: CLINIC | Age: 76
End: 2021-09-13
Payer: MEDICARE

## 2021-09-13 DIAGNOSIS — Z95.2 S/P AVR: ICD-10-CM

## 2021-09-13 DIAGNOSIS — Z95.1 S/P CABG (CORONARY ARTERY BYPASS GRAFT): ICD-10-CM

## 2021-09-13 PROCEDURE — 93798 PHYS/QHP OP CAR RHAB W/ECG: CPT

## 2021-09-15 ENCOUNTER — CLINICAL SUPPORT (OUTPATIENT)
Dept: CARDIAC REHAB | Facility: CLINIC | Age: 76
End: 2021-09-15
Payer: MEDICARE

## 2021-09-15 DIAGNOSIS — Z95.2 S/P AVR: ICD-10-CM

## 2021-09-15 DIAGNOSIS — Z95.1 S/P CABG (CORONARY ARTERY BYPASS GRAFT): ICD-10-CM

## 2021-09-15 PROCEDURE — 93798 PHYS/QHP OP CAR RHAB W/ECG: CPT

## 2021-09-17 ENCOUNTER — CLINICAL SUPPORT (OUTPATIENT)
Dept: CARDIAC REHAB | Facility: CLINIC | Age: 76
End: 2021-09-17
Payer: MEDICARE

## 2021-09-17 DIAGNOSIS — Z95.2 S/P AVR: ICD-10-CM

## 2021-09-17 DIAGNOSIS — Z95.1 S/P CABG (CORONARY ARTERY BYPASS GRAFT): ICD-10-CM

## 2021-09-17 PROCEDURE — 93798 PHYS/QHP OP CAR RHAB W/ECG: CPT

## 2021-09-20 ENCOUNTER — CLINICAL SUPPORT (OUTPATIENT)
Dept: CARDIAC REHAB | Facility: CLINIC | Age: 76
End: 2021-09-20
Payer: MEDICARE

## 2021-09-20 DIAGNOSIS — Z95.2 S/P AVR: ICD-10-CM

## 2021-09-20 DIAGNOSIS — Z95.1 S/P CABG (CORONARY ARTERY BYPASS GRAFT): ICD-10-CM

## 2021-09-20 PROCEDURE — 93798 PHYS/QHP OP CAR RHAB W/ECG: CPT

## 2021-09-22 ENCOUNTER — CLINICAL SUPPORT (OUTPATIENT)
Dept: CARDIAC REHAB | Facility: CLINIC | Age: 76
End: 2021-09-22
Payer: MEDICARE

## 2021-09-22 DIAGNOSIS — Z95.2 S/P AVR: ICD-10-CM

## 2021-09-22 DIAGNOSIS — Z95.1 S/P CABG (CORONARY ARTERY BYPASS GRAFT): ICD-10-CM

## 2021-09-22 PROCEDURE — 93798 PHYS/QHP OP CAR RHAB W/ECG: CPT

## 2021-09-23 ENCOUNTER — OFFICE VISIT (OUTPATIENT)
Dept: DERMATOLOGY | Age: 76
End: 2021-09-23
Payer: MEDICARE

## 2021-09-23 VITALS — BODY MASS INDEX: 23.58 KG/M2 | HEIGHT: 69 IN | TEMPERATURE: 97.5 F | WEIGHT: 159.2 LBS

## 2021-09-23 DIAGNOSIS — D48.9 NEOPLASM OF UNCERTAIN BEHAVIOR: ICD-10-CM

## 2021-09-23 DIAGNOSIS — L57.0 KERATOSIS, ACTINIC: ICD-10-CM

## 2021-09-23 DIAGNOSIS — L73.9 FOLLICULITIS: Primary | ICD-10-CM

## 2021-09-23 PROCEDURE — 99204 OFFICE O/P NEW MOD 45 MIN: CPT | Performed by: DERMATOLOGY

## 2021-09-23 PROCEDURE — 11102 TANGNTL BX SKIN SINGLE LES: CPT | Performed by: DERMATOLOGY

## 2021-09-23 PROCEDURE — 17000 DESTRUCT PREMALG LESION: CPT | Performed by: DERMATOLOGY

## 2021-09-23 PROCEDURE — 88305 TISSUE EXAM BY PATHOLOGIST: CPT | Performed by: STUDENT IN AN ORGANIZED HEALTH CARE EDUCATION/TRAINING PROGRAM

## 2021-09-23 RX ORDER — ERYTHROMYCIN AND BENZOYL PEROXIDE 30; 50 MG/G; MG/G
GEL TOPICAL 2 TIMES DAILY
Qty: 23.3 G | Refills: 0 | Status: SHIPPED | OUTPATIENT
Start: 2021-09-23 | End: 2022-02-03

## 2021-09-23 NOTE — PATIENT INSTRUCTIONS
NEOPLASM OF UNCERTAIN BEHAVIOR OF SKIN    Assessment and Plan:   I have discussed with the patient that a sample of skin via a "skin biopsy would be potentially helpful to further make a specific diagnosis under the microscope   Based on a thorough discussion of this condition and the management approach to it (including a comprehensive discussion of the known risks, side effects and potential benefits of treatment), the patient (family) agrees to implement the following specific plan:    o Procedure:  Skin Biopsy  After a thorough discussion of treatment options and risk/benefits/alternatives (including but not limited to local pain, scarring, dyspigmentation, blistering, possible superinfection, and inability to confirm a diagnosis via histopathology), verbal and written consent were obtained and portion of the rash was biopsied for tissue sample  See below for consent that was obtained from patient and subsequent Procedure Note  PROCEDURE SHAVE BIOPSY NOTE:    After obtaining informed consent  at which time there was a discussion about the purpose of biopsy  and low risks of infection and bleeding  The area was prepped and draped in the usual fashion  Anesthesia was obtained with 1% lidocaine with epinephrine  A shave biopsy to an appropriate sampling depth was obtained with a sterile blade (such as a 15-blade or DermaBlade)  The resulting wound was covered with surgical ointment and bandaged appropriately  The patient tolerated the procedure well without complications and was without signs of functional compromise  Specimen has been sent for review by Dermatopathology  Standard post-procedure care has been explained and has been included in written form within the patient's copy of Informed Consent      INFORMED CONSENT DISCUSSION AND POST-OPERATIVE INSTRUCTIONS FOR PATIENT    I   RATIONALE FOR PROCEDURE  I understand that a skin biopsy allows the Dermatologist to test a lesion or rash under the microscope to obtain a diagnosis  It usually involves numbing the area with numbing medication and removing a small piece of skin; sometimes the area will be closed with sutures  In this specific procedure, sutures are not usually needed  If any sutures are placed, then they are usually need to be removed in 2 weeks or less  I understand that my Dermatologist recommends that a skin "shave" biopsy be performed today  A local anesthetic, similar to the kind that a dentist uses when filling a cavity, will be injected with a very small needle into the skin area to be sampled  The injected skin and tissue underneath "will go to sleep and become numb so no pain should be felt afterwards  An instrument shaped like a tiny "razor blade" (shave biopsy instrument) will be used to cut a small piece of tissue and skin from the area so that a sample of tissue can be taken and examined more closely under the microscope  A slight amount of bleeding will occur, but it will be stopped with direct pressure and a pressure bandage and any other appropriate methods  I understands that a scar will form where the wound was created  Surgical ointment will be applied to help protect the wound  Sutures are not usually needed  II   RISKS AND POTENTIAL COMPLICATIONS   I understand the risks and potential complications of a skin biopsy include but are not limited to the following:   Bleeding   Infection   Pain   Scar/keloid   Skin discoloration   Incomplete Removal   Recurrence   Nerve Damage/Numbness/Loss of Function   Allergic Reaction to Anesthesia   Biopsies are diagnostic procedures and based on findings additional treatment or evaluation may be required   Loss or destruction of specimen resulting in no additional findings    My Dermatologist has explained to me the nature of the condition, the nature of the procedure, and the benefits to be reasonably expected compared with alternative approaches    My Dermatologist has discussed the likelihood of major risks or complications of this procedure including the specific risks listed above, such as bleeding, infection, and scarring/keloid  I understand that a scar is expected after this procedure  I understand that my physician cannot predict if the scar will form a "keloid," which extends beyond the borders of the wound that is created  A keloid is a thick, painful, and bumpy scar  A keloid can be difficult to treat, as it does not always respond well to therapy, which includes injecting cortisone directly into the keloid every few weeks  While this usually reduces the pain and size of the scar, it does not eliminate it  I understand that photographs may be taken before and after the procedure  These will be maintained as part of the medical providers confidential records and may not be made available to me  I further authorize the medical provider to use the photographs for teaching purposes or to illustrate scientific papers, books, or lectures if in his/her judgment, medical research, education, or science may benefit from its use  I have had an opportunity to fully inquire about the risks and benefits of this procedure and its alternatives  I have been given ample time and opportunity to ask questions and to seek a second opinion if I wished to do so  I acknowledge that there have specifically been no guarantees as to the cosmetic results from the procedure  I am aware that with any procedure there is always the possibility of an unexpected complication  III  POST-PROCEDURAL CARE (WHAT YOU WILL NEED TO DO "AFTER THE BIOPSY" TO OPTIMIZE HEALING)     Keep the area clean and dry  Try NOT to remove the bandage or get it wet for the first 24 hours       Gently clean the area and apply surgical ointment (such as Vaseline petrolatum ointment, which is available "over the counter" and not a prescription) to the biopsy site for up to 2 weeks straight  This acts to protect the wound from the outside world   Sutures are not usually placed in this procedure  If any sutures were placed, return for suture removal as instructed (generally 1 week for the face, 2 weeks for the body)   Take Acetaminophen (Tylenol) for discomfort, if no contraindications  Ibuprofen or aspirin could make bleeding worse   Call our office immediately for signs of infection: fever, chills, increased redness, warmth, tenderness, discomfort/pain, or pus or foul smell coming from the wound  WHAT TO DO IF THERE IS ANY BLEEDING? If a small amount of bleeding is noticed, place a clean cloth over the area and apply firm pressure for ten minutes  Check the wound after 10 minutes of direct pressure  If bleeding persists, try one more time for an additional 10 minutes of direct pressure on the area  If the bleeding becomes heavier or does not stop after the second attempt, or if you have any other questions about this procedure, then please call your [facility] Dermatologist by calling [phone] (SKIN)  I hereby acknowledge that I have reviewed and verified the site with my Dermatologist and have requested and authorized my Dermatologist to proceed with the procedure  FOLLICULITIS    Assessment and Plan:  Based on a thorough discussion of this condition and the management approach to it (including a comprehensive discussion of the known risks, side effects and potential benefits of treatment), the patient (family) agrees to implement the following specific plan:   Erythromycin-benzoyl peroxide gel - apply topically twice a day for 2-4 weeks   Monitor for changes    What is folliculitis? Folliculitis is the name given to a group of skin conditions in which there are inflamed hair follicles  The result is a tender red spot, often with a surface pustule  Folliculitis may be superficial or deep   It can affect anywhere there are hairs, including chest, back, buttocks, arms and legs  Acne and its variants are also types of folliculitis  What causes folliculitis? Folliculitis can be due to infection, occlusion (blockage), irritation and various skin diseases  Folliculitis due to infection  To determine if folliculitis is due to an infection, swabs should be taken from the pustules for cytology and culture in the laboratory  Bacteria  Bacterial folliculitis is commonly due to Staphylococcus aureus  If the infection involves the deep part of the follicle, it results in a painful boil  Recommended treatment includes careful hygiene, antiseptic cleanser or cream, antibiotic ointment, and/or oral antibiotics  Spa pool folliculitis is due to infection with Pseudomonas aeruginosa, which thrives in inadequately chlorinated warm water  Gram negative folliculitis is a pustular facial eruption also due to infection with Pseudomonas aeruginosa or other similar organisms  When it appears, it usually follows tetracycline treatment of acne, but is quite rare  Yeasts  The most common yeast to cause a folliculitis is Pityrosporum ovale, also known as Malassezia  Malassezia folliculitis (Pityrosporum folliculitis) is an itchy acne-like condition usually affecting the upper trunk of a young adult  Treatment includes avoiding moisturisers, stopping any antibiotics and topical antifungal or oral antifungal medication for several weeks  Candida albicans can also provoke a folliculitis in skin folds (intertrigo) or in the beard area  It is treated with topical or oral antifungal agents  Fungi  Ringworm of the scalp (tinea capitis) usually results in scaling and hair loss, but sometimes results in folliculitis  In [place], cat ringworm (Microsporum canis) is the commonest organism causing scalp fungal infection  Other fungi such as Trichophyton tonsurans are increasingly reported  Treatment is with oral antifungal agents for several months      Viral infections  Folliculitis may caused by herpes simplex virus  This tends to be tender, and resolves without treatment in around 10 days  Severe recurrent attacks may be treated with acyclovir and other antiviral agents  Herpes zoster (the cause of shingles) may also present as folliculitis with painful pustules and crusted spots within a dermatome (an area of skin supplied by a single nerve)  It is treated with hihg-dose acyclovir  Molluscum contagiosum, common in young children, may also cause follicular umbilicated papules, usually clustered in and around a body fold  Molluscum may provoke dermatitis  Parasitic infection  Folliculitis on the face or scalp of older or immunosuppressed adults may be due to colonisation by hair follicle mites (demodex)  This is known as demodicosis  The human infestation, scabies, often provokes folliculitis, as well as non-follicular papules, vesicles and pustules  Folliculitis due to irritation from regrowing hairs  Folliculitis may arise as hairs regrow after shaving, waxing, electrolysis or plucking  Swabs taken from the pustules are sterile ie there is no growth of bacteria or other organisms  In the beard area irritant folliculitis is known as pseudofolliculitis barbae  Irritant folliculitis is also common on the lower legs of women (shaving rash)  It is frequently very itchy  Treatment is by stopping hair removal, and not beginning again for about three months after the folliculitis has settled  To prevent reoccurring irritant folliculitis, use a gentle hair removal method, such as a lady's electric razor  Avoid soap and apply plenty of shaving gel, if using a blade shaver  Folliculitis due to contact reactions  Occlusion  Paraffin-based ointments, moisturisers, and adhesive plasters may all result in a sterile folliculitis  If a moisturiser is needed, choose an oil-free product, as it is less likely to cause occlusion      Chemicals  Coal tar, cutting oils and other chemicals may cause an irritant folliculitis  Avoid contact with the causative product  Topical steroids  Overuse of topical steroids may produce a folliculitis  Perioral dermatitis is a facial folliculitis provoked by moisturisers and topical steroids  Perioral dermatitis is treated with tetracycline antibiotics for six weeks or so  Folliculitis due to immunosuppression  Eosinophilic folliculitis is a specific type of folliculitis that may arise in some immune suppressed individuals such as those infected by human immunodeficiency virus (HIV) or those who have cancer  Folliculitis due to drugs  Folliculitis may be due to drugs, particularly corticosteroids (steroid acne), androgens (male hormones), ACTH, lithium, isoniazid (INH), phenytoin and B-complex vitamins  Protein kinase inhibitors (epidermal growth factor receptor inhibitors) and targeted therapy for metastatic melanoma (vemurafenib, dabrafenib) nearly always result in folliculitis  Folliculitis due to inflammatory skin diseases  Certain uncommon inflammatory skin diseases may cause permanent hair loss and scarring because of deep seated sterile folliculitis  These include:   Lichen planus   Discoid lupus erythematosus   Folliculitis decalvans   Folliculitis keloidalis     Treatment depends on the underlying condition and its severity  A skin biopsy is often necessary to establish the diagnosis  Acne variants   Acne and acne-like or acneform disorders are also forms of folliculitis  These include:   Acne vulgaris   Nodulocystic acne   Rosacea   Scalp folliculitis   Chloracne    The follicular occlusion syndrome refers to:   Hidradenitis suppurativa (acne inversa)   Acne conglobata (a severe form of nodulocystic acne)   Dissecting cellulitis (perifolliculitis capitis abscedens et suffodiens)   Pilonidal sinus      Treatment of the acne variants may include topical therapy as well as long courses of tetracycline antibiotics, isotretinoin (vitamin-A derivative) and in women, antiandrogenic therapy  Buttock folliculitis  Folliculitis affecting the buttocks is quite common and is often nonspecific, ie no specific cause is found  Buttock folliculitis is equally common in males and females   Acute buttock folliculitis is usually bacterial in origin (like boils), resulting in red painful papules and pustules  It clears with antibiotics   Chronic buttock folliculitis does not often cause significant symptoms but it can be very persistent  Although antiseptics, topical acne treatments, peeling agents such as alphahydroxy acids, long courses of oral antibiotics and isotretinoin can help buttock folliculitis, they are not always effective  Hair removal might be worth trying if the affected area is hairy  As regrowth of hair can make it worse, permanent hair reduction by laser or intense pulsed light (IPL) is best     ACTINIC KERATOSIS    Assessment and Plan:  Based on a thorough discussion of this condition and the management approach to it (including a comprehensive discussion of the known risks, side effects and potential benefits of treatment), the patient (family) agrees to implement the following specific plan:     Cryotherapy done in office today    Actinic keratoses are very common on sites repeatedly exposed to the sun, especially the backs of the hands and the face, most often affecting the ears, nose, cheeks, upper lip, vermilion of the lower lip, temples, forehead and balding scalp  In severely chronically sun-damaged individuals, they may also be found on the upper trunk, upper and lower limbs, and dorsum of feet  We discussed the theoretical premalignant (pre-cancerous) nature and etiology of these growths  We discussed the prevailing notion that actinic keratoses are a reflection of abnormal skin cell development due to DNA damage by short wavelength UVB    They are more likely to appear if the immune function is poor, due to aging, recent sun exposure, predisposing disease or certain drugs  We discussed that the main concern is that actinic keratoses may predispose to squamous cell carcinoma  It is rare for a solitary actinic keratosis to evolve to squamous cell carcinoma (SCC), but the risk of SCC occurring at some stage in a patient with more than 10 actinic keratoses is thought to be about 10 to 15%  A tender, thickened, ulcerated or enlarging actinic keratosis is suspicious of SCC  Actinic keratoses may be prevented by strict sun protection  If already present, keratoses may improve with a very high sun protection factor (50+) broad-spectrum sunscreen applied at least daily to affected areas, year-round  We recommend that UPF-rated clothing and hats and sunglasses be worn whenever possible and that a sunscreen-moisturizer combination product such as Neutrogena Daily Defense be applied at least three times a day  We performed a thorough discussion of treatment options and specific risk/benefits/alternatives including but not limited to medical field treatment with medications such as the following:     Topical field area medications such as 5-fluorouracil or Aldara (specifically, the trouble with long-term compliance, blistering and local skin reaction versus the convenience of at-home therapy and that field therapy gets what is not yet seen)   Cryotherapy (specifically, local pain, scarring, dyspigmentation, blistering, possible superinfection, and treats only what we see versus directed treatment today)   Photodynamic therapy (specifically, local pain, scarring, dyspigmentation, blistering, possible superinfection, need to schedule for a later date, and time spent in the office versus field therapy that gets what is not yet seen)      PROCEDURE:  DESTRUCTION OF PRE-MALIGNANT LESIONS  After a thorough discussion of treatment options and risk/benefits/alternatives (including but not limited to local pain, scarring, dyspigmentation, blistering, and possible superinfection), verbal and written consent were obtained and the aforementioned lesions were treated on with cryotherapy using liquid nitrogen x 1 cycle for 5-10 seconds  The patient tolerated the procedure well, and after-care instructions were provided

## 2021-09-23 NOTE — PROGRESS NOTES
[address] [facility] Note     Patient Name: Arthur Moss  Encounter Date: 09/23/21     Have you been cared for by a [facility] Dermatologist in the last 3 years and, if so, which one? No    · Have you traveled outside of the [address] [place] [address] in the past 3 months or outside of the [facility] area in the last 2 weeks? No     May we call your Preferred Phone number to discuss your specific medical information? Yes     May we leave a detailed message that includes your specific medical information? Yes      Today's Chief Concerns:   Concern #1:  Skin check, spot of concern on right calf    Past Medical History:  Have you personally ever had or currently have any of the following? · Skin cancer (such as Melanoma, Basal Cell Carcinoma, Squamous Cell Carcinoma? (If Yes, please provide more detail)- No  · Eczema: No  · Psoriasis: No  · HIV/AIDS: No  · Hepatitis B or C: No  · Tuberculosis: No  · Systemic Immunosuppression such as Diabetes, Biologic or Immunotherapy, Chemotherapy, Organ Transplantation, Bone Marrow Transplantation (If YES, please provide more detail): No  · Radiation Treatment (If YES, please provide more detail): No  · Any other major medical conditions/concerns? (If Yes, which types)- YES, open heart surgery in May, diabetes, hypertension    Social History:     What is/was your primary occupation? retired     What are your hobbies/past-times? Golf, [employer]    Family History:  Have any of your "first degree relatives" (parent, brother, sister, or child) had any of the following       · Skin cancer such as Melanoma or Merkel Cell Carcinoma or Pancreatic Cancer? No  · Eczema, Asthma, Hay Fever or Seasonal Allergies: YES, daughter - asthma  · Psoriasis or Psoriatic Arthritis: No  · Do any other medical conditions seem to run in your family? If Yes, what condition and which relatives?   No    Current Medications:       Current Outpatient Medications:     acetaminophen (TYLENOL) 325 mg tablet, Take 1-2 tablets Q4-6 hours PRN pain  Do not take more than 4 grams in 24 hours  , Disp: , Rfl: 0    aspirin 81 mg chewable tablet, Chew 1 tablet (81 mg total) daily, Disp: 30 tablet, Rfl: 2    Blood Glucose Monitoring Suppl (FreeStyle InsuLinx System) w/Device KIT, Test TID & QHS as directed , Disp: 1 kit, Rfl: 0    carvedilol (COREG) 6 25 mg tablet, Take 1 tablet (6 25 mg total) by mouth 2 (two) times a day with meals, Disp: 60 tablet, Rfl: 2    Dapagliflozin Propanediol (Farxiga) 5 MG TABS, Take 1 tablet (5 mg total) by mouth daily, Disp: 90 tablet, Rfl: 1    FreeStyle InsuLinx Test test strip, Test TID & QHS as directed , Disp: 100 each, Rfl: 2    Lancets (freestyle) lancets, Test TID & QHS as directed , Disp: 100 each, Rfl: 2    lisinopril-hydrochlorothiazide (PRINZIDE,ZESTORETIC) 20-25 MG per tablet, Take 1 tablet by mouth daily, Disp: 90 tablet, Rfl: 3    metFORMIN (GLUCOPHAGE) 1000 MG tablet, TAKE ONE TABLET BY MOUTH TWICE A DAY WITH MEALS, Disp: 180 tablet, Rfl: 2    atorvastatin (LIPITOR) 80 mg tablet, Take 1 tablet (80 mg total) by mouth daily with dinner (Patient not taking: Reported on 9/23/2021), Disp: 90 tablet, Rfl: 2    docusate sodium (COLACE) 100 mg capsule, Take 1 capsule (100 mg total) by mouth 2 (two) times a day Hold for soft stools  (Patient not taking: Reported on 9/3/2021), Disp: 60 capsule, Rfl: 0      Review of Systems:  Have you recently had or currently have any of the following? If YES, what are you doing for the problem? · Fever, chills or unintended weight loss: No  · Sudden loss or change in your vision: No  · Nausea, vomiting or blood in your stool: No  · Painful or swollen joints: No  · Wheezing or cough: No  · Changing mole or non-healing wound: No  · Nosebleeds: No  · Excessive sweating: No  · Easy or prolonged bleeding? No  · Over the last 2 weeks, how often have you been bothered by the following problems?   · Taking little interest or pleasure in doing things: 1 - Not at All  · Feeling down, depressed, or hopeless: 1 - Not at All  · Rapid heartbeat with epinephrine:  No    · FEMALES ONLY:    · Are you pregnant or planning to become pregnant? N/A  · Are you currently or planning to be nursing or breast feeding? N/A    · Any known allergies? No Known Allergies      Physical Exam:     Was a chaperone (Derm Clinical Assistant) present throughout the entire Physical Exam? Yes     Did the Dermatology Team specifically  the patient on the importance of a Full Skin Exam to be sure that nothing is missed clinically?  Yes}  o Did the patient ultimately request or accept a Full Skin Exam?  Yes  o Did the patient specifically refuse to have the areas "under-the-bra" examined by the Dermatologist? No  o Did the patient specifically refuse to have the areas "under-the-underwear" examined by the Dermatologist? No    CONSTITUTIONAL:   Vitals:    09/23/21 1111   Temp: 97 5 °F (36 4 °C)   TempSrc: Temporal   Weight: 72 2 kg (159 lb 3 2 oz)   Height: 5' 9" (1 753 m)         PSYCH: Normal mood and affect  EYES: Normal conjunctiva  ENT: Normal lips and oral mucosa  CARDIOVASCULAR: No edema  RESPIRATORY: Normal respirations  HEME/LYMPH/IMMUNO:  No regional lymphadenopathy except as noted below in "ASSESSMENT AND PLAN BY DIAGNOSIS"    SKIN:  FULL ORGAN SYSTEM EXAM  Hair, Scalp, Ears, Face Normal except as noted below in Assessment   Neck, Cervical Chain Nodes Normal except as noted below in Assessment   Right Arm/Hand/Fingers Normal except as noted below in Assessment   Left Arm/Hand/Fingers Normal except as noted below in Assessment   Chest/Breasts/Axillae Viewed areas Normal except as noted below in Assessment   Abdomen, Umbilicus Normal except as noted below in Assessment   Back/Spine Normal except as noted below in Assessment   Groin/Genitalia/Buttocks NOT EXAMINED   Right Leg, Foot, Toes Normal except as noted below in Assessment   Left Leg, Foot, Toes Normal except as noted below in Assessment        Assessment and Plan by Diagnosis:    History of Present Condition:     Duration:  How long has this been an issue for you?    o  1 year   Location Affected:  Where on the body is this affecting you?    o  right calf   Quality:  Is there any bleeding, pain, itch, burning/irritation, or redness associated with the skin lesion? o  would scab up and heal over   Severity:  Describe any bleeding, pain, itch, burning/irritation, or redness on a scale of 1 to 10 (with 10 being the worst)  o  irritation   Timing:  Does this condition seem to be there pretty constantly or do you notice it more at specific times throughout the day?    o 2   Context:  Have you ever noticed that this condition seems to be associated with specific activities you do?    o  denies   Modifying Factors:    o Anything that seems to make the condition worse?    -  denies  o What have you tried to do to make the condition better?    -  over the counter moisturizer   Associated Signs and Symptoms:  Does this skin lesion seem to be associated with any of the following:  o  SL AMB DERM SIGNS AND SYMPTOMS: Redness       NEOPLASM OF UNCERTAIN BEHAVIOR OF SKIN    Physical Exam:   (Anatomic Location); (Size and Morphological Description); (Differential Diagnosis):  o Specimen A: right posterior calf; 2 cm pink plaque with crusted papule on lateral edge; DiffDx: squamous cell carcinoma versus basal cell carcinoma   Pertinent Positives:   Pertinent Negatives: Additional History of Present Condition:  Present upon examination    Assessment and Plan:   I have discussed with the patient that a sample of skin via a "skin biopsy would be potentially helpful to further make a specific diagnosis under the microscope     Based on a thorough discussion of this condition and the management approach to it (including a comprehensive discussion of the known risks, side effects and potential benefits of treatment), the patient (family) agrees to implement the following specific plan:    o Procedure:  Skin Biopsy  After a thorough discussion of treatment options and risk/benefits/alternatives (including but not limited to local pain, scarring, dyspigmentation, blistering, possible superinfection, and inability to confirm a diagnosis via histopathology), verbal and written consent were obtained and portion of the rash was biopsied for tissue sample  See below for consent that was obtained from patient and subsequent Procedure Note  PROCEDURE SHAVE BIOPSY NOTE:     Performing Physician: Krystal Garcia   Anatomic Location; Clinical Description with size (cm); Pre-Op Diagnosis:   o Specimen A: right posterior calf; 2 cm pink plaque with crusted papule on lateral edge; DiffDx: squamous cell carcinoma versus basal cell carcinoma   Post-op diagnosis: Same      Local anesthesia: 1% xylocaine with epi       Topical anesthesia: None     Hemostasis: Aluminum chloride       After obtaining informed consent  at which time there was a discussion about the purpose of biopsy  and low risks of infection and bleeding  The area was prepped and draped in the usual fashion  Anesthesia was obtained with 1% lidocaine with epinephrine  A shave biopsy to an appropriate sampling depth was obtained with a sterile blade (such as a 15-blade or DermaBlade)  The resulting wound was covered with surgical ointment and bandaged appropriately  The patient tolerated the procedure well without complications and was without signs of functional compromise  Specimen has been sent for review by Dermatopathology  Standard post-procedure care has been explained and has been included in written form within the patient's copy of Informed Consent      INFORMED CONSENT DISCUSSION AND POST-OPERATIVE INSTRUCTIONS FOR PATIENT    I   RATIONALE FOR PROCEDURE  I understand that a skin biopsy allows the Dermatologist to test a lesion or rash under the microscope to obtain a diagnosis  It usually involves numbing the area with numbing medication and removing a small piece of skin; sometimes the area will be closed with sutures  In this specific procedure, sutures are not usually needed  If any sutures are placed, then they are usually need to be removed in 2 weeks or less  I understand that my Dermatologist recommends that a skin "shave" biopsy be performed today  A local anesthetic, similar to the kind that a dentist uses when filling a cavity, will be injected with a very small needle into the skin area to be sampled  The injected skin and tissue underneath "will go to sleep and become numb so no pain should be felt afterwards  An instrument shaped like a tiny "razor blade" (shave biopsy instrument) will be used to cut a small piece of tissue and skin from the area so that a sample of tissue can be taken and examined more closely under the microscope  A slight amount of bleeding will occur, but it will be stopped with direct pressure and a pressure bandage and any other appropriate methods  I understands that a scar will form where the wound was created  Surgical ointment will be applied to help protect the wound  Sutures are not usually needed  II   RISKS AND POTENTIAL COMPLICATIONS   I understand the risks and potential complications of a skin biopsy include but are not limited to the following:   Bleeding   Infection   Pain   Scar/keloid   Skin discoloration   Incomplete Removal   Recurrence   Nerve Damage/Numbness/Loss of Function   Allergic Reaction to Anesthesia   Biopsies are diagnostic procedures and based on findings additional treatment or evaluation may be required   Loss or destruction of specimen resulting in no additional findings    My Dermatologist has explained to me the nature of the condition, the nature of the procedure, and the benefits to be reasonably expected compared with alternative approaches    My Dermatologist has discussed the likelihood of major risks or complications of this procedure including the specific risks listed above, such as bleeding, infection, and scarring/keloid  I understand that a scar is expected after this procedure  I understand that my physician cannot predict if the scar will form a "keloid," which extends beyond the borders of the wound that is created  A keloid is a thick, painful, and bumpy scar  A keloid can be difficult to treat, as it does not always respond well to therapy, which includes injecting cortisone directly into the keloid every few weeks  While this usually reduces the pain and size of the scar, it does not eliminate it  I understand that photographs may be taken before and after the procedure  These will be maintained as part of the medical providers confidential records and may not be made available to me  I further authorize the medical provider to use the photographs for teaching purposes or to illustrate scientific papers, books, or lectures if in his/her judgment, medical research, education, or science may benefit from its use  I have had an opportunity to fully inquire about the risks and benefits of this procedure and its alternatives  I have been given ample time and opportunity to ask questions and to seek a second opinion if I wished to do so  I acknowledge that there have specifically been no guarantees as to the cosmetic results from the procedure  I am aware that with any procedure there is always the possibility of an unexpected complication  III  POST-PROCEDURAL CARE (WHAT YOU WILL NEED TO DO "AFTER THE BIOPSY" TO OPTIMIZE HEALING)     Keep the area clean and dry  Try NOT to remove the bandage or get it wet for the first 24 hours       Gently clean the area and apply surgical ointment (such as Vaseline petrolatum ointment, which is available "over the counter" and not a prescription) to the biopsy site for up to 2 weeks straight  This acts to protect the wound from the outside world   Sutures are not usually placed in this procedure  If any sutures were placed, return for suture removal as instructed (generally 1 week for the face, 2 weeks for the body)   Take Acetaminophen (Tylenol) for discomfort, if no contraindications  Ibuprofen or aspirin could make bleeding worse   Call our office immediately for signs of infection: fever, chills, increased redness, warmth, tenderness, discomfort/pain, or pus or foul smell coming from the wound  WHAT TO DO IF THERE IS ANY BLEEDING? If a small amount of bleeding is noticed, place a clean cloth over the area and apply firm pressure for ten minutes  Check the wound after 10 minutes of direct pressure  If bleeding persists, try one more time for an additional 10 minutes of direct pressure on the area  If the bleeding becomes heavier or does not stop after the second attempt, or if you have any other questions about this procedure, then please call your [facility] Dermatologist by calling [phone] (SKIN)  I hereby acknowledge that I have reviewed and verified the site with my Dermatologist and have requested and authorized my Dermatologist to proceed with the procedure  FOLLICULITIS    Physical Exam:   Anatomic Location Affected:  Right chest next to scar   Morphological Description:  Red nodule with pustule   Pertinent Positives:   Pertinent Negatives: Additional History of Present Condition:  Patient states having open heart surgery in May 2021      Assessment and Plan:  Based on a thorough discussion of this condition and the management approach to it (including a comprehensive discussion of the known risks, side effects and potential benefits of treatment), the patient (family) agrees to implement the following specific plan:   Erythromycin-benzoyl peroxide gel - apply topically to affected twice a day for 2-4 weeks   Monitor for changes    What is folliculitis? Folliculitis is the name given to a group of skin conditions in which there are inflamed hair follicles  The result is a tender red spot, often with a surface pustule  Folliculitis may be superficial or deep  It can affect anywhere there are hairs, including chest, back, buttocks, arms and legs  Acne and its variants are also types of folliculitis  What causes folliculitis? Folliculitis can be due to infection, occlusion (blockage), irritation and various skin diseases  Folliculitis due to infection  To determine if folliculitis is due to an infection, swabs should be taken from the pustules for cytology and culture in the laboratory  Bacteria  Bacterial folliculitis is commonly due to Staphylococcus aureus  If the infection involves the deep part of the follicle, it results in a painful boil  Recommended treatment includes careful hygiene, antiseptic cleanser or cream, antibiotic ointment, and/or oral antibiotics  Spa pool folliculitis is due to infection with Pseudomonas aeruginosa, which thrives in inadequately chlorinated warm water  Gram negative folliculitis is a pustular facial eruption also due to infection with Pseudomonas aeruginosa or other similar organisms  When it appears, it usually follows tetracycline treatment of acne, but is quite rare  Yeasts  The most common yeast to cause a folliculitis is Pityrosporum ovale, also known as Malassezia  Malassezia folliculitis (Pityrosporum folliculitis) is an itchy acne-like condition usually affecting the upper trunk of a young adult  Treatment includes avoiding moisturisers, stopping any antibiotics and topical antifungal or oral antifungal medication for several weeks  Candida albicans can also provoke a folliculitis in skin folds (intertrigo) or in the beard area  It is treated with topical or oral antifungal agents      Fungi  Ringworm of the scalp (tinea capitis) usually results in scaling and hair loss, but sometimes results in folliculitis  In [place], cat ringworm (Microsporum canis) is the commonest organism causing scalp fungal infection  Other fungi such as Trichophyton tonsurans are increasingly reported  Treatment is with oral antifungal agents for several months  Viral infections  Folliculitis may caused by herpes simplex virus  This tends to be tender, and resolves without treatment in around 10 days  Severe recurrent attacks may be treated with acyclovir and other antiviral agents  Herpes zoster (the cause of shingles) may also present as folliculitis with painful pustules and crusted spots within a dermatome (an area of skin supplied by a single nerve)  It is treated with hihg-dose acyclovir  Molluscum contagiosum, common in young children, may also cause follicular umbilicated papules, usually clustered in and around a body fold  Molluscum may provoke dermatitis  Parasitic infection  Folliculitis on the face or scalp of older or immunosuppressed adults may be due to colonisation by hair follicle mites (demodex)  This is known as demodicosis  The human infestation, scabies, often provokes folliculitis, as well as non-follicular papules, vesicles and pustules  Folliculitis due to irritation from regrowing hairs  Folliculitis may arise as hairs regrow after shaving, waxing, electrolysis or plucking  Swabs taken from the pustules are sterile ie there is no growth of bacteria or other organisms  In the beard area irritant folliculitis is known as pseudofolliculitis barbae  Irritant folliculitis is also common on the lower legs of women (shaving rash)  It is frequently very itchy  Treatment is by stopping hair removal, and not beginning again for about three months after the folliculitis has settled  To prevent reoccurring irritant folliculitis, use a gentle hair removal method, such as a lady's electric razor  Avoid soap and apply plenty of shaving gel, if using a blade shaver      Folliculitis due to contact reactions  Occlusion  Paraffin-based ointments, moisturisers, and adhesive plasters may all result in a sterile folliculitis  If a moisturiser is needed, choose an oil-free product, as it is less likely to cause occlusion  Chemicals  Coal tar, cutting oils and other chemicals may cause an irritant folliculitis  Avoid contact with the causative product  Topical steroids  Overuse of topical steroids may produce a folliculitis  Perioral dermatitis is a facial folliculitis provoked by moisturisers and topical steroids  Perioral dermatitis is treated with tetracycline antibiotics for six weeks or so  Folliculitis due to immunosuppression  Eosinophilic folliculitis is a specific type of folliculitis that may arise in some immune suppressed individuals such as those infected by human immunodeficiency virus (HIV) or those who have cancer  Folliculitis due to drugs  Folliculitis may be due to drugs, particularly corticosteroids (steroid acne), androgens (male hormones), ACTH, lithium, isoniazid (INH), phenytoin and B-complex vitamins  Protein kinase inhibitors (epidermal growth factor receptor inhibitors) and targeted therapy for metastatic melanoma (vemurafenib, dabrafenib) nearly always result in folliculitis  Folliculitis due to inflammatory skin diseases  Certain uncommon inflammatory skin diseases may cause permanent hair loss and scarring because of deep seated sterile folliculitis  These include:   Lichen planus   Discoid lupus erythematosus   Folliculitis decalvans   Folliculitis keloidalis     Treatment depends on the underlying condition and its severity  A skin biopsy is often necessary to establish the diagnosis  Acne variants   Acne and acne-like or acneform disorders are also forms of folliculitis   These include:   Acne vulgaris   Nodulocystic acne   Rosacea   Scalp folliculitis   Chloracne    The follicular occlusion syndrome refers to:   Hidradenitis suppurativa (acne inversa)   Acne conglobata (a severe form of nodulocystic acne)   Dissecting cellulitis (perifolliculitis capitis abscedens et suffodiens)   Pilonidal sinus  Treatment of the acne variants may include topical therapy as well as long courses of tetracycline antibiotics, isotretinoin (vitamin-A derivative) and in women, antiandrogenic therapy  Buttock folliculitis  Folliculitis affecting the buttocks is quite common and is often nonspecific, ie no specific cause is found  Buttock folliculitis is equally common in males and females   Acute buttock folliculitis is usually bacterial in origin (like boils), resulting in red painful papules and pustules  It clears with antibiotics   Chronic buttock folliculitis does not often cause significant symptoms but it can be very persistent  Although antiseptics, topical acne treatments, peeling agents such as alphahydroxy acids, long courses of oral antibiotics and isotretinoin can help buttock folliculitis, they are not always effective  Hair removal might be worth trying if the affected area is hairy   As regrowth of hair can make it worse, permanent hair reduction by laser or intense pulsed light (IPL) is best     ACTINIC KERATOSIS    Physical Exam:   Anatomic Location Affected:  Right elbow   Morphological Description:  Linear red keratotic    Additional History of Present Condition:  Present upon examination    Assessment and Plan:  Based on a thorough discussion of this condition and the management approach to it (including a comprehensive discussion of the known risks, side effects and potential benefits of treatment), the patient (family) agrees to implement the following specific plan:     Cryotherapy done in office today    Actinic keratoses are very common on sites repeatedly exposed to the sun, especially the backs of the hands and the face, most often affecting the ears, nose, cheeks, upper lip, vermilion of the lower lip, temples, forehead and balding scalp  In severely chronically sun-damaged individuals, they may also be found on the upper trunk, upper and lower limbs, and dorsum of feet  We discussed the theoretical premalignant (pre-cancerous) nature and etiology of these growths  We discussed the prevailing notion that actinic keratoses are a reflection of abnormal skin cell development due to DNA damage by short wavelength UVB  They are more likely to appear if the immune function is poor, due to aging, recent sun exposure, predisposing disease or certain drugs  We discussed that the main concern is that actinic keratoses may predispose to squamous cell carcinoma  It is rare for a solitary actinic keratosis to evolve to squamous cell carcinoma (SCC), but the risk of SCC occurring at some stage in a patient with more than 10 actinic keratoses is thought to be about 10 to 15%  A tender, thickened, ulcerated or enlarging actinic keratosis is suspicious of SCC  Actinic keratoses may be prevented by strict sun protection  If already present, keratoses may improve with a very high sun protection factor (50+) broad-spectrum sunscreen applied at least daily to affected areas, year-round  We recommend that UPF-rated clothing and hats and sunglasses be worn whenever possible and that a sunscreen-moisturizer combination product such as Neutrogena Daily Defense be applied at least three times a day  We performed a thorough discussion of treatment options and specific risk/benefits/alternatives including but not limited to medical field treatment with medications such as the following:     Topical field area medications such as 5-fluorouracil or Aldara (specifically, the trouble with long-term compliance, blistering and local skin reaction versus the convenience of at-home therapy and that field therapy gets what is not yet seen)       Cryotherapy (specifically, local pain, scarring, dyspigmentation, blistering, possible superinfection, and treats TYSTBERGA what we see versus directed treatment today)   Photodynamic therapy (specifically, local pain, scarring, dyspigmentation, blistering, possible superinfection, need to schedule for a later date, and time spent in the office versus field therapy that gets what is not yet seen)  PROCEDURE:  DESTRUCTION OF PRE-MALIGNANT LESIONS  After a thorough discussion of treatment options and risk/benefits/alternatives (including but not limited to local pain, scarring, dyspigmentation, blistering, and possible superinfection), verbal and written consent were obtained and the aforementioned lesions were treated on with cryotherapy using liquid nitrogen x 1 cycle for 5-10 seconds   TOTAL NUMBER of 1 pre-malignant lesions were treated today on the ANATOMIC LOCATION: Right elbow  The patient tolerated the procedure well, and after-care instructions were provided        Scribe Attestation    I,:   am acting as a scribe while in the presence of the attending physician :       I,:   personally performed the services described in this documentation    as scribed in my presence :

## 2021-09-24 ENCOUNTER — CLINICAL SUPPORT (OUTPATIENT)
Dept: CARDIAC REHAB | Facility: CLINIC | Age: 76
End: 2021-09-24
Payer: MEDICARE

## 2021-09-24 DIAGNOSIS — Z95.2 S/P AVR: ICD-10-CM

## 2021-09-24 DIAGNOSIS — Z95.1 S/P CABG (CORONARY ARTERY BYPASS GRAFT): ICD-10-CM

## 2021-09-24 PROCEDURE — 93798 PHYS/QHP OP CAR RHAB W/ECG: CPT

## 2021-09-27 ENCOUNTER — CLINICAL SUPPORT (OUTPATIENT)
Dept: CARDIAC REHAB | Facility: CLINIC | Age: 76
End: 2021-09-27
Payer: MEDICARE

## 2021-09-27 DIAGNOSIS — Z95.2 S/P AVR: ICD-10-CM

## 2021-09-27 DIAGNOSIS — Z95.1 S/P CABG (CORONARY ARTERY BYPASS GRAFT): ICD-10-CM

## 2021-09-27 PROCEDURE — 93798 PHYS/QHP OP CAR RHAB W/ECG: CPT

## 2021-09-27 NOTE — PROGRESS NOTES
Cardiac Rehabilitation Plan of Care   Discharge          Today's date: 2021   # of Exercise Sessions Completed: 36  Patient name: Willie Yeboah      : 1945  Age: 77 y o  MRN: [id number]  Referring Physician: Yaya Love PA-C  Cardiologist: Katharine Bloch  Provider: [facility]  Clinician: Pat Conner, MS, CEP    Dx:   No diagnosis found  Date of onset: 5/10/2021      SUMMARY OF PROGRESS:  Discharge note for Calvin Oliveira  He had 95 5% improvement in functional capacity increasing His max METs in the submaximal TM ETT increased from 2 2 to 4 3 with test termination of RHR +30  His exercise tolerance (max METs in tolerated in cardiac rehab) increased by 73 9%  He had a 14% improvement in the DUKE activity estimated MET level with ADLs and physical activity  His [facility] QOL decreased by 13 3%  PHQ-9 score decreased from 5 to 1  His weight decreased by 1 3 pounds  Waist circumference decreased by 5 4 inches  Rate Your Plate score stayed the same at 61 to 61  Cindy Duncan has been supplementing CR sessions with home exercise which includes golfing and walking   He reports increased stamina, strength and reduced SOB with activity  [place] tolerates 41-51 mins at 2 5 - 5 8 METs plus wt training  NSR with BBB, 1AVB, TWI and Occ PVCs on telemetry  RHR 64 - 67, ExHR 99 - 114  Resting /74 - 142/84 with appropriate response to exercise reaching 142/70 - 148/72  All group education classes on cardiac risk factor modification were attended by the patient  Discharge plans include continuation of exercise  He will be utilizing his girlfriend's home gym 3x a week  Encouraged Pt to continue exercise  Frequency: 4-6 days/wk, Intensity: RPE 4-5, Time: 40-50 mins daily, 150-200 mins/wk  Pt was encouraged to continue eating heart healthy  Pt was encouraged to remain compliant with medications and f/u with cardiologist with any cardiac symptoms, medication management and updated lipid profile           Medication compliance: Yes   Comments: Pt reports to be compliant with medications  Fall Risk: Low   Comments: Ambulates with a steady gait with no assist device and Denies a fall in the past 6 months    EKG Interpretation: NSR 1AVB  PAC, PVC      EXERCISE ASSESSMENT and PLAN    Current Exercise Program in Rehab:       Frequency: 3 days/week    Minutes: 41-51        METS: 2 5-5 8         HR:    RPE: 3-4         Modalities: Treadmill, UBE, NuStep and Recumbent bike        Strength training: Will be added following at least 8 weeks post surgery and 8-10 monitored sessions   Modalities: Arm Curl, Wall push-ups, Front Raises and Shoulder Shrugs    Home Exercise: Type: walking , Frequency: 5-7 days/week, Duration: 30 mins    Goals: Attend Rehab regularly and increase endurance to return to normal activities such as yard work, and golfing    Progression Toward Goals:  Goals met: returned to golfing, increase endurance to complete yardwork  Education: benefit of exercise for CAD risk factors, AHA guidelines to achieve >150 mins/wk of moderate exercise, RPE scale and class: Risk Factors for Heart Disease   Plan:education on home exercise guidelines and home exercise 30+ mins 2 days opposite CR  Readiness to change: Maintenance: (Maintaining the behavior change)      NUTRITION ASSESSMENT AND PLAN    Weight control:    Starting weight: 156   Current weight:   154  Waist circumference:    Startin   Current:   35    Diabetes: T2D, Patient reported fasting , No insulin  A1c: 6 4  last measured: 21    Lipid management: Discussed diet and lipid management and Last lipid profile 21  Chol 133  TRG 98  HDL 59  LDL 54    Goals:choose low sodium processed foods    Progression Toward Goals: Goals met: increased consumption of fruits      Education: heart healthy eating  low sodium diet  exercise and diabetes management   education class: Heart Healthy Eating  education class:  Label Reading   Healthy food choices  Label reading  Plan: replace unhealthy snacks with fruits & vegs  Readiness to change: Maintenance: (Maintaining the behavior change)      PSYCHOSOCIAL ASSESSMENT AND PLAN    Emotional:  Depression assessment:  PHQ-9 = 1-4 = Minimal Depression            Anxiety measure:  DENIS-7 = 0-4  = Not anxious  Self-reported stress level:  1  Social support: Very Good, Patient reports excellent emotional/social support from family and Patient has friends available for support when needed     Goals:  improved sleep and increased energy    Progression Toward Goals: Reviewed Pt goals and determined plan of care, Will continue to educate and progress as tolerated  Education: signs/sxs of depression, benefits of a positive support system, stress management techniques, class:  Stress and Your Health  and class:  Relaxation  Plan: Refer to [employer], Practice relaxation techniques, Exercise, Spend time outdoors, Read a Book and Keep a positive mindset  Readiness to change: Maintenance: (Maintaining the behavior change)      OTHER CORE COMPONENTS     Tobacco:   Social History     Tobacco Use   Smoking Status Never Smoker   Smokeless Tobacco Never Used       Tobacco Use Intervention:   N/A:  Patient is a non-smoker     Anginal Symptoms:  None   NTG use: No prescription    Blood pressure:    Restin/74 - 142/84   Exercise: 142/70 - 148/72    Goals: consistent BP < 130/80, reduced dietary sodium <2300mg, moderate intensity exercise >150 mins/wk and medication compliance    Progression Toward Goals: Reviewed Pt goals and determined plan of care, Will continue to educate and progress as tolerated      Education:  understanding high blood pressure and it's relationship to CAD, low sodium diet and HTN, Education class:  Common Heart Medications and Education class: Understanding Heart Disease, common heart medications, cardiac risk factots  Plan: Avoid Processed foods, engage in regular exercise and check labels for sodium content  Readiness to change: Maintenance: (Maintaining the behavior change)

## 2021-09-29 NOTE — RESULT ENCOUNTER NOTE
Tissue Exam: [id number]  Order: [id number]  Status:  Final result   Visible to patient:  No (inaccessible in [address]) Next appt:  02/03/2022 at 10:30 AM in Family Medicine (Pablo Simms MD) Dx:  Neoplasm of uncertain behavior  0 Result Notes  Component   Case Report  Surgical Pathology Report                         Case: [id number]                                   Authorizing Provider: Myra Barth MD      Collected:           09/23/2021 [facility]              Ordering Location:     [facility]      Received:            09/23/2021 [address] [place] [address]                                                                   Pathologist:           Michael Holguin MD                                                           Specimen:    Skin, Other, Specimen A: Right posterior calf                                            Final Diagnosis  A  Skin, right posterior calf, shave biopsy:     BASAL CELL CARCINOMA, SUPERFICIAL, NODULAR, AND INFILTRATIVE TYPES; extending to the tissue edges (see note)      Note: In the appropriate clinical context, this histologic subtype may qualify for Mohs surgery; clinical pathological correlation is advised         Electronically signed by Michael Holguin MD on 9/28/2021 at  4:23 PM  Additional Information   All reported additional testing was performed with appropriately reactive controls   These tests were developed and their performance characteristics determined by [facility] or appropriate performing facility, though some tests may be performed on tissues which have not been validated for performance characteristics (such as staining performed on alcohol exposed cell blocks and decalcified tissues)   Results should be interpreted with caution and in the context of the patients' clinical condition  These tests may not be cleared or approved by the [place]   Food and Drug Administration, though the FDA has determined that such clearance or approval is not necessary  These tests are used for clinical purposes and they should not be regarded as investigational or for research  This laboratory has been approved by [place], designated as a high-complexity laboratory and is qualified to perform these tests  Norma Raheel Description     A  The specimen is received in formalin, labeled with the patient's name and hospital number, and is designated "right posterior calf "  The specimen consists of 1 hair-bearing shave of skin measuring 1 5 x 1 4 x 0 2 cm  The skin surface exhibits a vaguely pigmented, keratotic skin macule, measuring 0 9 x 0 7 cm and comes within 0 3 cm of nearest peripheral margin  The margin resection is inked green, and surface tips are inked red  The specimen is sectioned, and the macule exhibits tan dense cut surfaces, that extend to a depth of 0 2 cm, appearing to contact the deep inked margin  The remaining cut surface is white tan fibrous  Entirely submitted  Two cassettes      1: Unoriented shaved ends of resection, between sponges   2:  Center trisected and sequentially submitted     Note: The estimated total formalin fixation time based upon information provided by the submitting clinician and the standard processing schedule is under 72 hours  [place]       Clinical Information   Specimen A: right posterior calf; skin; shave; 77year old male with 2 cm pink plaque with crusted papule on lateral edge; DiffDx: squamous cell carcinoma versus basal cell carcinoma        DERMATOPATHOLOGY RESULT NOTE    Results reviewed by ordering physician  Called patient to personally discuss results  No answer, left voicemail with result        Instructions for Clinical Derm Team:   (remember to route Result Note to appropriate staff):    Call patient and schedule for MOHS    Result & Plan by Specimen:    Specimen A: malignant  Plan: MOHs, (infiltrative pattern, 1 5 cm)

## 2021-10-04 ENCOUNTER — TELEPHONE (OUTPATIENT)
Dept: DERMATOLOGY | Facility: CLINIC | Age: 76
End: 2021-10-04

## 2021-10-07 ENCOUNTER — TELEPHONE (OUTPATIENT)
Dept: DERMATOLOGY | Facility: CLINIC | Age: 76
End: 2021-10-07

## 2021-10-07 DIAGNOSIS — Z79.2 PROPHYLACTIC ANTIBIOTIC: Primary | ICD-10-CM

## 2021-10-08 RX ORDER — CEPHALEXIN 500 MG/1
CAPSULE ORAL
Qty: 4 CAPSULE | Refills: 0 | Status: SHIPPED | OUTPATIENT
Start: 2021-11-01 | End: 2021-11-01

## 2021-11-01 ENCOUNTER — PROCEDURE VISIT (OUTPATIENT)
Dept: DERMATOLOGY | Facility: CLINIC | Age: 76
End: 2021-11-01
Payer: MEDICARE

## 2021-11-01 ENCOUNTER — TELEPHONE (OUTPATIENT)
Dept: DERMATOLOGY | Facility: CLINIC | Age: 76
End: 2021-11-01

## 2021-11-01 VITALS
DIASTOLIC BLOOD PRESSURE: 70 MMHG | HEART RATE: 68 BPM | HEIGHT: 68 IN | BODY MASS INDEX: 23.79 KG/M2 | RESPIRATION RATE: 18 BRPM | TEMPERATURE: 97.9 F | SYSTOLIC BLOOD PRESSURE: 136 MMHG | WEIGHT: 157 LBS

## 2021-11-01 DIAGNOSIS — C44.712 BASAL CELL CARCINOMA OF LOWER LEG, RIGHT: Primary | ICD-10-CM

## 2021-11-01 PROCEDURE — 17314 MOHS ADDL STAGE T/A/L: CPT | Performed by: DERMATOLOGY

## 2021-11-01 PROCEDURE — 12032 INTMD RPR S/A/T/EXT 2.6-7.5: CPT | Performed by: DERMATOLOGY

## 2021-11-01 PROCEDURE — 17313 MOHS 1 STAGE T/A/L: CPT | Performed by: DERMATOLOGY

## 2021-11-01 RX ADMIN — Medication: at 14:33

## 2021-11-01 RX ADMIN — Medication: at 09:58

## 2021-11-01 RX ADMIN — Medication: at 14:57

## 2021-11-11 ENCOUNTER — OFFICE VISIT (OUTPATIENT)
Dept: DERMATOLOGY | Facility: CLINIC | Age: 76
End: 2021-11-11

## 2021-11-11 DIAGNOSIS — Z48.02 ENCOUNTER FOR REMOVAL OF SUTURES: Primary | ICD-10-CM

## 2021-11-11 PROCEDURE — 99024 POSTOP FOLLOW-UP VISIT: CPT | Performed by: DERMATOLOGY

## 2021-11-22 DIAGNOSIS — I10 BENIGN ESSENTIAL HYPERTENSION: ICD-10-CM

## 2021-11-22 RX ORDER — CARVEDILOL 6.25 MG/1
6.25 TABLET ORAL 2 TIMES DAILY WITH MEALS
Qty: 60 TABLET | Refills: 2
Start: 2021-11-22 | End: 2021-11-26

## 2021-11-23 ENCOUNTER — IMMUNIZATIONS (OUTPATIENT)
Dept: FAMILY MEDICINE CLINIC | Facility: HOSPITAL | Age: 76
End: 2021-11-23

## 2021-11-23 DIAGNOSIS — Z23 ENCOUNTER FOR IMMUNIZATION: Primary | ICD-10-CM

## 2021-11-23 PROCEDURE — 91306 COVID-19 MODERNA VACC 0.25 ML BOOSTER: CPT

## 2021-11-23 PROCEDURE — 0064A COVID-19 MODERNA VACC 0.25 ML BOOSTER: CPT

## 2021-11-24 DIAGNOSIS — I10 BENIGN ESSENTIAL HYPERTENSION: ICD-10-CM

## 2021-11-26 RX ORDER — CARVEDILOL 6.25 MG/1
TABLET ORAL
Qty: 90 TABLET | Refills: 3 | Status: SHIPPED | OUTPATIENT
Start: 2021-11-26 | End: 2022-06-27 | Stop reason: SDUPTHER

## 2021-11-30 ENCOUNTER — OFFICE VISIT (OUTPATIENT)
Dept: CARDIOLOGY CLINIC | Facility: CLINIC | Age: 76
End: 2021-11-30
Payer: MEDICARE

## 2021-11-30 VITALS
BODY MASS INDEX: 23.55 KG/M2 | HEART RATE: 62 BPM | SYSTOLIC BLOOD PRESSURE: 110 MMHG | OXYGEN SATURATION: 100 % | HEIGHT: 68 IN | DIASTOLIC BLOOD PRESSURE: 70 MMHG | WEIGHT: 155.4 LBS

## 2021-11-30 DIAGNOSIS — I48.0 PAF (PAROXYSMAL ATRIAL FIBRILLATION) (HCC): ICD-10-CM

## 2021-11-30 DIAGNOSIS — I25.10 CORONARY ARTERY DISEASE INVOLVING NATIVE CORONARY ARTERY OF NATIVE HEART WITHOUT ANGINA PECTORIS: Primary | ICD-10-CM

## 2021-11-30 DIAGNOSIS — Z95.2 S/P AVR: ICD-10-CM

## 2021-11-30 DIAGNOSIS — E78.2 MIXED HYPERLIPIDEMIA: ICD-10-CM

## 2021-11-30 DIAGNOSIS — I10 BENIGN ESSENTIAL HYPERTENSION: ICD-10-CM

## 2021-11-30 DIAGNOSIS — Z95.1 S/P CABG (CORONARY ARTERY BYPASS GRAFT): ICD-10-CM

## 2021-11-30 DIAGNOSIS — I44.7 LBBB (LEFT BUNDLE BRANCH BLOCK): ICD-10-CM

## 2021-11-30 PROCEDURE — 99214 OFFICE O/P EST MOD 30 MIN: CPT | Performed by: INTERNAL MEDICINE

## 2021-12-30 DIAGNOSIS — E11.9 TYPE 2 DIABETES MELLITUS WITHOUT COMPLICATION, WITHOUT LONG-TERM CURRENT USE OF INSULIN (HCC): ICD-10-CM

## 2021-12-30 RX ORDER — DAPAGLIFLOZIN 5 MG/1
5 TABLET, FILM COATED ORAL DAILY
Qty: 90 TABLET | Refills: 1 | Status: SHIPPED | OUTPATIENT
Start: 2021-12-30 | End: 2022-02-03

## 2022-01-01 ENCOUNTER — APPOINTMENT (EMERGENCY)
Dept: RADIOLOGY | Facility: HOSPITAL | Age: 77
End: 2022-01-01

## 2022-01-01 ENCOUNTER — APPOINTMENT (INPATIENT)
Dept: RADIOLOGY | Facility: HOSPITAL | Age: 77
End: 2022-01-01

## 2022-01-01 ENCOUNTER — APPOINTMENT (INPATIENT)
Dept: NON INVASIVE DIAGNOSTICS | Facility: HOSPITAL | Age: 77
End: 2022-01-01

## 2022-01-01 ENCOUNTER — HOSPITAL ENCOUNTER (INPATIENT)
Facility: HOSPITAL | Age: 77
LOS: 4 days | End: 2022-12-29
Attending: EMERGENCY MEDICINE | Admitting: ANESTHESIOLOGY

## 2022-01-01 VITALS
SYSTOLIC BLOOD PRESSURE: 60 MMHG | HEIGHT: 69 IN | DIASTOLIC BLOOD PRESSURE: 42 MMHG | WEIGHT: 164.24 LBS | RESPIRATION RATE: 33 BRPM | TEMPERATURE: 98.4 F | BODY MASS INDEX: 24.33 KG/M2

## 2022-01-01 DIAGNOSIS — I46.9 CARDIAC ARREST (HCC): Primary | ICD-10-CM

## 2022-01-01 LAB
2HR DELTA HS TROPONIN: 1036 NG/L
4HR DELTA HS TROPONIN: 3821 NG/L
ACANTHOCYTES BLD QL SMEAR: PRESENT
ALBUMIN SERPL BCP-MCNC: 2.6 G/DL (ref 3.5–5)
ALBUMIN SERPL BCP-MCNC: 2.8 G/DL (ref 3.5–5)
ALBUMIN SERPL BCP-MCNC: 2.9 G/DL (ref 3.5–5)
ALBUMIN SERPL BCP-MCNC: 3 G/DL (ref 3.5–5)
ALBUMIN SERPL BCP-MCNC: 3.7 G/DL (ref 3.5–5)
ALBUMIN SERPL BCP-MCNC: 4 G/DL (ref 3.5–5)
ALP SERPL-CCNC: 100 U/L (ref 46–116)
ALP SERPL-CCNC: 102 U/L (ref 46–116)
ALP SERPL-CCNC: 105 U/L (ref 46–116)
ALP SERPL-CCNC: 107 U/L (ref 46–116)
ALP SERPL-CCNC: 54 U/L (ref 46–116)
ALP SERPL-CCNC: 96 U/L (ref 46–116)
ALT SERPL W P-5'-P-CCNC: 116 U/L (ref 12–78)
ALT SERPL W P-5'-P-CCNC: 149 U/L (ref 12–78)
ALT SERPL W P-5'-P-CCNC: 2105 U/L (ref 12–78)
ALT SERPL W P-5'-P-CCNC: 2755 U/L (ref 12–78)
ALT SERPL W P-5'-P-CCNC: 3573 U/L (ref 12–78)
ALT SERPL W P-5'-P-CCNC: 51 U/L (ref 12–78)
AMMONIA PLAS-SCNC: 27 UMOL/L (ref 11–35)
AMPHETAMINES SERPL QL SCN: NEGATIVE
ANION GAP SERPL CALCULATED.3IONS-SCNC: 10 MMOL/L (ref 4–13)
ANION GAP SERPL CALCULATED.3IONS-SCNC: 11 MMOL/L (ref 4–13)
ANION GAP SERPL CALCULATED.3IONS-SCNC: 13 MMOL/L (ref 4–13)
ANION GAP SERPL CALCULATED.3IONS-SCNC: 16 MMOL/L (ref 4–13)
ANION GAP SERPL CALCULATED.3IONS-SCNC: 18 MMOL/L (ref 4–13)
ANION GAP SERPL CALCULATED.3IONS-SCNC: 19 MMOL/L (ref 4–13)
ANION GAP SERPL CALCULATED.3IONS-SCNC: 26 MMOL/L (ref 4–13)
ANION GAP SERPL CALCULATED.3IONS-SCNC: 30 MMOL/L (ref 4–13)
ANION GAP SERPL CALCULATED.3IONS-SCNC: 9 MMOL/L (ref 4–13)
AORTIC ROOT: 3.3 CM
AORTIC VALVE MEAN VELOCITY: 10.5 M/S
APTT PPP: 29 SECONDS (ref 23–37)
APTT PPP: 49 SECONDS (ref 23–37)
APTT PPP: 52 SECONDS (ref 23–37)
APTT PPP: 55 SECONDS (ref 23–37)
APTT PPP: 57 SECONDS (ref 23–37)
APTT PPP: 57 SECONDS (ref 23–37)
APTT PPP: 61 SECONDS (ref 23–37)
APTT PPP: 62 SECONDS (ref 23–37)
APTT PPP: 66 SECONDS (ref 23–37)
APTT PPP: 94 SECONDS (ref 23–37)
APTT PPP: 95 SECONDS (ref 23–37)
ARTERIAL PATENCY WRIST A: YES
AST SERPL W P-5'-P-CCNC: 227 U/L (ref 5–45)
AST SERPL W P-5'-P-CCNC: 344 U/L (ref 5–45)
AST SERPL W P-5'-P-CCNC: 65 U/L (ref 5–45)
AST SERPL W P-5'-P-CCNC: 8110 U/L (ref 5–45)
AST SERPL W P-5'-P-CCNC: ABNORMAL U/L (ref 5–45)
AST SERPL W P-5'-P-CCNC: ABNORMAL U/L (ref 5–45)
ATRIAL RATE: 111 BPM
ATRIAL RATE: 141 BPM
ATRIAL RATE: 153 BPM
ATRIAL RATE: 60 BPM
ATRIAL RATE: 61 BPM
ATRIAL RATE: 71 BPM
ATRIAL RATE: 73 BPM
ATRIAL RATE: 74 BPM
ATRIAL RATE: 77 BPM
ATRIAL RATE: 78 BPM
ATRIAL RATE: 85 BPM
ATRIAL RATE: 87 BPM
ATRIAL RATE: 98 BPM
AV AREA BY CONTINUOUS VTI: 1.8 CM2
AV AREA PEAK VELOCITY: 1.8 CM2
AV LVOT MEAN GRADIENT: 2 MMHG
AV LVOT PEAK GRADIENT: 4 MMHG
AV MEAN GRADIENT: 5 MMHG
AV PEAK GRADIENT: 10 MMHG
AV VALVE AREA: 1.78 CM2
AV VELOCITY RATIO: 0.6
BACTERIA SPT RESP CULT: ABNORMAL
BACTERIA UR QL AUTO: ABNORMAL /HPF
BARBITURATES UR QL: NEGATIVE
BASE EX.OXY STD BLDV CALC-SCNC: 57.3 % (ref 60–80)
BASE EXCESS BLDA CALC-SCNC: -0.9 MMOL/L
BASE EXCESS BLDA CALC-SCNC: -13 MMOL/L (ref -2–3)
BASE EXCESS BLDA CALC-SCNC: -15.6 MMOL/L
BASE EXCESS BLDA CALC-SCNC: -18.8 MMOL/L
BASE EXCESS BLDA CALC-SCNC: -6.1 MMOL/L
BASE EXCESS BLDA CALC-SCNC: -8.1 MMOL/L
BASE EXCESS BLDV CALC-SCNC: -15.6 MMOL/L
BASOPHILS # BLD AUTO: 0.04 THOUSANDS/ÂΜL (ref 0–0.1)
BASOPHILS # BLD AUTO: 0.09 THOUSANDS/ÂΜL (ref 0–0.1)
BASOPHILS # BLD MANUAL: 0 THOUSAND/UL (ref 0–0.1)
BASOPHILS NFR BLD AUTO: 1 % (ref 0–1)
BASOPHILS NFR BLD AUTO: 1 % (ref 0–1)
BASOPHILS NFR MAR MANUAL: 0 % (ref 0–1)
BENZODIAZ UR QL: NEGATIVE
BILIRUB SERPL-MCNC: 0.36 MG/DL (ref 0.2–1)
BILIRUB SERPL-MCNC: 0.39 MG/DL (ref 0.2–1)
BILIRUB SERPL-MCNC: 0.54 MG/DL (ref 0.2–1)
BILIRUB SERPL-MCNC: 1.94 MG/DL (ref 0.2–1)
BILIRUB SERPL-MCNC: 2.19 MG/DL (ref 0.2–1)
BILIRUB SERPL-MCNC: 2.47 MG/DL (ref 0.2–1)
BILIRUB UR QL STRIP: NEGATIVE
BODY TEMPERATURE: 96.8 DEGREES FEHRENHEIT
BODY TEMPERATURE: 97.4 DEGREES FEHRENHEIT
BODY TEMPERATURE: 97.5 DEGREES FEHRENHEIT
BODY TEMPERATURE: 98.1 DEGREES FEHRENHEIT
BODY TEMPERATURE: 98.2 DEGREES FEHRENHEIT
BODY TEMPERATURE: 98.5 DEGREES FEHRENHEIT
BUN SERPL-MCNC: 22 MG/DL (ref 5–25)
BUN SERPL-MCNC: 24 MG/DL (ref 5–25)
BUN SERPL-MCNC: 25 MG/DL (ref 5–25)
BUN SERPL-MCNC: 25 MG/DL (ref 5–25)
BUN SERPL-MCNC: 29 MG/DL (ref 5–25)
BUN SERPL-MCNC: 40 MG/DL (ref 5–25)
BUN SERPL-MCNC: 45 MG/DL (ref 5–25)
BUN SERPL-MCNC: 47 MG/DL (ref 5–25)
BUN SERPL-MCNC: 49 MG/DL (ref 5–25)
CA-I BLD-SCNC: 1.01 MMOL/L (ref 1.12–1.32)
CA-I BLD-SCNC: 1.04 MMOL/L (ref 1.12–1.32)
CA-I BLD-SCNC: 1.05 MMOL/L (ref 1.12–1.32)
CA-I BLD-SCNC: 1.06 MMOL/L (ref 1.12–1.32)
CA-I BLD-SCNC: 1.08 MMOL/L (ref 1.12–1.32)
CALCIUM ALBUM COR SERPL-MCNC: 11.8 MG/DL (ref 8.3–10.1)
CALCIUM ALBUM COR SERPL-MCNC: 12.7 MG/DL (ref 8.3–10.1)
CALCIUM ALBUM COR SERPL-MCNC: 8.8 MG/DL (ref 8.3–10.1)
CALCIUM ALBUM COR SERPL-MCNC: 8.8 MG/DL (ref 8.3–10.1)
CALCIUM SERPL-MCNC: 10.7 MG/DL (ref 8.3–10.1)
CALCIUM SERPL-MCNC: 11.8 MG/DL (ref 8.3–10.1)
CALCIUM SERPL-MCNC: 7.8 MG/DL (ref 8.3–10.1)
CALCIUM SERPL-MCNC: 7.8 MG/DL (ref 8.3–10.1)
CALCIUM SERPL-MCNC: 8 MG/DL (ref 8.3–10.1)
CALCIUM SERPL-MCNC: 8.1 MG/DL (ref 8.3–10.1)
CALCIUM SERPL-MCNC: 8.3 MG/DL (ref 8.3–10.1)
CALCIUM SERPL-MCNC: 8.4 MG/DL (ref 8.3–10.1)
CALCIUM SERPL-MCNC: 8.8 MG/DL (ref 8.3–10.1)
CARDIAC TROPONIN I PNL SERPL HS: 1132 NG/L
CARDIAC TROPONIN I PNL SERPL HS: 1370 NG/L (ref 8–18)
CARDIAC TROPONIN I PNL SERPL HS: 3886 NG/L (ref 8–18)
CARDIAC TROPONIN I PNL SERPL HS: 3917 NG/L
CARDIAC TROPONIN I PNL SERPL HS: 904 NG/L (ref 8–18)
CARDIAC TROPONIN I PNL SERPL HS: 96 NG/L
CHLORIDE SERPL-SCNC: 100 MMOL/L (ref 96–108)
CHLORIDE SERPL-SCNC: 101 MMOL/L (ref 96–108)
CHLORIDE SERPL-SCNC: 102 MMOL/L (ref 96–108)
CHLORIDE SERPL-SCNC: 103 MMOL/L (ref 96–108)
CHLORIDE SERPL-SCNC: 103 MMOL/L (ref 96–108)
CHLORIDE SERPL-SCNC: 104 MMOL/L (ref 96–108)
CHLORIDE SERPL-SCNC: 104 MMOL/L (ref 96–108)
CHLORIDE SERPL-SCNC: 105 MMOL/L (ref 96–108)
CHLORIDE SERPL-SCNC: 98 MMOL/L (ref 96–108)
CLARITY UR: ABNORMAL
CO2 SERPL-SCNC: 15 MMOL/L (ref 21–32)
CO2 SERPL-SCNC: 16 MMOL/L (ref 21–32)
CO2 SERPL-SCNC: 17 MMOL/L (ref 21–32)
CO2 SERPL-SCNC: 21 MMOL/L (ref 21–32)
CO2 SERPL-SCNC: 23 MMOL/L (ref 21–32)
CO2 SERPL-SCNC: 23 MMOL/L (ref 21–32)
CO2 SERPL-SCNC: 24 MMOL/L (ref 21–32)
CO2 SERPL-SCNC: 24 MMOL/L (ref 21–32)
CO2 SERPL-SCNC: 26 MMOL/L (ref 21–32)
COCAINE UR QL: NEGATIVE
COLOR UR: ABNORMAL
CREAT SERPL-MCNC: 1.21 MG/DL (ref 0.6–1.3)
CREAT SERPL-MCNC: 1.25 MG/DL (ref 0.6–1.3)
CREAT SERPL-MCNC: 1.25 MG/DL (ref 0.6–1.3)
CREAT SERPL-MCNC: 1.37 MG/DL (ref 0.6–1.3)
CREAT SERPL-MCNC: 1.42 MG/DL (ref 0.6–1.3)
CREAT SERPL-MCNC: 2.31 MG/DL (ref 0.6–1.3)
CREAT SERPL-MCNC: 2.43 MG/DL (ref 0.6–1.3)
CREAT SERPL-MCNC: 2.83 MG/DL (ref 0.6–1.3)
CREAT SERPL-MCNC: 2.86 MG/DL (ref 0.6–1.3)
DOP CALC AO PEAK VEL: 1.56 M/S
DOP CALC AO VTI: 29.5 CM
DOP CALC LVOT AREA: 2.83 CM2
DOP CALC LVOT DIAMETER: 1.9 CM
DOP CALC LVOT PEAK VEL VTI: 18.48 CM
DOP CALC LVOT PEAK VEL: 0.94 M/S
DOP CALC LVOT STROKE INDEX: 26.7 ML/M2
DOP CALC LVOT STROKE VOLUME: 52.37
DOP CALC MV VTI: 43.33 CM
E WAVE DECELERATION TIME: 218 MS
EOSINOPHIL # BLD AUTO: 0.02 THOUSAND/ÂΜL (ref 0–0.61)
EOSINOPHIL # BLD AUTO: 0.39 THOUSAND/ÂΜL (ref 0–0.61)
EOSINOPHIL # BLD MANUAL: 0 THOUSAND/UL (ref 0–0.4)
EOSINOPHIL NFR BLD AUTO: 0 % (ref 0–6)
EOSINOPHIL NFR BLD AUTO: 3 % (ref 0–6)
EOSINOPHIL NFR BLD MANUAL: 0 % (ref 0–6)
ERYTHROCYTE [DISTWIDTH] IN BLOOD BY AUTOMATED COUNT: 12.6 % (ref 11.6–15.1)
ERYTHROCYTE [DISTWIDTH] IN BLOOD BY AUTOMATED COUNT: 12.8 % (ref 11.6–15.1)
ERYTHROCYTE [DISTWIDTH] IN BLOOD BY AUTOMATED COUNT: 13.2 % (ref 11.6–15.1)
ETHANOL SERPL-MCNC: 7 MG/DL (ref 0–3)
FIO2 GAS DIL.REBREATH: 100 L
FLUAV RNA RESP QL NAA+PROBE: NEGATIVE
FLUBV RNA RESP QL NAA+PROBE: NEGATIVE
FRACTIONAL SHORTENING: 22 (ref 28–44)
GFR SERPL CREATININE-BSD FRML MDRD: 20 ML/MIN/1.73SQ M
GFR SERPL CREATININE-BSD FRML MDRD: 20 ML/MIN/1.73SQ M
GFR SERPL CREATININE-BSD FRML MDRD: 24 ML/MIN/1.73SQ M
GFR SERPL CREATININE-BSD FRML MDRD: 26 ML/MIN/1.73SQ M
GFR SERPL CREATININE-BSD FRML MDRD: 47 ML/MIN/1.73SQ M
GFR SERPL CREATININE-BSD FRML MDRD: 49 ML/MIN/1.73SQ M
GFR SERPL CREATININE-BSD FRML MDRD: 55 ML/MIN/1.73SQ M
GFR SERPL CREATININE-BSD FRML MDRD: 55 ML/MIN/1.73SQ M
GFR SERPL CREATININE-BSD FRML MDRD: 57 ML/MIN/1.73SQ M
GLUCOSE SERPL-MCNC: 109 MG/DL (ref 65–140)
GLUCOSE SERPL-MCNC: 116 MG/DL (ref 65–140)
GLUCOSE SERPL-MCNC: 124 MG/DL (ref 65–140)
GLUCOSE SERPL-MCNC: 126 MG/DL (ref 65–140)
GLUCOSE SERPL-MCNC: 126 MG/DL (ref 65–140)
GLUCOSE SERPL-MCNC: 131 MG/DL (ref 65–140)
GLUCOSE SERPL-MCNC: 133 MG/DL (ref 65–140)
GLUCOSE SERPL-MCNC: 138 MG/DL (ref 65–140)
GLUCOSE SERPL-MCNC: 142 MG/DL (ref 65–140)
GLUCOSE SERPL-MCNC: 145 MG/DL (ref 65–140)
GLUCOSE SERPL-MCNC: 150 MG/DL (ref 65–140)
GLUCOSE SERPL-MCNC: 151 MG/DL (ref 65–140)
GLUCOSE SERPL-MCNC: 153 MG/DL (ref 65–140)
GLUCOSE SERPL-MCNC: 156 MG/DL (ref 65–140)
GLUCOSE SERPL-MCNC: 157 MG/DL (ref 65–140)
GLUCOSE SERPL-MCNC: 161 MG/DL (ref 65–140)
GLUCOSE SERPL-MCNC: 169 MG/DL (ref 65–140)
GLUCOSE SERPL-MCNC: 175 MG/DL (ref 65–140)
GLUCOSE SERPL-MCNC: 189 MG/DL (ref 65–140)
GLUCOSE SERPL-MCNC: 189 MG/DL (ref 65–140)
GLUCOSE SERPL-MCNC: 201 MG/DL (ref 65–140)
GLUCOSE SERPL-MCNC: 205 MG/DL (ref 65–140)
GLUCOSE SERPL-MCNC: 230 MG/DL (ref 65–140)
GLUCOSE SERPL-MCNC: 235 MG/DL (ref 65–140)
GLUCOSE SERPL-MCNC: 241 MG/DL (ref 65–140)
GLUCOSE SERPL-MCNC: 245 MG/DL (ref 65–140)
GLUCOSE SERPL-MCNC: 57 MG/DL (ref 65–140)
GLUCOSE SERPL-MCNC: 76 MG/DL (ref 65–140)
GLUCOSE SERPL-MCNC: 99 MG/DL (ref 65–140)
GLUCOSE UR STRIP-MCNC: ABNORMAL MG/DL
GRAM STN SPEC: ABNORMAL
HAV IGM SER QL: NORMAL
HBV CORE AB SER QL: NORMAL
HBV CORE IGM SER QL: NORMAL
HBV CORE IGM SER QL: NORMAL
HBV SURFACE AG SER QL: NORMAL
HBV SURFACE AG SER QL: NORMAL
HCO3 BLDA-SCNC: 11.2 MMOL/L (ref 22–28)
HCO3 BLDA-SCNC: 11.4 MMOL/L (ref 22–28)
HCO3 BLDA-SCNC: 13.4 MMOL/L (ref 22–28)
HCO3 BLDA-SCNC: 17.6 MMOL/L (ref 22–28)
HCO3 BLDA-SCNC: 18.6 MMOL/L (ref 22–28)
HCO3 BLDA-SCNC: 23 MMOL/L (ref 22–28)
HCO3 BLDV-SCNC: 13.6 MMOL/L (ref 24–30)
HCT VFR BLD AUTO: 28.9 % (ref 36.5–49.3)
HCT VFR BLD AUTO: 31.9 % (ref 36.5–49.3)
HCT VFR BLD AUTO: 32.7 % (ref 36.5–49.3)
HCT VFR BLD AUTO: 39 % (ref 36.5–49.3)
HCT VFR BLD AUTO: 43 % (ref 36.5–49.3)
HCT VFR BLD CALC: 30 % (ref 36.5–49.3)
HCV AB SER QL: NORMAL
HCV AB SER QL: NORMAL
HGB BLD-MCNC: 10.4 G/DL (ref 12–17)
HGB BLD-MCNC: 13 G/DL (ref 12–17)
HGB BLD-MCNC: 14 G/DL (ref 12–17)
HGB BLD-MCNC: 8.7 G/DL (ref 12–17)
HGB BLD-MCNC: 9.9 G/DL (ref 12–17)
HGB BLDA-MCNC: 10.2 G/DL (ref 12–17)
HGB UR QL STRIP.AUTO: ABNORMAL
HOROWITZ INDEX BLDA+IHG-RTO: 100 MM[HG]
HOROWITZ INDEX BLDA+IHG-RTO: 30 MM[HG]
HOROWITZ INDEX BLDA+IHG-RTO: 40 MM[HG]
IMM GRANULOCYTES # BLD AUTO: 0.03 THOUSAND/UL (ref 0–0.2)
IMM GRANULOCYTES # BLD AUTO: 0.22 THOUSAND/UL (ref 0–0.2)
IMM GRANULOCYTES NFR BLD AUTO: 0 % (ref 0–2)
IMM GRANULOCYTES NFR BLD AUTO: 2 % (ref 0–2)
INR PPP: 1.12 (ref 0.84–1.19)
INR PPP: 1.28 (ref 0.84–1.19)
INR PPP: 3.92 (ref 0.84–1.19)
INTERVENTRICULAR SEPTUM IN DIASTOLE (PARASTERNAL SHORT AXIS VIEW): 1.3 CM
INTERVENTRICULAR SEPTUM: 1.3 CM (ref 0.6–1.1)
KETONES UR STRIP-MCNC: ABNORMAL MG/DL
L PNEUMO1 AG UR QL IA.RAPID: NEGATIVE
LAAS-AP4: 23.8 CM2
LACTATE SERPL-SCNC: 12.6 MMOL/L (ref 0.5–2)
LACTATE SERPL-SCNC: 14.7 MMOL/L (ref 0.5–2)
LACTATE SERPL-SCNC: 18.1 MMOL/L (ref 0.5–2)
LACTATE SERPL-SCNC: 2.5 MMOL/L (ref 0.5–2)
LACTATE SERPL-SCNC: 2.7 MMOL/L (ref 0.5–2)
LACTATE SERPL-SCNC: 3.9 MMOL/L (ref 0.5–2)
LACTATE SERPL-SCNC: 8.2 MMOL/L (ref 0.5–2)
LACTATE SERPL-SCNC: 8.3 MMOL/L (ref 0.5–2)
LEFT ATRIUM AREA SYSTOLE SINGLE PLANE A4C: 24 CM2
LEFT ATRIUM SIZE: 3.2 CM
LEFT INTERNAL DIMENSION IN SYSTOLE: 3.5 CM (ref 2.1–4)
LEFT VENTRICULAR INTERNAL DIMENSION IN DIASTOLE: 4.5 CM (ref 3.5–6)
LEFT VENTRICULAR POSTERIOR WALL IN END DIASTOLE: 1.3 CM
LEFT VENTRICULAR STROKE VOLUME: 43 ML
LEUKOCYTE ESTERASE UR QL STRIP: NEGATIVE
LIPASE SERPL-CCNC: 197 U/L (ref 73–393)
LVSV (TEICH): 43 ML
LYMPHOCYTES # BLD AUTO: 0.91 THOUSANDS/ÂΜL (ref 0.6–4.47)
LYMPHOCYTES # BLD AUTO: 22 % (ref 14–44)
LYMPHOCYTES # BLD AUTO: 3.5 THOUSAND/UL (ref 0.6–4.47)
LYMPHOCYTES # BLD AUTO: 6.27 THOUSANDS/ÂΜL (ref 0.6–4.47)
LYMPHOCYTES NFR BLD AUTO: 11 % (ref 14–44)
LYMPHOCYTES NFR BLD AUTO: 48 % (ref 14–44)
MAGNESIUM SERPL-MCNC: 1.5 MG/DL (ref 1.6–2.6)
MAGNESIUM SERPL-MCNC: 1.7 MG/DL (ref 1.6–2.6)
MAGNESIUM SERPL-MCNC: 2.4 MG/DL (ref 1.6–2.6)
MAGNESIUM SERPL-MCNC: 2.6 MG/DL (ref 1.6–2.6)
MAGNESIUM SERPL-MCNC: 2.6 MG/DL (ref 1.6–2.6)
MAGNESIUM SERPL-MCNC: 2.7 MG/DL (ref 1.6–2.6)
MAGNESIUM SERPL-MCNC: 3.2 MG/DL (ref 1.6–2.6)
MAGNESIUM SERPL-MCNC: 3.3 MG/DL (ref 1.6–2.6)
MCH RBC QN AUTO: 30.7 PG (ref 26.8–34.3)
MCH RBC QN AUTO: 31 PG (ref 26.8–34.3)
MCH RBC QN AUTO: 31.4 PG (ref 26.8–34.3)
MCHC RBC AUTO-ENTMCNC: 30.1 G/DL (ref 31.4–37.4)
MCHC RBC AUTO-ENTMCNC: 31 G/DL (ref 31.4–37.4)
MCHC RBC AUTO-ENTMCNC: 31.8 G/DL (ref 31.4–37.4)
MCHC RBC AUTO-ENTMCNC: 32.6 G/DL (ref 31.4–37.4)
MCHC RBC AUTO-ENTMCNC: 33.3 G/DL (ref 31.4–37.4)
MCV RBC AUTO: 102 FL (ref 82–98)
MCV RBC AUTO: 94 FL (ref 82–98)
MCV RBC AUTO: 94 FL (ref 82–98)
MCV RBC AUTO: 98 FL (ref 82–98)
MCV RBC AUTO: 99 FL (ref 82–98)
METHADONE UR QL: NEGATIVE
MONOCYTES # BLD AUTO: 0.42 THOUSAND/ÂΜL (ref 0.17–1.22)
MONOCYTES # BLD AUTO: 0.48 THOUSAND/UL (ref 0–1.22)
MONOCYTES # BLD AUTO: 0.56 THOUSAND/ÂΜL (ref 0.17–1.22)
MONOCYTES NFR BLD AUTO: 4 % (ref 4–12)
MONOCYTES NFR BLD AUTO: 5 % (ref 4–12)
MONOCYTES NFR BLD: 3 % (ref 4–12)
MRSA NOSE QL CULT: NORMAL
MV E'TISSUE VEL-LAT: 4 CM/S
MV E'TISSUE VEL-SEP: 4 CM/S
MV MEAN GRADIENT: 2 MMHG
MV PEAK A VEL: 1.21 M/S
MV PEAK E VEL: 125 CM/S
MV PEAK GRADIENT: 7 MMHG
MV STENOSIS PRESSURE HALF TIME: 63 MS
MV VALVE AREA BY CONTINUITY EQUATION: 1.21 CM2
MV VALVE AREA P 1/2 METHOD: 3.49
NEUTROPHILS # BLD AUTO: 5.43 THOUSANDS/ÂΜL (ref 1.85–7.62)
NEUTROPHILS # BLD AUTO: 7.08 THOUSANDS/ÂΜL (ref 1.85–7.62)
NEUTROPHILS # BLD MANUAL: 11.46 THOUSAND/UL (ref 1.85–7.62)
NEUTS BAND NFR BLD MANUAL: 6 % (ref 0–8)
NEUTS SEG NFR BLD AUTO: 42 % (ref 43–75)
NEUTS SEG NFR BLD AUTO: 66 % (ref 43–75)
NEUTS SEG NFR BLD AUTO: 83 % (ref 43–75)
NITRITE UR QL STRIP: NEGATIVE
NON-SQ EPI CELLS URNS QL MICRO: ABNORMAL /HPF
NRBC BLD AUTO-RTO: 0 /100 WBCS
NRBC BLD AUTO-RTO: 0 /100 WBCS
NRBC BLD AUTO-RTO: 1 /100 WBCS
NT-PROBNP SERPL-MCNC: 1032 PG/ML
O2 CT BLDA-SCNC: 14.7 ML/DL (ref 16–23)
O2 CT BLDA-SCNC: 18.3 ML/DL (ref 16–23)
O2 CT BLDA-SCNC: 20.2 ML/DL (ref 16–23)
O2 CT BLDA-SCNC: 8.5 ML/DL (ref 16–23)
O2 CT BLDA-SCNC: 9.5 ML/DL (ref 16–23)
O2 CT BLDV-SCNC: 8.6 ML/DL
OPIATES UR QL SCN: NEGATIVE
OTHER STN SPEC: ABNORMAL
OXYCODONE+OXYMORPHONE UR QL SCN: NEGATIVE
OXYHGB MFR BLDA: 54.7 % (ref 94–97)
OXYHGB MFR BLDA: 60.3 % (ref 94–97)
OXYHGB MFR BLDA: 92.4 % (ref 94–97)
OXYHGB MFR BLDA: 96.6 % (ref 94–97)
OXYHGB MFR BLDA: 99.3 % (ref 94–97)
P AXIS: 28 DEGREES
P AXIS: 50 DEGREES
P AXIS: 73 DEGREES
P AXIS: 74 DEGREES
P AXIS: 76 DEGREES
P AXIS: 77 DEGREES
P AXIS: 82 DEGREES
P AXIS: 84 DEGREES
P AXIS: 84 DEGREES
PCO2 BLD: 12 MMOL/L (ref 21–32)
PCO2 BLD: 22.4 MM HG (ref 36–44)
PCO2 BLDA: 34.5 MM HG (ref 36–44)
PCO2 BLDA: 35.3 MM HG (ref 36–44)
PCO2 BLDA: 37.2 MM HG (ref 36–44)
PCO2 BLDA: 43.7 MM HG (ref 36–44)
PCO2 BLDA: 44.6 MM HG (ref 36–44)
PCO2 BLDV: 46 MM HG (ref 42–50)
PCO2 TEMP ADJ BLDA: 33 MM HG (ref 36–44)
PCO2 TEMP ADJ BLDA: 34.8 MM HG (ref 36–44)
PCO2 TEMP ADJ BLDA: 37 MM HG (ref 36–44)
PCO2 TEMP ADJ BLDA: 42.4 MM HG (ref 36–44)
PCO2 TEMP ADJ BLDA: 43.4 MM HG (ref 36–44)
PCP UR QL: NEGATIVE
PEEP RESPIRATORY: 10 CM[H2O]
PEEP RESPIRATORY: 12 CM[H2O]
PEEP RESPIRATORY: 12 CM[H2O]
PEEP RESPIRATORY: 6 CM[H2O]
PEEP RESPIRATORY: 8 CM[H2O]
PH BLD: 7.04 [PH] (ref 7.35–7.45)
PH BLD: 7.11 [PH] (ref 7.35–7.45)
PH BLD: 7.29 [PH] (ref 7.35–7.45)
PH BLD: 7.31 [PH] (ref 7.35–7.45)
PH BLD: 7.36 [PH] (ref 7.35–7.45)
PH BLD: 7.44 [PH] (ref 7.35–7.45)
PH BLDA: 7.03 [PH] (ref 7.35–7.45)
PH BLDA: 7.1 [PH] (ref 7.35–7.45)
PH BLDA: 7.29 [PH] (ref 7.35–7.45)
PH BLDA: 7.35 [PH] (ref 7.35–7.45)
PH BLDA: 7.43 [PH] (ref 7.35–7.45)
PH BLDV: 7.09 [PH] (ref 7.3–7.4)
PH UR STRIP.AUTO: 6 [PH]
PHOSPHATE SERPL-MCNC: 3.9 MG/DL (ref 2.3–4.1)
PHOSPHATE SERPL-MCNC: 3.9 MG/DL (ref 2.3–4.1)
PHOSPHATE SERPL-MCNC: 4.1 MG/DL (ref 2.3–4.1)
PHOSPHATE SERPL-MCNC: 4.4 MG/DL (ref 2.3–4.1)
PHOSPHATE SERPL-MCNC: 8.5 MG/DL (ref 2.3–4.1)
PLATELET # BLD AUTO: 105 THOUSANDS/UL (ref 149–390)
PLATELET # BLD AUTO: 116 THOUSANDS/UL (ref 149–390)
PLATELET # BLD AUTO: 154 THOUSANDS/UL (ref 149–390)
PLATELET # BLD AUTO: 155 THOUSANDS/UL (ref 149–390)
PLATELET # BLD AUTO: 183 THOUSANDS/UL (ref 149–390)
PLATELET # BLD AUTO: 76 THOUSANDS/UL (ref 149–390)
PLATELET BLD QL SMEAR: ABNORMAL
PMV BLD AUTO: 10 FL (ref 8.9–12.7)
PMV BLD AUTO: 10.1 FL (ref 8.9–12.7)
PMV BLD AUTO: 10.2 FL (ref 8.9–12.7)
PMV BLD AUTO: 11 FL (ref 8.9–12.7)
PMV BLD AUTO: 11.2 FL (ref 8.9–12.7)
PMV BLD AUTO: 11.7 FL (ref 8.9–12.7)
PO2 BLD: 155 MM HG (ref 75–129)
PO2 BLD: 408.3 MM HG (ref 75–129)
PO2 BLD: 42.7 MM HG (ref 75–129)
PO2 BLD: 44.1 MM HG (ref 75–129)
PO2 BLD: 65.5 MM HG (ref 75–129)
PO2 BLD: 90.7 MM HG (ref 75–129)
PO2 BLDA: 408.9 MM HG (ref 75–129)
PO2 BLDA: 44.9 MM HG (ref 75–129)
PO2 BLDA: 46 MM HG (ref 75–129)
PO2 BLDA: 70.1 MM HG (ref 75–129)
PO2 BLDA: 92.4 MM HG (ref 75–129)
PO2 BLDV: 44.3 MM HG (ref 35–45)
POTASSIUM BLD-SCNC: 5.1 MMOL/L (ref 3.5–5.3)
POTASSIUM SERPL-SCNC: 3.4 MMOL/L (ref 3.5–5.3)
POTASSIUM SERPL-SCNC: 3.9 MMOL/L (ref 3.5–5.3)
POTASSIUM SERPL-SCNC: 4.5 MMOL/L (ref 3.5–5.3)
POTASSIUM SERPL-SCNC: 4.6 MMOL/L (ref 3.5–5.3)
POTASSIUM SERPL-SCNC: 5 MMOL/L (ref 3.5–5.3)
POTASSIUM SERPL-SCNC: 5.2 MMOL/L (ref 3.5–5.3)
POTASSIUM SERPL-SCNC: 5.2 MMOL/L (ref 3.5–5.3)
POTASSIUM SERPL-SCNC: 5.3 MMOL/L (ref 3.5–5.3)
POTASSIUM SERPL-SCNC: 5.9 MMOL/L (ref 3.5–5.3)
PR INTERVAL: 170 MS
PR INTERVAL: 176 MS
PR INTERVAL: 202 MS
PR INTERVAL: 206 MS
PR INTERVAL: 216 MS
PR INTERVAL: 216 MS
PR INTERVAL: 218 MS
PR INTERVAL: 226 MS
PR INTERVAL: 232 MS
PROCALCITONIN SERPL-MCNC: 0.07 NG/ML
PROCALCITONIN SERPL-MCNC: 0.67 NG/ML
PROCALCITONIN SERPL-MCNC: 0.78 NG/ML
PROCALCITONIN SERPL-MCNC: 1.04 NG/ML
PROCALCITONIN SERPL-MCNC: 1.33 NG/ML
PROT SERPL-MCNC: 5.2 G/DL (ref 6.4–8.4)
PROT SERPL-MCNC: 5.3 G/DL (ref 6.4–8.4)
PROT SERPL-MCNC: 5.7 G/DL (ref 6.4–8.4)
PROT SERPL-MCNC: 6 G/DL (ref 6.4–8.4)
PROT SERPL-MCNC: 6.7 G/DL (ref 6.4–8.4)
PROT SERPL-MCNC: 7.1 G/DL (ref 6.4–8.4)
PROT UR STRIP-MCNC: ABNORMAL MG/DL
PROTHROMBIN TIME: 14.5 SECONDS (ref 11.6–14.5)
PROTHROMBIN TIME: 16.1 SECONDS (ref 11.6–14.5)
PROTHROMBIN TIME: 38.4 SECONDS (ref 11.6–14.5)
QRS AXIS: -10 DEGREES
QRS AXIS: -21 DEGREES
QRS AXIS: -33 DEGREES
QRS AXIS: -39 DEGREES
QRS AXIS: -41 DEGREES
QRS AXIS: -41 DEGREES
QRS AXIS: -5 DEGREES
QRS AXIS: -54 DEGREES
QRS AXIS: -62 DEGREES
QRS AXIS: -74 DEGREES
QRS AXIS: -76 DEGREES
QRS AXIS: -82 DEGREES
QRS AXIS: 21 DEGREES
QRSD INTERVAL: 106 MS
QRSD INTERVAL: 112 MS
QRSD INTERVAL: 114 MS
QRSD INTERVAL: 116 MS
QRSD INTERVAL: 122 MS
QRSD INTERVAL: 122 MS
QRSD INTERVAL: 126 MS
QRSD INTERVAL: 130 MS
QRSD INTERVAL: 154 MS
QRSD INTERVAL: 166 MS
QRSD INTERVAL: 90 MS
QRSD INTERVAL: 96 MS
QRSD INTERVAL: 98 MS
QT INTERVAL: 342 MS
QT INTERVAL: 346 MS
QT INTERVAL: 368 MS
QT INTERVAL: 378 MS
QT INTERVAL: 412 MS
QT INTERVAL: 414 MS
QT INTERVAL: 416 MS
QT INTERVAL: 428 MS
QT INTERVAL: 456 MS
QT INTERVAL: 466 MS
QT INTERVAL: 470 MS
QT INTERVAL: 482 MS
QT INTERVAL: 524 MS
QTC INTERVAL: 441 MS
QTC INTERVAL: 461 MS
QTC INTERVAL: 466 MS
QTC INTERVAL: 482 MS
QTC INTERVAL: 482 MS
QTC INTERVAL: 495 MS
QTC INTERVAL: 513 MS
QTC INTERVAL: 515 MS
QTC INTERVAL: 521 MS
QTC INTERVAL: 527 MS
QTC INTERVAL: 548 MS
QTC INTERVAL: 559 MS
QTC INTERVAL: 567 MS
RBC # BLD AUTO: 2.83 MILLION/UL (ref 3.88–5.62)
RBC # BLD AUTO: 3.22 MILLION/UL (ref 3.88–5.62)
RBC # BLD AUTO: 3.35 MILLION/UL (ref 3.88–5.62)
RBC # BLD AUTO: 4.14 MILLION/UL (ref 3.88–5.62)
RBC # BLD AUTO: 4.56 MILLION/UL (ref 3.88–5.62)
RBC #/AREA URNS AUTO: ABNORMAL /HPF
RBC MORPH BLD: PRESENT
RIGHT ATRIUM AREA SYSTOLE A4C: 17.4 CM2
RIGHT VENTRICLE ID DIMENSION: 3.4 CM
RSV RNA RESP QL NAA+PROBE: NEGATIVE
S PNEUM AG UR QL: NEGATIVE
SAO2 % BLD FROM PO2: 99 % (ref 60–85)
SARS-COV-2 RNA RESP QL NAA+PROBE: NEGATIVE
SL CV LV EF: 35
SL CV PED ECHO LEFT VENTRICLE DIASTOLIC VOLUME (MOD BIPLANE) 2D: 94 ML
SL CV PED ECHO LEFT VENTRICLE SYSTOLIC VOLUME (MOD BIPLANE) 2D: 51 ML
SODIUM BLD-SCNC: 132 MMOL/L (ref 136–145)
SODIUM SERPL-SCNC: 134 MMOL/L (ref 135–147)
SODIUM SERPL-SCNC: 137 MMOL/L (ref 135–147)
SODIUM SERPL-SCNC: 138 MMOL/L (ref 135–147)
SODIUM SERPL-SCNC: 138 MMOL/L (ref 135–147)
SODIUM SERPL-SCNC: 139 MMOL/L (ref 135–147)
SODIUM SERPL-SCNC: 141 MMOL/L (ref 135–147)
SODIUM SERPL-SCNC: 141 MMOL/L (ref 135–147)
SODIUM SERPL-SCNC: 145 MMOL/L (ref 135–147)
SODIUM SERPL-SCNC: 148 MMOL/L (ref 135–147)
SP GR UR STRIP.AUTO: >=1.03 (ref 1–1.03)
SPECIMEN SOURCE: ABNORMAL
T WAVE AXIS: 103 DEGREES
T WAVE AXIS: 104 DEGREES
T WAVE AXIS: 105 DEGREES
T WAVE AXIS: 108 DEGREES
T WAVE AXIS: 112 DEGREES
T WAVE AXIS: 115 DEGREES
T WAVE AXIS: 118 DEGREES
T WAVE AXIS: 124 DEGREES
T WAVE AXIS: 144 DEGREES
T WAVE AXIS: 92 DEGREES
T WAVE AXIS: 95 DEGREES
T4 FREE SERPL-MCNC: 1.34 NG/DL (ref 0.76–1.46)
THC UR QL: NEGATIVE
TOXIC GRANULES BLD QL SMEAR: PRESENT
TSH SERPL DL<=0.05 MIU/L-ACNC: 4.54 UIU/ML (ref 0.45–4.5)
UROBILINOGEN UR QL STRIP.AUTO: 0.2 E.U./DL
VARIANT LYMPHS # BLD AUTO: 3 %
VENT AC: 20
VENT AC: 28
VENT- AC: AC
VENTRICULAR RATE: 100 BPM
VENTRICULAR RATE: 113 BPM
VENTRICULAR RATE: 139 BPM
VENTRICULAR RATE: 151 BPM
VENTRICULAR RATE: 60 BPM
VENTRICULAR RATE: 61 BPM
VENTRICULAR RATE: 71 BPM
VENTRICULAR RATE: 73 BPM
VENTRICULAR RATE: 74 BPM
VENTRICULAR RATE: 74 BPM
VENTRICULAR RATE: 77 BPM
VENTRICULAR RATE: 87 BPM
VENTRICULAR RATE: 98 BPM
VT SETTING VENT: 400 ML
VT SETTING VENT: 400 ML
VT SETTING VENT: 430 ML
VT SETTING VENT: 440 ML
VT SETTING VENT: 440 ML
WBC # BLD AUTO: 12.96 THOUSAND/UL (ref 4.31–10.16)
WBC # BLD AUTO: 13.22 THOUSAND/UL (ref 4.31–10.16)
WBC # BLD AUTO: 15.91 THOUSAND/UL (ref 4.31–10.16)
WBC # BLD AUTO: 8.5 THOUSAND/UL (ref 4.31–10.16)
WBC # BLD AUTO: 9.64 THOUSAND/UL (ref 4.31–10.16)
WBC #/AREA URNS AUTO: ABNORMAL /HPF

## 2022-01-01 PROCEDURE — 02HK3JZ INSERTION OF PACEMAKER LEAD INTO RIGHT VENTRICLE, PERCUTANEOUS APPROACH: ICD-10-PCS | Performed by: ANESTHESIOLOGY

## 2022-01-01 PROCEDURE — 5A1223Z PERFORMANCE OF CARDIAC PACING, CONTINUOUS: ICD-10-PCS | Performed by: ANESTHESIOLOGY

## 2022-01-01 PROCEDURE — 5A1945Z RESPIRATORY VENTILATION, 24-96 CONSECUTIVE HOURS: ICD-10-PCS | Performed by: ANESTHESIOLOGY

## 2022-01-01 PROCEDURE — 5A12012 PERFORMANCE OF CARDIAC OUTPUT, SINGLE, MANUAL: ICD-10-PCS | Performed by: ANESTHESIOLOGY

## 2022-01-01 PROCEDURE — 5A2204Z RESTORATION OF CARDIAC RHYTHM, SINGLE: ICD-10-PCS | Performed by: ANESTHESIOLOGY

## 2022-01-01 PROCEDURE — 0BH17EZ INSERTION OF ENDOTRACHEAL AIRWAY INTO TRACHEA, VIA NATURAL OR ARTIFICIAL OPENING: ICD-10-PCS | Performed by: ANESTHESIOLOGY

## 2022-01-01 PROCEDURE — 02HV33Z INSERTION OF INFUSION DEVICE INTO SUPERIOR VENA CAVA, PERCUTANEOUS APPROACH: ICD-10-PCS | Performed by: ANESTHESIOLOGY

## 2022-01-01 RX ORDER — INSULIN LISPRO 100 [IU]/ML
1-6 INJECTION, SOLUTION INTRAVENOUS; SUBCUTANEOUS
Status: DISCONTINUED | OUTPATIENT
Start: 2022-01-01 | End: 2022-01-01 | Stop reason: SDUPTHER

## 2022-01-01 RX ORDER — ACETAMINOPHEN 325 MG/1
650 TABLET ORAL EVERY 6 HOURS PRN
Status: DISCONTINUED | OUTPATIENT
Start: 2022-01-01 | End: 2022-01-01

## 2022-01-01 RX ORDER — LEVALBUTEROL INHALATION SOLUTION 0.63 MG/3ML
0.63 SOLUTION RESPIRATORY (INHALATION)
Status: CANCELLED | OUTPATIENT
Start: 2022-01-01

## 2022-01-01 RX ORDER — LIDOCAINE HYDROCHLORIDE ANHYDROUS AND DEXTROSE MONOHYDRATE .8; 5 G/100ML; G/100ML
1 INJECTION, SOLUTION INTRAVENOUS CONTINUOUS
Status: CANCELLED | OUTPATIENT
Start: 2022-01-01

## 2022-01-01 RX ORDER — FENTANYL CITRATE 50 UG/ML
50 INJECTION, SOLUTION INTRAMUSCULAR; INTRAVENOUS
Status: DISCONTINUED | OUTPATIENT
Start: 2022-01-01 | End: 2022-01-01

## 2022-01-01 RX ORDER — ASPIRIN 81 MG/1
81 TABLET, CHEWABLE ORAL DAILY
Status: CANCELLED | OUTPATIENT
Start: 2022-01-01

## 2022-01-01 RX ORDER — POTASSIUM CHLORIDE 14.9 MG/ML
20 INJECTION INTRAVENOUS ONCE
Status: COMPLETED | OUTPATIENT
Start: 2022-01-01 | End: 2022-01-01

## 2022-01-01 RX ORDER — ASPIRIN 81 MG/1
81 TABLET, CHEWABLE ORAL DAILY
Status: DISCONTINUED | OUTPATIENT
Start: 2022-01-01 | End: 2022-01-01 | Stop reason: HOSPADM

## 2022-01-01 RX ORDER — HEPARIN SODIUM 1000 [USP'U]/ML
2000 INJECTION, SOLUTION INTRAVENOUS; SUBCUTANEOUS EVERY 6 HOURS PRN
Status: CANCELLED | OUTPATIENT
Start: 2022-01-01

## 2022-01-01 RX ORDER — HEPARIN SODIUM 10000 [USP'U]/100ML
3-20 INJECTION, SOLUTION INTRAVENOUS
Status: CANCELLED | OUTPATIENT
Start: 2022-01-01

## 2022-01-01 RX ORDER — METOPROLOL TARTRATE 5 MG/5ML
5 INJECTION INTRAVENOUS EVERY 6 HOURS PRN
Status: DISCONTINUED | OUTPATIENT
Start: 2022-01-01 | End: 2022-01-01

## 2022-01-01 RX ORDER — POTASSIUM CHLORIDE 20MEQ/15ML
20 LIQUID (ML) ORAL ONCE
Status: COMPLETED | OUTPATIENT
Start: 2022-01-01 | End: 2022-01-01

## 2022-01-01 RX ORDER — ATROPINE SULFATE 0.1 MG/ML
INJECTION, SOLUTION ENDOTRACHEAL; INTRAMUSCULAR; INTRAVENOUS; SUBCUTANEOUS CODE/TRAUMA/SEDATION MEDICATION
Status: COMPLETED | OUTPATIENT
Start: 2022-01-01 | End: 2022-01-01

## 2022-01-01 RX ORDER — ATORVASTATIN CALCIUM 40 MG/1
80 TABLET, FILM COATED ORAL
Status: DISCONTINUED | OUTPATIENT
Start: 2022-01-01 | End: 2022-01-01

## 2022-01-01 RX ORDER — METOPROLOL TARTRATE 5 MG/5ML
5 INJECTION INTRAVENOUS ONCE
Status: COMPLETED | OUTPATIENT
Start: 2022-01-01 | End: 2022-01-01

## 2022-01-01 RX ORDER — CHLORHEXIDINE GLUCONATE 0.12 MG/ML
15 RINSE ORAL EVERY 12 HOURS SCHEDULED
Status: DISCONTINUED | OUTPATIENT
Start: 2022-01-01 | End: 2022-01-01

## 2022-01-01 RX ORDER — METOPROLOL TARTRATE 50 MG/1
50 TABLET, FILM COATED ORAL EVERY 12 HOURS SCHEDULED
Status: DISCONTINUED | OUTPATIENT
Start: 2022-01-01 | End: 2022-01-01

## 2022-01-01 RX ORDER — ACETAMINOPHEN 160 MG/5ML
650 SUSPENSION, ORAL (FINAL DOSE FORM) ORAL EVERY 6 HOURS PRN
Status: CANCELLED | OUTPATIENT
Start: 2022-01-01

## 2022-01-01 RX ORDER — HEPARIN SODIUM 10000 [USP'U]/100ML
3-20 INJECTION, SOLUTION INTRAVENOUS
Status: DISCONTINUED | OUTPATIENT
Start: 2022-01-01 | End: 2022-01-01 | Stop reason: HOSPADM

## 2022-01-01 RX ORDER — ALBUMIN, HUMAN INJ 5% 5 %
12.5 SOLUTION INTRAVENOUS ONCE
Status: COMPLETED | OUTPATIENT
Start: 2022-01-01 | End: 2022-01-01

## 2022-01-01 RX ORDER — ACETAMINOPHEN 650 MG/1
650 SUPPOSITORY RECTAL ONCE
Status: DISCONTINUED | OUTPATIENT
Start: 2022-01-01 | End: 2022-01-01

## 2022-01-01 RX ORDER — DEXTROSE MONOHYDRATE 25 G/50ML
25 INJECTION, SOLUTION INTRAVENOUS ONCE
Status: COMPLETED | OUTPATIENT
Start: 2022-01-01 | End: 2022-01-01

## 2022-01-01 RX ORDER — MAGNESIUM SULFATE HEPTAHYDRATE 40 MG/ML
INJECTION, SOLUTION INTRAVENOUS
Status: COMPLETED
Start: 2022-01-01 | End: 2022-01-01

## 2022-01-01 RX ORDER — SODIUM CHLORIDE FOR INHALATION 3 %
4 VIAL, NEBULIZER (ML) INHALATION
Status: DISCONTINUED | OUTPATIENT
Start: 2022-01-01 | End: 2022-01-01 | Stop reason: HOSPADM

## 2022-01-01 RX ORDER — DEXTROSE 50 % IN WATER 50 %
SYRINGE (ML) INTRAVENOUS CODE/TRAUMA/SEDATION MEDICATION
Status: DISCONTINUED | OUTPATIENT
Start: 2022-01-01 | End: 2022-01-01 | Stop reason: HOSPADM

## 2022-01-01 RX ORDER — ETOMIDATE 2 MG/ML
20 INJECTION INTRAVENOUS ONCE
Status: COMPLETED | OUTPATIENT
Start: 2022-01-01 | End: 2022-01-01

## 2022-01-01 RX ORDER — CALCIUM CHLORIDE 100 MG/ML
SYRINGE (ML) INTRAVENOUS CODE/TRAUMA/SEDATION MEDICATION
Status: DISCONTINUED | OUTPATIENT
Start: 2022-01-01 | End: 2022-01-01 | Stop reason: HOSPADM

## 2022-01-01 RX ORDER — MIDAZOLAM HYDROCHLORIDE 2 MG/2ML
4 INJECTION, SOLUTION INTRAMUSCULAR; INTRAVENOUS ONCE
Status: COMPLETED | OUTPATIENT
Start: 2022-01-01 | End: 2022-01-01

## 2022-01-01 RX ORDER — HEPARIN SODIUM 1000 [USP'U]/ML
4000 INJECTION, SOLUTION INTRAVENOUS; SUBCUTANEOUS EVERY 6 HOURS PRN
Status: DISCONTINUED | OUTPATIENT
Start: 2022-01-01 | End: 2022-01-01 | Stop reason: HOSPADM

## 2022-01-01 RX ORDER — SODIUM CHLORIDE, SODIUM GLUCONATE, SODIUM ACETATE, POTASSIUM CHLORIDE, MAGNESIUM CHLORIDE, SODIUM PHOSPHATE, DIBASIC, AND POTASSIUM PHOSPHATE .53; .5; .37; .037; .03; .012; .00082 G/100ML; G/100ML; G/100ML; G/100ML; G/100ML; G/100ML; G/100ML
500 INJECTION, SOLUTION INTRAVENOUS ONCE
Status: DISCONTINUED | OUTPATIENT
Start: 2022-01-01 | End: 2022-01-01

## 2022-01-01 RX ORDER — MAGNESIUM SULFATE 1 G/100ML
1 INJECTION INTRAVENOUS ONCE
Status: COMPLETED | OUTPATIENT
Start: 2022-01-01 | End: 2022-01-01

## 2022-01-01 RX ORDER — ETOMIDATE 2 MG/ML
INJECTION INTRAVENOUS CODE/TRAUMA/SEDATION MEDICATION
Status: COMPLETED | OUTPATIENT
Start: 2022-01-01 | End: 2022-01-01

## 2022-01-01 RX ORDER — LABETALOL HYDROCHLORIDE 5 MG/ML
10 INJECTION, SOLUTION INTRAVENOUS EVERY 4 HOURS PRN
Status: DISCONTINUED | OUTPATIENT
Start: 2022-01-01 | End: 2022-01-01

## 2022-01-01 RX ORDER — MAGNESIUM SULFATE HEPTAHYDRATE 40 MG/ML
2 INJECTION, SOLUTION INTRAVENOUS ONCE
Status: COMPLETED | OUTPATIENT
Start: 2022-01-01 | End: 2022-01-01

## 2022-01-01 RX ORDER — FUROSEMIDE 10 MG/ML
40 INJECTION INTRAMUSCULAR; INTRAVENOUS ONCE
Status: COMPLETED | OUTPATIENT
Start: 2022-01-01 | End: 2022-01-01

## 2022-01-01 RX ORDER — INSULIN LISPRO 100 [IU]/ML
1-6 INJECTION, SOLUTION INTRAVENOUS; SUBCUTANEOUS
Status: DISCONTINUED | OUTPATIENT
Start: 2022-01-01 | End: 2022-01-01

## 2022-01-01 RX ORDER — EPINEPHRINE 0.1 MG/ML
SYRINGE (ML) INJECTION CODE/TRAUMA/SEDATION MEDICATION
Status: DISCONTINUED | OUTPATIENT
Start: 2022-01-01 | End: 2022-01-01 | Stop reason: HOSPADM

## 2022-01-01 RX ORDER — SUCCINYLCHOLINE/SOD CL,ISO/PF 100 MG/5ML
125 SYRINGE (ML) INTRAVENOUS ONCE
Status: COMPLETED | OUTPATIENT
Start: 2022-01-01 | End: 2022-01-01

## 2022-01-01 RX ORDER — CALCIUM GLUCONATE 20 MG/ML
1 INJECTION, SOLUTION INTRAVENOUS ONCE
Status: COMPLETED | OUTPATIENT
Start: 2022-01-01 | End: 2022-01-01

## 2022-01-01 RX ORDER — LIDOCAINE HYDROCHLORIDE ANHYDROUS AND DEXTROSE MONOHYDRATE .8; 5 G/100ML; G/100ML
1 INJECTION, SOLUTION INTRAVENOUS CONTINUOUS
Status: DISCONTINUED | OUTPATIENT
Start: 2022-01-01 | End: 2022-01-01 | Stop reason: HOSPADM

## 2022-01-01 RX ORDER — SODIUM CHLORIDE, SODIUM GLUCONATE, SODIUM ACETATE, POTASSIUM CHLORIDE, MAGNESIUM CHLORIDE, SODIUM PHOSPHATE, DIBASIC, AND POTASSIUM PHOSPHATE .53; .5; .37; .037; .03; .012; .00082 G/100ML; G/100ML; G/100ML; G/100ML; G/100ML; G/100ML; G/100ML
500 INJECTION, SOLUTION INTRAVENOUS ONCE
Status: COMPLETED | OUTPATIENT
Start: 2022-01-01 | End: 2022-01-01

## 2022-01-01 RX ORDER — INSULIN LISPRO 100 [IU]/ML
1-6 INJECTION, SOLUTION INTRAVENOUS; SUBCUTANEOUS EVERY 6 HOURS SCHEDULED
Status: DISCONTINUED | OUTPATIENT
Start: 2022-01-01 | End: 2022-01-01

## 2022-01-01 RX ORDER — SODIUM BICARBONATE 84 MG/ML
INJECTION, SOLUTION INTRAVENOUS CODE/TRAUMA/SEDATION MEDICATION
Status: DISCONTINUED | OUTPATIENT
Start: 2022-01-01 | End: 2022-01-01 | Stop reason: HOSPADM

## 2022-01-01 RX ORDER — FENTANYL CITRATE-0.9 % NACL/PF 10 MCG/ML
50 PLASTIC BAG, INJECTION (ML) INTRAVENOUS CONTINUOUS
Status: DISCONTINUED | OUTPATIENT
Start: 2022-01-01 | End: 2022-01-01

## 2022-01-01 RX ORDER — ASPIRIN 81 MG/1
81 TABLET ORAL DAILY
Status: DISCONTINUED | OUTPATIENT
Start: 2022-01-01 | End: 2022-01-01

## 2022-01-01 RX ORDER — HYDRALAZINE HYDROCHLORIDE 20 MG/ML
20 INJECTION INTRAMUSCULAR; INTRAVENOUS ONCE
Status: DISCONTINUED | OUTPATIENT
Start: 2022-01-01 | End: 2022-01-01

## 2022-01-01 RX ORDER — LABETALOL HYDROCHLORIDE 5 MG/ML
20 INJECTION, SOLUTION INTRAVENOUS ONCE
Status: COMPLETED | OUTPATIENT
Start: 2022-01-01 | End: 2022-01-01

## 2022-01-01 RX ORDER — HEPARIN SODIUM 1000 [USP'U]/ML
2000 INJECTION, SOLUTION INTRAVENOUS; SUBCUTANEOUS EVERY 6 HOURS PRN
Status: DISCONTINUED | OUTPATIENT
Start: 2022-01-01 | End: 2022-01-01 | Stop reason: HOSPADM

## 2022-01-01 RX ORDER — PANTOPRAZOLE SODIUM 40 MG/10ML
40 INJECTION, POWDER, LYOPHILIZED, FOR SOLUTION INTRAVENOUS
Status: CANCELLED | OUTPATIENT
Start: 2022-01-01

## 2022-01-01 RX ORDER — PROPOFOL 10 MG/ML
5-50 INJECTION, EMULSION INTRAVENOUS
Status: DISCONTINUED | OUTPATIENT
Start: 2022-01-01 | End: 2022-01-01

## 2022-01-01 RX ORDER — LABETALOL HYDROCHLORIDE 5 MG/ML
20 INJECTION, SOLUTION INTRAVENOUS EVERY 4 HOURS PRN
Status: DISCONTINUED | OUTPATIENT
Start: 2022-01-01 | End: 2022-01-01

## 2022-01-01 RX ORDER — ASPIRIN 325 MG
325 TABLET ORAL DAILY
Status: DISCONTINUED | OUTPATIENT
Start: 2022-01-01 | End: 2022-01-01

## 2022-01-01 RX ORDER — LIDOCAINE HYDROCHLORIDE 20 MG/ML
INJECTION, SOLUTION EPIDURAL; INFILTRATION; INTRACAUDAL; PERINEURAL CODE/TRAUMA/SEDATION MEDICATION
Status: COMPLETED | OUTPATIENT
Start: 2022-01-01 | End: 2022-01-01

## 2022-01-01 RX ORDER — LEVALBUTEROL INHALATION SOLUTION 0.63 MG/3ML
0.63 SOLUTION RESPIRATORY (INHALATION)
Status: DISCONTINUED | OUTPATIENT
Start: 2022-01-01 | End: 2022-01-01 | Stop reason: HOSPADM

## 2022-01-01 RX ORDER — BUSPIRONE HYDROCHLORIDE 10 MG/1
30 TABLET ORAL EVERY 8 HOURS
Status: COMPLETED | OUTPATIENT
Start: 2022-01-01 | End: 2022-01-01

## 2022-01-01 RX ORDER — HEPARIN SODIUM 1000 [USP'U]/ML
4000 INJECTION, SOLUTION INTRAVENOUS; SUBCUTANEOUS EVERY 6 HOURS PRN
Status: CANCELLED | OUTPATIENT
Start: 2022-01-01

## 2022-01-01 RX ORDER — VASOPRESSIN 20 U/ML
INJECTION PARENTERAL
Status: COMPLETED
Start: 2022-01-01 | End: 2022-01-01

## 2022-01-01 RX ORDER — INSULIN LISPRO 100 [IU]/ML
5-25 INJECTION, SOLUTION INTRAVENOUS; SUBCUTANEOUS EVERY 6 HOURS SCHEDULED
Status: DISCONTINUED | OUTPATIENT
Start: 2022-01-01 | End: 2022-01-01 | Stop reason: HOSPADM

## 2022-01-01 RX ORDER — AMIODARONE HYDROCHLORIDE 50 MG/ML
INJECTION, SOLUTION INTRAVENOUS CODE/TRAUMA/SEDATION MEDICATION
Status: DISCONTINUED | OUTPATIENT
Start: 2022-01-01 | End: 2022-01-01 | Stop reason: HOSPADM

## 2022-01-01 RX ORDER — FUROSEMIDE 10 MG/ML
80 INJECTION INTRAMUSCULAR; INTRAVENOUS ONCE
Status: COMPLETED | OUTPATIENT
Start: 2022-01-01 | End: 2022-01-01

## 2022-01-01 RX ORDER — CEFEPIME HYDROCHLORIDE 2 G/50ML
2000 INJECTION, SOLUTION INTRAVENOUS ONCE
Status: COMPLETED | OUTPATIENT
Start: 2022-01-01 | End: 2022-01-01

## 2022-01-01 RX ORDER — HEPARIN SODIUM 5000 [USP'U]/ML
5000 INJECTION, SOLUTION INTRAVENOUS; SUBCUTANEOUS EVERY 8 HOURS SCHEDULED
Status: DISCONTINUED | OUTPATIENT
Start: 2022-01-01 | End: 2022-01-01

## 2022-01-01 RX ORDER — ASPIRIN 81 MG/1
81 TABLET, CHEWABLE ORAL DAILY
COMMUNITY

## 2022-01-01 RX ORDER — LIDOCAINE HYDROCHLORIDE ANHYDROUS AND DEXTROSE MONOHYDRATE .8; 5 G/100ML; G/100ML
INJECTION, SOLUTION INTRAVENOUS
Status: COMPLETED | OUTPATIENT
Start: 2022-01-01 | End: 2022-01-01

## 2022-01-01 RX ORDER — CALCIUM CHLORIDE 100 MG/ML
1 INJECTION INTRAVENOUS; INTRAVENTRICULAR ONCE
Status: COMPLETED | OUTPATIENT
Start: 2022-01-01 | End: 2022-01-01

## 2022-01-01 RX ORDER — SODIUM CHLORIDE, SODIUM GLUCONATE, SODIUM ACETATE, POTASSIUM CHLORIDE, MAGNESIUM CHLORIDE, SODIUM PHOSPHATE, DIBASIC, AND POTASSIUM PHOSPHATE .53; .5; .37; .037; .03; .012; .00082 G/100ML; G/100ML; G/100ML; G/100ML; G/100ML; G/100ML; G/100ML
50 INJECTION, SOLUTION INTRAVENOUS CONTINUOUS
Status: DISCONTINUED | OUTPATIENT
Start: 2022-01-01 | End: 2022-01-01

## 2022-01-01 RX ORDER — INSULIN LISPRO 100 [IU]/ML
5-25 INJECTION, SOLUTION INTRAVENOUS; SUBCUTANEOUS EVERY 6 HOURS SCHEDULED
Status: CANCELLED | OUTPATIENT
Start: 2022-01-01

## 2022-01-01 RX ORDER — CHLORHEXIDINE GLUCONATE 0.12 MG/ML
15 RINSE ORAL EVERY 12 HOURS SCHEDULED
Status: CANCELLED | OUTPATIENT
Start: 2022-01-01

## 2022-01-01 RX ORDER — ROCURONIUM BROMIDE 10 MG/ML
1 INJECTION, SOLUTION INTRAVENOUS ONCE
Status: DISCONTINUED | OUTPATIENT
Start: 2022-01-01 | End: 2022-01-01

## 2022-01-01 RX ORDER — EPINEPHRINE 0.1 MG/ML
SYRINGE (ML) INJECTION CODE/TRAUMA/SEDATION MEDICATION
Status: COMPLETED | OUTPATIENT
Start: 2022-01-01 | End: 2022-01-01

## 2022-01-01 RX ORDER — PROPOFOL 10 MG/ML
INJECTION, EMULSION INTRAVENOUS
Status: DISCONTINUED
Start: 2022-01-01 | End: 2022-01-01 | Stop reason: HOSPADM

## 2022-01-01 RX ORDER — SODIUM CHLORIDE FOR INHALATION 3 %
4 VIAL, NEBULIZER (ML) INHALATION
Status: CANCELLED | OUTPATIENT
Start: 2022-01-01

## 2022-01-01 RX ORDER — NOREPINEPHRINE BITARTRATE 1 MG/ML
INJECTION, SOLUTION INTRAVENOUS
Status: COMPLETED
Start: 2022-01-01 | End: 2022-01-01

## 2022-01-01 RX ORDER — PANTOPRAZOLE SODIUM 40 MG/10ML
40 INJECTION, POWDER, LYOPHILIZED, FOR SOLUTION INTRAVENOUS
Status: DISCONTINUED | OUTPATIENT
Start: 2022-01-01 | End: 2022-01-01 | Stop reason: HOSPADM

## 2022-01-01 RX ORDER — CEFEPIME HYDROCHLORIDE 2 G/50ML
2000 INJECTION, SOLUTION INTRAVENOUS ONCE
Status: DISCONTINUED | OUTPATIENT
Start: 2022-01-01 | End: 2022-01-01

## 2022-01-01 RX ORDER — LIDOCAINE HYDROCHLORIDE ANHYDROUS AND DEXTROSE MONOHYDRATE .8; 5 G/100ML; G/100ML
INJECTION, SOLUTION INTRAVENOUS
Status: DISCONTINUED
Start: 2022-01-01 | End: 2022-01-01 | Stop reason: WASHOUT

## 2022-01-01 RX ORDER — AMIODARONE HYDROCHLORIDE 50 MG/ML
INJECTION, SOLUTION INTRAVENOUS
Status: DISPENSED
Start: 2022-01-01 | End: 2022-01-01

## 2022-01-01 RX ORDER — CHLORHEXIDINE GLUCONATE 0.12 MG/ML
15 RINSE ORAL EVERY 12 HOURS SCHEDULED
Status: DISCONTINUED | OUTPATIENT
Start: 2022-01-01 | End: 2022-01-01 | Stop reason: HOSPADM

## 2022-01-01 RX ORDER — CALCIUM GLUCONATE 20 MG/ML
1 INJECTION, SOLUTION INTRAVENOUS ONCE
Status: DISCONTINUED | OUTPATIENT
Start: 2022-01-01 | End: 2022-01-01

## 2022-01-01 RX ORDER — ACETAMINOPHEN 325 MG/1
975 TABLET ORAL EVERY 8 HOURS SCHEDULED
Status: DISCONTINUED | OUTPATIENT
Start: 2022-01-01 | End: 2022-01-01

## 2022-01-01 RX ORDER — ACETAMINOPHEN 160 MG/5ML
650 SUSPENSION, ORAL (FINAL DOSE FORM) ORAL EVERY 6 HOURS PRN
Status: DISCONTINUED | OUTPATIENT
Start: 2022-01-01 | End: 2022-01-01 | Stop reason: HOSPADM

## 2022-01-01 RX ORDER — MAGNESIUM SULFATE 500 MG/ML
VIAL (ML) INJECTION CODE/TRAUMA/SEDATION MEDICATION
Status: COMPLETED | OUTPATIENT
Start: 2022-01-01 | End: 2022-01-01

## 2022-01-01 RX ADMIN — FENTANYL CITRATE 50 MCG/HR: 50 INJECTION, SOLUTION INTRAMUSCULAR; INTRAVENOUS at 11:54

## 2022-01-01 RX ADMIN — ASPIRIN 325 MG: 325 TABLET ORAL at 08:26

## 2022-01-01 RX ADMIN — LABETALOL HYDROCHLORIDE 20 MG: 5 INJECTION, SOLUTION INTRAVENOUS at 03:42

## 2022-01-01 RX ADMIN — ALBUMIN (HUMAN) 12.5 G: 12.5 INJECTION, SOLUTION INTRAVENOUS at 19:33

## 2022-01-01 RX ADMIN — LABETALOL 20 MG/4 ML (5 MG/ML) INTRAVENOUS SYRINGE 20 MG: at 21:15

## 2022-01-01 RX ADMIN — FUROSEMIDE 80 MG: 10 INJECTION, SOLUTION INTRAMUSCULAR; INTRAVENOUS at 08:05

## 2022-01-01 RX ADMIN — AMPICILLIN SODIUM AND SULBACTAM SODIUM 3 G: 2; 1 INJECTION, POWDER, FOR SOLUTION INTRAMUSCULAR; INTRAVENOUS at 20:06

## 2022-01-01 RX ADMIN — LEVALBUTEROL HYDROCHLORIDE 0.63 MG: 0.63 SOLUTION RESPIRATORY (INHALATION) at 20:49

## 2022-01-01 RX ADMIN — SODIUM BICARBONATE 50 MEQ: 84 INJECTION, SOLUTION INTRAVENOUS at 09:37

## 2022-01-01 RX ADMIN — INSULIN LISPRO 1 UNITS: 100 INJECTION, SOLUTION INTRAVENOUS; SUBCUTANEOUS at 06:08

## 2022-01-01 RX ADMIN — SODIUM CHLORIDE SOLN NEBU 3% 4 ML: 3 NEBU SOLN at 20:49

## 2022-01-01 RX ADMIN — CEFEPIME HYDROCHLORIDE 2000 MG: 2 INJECTION, SOLUTION INTRAVENOUS at 23:02

## 2022-01-01 RX ADMIN — LEVALBUTEROL HYDROCHLORIDE 0.63 MG: 0.63 SOLUTION RESPIRATORY (INHALATION) at 13:44

## 2022-01-01 RX ADMIN — EPINEPHRINE 10 MCG/MIN: 1 INJECTION INTRAMUSCULAR; INTRAVENOUS; SUBCUTANEOUS at 07:50

## 2022-01-01 RX ADMIN — BUSPIRONE HYDROCHLORIDE 30 MG: 10 TABLET ORAL at 07:33

## 2022-01-01 RX ADMIN — Medication 20 MG: at 08:00

## 2022-01-01 RX ADMIN — EPINEPHRINE 1 MG: 0.1 INJECTION, SOLUTION ENDOTRACHEAL; INTRACARDIAC; INTRAVENOUS at 09:17

## 2022-01-01 RX ADMIN — SODIUM BICARBONATE 50 MEQ: 84 INJECTION INTRAVENOUS at 05:22

## 2022-01-01 RX ADMIN — FUROSEMIDE 40 MG: 10 INJECTION, SOLUTION INTRAMUSCULAR; INTRAVENOUS at 19:38

## 2022-01-01 RX ADMIN — ALBUMIN (HUMAN) 12.5 G: 12.5 INJECTION, SOLUTION INTRAVENOUS at 07:17

## 2022-01-01 RX ADMIN — AMIODARONE HYDROCHLORIDE 300 MG: 50 INJECTION, SOLUTION INTRAVENOUS at 09:17

## 2022-01-01 RX ADMIN — ACETAMINOPHEN 975 MG: 325 TABLET, FILM COATED ORAL at 21:07

## 2022-01-01 RX ADMIN — SODIUM CHLORIDE 1 UNITS/HR: 9 INJECTION, SOLUTION INTRAVENOUS at 11:14

## 2022-01-01 RX ADMIN — AMPICILLIN SODIUM AND SULBACTAM SODIUM 3 G: 2; 1 INJECTION, POWDER, FOR SOLUTION INTRAMUSCULAR; INTRAVENOUS at 13:55

## 2022-01-01 RX ADMIN — BUSPIRONE HYDROCHLORIDE 30 MG: 10 TABLET ORAL at 15:14

## 2022-01-01 RX ADMIN — EPINEPHRINE 1 MG: 0.1 INJECTION, SOLUTION ENDOTRACHEAL; INTRACARDIAC; INTRAVENOUS at 09:37

## 2022-01-01 RX ADMIN — MAGNESIUM SULFATE HEPTAHYDRATE 1 G: 1 INJECTION, SOLUTION INTRAVENOUS at 22:48

## 2022-01-01 RX ADMIN — AMPICILLIN SODIUM AND SULBACTAM SODIUM 3 G: 2; 1 INJECTION, POWDER, FOR SOLUTION INTRAMUSCULAR; INTRAVENOUS at 13:20

## 2022-01-01 RX ADMIN — EPINEPHRINE 1 MG: 0.1 INJECTION, SOLUTION ENDOTRACHEAL; INTRACARDIAC; INTRAVENOUS at 09:40

## 2022-01-01 RX ADMIN — CALCIUM CHLORIDE 1 G: 100 INJECTION INTRAVENOUS; INTRAVENTRICULAR at 05:22

## 2022-01-01 RX ADMIN — AMPICILLIN SODIUM AND SULBACTAM SODIUM 3 G: 2; 1 INJECTION, POWDER, FOR SOLUTION INTRAMUSCULAR; INTRAVENOUS at 19:45

## 2022-01-01 RX ADMIN — SODIUM CHLORIDE 1000 ML: 0.9 INJECTION, SOLUTION INTRAVENOUS at 21:21

## 2022-01-01 RX ADMIN — EPINEPHRINE 1 MCG/MIN: 1 INJECTION INTRAMUSCULAR; INTRAVENOUS; SUBCUTANEOUS at 05:16

## 2022-01-01 RX ADMIN — DEXTROSE MONOHYDRATE 25 G: 500 INJECTION PARENTERAL at 09:31

## 2022-01-01 RX ADMIN — HEPARIN SODIUM 5000 UNITS: 5000 INJECTION INTRAVENOUS; SUBCUTANEOUS at 01:06

## 2022-01-01 RX ADMIN — AMPICILLIN SODIUM AND SULBACTAM SODIUM 3 G: 2; 1 INJECTION, POWDER, FOR SOLUTION INTRAMUSCULAR; INTRAVENOUS at 13:44

## 2022-01-01 RX ADMIN — AMPICILLIN SODIUM AND SULBACTAM SODIUM 3 G: 2; 1 INJECTION, POWDER, FOR SOLUTION INTRAMUSCULAR; INTRAVENOUS at 08:15

## 2022-01-01 RX ADMIN — EPINEPHRINE 1 MG: 0.1 INJECTION, SOLUTION ENDOTRACHEAL; INTRACARDIAC; INTRAVENOUS at 09:23

## 2022-01-01 RX ADMIN — BUSPIRONE HYDROCHLORIDE 30 MG: 10 TABLET ORAL at 07:08

## 2022-01-01 RX ADMIN — METOPROLOL TARTRATE 50 MG: 50 TABLET, FILM COATED ORAL at 21:37

## 2022-01-01 RX ADMIN — SODIUM BICARBONATE 50 MEQ: 84 INJECTION, SOLUTION INTRAVENOUS at 09:35

## 2022-01-01 RX ADMIN — HEPARIN SODIUM 10 UNITS/KG/HR: 10000 INJECTION, SOLUTION INTRAVENOUS at 16:17

## 2022-01-01 RX ADMIN — DEXTROSE MONOHYDRATE 25 ML: 25 INJECTION, SOLUTION INTRAVENOUS at 02:51

## 2022-01-01 RX ADMIN — LEVALBUTEROL HYDROCHLORIDE 0.63 MG: 0.63 SOLUTION RESPIRATORY (INHALATION) at 07:36

## 2022-01-01 RX ADMIN — AMPICILLIN SODIUM AND SULBACTAM SODIUM 3 G: 2; 1 INJECTION, POWDER, FOR SOLUTION INTRAMUSCULAR; INTRAVENOUS at 02:11

## 2022-01-01 RX ADMIN — POTASSIUM CHLORIDE 20 MEQ: 14.9 INJECTION, SOLUTION INTRAVENOUS at 15:30

## 2022-01-01 RX ADMIN — INSULIN LISPRO 1 UNITS: 100 INJECTION, SOLUTION INTRAVENOUS; SUBCUTANEOUS at 17:58

## 2022-01-01 RX ADMIN — PROPOFOL 45 MCG/KG/MIN: 10 INJECTION, EMULSION INTRAVENOUS at 11:35

## 2022-01-01 RX ADMIN — SODIUM CHLORIDE, SODIUM GLUCONATE, SODIUM ACETATE, POTASSIUM CHLORIDE, MAGNESIUM CHLORIDE, SODIUM PHOSPHATE, DIBASIC, AND POTASSIUM PHOSPHATE 500 ML: .53; .5; .37; .037; .03; .012; .00082 INJECTION, SOLUTION INTRAVENOUS at 13:28

## 2022-01-01 RX ADMIN — CALCIUM GLUCONATE 1 G: 20 INJECTION, SOLUTION INTRAVENOUS at 02:35

## 2022-01-01 RX ADMIN — CALCIUM GLUCONATE 1 G: 20 INJECTION, SOLUTION INTRAVENOUS at 06:40

## 2022-01-01 RX ADMIN — Medication 50 MEQ: at 02:54

## 2022-01-01 RX ADMIN — VASOPRESSIN: 20 INJECTION, SOLUTION INTRAMUSCULAR; SUBCUTANEOUS at 09:30

## 2022-01-01 RX ADMIN — SODIUM BICARBONATE 50 MEQ: 84 INJECTION, SOLUTION INTRAVENOUS at 09:16

## 2022-01-01 RX ADMIN — LEVALBUTEROL HYDROCHLORIDE 0.63 MG: 0.63 SOLUTION RESPIRATORY (INHALATION) at 11:28

## 2022-01-01 RX ADMIN — ETOMIDATE 20 MG: 2 INJECTION, SOLUTION INTRAVENOUS at 04:30

## 2022-01-01 RX ADMIN — PROPOFOL 35 MCG/KG/MIN: 10 INJECTION, EMULSION INTRAVENOUS at 02:28

## 2022-01-01 RX ADMIN — EPINEPHRINE 1 MG: 0.1 INJECTION, SOLUTION ENDOTRACHEAL; INTRACARDIAC; INTRAVENOUS at 09:14

## 2022-01-01 RX ADMIN — SODIUM CHLORIDE SOLN NEBU 3% 4 ML: 3 NEBU SOLN at 07:36

## 2022-01-01 RX ADMIN — PROPOFOL 45 MCG/KG/MIN: 10 INJECTION, EMULSION INTRAVENOUS at 06:40

## 2022-01-01 RX ADMIN — AMIODARONE HYDROCHLORIDE 150 MG: 50 INJECTION, SOLUTION INTRAVENOUS at 01:01

## 2022-01-01 RX ADMIN — SODIUM CHLORIDE SOLN NEBU 3% 4 ML: 3 NEBU SOLN at 02:28

## 2022-01-01 RX ADMIN — INSULIN LISPRO 3 UNITS: 100 INJECTION, SOLUTION INTRAVENOUS; SUBCUTANEOUS at 01:05

## 2022-01-01 RX ADMIN — MAGNESIUM SULFATE HEPTAHYDRATE 2 G: 40 INJECTION, SOLUTION INTRAVENOUS at 00:59

## 2022-01-01 RX ADMIN — HEPARIN SODIUM 2000 UNITS: 1000 INJECTION INTRAVENOUS; SUBCUTANEOUS at 17:54

## 2022-01-01 RX ADMIN — SODIUM CHLORIDE, SODIUM GLUCONATE, SODIUM ACETATE, POTASSIUM CHLORIDE, MAGNESIUM CHLORIDE, SODIUM PHOSPHATE, DIBASIC, AND POTASSIUM PHOSPHATE 50 ML/HR: .53; .5; .37; .037; .03; .012; .00082 INJECTION, SOLUTION INTRAVENOUS at 01:05

## 2022-01-01 RX ADMIN — MAGNESIUM SULFATE HEPTAHYDRATE 1 G: 500 INJECTION, SOLUTION INTRAMUSCULAR; INTRAVENOUS at 04:26

## 2022-01-01 RX ADMIN — AMPICILLIN SODIUM AND SULBACTAM SODIUM 3 G: 2; 1 INJECTION, POWDER, FOR SOLUTION INTRAMUSCULAR; INTRAVENOUS at 08:27

## 2022-01-01 RX ADMIN — LEVALBUTEROL HYDROCHLORIDE 0.63 MG: 0.63 SOLUTION RESPIRATORY (INHALATION) at 08:20

## 2022-01-01 RX ADMIN — SODIUM BICARBONATE 50 MEQ: 84 INJECTION, SOLUTION INTRAVENOUS at 09:24

## 2022-01-01 RX ADMIN — MAGNESIUM SULFATE HEPTAHYDRATE 2 G: 40 INJECTION, SOLUTION INTRAVENOUS at 23:00

## 2022-01-01 RX ADMIN — PROPOFOL 5 MCG/KG/MIN: 10 INJECTION, EMULSION INTRAVENOUS at 20:40

## 2022-01-01 RX ADMIN — POTASSIUM CHLORIDE 20 MEQ: 14.9 INJECTION, SOLUTION INTRAVENOUS at 12:44

## 2022-01-01 RX ADMIN — INSULIN LISPRO 1 UNITS: 100 INJECTION, SOLUTION INTRAVENOUS; SUBCUTANEOUS at 00:14

## 2022-01-01 RX ADMIN — FENTANYL CITRATE 50 MCG/HR: 50 INJECTION, SOLUTION INTRAMUSCULAR; INTRAVENOUS at 02:28

## 2022-01-01 RX ADMIN — ACETAMINOPHEN 975 MG: 325 TABLET, FILM COATED ORAL at 07:33

## 2022-01-01 RX ADMIN — INSULIN LISPRO 1 UNITS: 100 INJECTION, SOLUTION INTRAVENOUS; SUBCUTANEOUS at 11:16

## 2022-01-01 RX ADMIN — EPINEPHRINE 1 MG: 0.1 INJECTION, SOLUTION ENDOTRACHEAL; INTRACARDIAC; INTRAVENOUS at 09:19

## 2022-01-01 RX ADMIN — EPINEPHRINE 0.5 MG: 0.1 INJECTION, SOLUTION ENDOTRACHEAL; INTRACARDIAC; INTRAVENOUS at 09:20

## 2022-01-01 RX ADMIN — LEVALBUTEROL HYDROCHLORIDE 0.63 MG: 0.63 SOLUTION RESPIRATORY (INHALATION) at 02:28

## 2022-01-01 RX ADMIN — INSULIN HUMAN 10 UNITS: 100 INJECTION, SOLUTION PARENTERAL at 02:52

## 2022-01-01 RX ADMIN — SODIUM CHLORIDE SOLN NEBU 3% 4 ML: 3 NEBU SOLN at 03:15

## 2022-01-01 RX ADMIN — LEVALBUTEROL HYDROCHLORIDE 0.63 MG: 0.63 SOLUTION RESPIRATORY (INHALATION) at 03:15

## 2022-01-01 RX ADMIN — METOPROLOL TARTRATE 5 MG: 5 INJECTION INTRAVENOUS at 17:50

## 2022-01-01 RX ADMIN — LIDOCAINE HYDROCHLORIDE 1 MG/MIN: 8 INJECTION, SOLUTION INTRAVENOUS at 04:45

## 2022-01-01 RX ADMIN — LEVALBUTEROL HYDROCHLORIDE 0.63 MG: 0.63 SOLUTION RESPIRATORY (INHALATION) at 20:51

## 2022-01-01 RX ADMIN — PROPOFOL 25 MCG/KG/MIN: 10 INJECTION, EMULSION INTRAVENOUS at 21:19

## 2022-01-01 RX ADMIN — Medication 125 MG: at 20:20

## 2022-01-01 RX ADMIN — SODIUM CHLORIDE SOLN NEBU 3% 4 ML: 3 NEBU SOLN at 13:55

## 2022-01-01 RX ADMIN — ATROPINE SULFATE 1 MG: 0.1 INJECTION, SOLUTION ENDOTRACHEAL; INTRAMUSCULAR; INTRAVENOUS; SUBCUTANEOUS at 04:18

## 2022-01-01 RX ADMIN — SODIUM CHLORIDE SOLN NEBU 3% 4 ML: 3 NEBU SOLN at 13:18

## 2022-01-01 RX ADMIN — ASPIRIN 325 MG: 325 TABLET ORAL at 10:30

## 2022-01-01 RX ADMIN — IOHEXOL 80 ML: 300 INJECTION, SOLUTION INTRAVENOUS at 21:40

## 2022-01-01 RX ADMIN — LABETALOL HYDROCHLORIDE 10 MG: 5 INJECTION, SOLUTION INTRAVENOUS at 20:05

## 2022-01-01 RX ADMIN — AMPICILLIN SODIUM AND SULBACTAM SODIUM 3 G: 2; 1 INJECTION, POWDER, FOR SOLUTION INTRAMUSCULAR; INTRAVENOUS at 02:09

## 2022-01-01 RX ADMIN — METOPROLOL TARTRATE 5 MG: 5 INJECTION INTRAVENOUS at 19:41

## 2022-01-01 RX ADMIN — ACETAMINOPHEN 975 MG: 325 TABLET, FILM COATED ORAL at 13:32

## 2022-01-01 RX ADMIN — AMPICILLIN SODIUM AND SULBACTAM SODIUM 3 G: 2; 1 INJECTION, POWDER, FOR SOLUTION INTRAMUSCULAR; INTRAVENOUS at 01:40

## 2022-01-01 RX ADMIN — SODIUM BICARBONATE 125 ML/HR: 84 INJECTION, SOLUTION INTRAVENOUS at 08:09

## 2022-01-01 RX ADMIN — CHLORHEXIDINE GLUCONATE 15 ML: 1.2 SOLUTION ORAL at 08:26

## 2022-01-01 RX ADMIN — AMIODARONE HYDROCHLORIDE 1 MG/MIN: 50 INJECTION, SOLUTION INTRAVENOUS at 09:36

## 2022-01-01 RX ADMIN — INSULIN LISPRO 2 UNITS: 100 INJECTION, SOLUTION INTRAVENOUS; SUBCUTANEOUS at 21:37

## 2022-01-01 RX ADMIN — LIDOCAINE HYDROCHLORIDE 100 MG: 20 INJECTION, SOLUTION EPIDURAL; INFILTRATION; INTRACAUDAL; PERINEURAL at 04:25

## 2022-01-01 RX ADMIN — CHLORHEXIDINE GLUCONATE 15 ML: 1.2 SOLUTION ORAL at 01:05

## 2022-01-01 RX ADMIN — INSULIN LISPRO 3 UNITS: 100 INJECTION, SOLUTION INTRAVENOUS; SUBCUTANEOUS at 06:16

## 2022-01-01 RX ADMIN — METOPROLOL TARTRATE 5 MG: 5 INJECTION INTRAVENOUS at 12:44

## 2022-01-01 RX ADMIN — SODIUM BICARBONATE 50 MEQ: 84 INJECTION, SOLUTION INTRAVENOUS at 09:49

## 2022-01-01 RX ADMIN — ASPIRIN 81 MG: 81 TABLET, COATED ORAL at 09:07

## 2022-01-01 RX ADMIN — VASOPRESSIN 0.04 UNITS/MIN: 20 INJECTION, SOLUTION INTRAMUSCULAR; SUBCUTANEOUS at 09:30

## 2022-01-01 RX ADMIN — ACETAMINOPHEN 975 MG: 325 TABLET, FILM COATED ORAL at 21:19

## 2022-01-01 RX ADMIN — ALBUMIN (HUMAN) 12.5 G: 12.5 INJECTION, SOLUTION INTRAVENOUS at 02:33

## 2022-01-01 RX ADMIN — CHLORHEXIDINE GLUCONATE 15 ML: 1.2 SOLUTION ORAL at 21:19

## 2022-01-01 RX ADMIN — LEVALBUTEROL HYDROCHLORIDE 0.63 MG: 0.63 SOLUTION RESPIRATORY (INHALATION) at 21:02

## 2022-01-01 RX ADMIN — CHLORHEXIDINE GLUCONATE 15 ML: 1.2 SOLUTION ORAL at 08:45

## 2022-01-01 RX ADMIN — EPINEPHRINE 1 MG: 0.1 INJECTION, SOLUTION ENDOTRACHEAL; INTRACARDIAC; INTRAVENOUS at 04:18

## 2022-01-01 RX ADMIN — HEPARIN SODIUM 14 UNITS/KG/HR: 10000 INJECTION, SOLUTION INTRAVENOUS at 17:12

## 2022-01-01 RX ADMIN — Medication 20 MG: at 09:01

## 2022-01-01 RX ADMIN — ACETAMINOPHEN 975 MG: 325 TABLET, FILM COATED ORAL at 06:08

## 2022-01-01 RX ADMIN — SODIUM CHLORIDE SOLN NEBU 3% 4 ML: 3 NEBU SOLN at 21:02

## 2022-01-01 RX ADMIN — INSULIN LISPRO 3 UNITS: 100 INJECTION, SOLUTION INTRAVENOUS; SUBCUTANEOUS at 11:37

## 2022-01-01 RX ADMIN — INSULIN LISPRO 1 UNITS: 100 INJECTION, SOLUTION INTRAVENOUS; SUBCUTANEOUS at 07:01

## 2022-01-01 RX ADMIN — HEPARIN SODIUM 2000 UNITS: 1000 INJECTION INTRAVENOUS; SUBCUTANEOUS at 00:58

## 2022-01-01 RX ADMIN — HEPARIN SODIUM 12 UNITS/KG/HR: 10000 INJECTION, SOLUTION INTRAVENOUS at 06:20

## 2022-01-01 RX ADMIN — METOPROLOL TARTRATE 25 MG: 25 TABLET, FILM COATED ORAL at 21:06

## 2022-01-01 RX ADMIN — AMPICILLIN SODIUM AND SULBACTAM SODIUM 3 G: 2; 1 INJECTION, POWDER, FOR SOLUTION INTRAMUSCULAR; INTRAVENOUS at 20:02

## 2022-01-01 RX ADMIN — SODIUM CHLORIDE, SODIUM GLUCONATE, SODIUM ACETATE, POTASSIUM CHLORIDE, MAGNESIUM CHLORIDE, SODIUM PHOSPHATE, DIBASIC, AND POTASSIUM PHOSPHATE 500 ML: .53; .5; .37; .037; .03; .012; .00082 INJECTION, SOLUTION INTRAVENOUS at 09:00

## 2022-01-01 RX ADMIN — HEPARIN SODIUM 2000 UNITS: 1000 INJECTION INTRAVENOUS; SUBCUTANEOUS at 09:58

## 2022-01-01 RX ADMIN — MAGNESIUM SULFATE HEPTAHYDRATE 2 G: 40 INJECTION, SOLUTION INTRAVENOUS at 07:33

## 2022-01-01 RX ADMIN — LIDOCAINE HYDROCHLORIDE 100 MG: 20 INJECTION, SOLUTION EPIDURAL; INFILTRATION; INTRACAUDAL; PERINEURAL at 04:23

## 2022-01-01 RX ADMIN — SODIUM BICARBONATE 50 MEQ: 84 INJECTION, SOLUTION INTRAVENOUS at 09:30

## 2022-01-01 RX ADMIN — DEXTROSE 150 MG: 50 INJECTION, SOLUTION INTRAVENOUS at 20:08

## 2022-01-01 RX ADMIN — IOHEXOL 80 ML: 350 INJECTION, SOLUTION INTRAVENOUS at 21:42

## 2022-01-01 RX ADMIN — POTASSIUM CHLORIDE 20 MEQ: 20 SOLUTION ORAL at 16:22

## 2022-01-01 RX ADMIN — METOPROLOL TARTRATE 5 MG: 5 INJECTION INTRAVENOUS at 09:36

## 2022-01-01 RX ADMIN — SODIUM CHLORIDE SOLN NEBU 3% 4 ML: 3 NEBU SOLN at 08:20

## 2022-01-01 RX ADMIN — ACETAMINOPHEN 975 MG: 325 TABLET, FILM COATED ORAL at 13:57

## 2022-01-01 RX ADMIN — METOPROLOL TARTRATE 5 MG: 5 INJECTION INTRAVENOUS at 07:44

## 2022-01-01 RX ADMIN — SODIUM CHLORIDE SOLN NEBU 3% 4 ML: 3 NEBU SOLN at 11:28

## 2022-01-01 RX ADMIN — SODIUM BICARBONATE 50 MEQ: 84 INJECTION, SOLUTION INTRAVENOUS at 09:48

## 2022-01-01 RX ADMIN — BUSPIRONE HYDROCHLORIDE 30 MG: 10 TABLET ORAL at 23:19

## 2022-01-01 RX ADMIN — EPINEPHRINE 1 MG: 0.1 INJECTION, SOLUTION ENDOTRACHEAL; INTRACARDIAC; INTRAVENOUS at 09:43

## 2022-01-01 RX ADMIN — NOREPINEPHRINE BITARTRATE 10 MCG/MIN: 1 SOLUTION INTRAVENOUS at 08:00

## 2022-01-01 RX ADMIN — MIDAZOLAM 4 MG: 1 INJECTION INTRAMUSCULAR; INTRAVENOUS at 23:54

## 2022-01-01 RX ADMIN — CALCIUM CHLORIDE 1 G: 100 INJECTION PARENTERAL at 09:47

## 2022-01-01 RX ADMIN — EPINEPHRINE 1 MG: 0.1 INJECTION, SOLUTION ENDOTRACHEAL; INTRACARDIAC; INTRAVENOUS at 09:26

## 2022-01-01 RX ADMIN — SODIUM CHLORIDE SOLN NEBU 3% 4 ML: 3 NEBU SOLN at 20:51

## 2022-01-01 RX ADMIN — NOREPINEPHRINE BITARTRATE 4 MG: 1 SOLUTION INTRAVENOUS at 08:00

## 2022-01-01 RX ADMIN — ETOMIDATE 20 MG: 2 INJECTION, SOLUTION INTRAVENOUS at 04:27

## 2022-01-01 RX ADMIN — LEVALBUTEROL HYDROCHLORIDE 0.63 MG: 0.63 SOLUTION RESPIRATORY (INHALATION) at 13:18

## 2022-01-01 RX ADMIN — LIDOCAINE HYDROCHLORIDE ANHYDROUS AND DEXTROSE MONOHYDRATE 1 MG/MIN: .8; 5 INJECTION, SOLUTION INTRAVENOUS at 04:30

## 2022-01-01 RX ADMIN — METOPROLOL TARTRATE 50 MG: 50 TABLET, FILM COATED ORAL at 08:47

## 2022-01-01 RX ADMIN — NOREPINEPHRINE BITARTRATE 5 MCG/MIN: 1 INJECTION INTRAVENOUS at 00:10

## 2022-01-01 RX ADMIN — ETOMIDATE 20 MG: 2 INJECTION, SOLUTION INTRAVENOUS at 20:20

## 2022-01-01 RX ADMIN — EPINEPHRINE 1 MG: 0.1 INJECTION, SOLUTION ENDOTRACHEAL; INTRACARDIAC; INTRAVENOUS at 09:49

## 2022-01-01 RX ADMIN — EPINEPHRINE 1 MG: 0.1 INJECTION, SOLUTION ENDOTRACHEAL; INTRACARDIAC; INTRAVENOUS at 09:46

## 2022-01-01 RX ADMIN — FENTANYL CITRATE 50 MCG: 50 INJECTION INTRAMUSCULAR; INTRAVENOUS at 08:21

## 2022-01-01 RX ADMIN — HEPARIN SODIUM 2000 UNITS: 1000 INJECTION INTRAVENOUS; SUBCUTANEOUS at 08:00

## 2022-01-01 RX ADMIN — AMIODARONE HYDROCHLORIDE 0.5 MG/MIN: 50 INJECTION, SOLUTION INTRAVENOUS at 15:36

## 2022-01-01 RX ADMIN — EPINEPHRINE 1 MG: 0.1 INJECTION, SOLUTION ENDOTRACHEAL; INTRACARDIAC; INTRAVENOUS at 09:34

## 2022-01-01 RX ADMIN — PROPOFOL 40 MCG/KG/MIN: 10 INJECTION, EMULSION INTRAVENOUS at 01:45

## 2022-01-01 RX ADMIN — AMIODARONE HYDROCHLORIDE 150 MG: 50 INJECTION, SOLUTION INTRAVENOUS at 09:18

## 2022-01-01 RX ADMIN — PIPERACILLIN AND TAZOBACTAM 3.38 G: 36; 4.5 INJECTION, POWDER, FOR SOLUTION INTRAVENOUS at 08:25

## 2022-01-04 DIAGNOSIS — E11.9 TYPE 2 DIABETES MELLITUS WITHOUT COMPLICATION, WITHOUT LONG-TERM CURRENT USE OF INSULIN (HCC): ICD-10-CM

## 2022-01-11 ENCOUNTER — OFFICE VISIT (OUTPATIENT)
Dept: DERMATOLOGY | Age: 77
End: 2022-01-11
Payer: MEDICARE

## 2022-01-11 VITALS — TEMPERATURE: 96.8 F | BODY MASS INDEX: 23.98 KG/M2 | HEIGHT: 68 IN | WEIGHT: 158.2 LBS

## 2022-01-11 DIAGNOSIS — D48.9 NEOPLASM OF UNCERTAIN BEHAVIOR: Primary | ICD-10-CM

## 2022-01-11 PROCEDURE — 99213 OFFICE O/P EST LOW 20 MIN: CPT | Performed by: DERMATOLOGY

## 2022-01-11 PROCEDURE — 88305 TISSUE EXAM BY PATHOLOGIST: CPT | Performed by: STUDENT IN AN ORGANIZED HEALTH CARE EDUCATION/TRAINING PROGRAM

## 2022-01-11 PROCEDURE — 11102 TANGNTL BX SKIN SINGLE LES: CPT | Performed by: DERMATOLOGY

## 2022-01-11 NOTE — PATIENT INSTRUCTIONS
WHAT TO DO IF THERE IS ANY BLEEDING? If a small amount of bleeding is noticed, place a clean cloth over the area and apply firm pressure for ten minutes  Check the wound after 10 minutes of direct pressure  If bleeding persists, try one more time for an additional 10 minutes of direct pressure on the area  If the bleeding becomes heavier or does not stop after the second attempt, or if you have any other questions about this procedure, then please call your [facility] Dermatologist by calling [phone] (SKIN)  I hereby acknowledge that I have reviewed and verified the site with my Dermatologist and have requested and authorized my Dermatologist to proceed with the procedure  SEBORRHEIC KERATOSIS; NON-INFLAMED    Assessment and Plan:  Based on a thorough discussion of this condition and the management approach to it (including a comprehensive discussion of the known risks, side effects and potential benefits of treatment), the patient (family) agrees to implement the following specific plan:   Reassured benign   Treated with liquid Nitrogen as courtesy today's visit   Signed verbal consent obtained  Seborrheic Keratosis  A seborrheic keratosis is a harmless warty spot that appears during adult life as a common sign of skin aging  Seborrheic keratoses can arise on any area of skin, covered or uncovered, with the usual exception of the palms and soles  They do not arise from mucous membranes  Seborrheic keratoses can have highly variable appearance  Seborrheic keratoses are extremely common  It has been estimated that over 90% of adults over the age of 61 years have one or more of them  They occur in males and females of all races, typically beginning to erupt in the 35s or 45s  They are uncommon under the age of 21 years  The precise cause of seborrhoeic keratoses is not known  Seborrhoeic keratoses are considered degenerative in nature   As time goes by, seborrheic keratoses tend to become more numerous  Some people inherit a tendency to develop a very large number of them; some people may have hundreds of them  The name "seborrheic keratosis" is misleading, because these lesions are not limited to a seborrhoeic distribution (scalp, mid-face, chest, upper back), nor are they formed from sebaceous glands, nor are they associated with sebum -- which is greasy  Seborrheic keratosis may also be called "SK," "Seb K," "basal cell papilloma," "senile wart," or "barnacle "      Researchers have noted:   Eruptive seborrhoeic keratoses can follow sunburn or dermatitis   Skin friction may be the reason they appear in body folds   Viral cause (e g , human papillomavirus) seems unlikely   Stable and clonal mutations or activation of FRFR3, PIK3CA, MARISSA, AKT1 and EGFR genes are found in seborrhoeic keratoses   Seborrhoeic keratosis can arise from solar lentigo   FRFR3 mutations also arise in solar lentigines  These mutations are associated with increased age and location on the head and neck, suggesting a role of ultraviolet radiation in these lesions   Seborrheic keratoses do not harbour tumour suppressor gene mutations   Epidermal growth factor receptor inhibitors, which are used to treat some cancers, often result in an increase in verrucal (warty) keratoses  There is no easy way to remove multiple lesions on a single occasion  Unless a specific lesion is "inflamed" and is causing pain or stinging/burning or is bleeding, most insurance companies do not authorize treatment

## 2022-01-11 NOTE — PROGRESS NOTES
[facility] Follow Up Note    Patient Name: Flakito Elder  Encounter Date: 1/11/22    Today's Chief Concerns:  Cora Pall Concern #1:  Lesion right temple   Concern #2:    Concern #3:      Current Medications:    Current Outpatient Medications:     acetaminophen (TYLENOL) 325 mg tablet, Take 1-2 tablets Q4-6 hours PRN pain  Do not take more than 4 grams in 24 hours  , Disp: , Rfl: 0    aspirin 81 mg chewable tablet, Chew 1 tablet (81 mg total) daily, Disp: 30 tablet, Rfl: 2    Blood Glucose Monitoring Suppl (FreeStyle InsuLinx System) w/Device KIT, Test TID & QHS as directed , Disp: 1 kit, Rfl: 0    carvedilol (COREG) 6 25 mg tablet, TAKE ONE TABLET BY MOUTH TWICE A DAY WITH MEALS, Disp: 90 tablet, Rfl: 3    FreeStyle InsuLinx Test test strip, Test TID & QHS as directed , Disp: 100 each, Rfl: 2    Lancets (freestyle) lancets, Test TID & QHS as directed , Disp: 100 each, Rfl: 2    lisinopril-hydrochlorothiazide (PRINZIDE,ZESTORETIC) 20-25 MG per tablet, Take 1 tablet by mouth daily, Disp: 90 tablet, Rfl: 3    metFORMIN (GLUCOPHAGE) 1000 MG tablet, Take 1 tablet (1,000 mg total) by mouth 2 (two) times a day with meals, Disp: 180 tablet, Rfl: 2    atorvastatin (LIPITOR) 80 mg tablet, Take 1 tablet (80 mg total) by mouth daily with dinner (Patient not taking: Reported on 1/11/2022 ), Disp: 90 tablet, Rfl: 2    benzoyl peroxide-erythromycin (BENZAMYCIN) gel, Apply topically 2 (two) times a day For 2-4 weeks (Patient not taking: Reported on 1/11/2022 ), Disp: 23 3 g, Rfl: 0    Dapagliflozin Propanediol (Farxiga) 5 MG TABS, Take 1 tablet (5 mg total) by mouth daily (Patient not taking: Reported on 1/11/2022 ), Disp: 90 tablet, Rfl: 1    docusate sodium (COLACE) 100 mg capsule, Take 1 capsule (100 mg total) by mouth 2 (two) times a day Hold for soft stools   (Patient not taking: Reported on 9/3/2021), Disp: 60 capsule, Rfl: 0    CONSTITUTIONAL:   Vitals:    01/11/22 1255   Temp: (!) 96 8 °F (36 °C) [facility]   Weight: 71 8 kg (158 lb 3 2 oz)   Height: 5' 8" (1 727 m)             Specific Alerts:    Have you been seen by a [facility]atologist in the last 3 years? YES        No Known Allergies    May we call your Preferred Phone number to discuss your specific medical information? YES    May we leave a detailed message that includes your specific medical information? YES    Have you traveled outside of the [facility] in the past 3 months? No    Do you currently have a pacemaker or defibrillator? No    Do you have any artificial heart valves, joints, plates, screws, rods, stents, pins, etc? YES   - If Yes, were any placed within the last 2 years? May 2020    Do you require any medications prior to a surgical procedure? No   - If Yes, for which procedure? - If Yes, what medications to you require? Are you taking any medications that cause you to bleed more easily ("blood thinners") No    Have you ever experienced a rapid heartbeat with epinephrine? No    Have you ever been treated with "gold" (gold sodium thiomalate) therapy? No    [facility] can help with wrinkles, "laugh lines," facial volume loss, "double chin," "love handles," age spots, and more  Are you interested in learning today about some of the skin enhancement procedures that we offer? (If Yes, please provide more detail) No    Review of Systems:  Have you recently had or currently have any of the following?     · Fever or chills: No  · Night Sweats: No  · Headaches: No  · Weight Gain: No  · Weight Loss: No  · Blurry Vision: No  · Nausea: No  · Vomiting: No  · Diarrhea: No  · Blood in Stool: No  · Abdominal Pain: No  · Itchy Skin: No  · Painful Joints: YES arthritis  · Swollen Joints: No  · Muscle Pain: No  · Irregular Mole: No  · Sun Burn: No  · Dry Skin: No  · Skin Color Changes: No  · Scar or Keloid: No  · Cold Sores/Fever Blisters: No  · Bacterial Infections/MRSA: No  · Anxiety: No  · Depression: No  · Suicidal or Homicidal Thoughts: No      PSYCH: Normal mood and affect  EYES: Normal conjunctiva  ENT: Normal lips and oral mucosa  CARDIOVASCULAR: No edema  RESPIRATORY: Normal respirations  HEME/LYMPH/IMMUNO:  No regional lymphadenopathy except as noted below in ASSESSMENT AND PLAN BY DIAGNOSIS    FULL ORGAN SYSTEM SKIN EXAM (SKIN)   Hair, Scalp, Ears, Face Normal except as noted below in Assessment   Neck, Cervical Chain Nodes Normal except as noted below in Assessment   Right Arm/Hand/Fingers Normal except as noted below in Assessment   Left Arm/Hand/Fingers Normal except as noted below in Assessment   Chest/Breasts/Axillae Viewed areas Normal except as noted below in Assessment   Abdomen, Umbilicus Normal except as noted below in Assessment   Back/Spine Normal except as noted below in Assessment   Groin/Genitalia/Buttocks Viewed areas Normal except as noted below in Assessment   Right Leg, Foot, Toes Normal except as noted below in Assessment   Left Leg, Foot, Toes Normal except as noted below in Assessment       NEOPLASM OF UNCERTAIN BEHAVIOR OF SKIN    Physical Exam:   (Anatomic Location); (Size and Morphological Description); (Differential Diagnosis):  o Specimen A: Right Temple 8 mm red hyperkeratotic papule; Verruca rule out Squamous Cell Carcinoma   Pertinent Positives:   Pertinent Negatives: Additional History of Present Condition:  History of Basal Cell Carcinoma, onset Before in December of 2020  Assessment and Plan:   I have discussed with the patient that a sample of skin via a "skin biopsy would be potentially helpful to further make a specific diagnosis under the microscope   Based on a thorough discussion of this condition and the management approach to it (including a comprehensive discussion of the known risks, side effects and potential benefits of treatment), the patient (family) agrees to implement the following specific plan:    o Procedure:  Skin Biopsy    After a thorough discussion of treatment options and risk/benefits/alternatives (including but not limited to local pain, scarring, dyspigmentation, blistering, possible superinfection, and inability to confirm a diagnosis via histopathology), verbal and written consent were obtained and portion of the rash was biopsied for tissue sample  See below for consent that was obtained from patient and subsequent Procedure Note  PROCEDURE SHAVE BIOPSY NOTE:     Performing Physician: Rosales Barron Anatomic Location; Clinical Description with size (cm); Pre-Op Diagnosis:   o Specimen A: Right Temple 8 mm red hyperkeratotic papule; Verruca rule out Squamous Cell Carcinoma   Post-op diagnosis: Same      Local anesthesia: 1% xylocaine with epi       Topical anesthesia: None     Hemostasis: Aluminum chloride       After obtaining informed consent  at which time there was a discussion about the purpose of biopsy  and low risks of infection and bleeding  The area was prepped and draped in the usual fashion  Anesthesia was obtained with 1% lidocaine with epinephrine  A shave biopsy to an appropriate sampling depth was obtained with a sterile blade (such as a 15-blade or DermaBlade)  The resulting wound was covered with surgical ointment and bandaged appropriately  The patient tolerated the procedure well without complications and was without signs of functional compromise  Specimen has been sent for review by Dermatopathology  Standard post-procedure care has been explained and has been included in written form within the patient's copy of Informed Consent  INFORMED CONSENT DISCUSSION AND POST-OPERATIVE INSTRUCTIONS FOR PATIENT    I   RATIONALE FOR PROCEDURE  I understand that a skin biopsy allows the Dermatologist to test a lesion or rash under the microscope to obtain a diagnosis    It usually involves numbing the area with numbing medication and removing a small piece of skin; sometimes the area will be closed with sutures  In this specific procedure, sutures are not usually needed  If any sutures are placed, then they are usually need to be removed in 2 weeks or less  I understand that my Dermatologist recommends that a skin "shave" biopsy be performed today  A local anesthetic, similar to the kind that a dentist uses when filling a cavity, will be injected with a very small needle into the skin area to be sampled  The injected skin and tissue underneath "will go to sleep and become numb so no pain should be felt afterwards  An instrument shaped like a tiny "razor blade" (shave biopsy instrument) will be used to cut a small piece of tissue and skin from the area so that a sample of tissue can be taken and examined more closely under the microscope  A slight amount of bleeding will occur, but it will be stopped with direct pressure and a pressure bandage and any other appropriate methods  I understands that a scar will form where the wound was created  Surgical ointment will be applied to help protect the wound  Sutures are not usually needed  II   RISKS AND POTENTIAL COMPLICATIONS   I understand the risks and potential complications of a skin biopsy include but are not limited to the following:   Bleeding   Infection   Pain   Scar/keloid   Skin discoloration   Incomplete Removal   Recurrence   Nerve Damage/Numbness/Loss of Function   Allergic Reaction to Anesthesia   Biopsies are diagnostic procedures and based on findings additional treatment or evaluation may be required   Loss or destruction of specimen resulting in no additional findings    My Dermatologist has explained to me the nature of the condition, the nature of the procedure, and the benefits to be reasonably expected compared with alternative approaches    My Dermatologist has discussed the likelihood of major risks or complications of this procedure including the specific risks listed above, such as bleeding, infection, and scarring/keloid  I understand that a scar is expected after this procedure  I understand that my physician cannot predict if the scar will form a "keloid," which extends beyond the borders of the wound that is created  A keloid is a thick, painful, and bumpy scar  A keloid can be difficult to treat, as it does not always respond well to therapy, which includes injecting cortisone directly into the keloid every few weeks  While this usually reduces the pain and size of the scar, it does not eliminate it  I understand that photographs may be taken before and after the procedure  These will be maintained as part of the medical providers confidential records and may not be made available to me  I further authorize the medical provider to use the photographs for teaching purposes or to illustrate scientific papers, books, or lectures if in his/her judgment, medical research, education, or science may benefit from its use  I have had an opportunity to fully inquire about the risks and benefits of this procedure and its alternatives  I have been given ample time and opportunity to ask questions and to seek a second opinion if I wished to do so  I acknowledge that there have specifically been no guarantees as to the cosmetic results from the procedure  I am aware that with any procedure there is always the possibility of an unexpected complication  III  POST-PROCEDURAL CARE (WHAT YOU WILL NEED TO DO "AFTER THE BIOPSY" TO OPTIMIZE HEALING)     Keep the area clean and dry  Try NOT to remove the bandage or get it wet for the first 24 hours   Gently clean the area and apply surgical ointment (such as Vaseline petrolatum ointment, which is available "over the counter" and not a prescription) to the biopsy site for up to 2 weeks straight  This acts to protect the wound from the outside world   Sutures are not usually placed in this procedure    If any sutures were placed, return for suture removal as instructed (generally 1 week for the face, 2 weeks for the body)   Take Acetaminophen (Tylenol) for discomfort, if no contraindications  Ibuprofen or aspirin could make bleeding worse   Call our office immediately for signs of infection: fever, chills, increased redness, warmth, tenderness, discomfort/pain, or pus or foul smell coming from the wound  WHAT TO DO IF THERE IS ANY BLEEDING? If a small amount of bleeding is noticed, place a clean cloth over the area and apply firm pressure for ten minutes  Check the wound after 10 minutes of direct pressure  If bleeding persists, try one more time for an additional 10 minutes of direct pressure on the area  If the bleeding becomes heavier or does not stop after the second attempt, or if you have any other questions about this procedure, then please call your [address] [place] [address]'s Dermatologist by calling [phone] (SKIN)  I hereby acknowledge that I have reviewed and verified the site with my Dermatologist and have requested and authorized my Dermatologist to proceed with the procedure  SEBORRHEIC KERATOSIS; NON-INFLAMED    Physical Exam:   Anatomic Location Affected:  Left [place]   Morphological Description:  Flat and raised, waxy, smooth to warty textured, yellow to brownish-grey to dark brown to blackish, discrete, "stuck-on" appearing papules   Pertinent Positives:   Pertinent Negatives: Additional History of Present Condition:  Patient reports new bumps on the skin  Denies itch, burn, pain, bleeding or ulceration  Present constantly; nothing seems to make it worse or better  No prior treatment  Assessment and Plan:  Based on a thorough discussion of this condition and the management approach to it (including a comprehensive discussion of the known risks, side effects and potential benefits of treatment), the patient (family) agrees to implement the following specific plan:   Reassured benign     Treated with liquid Nitrogen as courtesy today's visit   Signed verbal consent obtained  Seborrheic Keratosis  A seborrheic keratosis is a harmless warty spot that appears during adult life as a common sign of skin aging  Seborrheic keratoses can arise on any area of skin, covered or uncovered, with the usual exception of the palms and soles  They do not arise from mucous membranes  Seborrheic keratoses can have highly variable appearance  Seborrheic keratoses are extremely common  It has been estimated that over 90% of adults over the age of 61 years have one or more of them  They occur in males and females of all races, typically beginning to erupt in the 35s or 45s  They are uncommon under the age of 21 years  The precise cause of seborrhoeic keratoses is not known  Seborrhoeic keratoses are considered degenerative in nature  As time goes by, seborrheic keratoses tend to become more numerous  Some people inherit a tendency to develop a very large number of them; some people may have hundreds of them  The name "seborrheic keratosis" is misleading, because these lesions are not limited to a seborrhoeic distribution (scalp, mid-face, chest, upper back), nor are they formed from sebaceous glands, nor are they associated with sebum -- which is greasy  Seborrheic keratosis may also be called "SK," "Seb K," "basal cell papilloma," "senile wart," or "barnacle "      Researchers have noted:   Eruptive seborrhoeic keratoses can follow sunburn or dermatitis   Skin friction may be the reason they appear in body folds   Viral cause (e g , human papillomavirus) seems unlikely   Stable and clonal mutations or activation of FRFR3, PIK3CA, MARISSA, AKT1 and EGFR genes are found in seborrhoeic keratoses   Seborrhoeic keratosis can arise from solar lentigo   FRFR3 mutations also arise in solar lentigines   These mutations are associated with increased age and location on the head and neck, suggesting a role of ultraviolet radiation in these lesions   Seborrheic keratoses do not harbour tumour suppressor gene mutations   Epidermal growth factor receptor inhibitors, which are used to treat some cancers, often result in an increase in verrucal (warty) keratoses  There is no easy way to remove multiple lesions on a single occasion  Unless a specific lesion is "inflamed" and is causing pain or stinging/burning or is bleeding, most insurance companies do not authorize treatment      Scribe Attestation    I,:  Driss Reddy am acting as a scribe while in the presence of the attending physician :       I,:  Mely De La Paz MD personally performed the services described in this documentation    as scribed in my presence :

## 2022-01-17 NOTE — RESULT ENCOUNTER NOTE
Case Report  Surgical Pathology Report                         Case: [id number]                                   Authorizing Provider: Nando Mart MD      Collected:           01/11/2022 1323              Ordering Location:     [facility]      Received:            01/11/2022 [address] [place] [address]                                                                   Pathologist:           Trisha Martinez MD                                                           Specimen:    Skin, Other, Specimen A: Right Temple                                                    Final Diagnosis  A  Skin, right temple, shave biopsy:     Verrucous atypical endophytic squamous proliferation, consistent with VERRUCA VULGARIS (see note)      Note: The histopathologic differential diagnosis of evolving squamous cell carcinoma is less favored  If the lesion were to progress/persist, additional sampling should be sought  Electronically signed by Trisha Martinez MD on 1/14/2022 at  4:35 PM    DERMATOPATHOLOGY RESULT NOTE    Results reviewed by ordering physician  Called patient to personally discuss results  No answer, left voicemail with result        Instructions for Clinical Derm Team:   (remember to route Result Note to appropriate staff):    None    Result & Plan by Specimen:    Specimen A: verruca  Plan: reassured, benign

## 2022-02-03 ENCOUNTER — OFFICE VISIT (OUTPATIENT)
Dept: FAMILY MEDICINE CLINIC | Facility: CLINIC | Age: 77
End: 2022-02-03
Payer: MEDICARE

## 2022-02-03 VITALS
RESPIRATION RATE: 14 BRPM | HEIGHT: 68 IN | WEIGHT: 160 LBS | DIASTOLIC BLOOD PRESSURE: 90 MMHG | BODY MASS INDEX: 24.25 KG/M2 | HEART RATE: 66 BPM | SYSTOLIC BLOOD PRESSURE: 130 MMHG | TEMPERATURE: 98.4 F

## 2022-02-03 DIAGNOSIS — Z12.5 SCREENING PSA (PROSTATE SPECIFIC ANTIGEN): ICD-10-CM

## 2022-02-03 DIAGNOSIS — Z00.00 MEDICARE ANNUAL WELLNESS VISIT, SUBSEQUENT: Primary | ICD-10-CM

## 2022-02-03 DIAGNOSIS — I50.42 CHRONIC COMBINED SYSTOLIC AND DIASTOLIC CONGESTIVE HEART FAILURE (HCC): ICD-10-CM

## 2022-02-03 DIAGNOSIS — E11.9 TYPE 2 DIABETES MELLITUS WITHOUT COMPLICATION, WITHOUT LONG-TERM CURRENT USE OF INSULIN (HCC): ICD-10-CM

## 2022-02-03 DIAGNOSIS — I48.0 PAF (PAROXYSMAL ATRIAL FIBRILLATION) (HCC): ICD-10-CM

## 2022-02-03 DIAGNOSIS — Z11.59 NEED FOR HEPATITIS C SCREENING TEST: ICD-10-CM

## 2022-02-03 LAB — SL AMB POCT HEMOGLOBIN AIC: 6.2 (ref ?–6.5)

## 2022-02-03 PROCEDURE — 83036 HEMOGLOBIN GLYCOSYLATED A1C: CPT | Performed by: FAMILY MEDICINE

## 2022-02-03 PROCEDURE — G0439 PPPS, SUBSEQ VISIT: HCPCS | Performed by: FAMILY MEDICINE

## 2022-02-03 NOTE — PROGRESS NOTES
Assessment and Plan:     Problem List Items Addressed This Visit        Cardiovascular and Mediastinum    PAF (paroxysmal atrial fibrillation) ([facility] )      Other Visit Diagnoses     Medicare annual wellness visit, subsequent    -  Primary    Type 2 diabetes mellitus without complication, without long-term current use of insulin ([facility] )        Relevant Orders    POCT hemoglobin A1c (Completed)    Hemoglobin A1C    Chronic combined systolic and diastolic congestive heart failure (HCC)        Screening PSA (prostate specific antigen)        Relevant Orders    PSA, Total Screen    Need for hepatitis C screening test        Relevant Orders    Hepatitis C Antibody ([facility])      Type 2 diabetes at this time will not make any changes  Patient tolerating medications as prescribed  CHF continue being managed by cardiology team   Carotid stenosis being managed by vascular very well managed  Depression Screening and Follow-up Plan: Patient was screened for depression during today's encounter  They screened negative with a PHQ-2 score of 0  Preventive health issues were discussed with patient, and age appropriate screening tests were ordered as noted in patient's After Visit Summary  Personalized health advice and appropriate referrals for health education or preventive services given if needed, as noted in patient's After Visit Summary  History of Present Illness:     Patient presents for Medicare Annual Wellness visit  Patient with no acute concerns today  States he is taking his diabetic medication as prescribed without any difficulties  Has been seen his cardiologist and vascular doctor as recommended  Patient denies any shortness of breath or chest pain  Is extremely excited given that he has his 1st grand baby      Patient Care Team:  Maria Teresa Casiano MD as PCP - General (Family Medicine)  Guanako Trammell MD (Family Medicine)  MD Tala Prince MD as Endoscopist  [address] (Vascular Surgery)     Problem List:     Patient Active Problem List   Diagnosis    Benign essential hypertension    Controlled diabetes mellitus with diabetic neuropathy ([facility] )    Hyperlipidemia    Murmur    Organic impotence    Mass of right side of neck    ICAO (internal carotid artery occlusion), right    Internal carotid artery occlusion, right    Carotid stenosis, left    Asthma    Status post carotid endarterectomy    Chronic systolic congestive heart failure ([facility] )    Coronary artery disease involving native coronary artery of native heart    Rheumatic aortic stenosis    S/P AVR    S/P CABG (coronary artery bypass graft)    Acute blood loss anemia    Thrombocytopenia (HCC)    LBBB (left bundle branch block)    1st degree AV block    Anemia    Encounter for postoperative care    PAF (paroxysmal atrial fibrillation) ([facility] )      Past Medical and Surgical History:     Past Medical History:   Diagnosis Date    Arthritis     generalized    Asthma     seasonal-does not use inhaler    Atrial fibrillation with RVR ([place] [address] ) 5/12/2021    Back pain     Basal cell carcinoma 09/23/2021    BCC- Right Posterior Calf     Carotid artery stenosis     Diabetes mellitus (HCC)     type    GERD (gastroesophageal reflux disease)     Hiatal hernia     Hyperlipidemia     Hypertension     Peyronie's disease     last assessed [id number]    Seasonal allergies     Wears glasses      Past Surgical History:   Procedure Laterality Date    APPENDECTOMY      CAROTID ENDARTERECTOMY Left 10/29/2020    COLONOSCOPY N/A 1/22/2016    Procedure: COLONOSCOPY;  Surgeon: Stu Pandey MD;  Location: [place] [address] GI LAB;   Service:     KNEE ARTHROSCOPY Left     KNEE ARTHROSCOPY W/ MENISCAL REPAIR Right     MOHS SURGERY Right 11/01/2021    BCC- Right Posterior Calf- Dr Amy Spears OPN W/STENTLESS TISSUE VALVE N/A 5/10/2021    Procedure: CORONARY ARTERY BYPASS GRAFT (CABG) X 5 USING LEFT GSV--> DIAGONAL,, RAMUS, PDA ,AND OM1, AND LEFT BALBIR-LAD WITH REPLACEMENT VALVE AORTIC USING 23 MM MURRAY MAGNA EASE(AVR); Surgeon: Rashaad Hsu MD;  [facility];  Service: Cardiac Surgery    UT THROMBOENDARTECTMY NECK,NECK INCIS Left 10/29/2020    Procedure: CAROTID ENDARTERECTOMY W/BOVINE PATCH ANGIOPLASTY AND COMPLETION DUPLEX IMAGING;  Surgeon: Danna Smith DO;  [facility];  Service: Vascular      Family History:     Family History   Problem Relation Age of Onset    Heart attack Mother [de-identified]        CABG    Kidney failure Father     Diabetes Father         pre-diabetic      Social History:     Social History     Socioeconomic History    Marital status: Single     Spouse name: None    Number of children: None    Years of education: None    Highest education level: None   Occupational History    None   Tobacco Use    Smoking status: Never Smoker    Smokeless tobacco: Never Used   Vaping Use    Vaping Use: Never used   Substance and Sexual Activity    Alcohol use: Yes     Alcohol/week: 10 0 standard drinks     Types: 7 Glasses of wine, 3 Standard drinks or equivalent per week    Drug use: No    Sexual activity: Yes     Partners: Female   Other Topics Concern    None   Social History Narrative    Daily caffeinnated coffee      Social Determinants of Health     Financial Resource Strain: Not on file   Food Insecurity: Not on file   Transportation Needs: Not on file   Physical Activity: Not on file   Stress: Not on file   Social Connections: Not on file   Intimate Partner Violence: Not on file   Housing Stability: Not on file      Medications and Allergies:     Current Outpatient Medications   Medication Sig Dispense Refill    acetaminophen (TYLENOL) 325 mg tablet Take 1-2 tablets Q4-6 hours PRN pain  Do not take more than 4 grams in 24 hours    0    aspirin 81 mg chewable tablet Chew 1 tablet (81 mg total) daily 30 tablet 2    Blood Glucose Monitoring Suppl (FreeStyle InsuLinx System) w/Device KIT Test TID & QHS as directed  1 kit 0    carvedilol (COREG) 6 25 mg tablet TAKE ONE TABLET BY MOUTH TWICE A DAY WITH MEALS 90 tablet 3    FreeStyle InsuLinx Test test strip Test TID & QHS as directed  100 each 2    Lancets (freestyle) lancets Test TID & QHS as directed  100 each 2    lisinopril-hydrochlorothiazide (PRINZIDE,ZESTORETIC) 20-25 MG per tablet Take 1 tablet by mouth daily 90 tablet 3    metFORMIN (GLUCOPHAGE) 1000 MG tablet Take 1 tablet (1,000 mg total) by mouth 2 (two) times a day with meals 180 tablet 2    atorvastatin (LIPITOR) 80 mg tablet Take 1 tablet (80 mg total) by mouth daily with dinner (Patient not taking: Reported on 1/11/2022 ) 90 tablet 2     No current facility-administered medications for this visit  No Known Allergies   Immunizations:     Immunization History   Administered Date(s) Administered    COVID-19 MODERNA VACC 0 25 ML IM BOOSTER 11/23/2021    COVID-19 MODERNA VACC 0 5 ML IM 02/11/2021, 03/15/2021    INFLUENZA 01/20/2022    Influenza Split High Dose Preservative Free IM 11/01/2013, 10/19/2015, 10/03/2017    Influenza, high dose seasonal 0 7 mL 10/29/2018, 12/18/2019, 09/15/2020    Influenza, seasonal, injectable 12/15/1999, 12/27/2010    Pneumococcal Conjugate 13-Valent 10/03/2017    Pneumococcal Polysaccharide PPV23 12/27/2010, 02/07/2014      Health Maintenance:         Topic Date Due    Hepatitis C Screening  Never done    Colorectal Cancer Screening  01/22/2026     There are no preventive care reminders to display for this patient  Medicare Health Risk Assessment:     /90 (BP Location: Left arm, Patient Position: Sitting, Cuff Size: Standard)   Pulse 66   Temp 98 4 °F (36 9 °C) (Temporal)   Resp 14   Ht 5' 8" (1 727 m)   Wt 72 6 kg (160 lb)   BMI 24 33 kg/m²      Cheryl Brown is here for his Subsequent Wellness visit  Health Risk Assessment:   Patient rates overall health as very good   Patient feels that their physical health rating is slightly worse  Patient is very satisfied with their life  Eyesight was rated as same  Hearing was rated as same  Patient feels that their emotional and mental health rating is same  Patients states they are sometimes angry  Patient states they are never, rarely unusually tired/fatigued  Pain experienced in the last 7 days has been some  Patient's pain rating has been 4/10  Left knee pain     Depression Screening:   PHQ-2 Score: 0      Fall Risk Screening: In the past year, patient has experienced: history of falling in past year    Number of falls: 1  Injured during fall?: No    Feels unsteady when standing or walking?: No    Worried about falling?: No      Home Safety:  Patient does not have trouble with stairs inside or outside of their home  Patient has working smoke alarms and has working carbon monoxide detector  Home safety hazards include: none  Nutrition:   Current diet is Diabetic and Low Cholesterol  Medications:   Patient is currently taking over-the-counter supplements  OTC medications include: see medication list  Patient is able to manage medications  Activities of Daily Living (ADLs)/Instrumental Activities of Daily Living (IADLs):   Walk and transfer into and out of bed and chair?: Yes  Dress and groom yourself?: Yes    Feed yourself?  Yes  Do your laundry/housekeeping?: Yes  Manage your money, pay your bills and track your expenses?: Yes  Make your own meals?: Yes    Do your own shopping?: Yes    Advance Care Planning:   Living will: Yes    Advanced directive: Yes      Comments: POA Son and Law-> Mehran Miller    Cognitive Screening:   Provider or family/friend/caregiver concerned regarding cognition?: No    PREVENTIVE SCREENINGS      Cardiovascular Screening:    General: Screening Not Indicated and History Lipid Disorder    Due for: Lipid Panel      Diabetes Screening:     General: Screening Not Indicated and History Diabetes    Due for: Blood Glucose      Colorectal Cancer Screening:     General: Screening Current      Prostate Cancer Screening:    General: Screening Not Indicated    Due for: PSA      Osteoporosis Screening:    General: Risks and Benefits Discussed      Abdominal Aortic Aneurysm (AAA) Screening:    Risk factors include: age between 73-67 yo        General: Screening Current      Lung Cancer Screening:     General: Screening Not Indicated      Hepatitis C Screening:    General: Risks and Benefits Discussed    Screening, Brief Intervention, and Referral to Treatment (SBIRT)    Screening  Typical number of drinks in a day: 0  Typical number of drinks in a week: 2  Interpretation: Low risk drinking behavior  Single Item Drug Screening:  How often have you used an illegal drug (including marijuana) or a prescription medication for non-medical reasons in the past year? never    Single Item Drug Screen Score: 0  Interpretation: Negative screen for possible drug use disorder    Physical Exam  Constitutional:       Appearance: He is well-developed  HENT:      Head: Normocephalic and atraumatic  Right Ear: External ear normal       Left Ear: External ear normal       Nose: Nose normal       Mouth/Throat:      Pharynx: No oropharyngeal exudate  Eyes:      Conjunctiva/sclera: Conjunctivae normal       Pupils: Pupils are equal, round, and reactive to light  Cardiovascular:      Rate and Rhythm: Normal rate and regular rhythm  Pulses: Intact distal pulses  Heart sounds: Normal heart sounds  Pulmonary:      Effort: Pulmonary effort is normal       Breath sounds: Normal breath sounds  Abdominal:      General: Bowel sounds are normal       Palpations: Abdomen is soft  Tenderness: There is no abdominal tenderness  Musculoskeletal:         General: Normal range of motion  Cervical back: Normal range of motion and neck supple  Skin:     General: Skin is warm and dry     Neurological:      Mental Status: He is alert and oriented to person, place, and time  Motor: No abnormal muscle tone  Psychiatric:         Behavior: Behavior normal          Thought Content:  Thought content normal          Judgment: Judgment normal            Joao Russell MD

## 2022-03-03 ENCOUNTER — HOSPITAL ENCOUNTER (OUTPATIENT)
Dept: RADIOLOGY | Facility: HOSPITAL | Age: 77
Discharge: HOME | End: 2022-03-03
Payer: MEDICARE

## 2022-03-03 DIAGNOSIS — Z98.890 STATUS POST CAROTID ENDARTERECTOMY: ICD-10-CM

## 2022-03-03 DIAGNOSIS — I65.21 INTERNAL CAROTID ARTERY OCCLUSION, RIGHT: ICD-10-CM

## 2022-03-03 DIAGNOSIS — I65.22 CAROTID STENOSIS, LEFT: ICD-10-CM

## 2022-03-03 PROCEDURE — 93880 EXTRACRANIAL BILAT STUDY: CPT

## 2022-03-03 PROCEDURE — 93880 EXTRACRANIAL BILAT STUDY: CPT | Performed by: SURGERY

## 2022-03-04 ENCOUNTER — TRANSCRIBE ORDERS (OUTPATIENT)
Dept: VASCULAR SURGERY | Facility: CLINIC | Age: 77
End: 2022-03-04

## 2022-03-04 DIAGNOSIS — I65.22 CAROTID STENOSIS, LEFT: Primary | ICD-10-CM

## 2022-04-08 ENCOUNTER — IMMUNIZATIONS (OUTPATIENT)
Dept: FAMILY MEDICINE CLINIC | Facility: HOSPITAL | Age: 77
End: 2022-04-08

## 2022-04-08 DIAGNOSIS — Z23 ENCOUNTER FOR IMMUNIZATION: Primary | ICD-10-CM

## 2022-04-08 PROCEDURE — 91306 COVID-19 MODERNA VACC 0.25 ML BOOSTER: CPT

## 2022-04-08 PROCEDURE — 0064A COVID-19 MODERNA VACC 0.25 ML BOOSTER: CPT

## 2022-05-16 ENCOUNTER — APPOINTMENT (OUTPATIENT)
Dept: LAB | Facility: CLINIC | Age: 77
End: 2022-05-16
Payer: MEDICARE

## 2022-05-16 DIAGNOSIS — Z11.59 NEED FOR HEPATITIS C SCREENING TEST: ICD-10-CM

## 2022-05-16 DIAGNOSIS — E11.9 TYPE 2 DIABETES MELLITUS WITHOUT COMPLICATION, WITHOUT LONG-TERM CURRENT USE OF INSULIN (HCC): ICD-10-CM

## 2022-05-16 DIAGNOSIS — Z12.5 SCREENING PSA (PROSTATE SPECIFIC ANTIGEN): ICD-10-CM

## 2022-05-16 LAB — PSA SERPL-MCNC: 0.7 NG/ML (ref 0–4)

## 2022-05-16 PROCEDURE — 36415 COLL VENOUS BLD VENIPUNCTURE: CPT

## 2022-05-16 PROCEDURE — 86803 HEPATITIS C AB TEST: CPT

## 2022-05-16 PROCEDURE — G0103 PSA SCREENING: HCPCS

## 2022-05-17 LAB — HCV AB SER QL: NORMAL

## 2022-05-23 ENCOUNTER — OFFICE VISIT (OUTPATIENT)
Dept: CARDIOLOGY CLINIC | Facility: CLINIC | Age: 77
End: 2022-05-23
Payer: MEDICARE

## 2022-05-23 VITALS
SYSTOLIC BLOOD PRESSURE: 120 MMHG | HEIGHT: 68 IN | HEART RATE: 62 BPM | DIASTOLIC BLOOD PRESSURE: 84 MMHG | TEMPERATURE: 97 F | WEIGHT: 161 LBS | OXYGEN SATURATION: 99 % | BODY MASS INDEX: 24.4 KG/M2

## 2022-05-23 DIAGNOSIS — I25.10 CORONARY ARTERY DISEASE INVOLVING NATIVE CORONARY ARTERY OF NATIVE HEART WITHOUT ANGINA PECTORIS: ICD-10-CM

## 2022-05-23 DIAGNOSIS — I50.22 CHRONIC SYSTOLIC CONGESTIVE HEART FAILURE (HCC): ICD-10-CM

## 2022-05-23 DIAGNOSIS — I10 BENIGN ESSENTIAL HYPERTENSION: Primary | ICD-10-CM

## 2022-05-23 PROCEDURE — 99214 OFFICE O/P EST MOD 30 MIN: CPT | Performed by: INTERNAL MEDICINE

## 2022-05-23 PROCEDURE — 93000 ELECTROCARDIOGRAM COMPLETE: CPT | Performed by: INTERNAL MEDICINE
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
## 2022-05-30 NOTE — PROGRESS NOTES
-Make sure your child stays hydrated  -Tylenol as needed for fevers  -Administer nasal saline spray with nasal suctioning (NOSE GOLDY) as needed for nasal congestion  -Please return to the emergency department if your child develops any difficulty breathing or is not able to drink anything and is not urinating at least once every 8 hours  -Follow-up with your primary care doctor as an outpatient     See scanned exercise session detailed report

## 2022-06-22 ENCOUNTER — APPOINTMENT (OUTPATIENT)
Dept: LAB | Facility: CLINIC | Age: 77
End: 2022-06-22
Payer: MEDICARE

## 2022-06-22 LAB
ALBUMIN SERPL BCP-MCNC: 3.9 G/DL (ref 3.5–5)
ALP SERPL-CCNC: 63 U/L (ref 46–116)
ALT SERPL W P-5'-P-CCNC: 15 U/L (ref 12–78)
ANION GAP SERPL CALCULATED.3IONS-SCNC: 1 MMOL/L (ref 4–13)
AST SERPL W P-5'-P-CCNC: 15 U/L (ref 5–45)
BILIRUB SERPL-MCNC: 0.51 MG/DL (ref 0.2–1)
BUN SERPL-MCNC: 29 MG/DL (ref 5–25)
CALCIUM SERPL-MCNC: 9.3 MG/DL (ref 8.3–10.1)
CHLORIDE SERPL-SCNC: 106 MMOL/L (ref 100–108)
CHOLEST SERPL-MCNC: 198 MG/DL
CO2 SERPL-SCNC: 30 MMOL/L (ref 21–32)
CREAT SERPL-MCNC: 1.37 MG/DL (ref 0.6–1.3)
GFR SERPL CREATININE-BSD FRML MDRD: 49 ML/MIN/1.73SQ M
GLUCOSE P FAST SERPL-MCNC: 133 MG/DL (ref 65–99)
HDLC SERPL-MCNC: 50 MG/DL
LDLC SERPL CALC-MCNC: 121 MG/DL (ref 0–100)
NONHDLC SERPL-MCNC: 148 MG/DL
POTASSIUM SERPL-SCNC: 4.4 MMOL/L (ref 3.5–5.3)
PROT SERPL-MCNC: 7.1 G/DL (ref 6.4–8.2)
SODIUM SERPL-SCNC: 137 MMOL/L (ref 136–145)
TRIGL SERPL-MCNC: 136 MG/DL

## 2022-06-22 PROCEDURE — 80053 COMPREHEN METABOLIC PANEL: CPT | Performed by: INTERNAL MEDICINE

## 2022-06-22 PROCEDURE — 80061 LIPID PANEL: CPT | Performed by: INTERNAL MEDICINE

## 2022-06-22 PROCEDURE — 36415 COLL VENOUS BLD VENIPUNCTURE: CPT | Performed by: INTERNAL MEDICINE

## 2022-06-23 ENCOUNTER — TELEPHONE (OUTPATIENT)
Dept: CARDIOLOGY CLINIC | Facility: CLINIC | Age: 77
End: 2022-06-23

## 2022-06-23 DIAGNOSIS — E11.40 CONTROLLED TYPE 2 DIABETES MELLITUS WITH DIABETIC NEUROPATHY, WITHOUT LONG-TERM CURRENT USE OF INSULIN (HCC): ICD-10-CM

## 2022-06-23 DIAGNOSIS — Z95.1 S/P CABG (CORONARY ARTERY BYPASS GRAFT): ICD-10-CM

## 2022-06-23 DIAGNOSIS — Z95.2 S/P AVR: ICD-10-CM

## 2022-06-23 RX ORDER — ATORVASTATIN CALCIUM 80 MG/1
80 TABLET, FILM COATED ORAL
Qty: 90 TABLET | Refills: 2 | Status: SHIPPED | OUTPATIENT
Start: 2022-06-23

## 2022-06-23 NOTE — TELEPHONE ENCOUNTER
----- Message from Bret Bowden DO sent at 6/23/2022  9:14 AM EDT -----  Can you please let the patient know his cholesterol levels have really increased  Is he taking his medication regularly? If he is, we will need to add something to it

## 2022-06-27 ENCOUNTER — TELEPHONE (OUTPATIENT)
Dept: CARDIOLOGY CLINIC | Facility: CLINIC | Age: 77
End: 2022-06-27

## 2022-06-27 DIAGNOSIS — I10 BENIGN ESSENTIAL HYPERTENSION: ICD-10-CM

## 2022-06-27 RX ORDER — CARVEDILOL 6.25 MG/1
6.25 TABLET ORAL 2 TIMES DAILY WITH MEALS
Qty: 90 TABLET | Refills: 1 | Status: SHIPPED | OUTPATIENT
Start: 2022-06-27

## 2022-07-28 ENCOUNTER — TELEPHONE (OUTPATIENT)
Dept: CARDIOLOGY CLINIC | Facility: CLINIC | Age: 77
End: 2022-07-28

## 2022-07-28 DIAGNOSIS — I10 BENIGN ESSENTIAL HYPERTENSION: ICD-10-CM

## 2022-07-28 RX ORDER — LISINOPRIL AND HYDROCHLOROTHIAZIDE 25; 20 MG/1; MG/1
1 TABLET ORAL DAILY
Qty: 90 TABLET | Refills: 1 | Status: SHIPPED | OUTPATIENT
Start: 2022-07-28

## 2022-07-29 ENCOUNTER — HOSPITAL ENCOUNTER (OUTPATIENT)
Facility: HOSPITAL | Age: 77
Setting detail: OBSERVATION
Discharge: HOME | End: 2022-07-30
Attending: EMERGENCY MEDICINE | Admitting: INTERNAL MEDICINE
Payer: MEDICARE

## 2022-07-29 ENCOUNTER — OFFICE VISIT (OUTPATIENT)
Dept: FAMILY MEDICINE CLINIC | Facility: CLINIC | Age: 77
End: 2022-07-29
Payer: MEDICARE

## 2022-07-29 VITALS
BODY MASS INDEX: 23.95 KG/M2 | RESPIRATION RATE: 18 BRPM | OXYGEN SATURATION: 96 % | WEIGHT: 158 LBS | SYSTOLIC BLOOD PRESSURE: 200 MMHG | TEMPERATURE: 98.2 F | HEIGHT: 68 IN | HEART RATE: 76 BPM | DIASTOLIC BLOOD PRESSURE: 110 MMHG

## 2022-07-29 DIAGNOSIS — R55 SYNCOPE, UNSPECIFIED SYNCOPE TYPE: Primary | ICD-10-CM

## 2022-07-29 DIAGNOSIS — R94.31 EKG ABNORMALITY: ICD-10-CM

## 2022-07-29 DIAGNOSIS — I10 BENIGN ESSENTIAL HYPERTENSION: ICD-10-CM

## 2022-07-29 DIAGNOSIS — E78.5 HYPERLIPIDEMIA, UNSPECIFIED HYPERLIPIDEMIA TYPE: ICD-10-CM

## 2022-07-29 DIAGNOSIS — Z95.2 S/P AVR: ICD-10-CM

## 2022-07-29 DIAGNOSIS — I25.10 CORONARY ARTERY DISEASE INVOLVING NATIVE CORONARY ARTERY OF NATIVE HEART WITHOUT ANGINA PECTORIS: ICD-10-CM

## 2022-07-29 DIAGNOSIS — I50.22 CHRONIC SYSTOLIC CONGESTIVE HEART FAILURE (HCC): ICD-10-CM

## 2022-07-29 DIAGNOSIS — E11.40 CONTROLLED TYPE 2 DIABETES MELLITUS WITH DIABETIC NEUROPATHY, WITHOUT LONG-TERM CURRENT USE OF INSULIN (HCC): ICD-10-CM

## 2022-07-29 DIAGNOSIS — Z86.73 OLD CEREBRAL INFARCT WITHOUT RESIDUAL DEFICIT: Chronic | ICD-10-CM

## 2022-07-29 DIAGNOSIS — Z95.1 S/P CABG (CORONARY ARTERY BYPASS GRAFT): ICD-10-CM

## 2022-07-29 DIAGNOSIS — I48.0 PAF (PAROXYSMAL ATRIAL FIBRILLATION) (HCC): ICD-10-CM

## 2022-07-29 LAB
2HR DELTA HS TROPONIN: 2 NG/L
4HR DELTA HS TROPONIN: 1 NG/L
ALBUMIN SERPL BCP-MCNC: 4.7 G/DL (ref 3.5–5)
ALP SERPL-CCNC: 61 U/L (ref 34–104)
ALT SERPL W P-5'-P-CCNC: 9 U/L (ref 7–52)
ANION GAP SERPL CALCULATED.3IONS-SCNC: 10 MMOL/L (ref 4–13)
AST SERPL W P-5'-P-CCNC: 16 U/L (ref 13–39)
BASOPHILS # BLD AUTO: 0.05 THOUSANDS/ΜL (ref 0–0.1)
BASOPHILS NFR BLD AUTO: 1 % (ref 0–1)
BILIRUB SERPL-MCNC: 0.58 MG/DL (ref 0.2–1)
BUN SERPL-MCNC: 26 MG/DL (ref 5–25)
CALCIUM SERPL-MCNC: 9.8 MG/DL (ref 8.4–10.2)
CARDIAC TROPONIN I PNL SERPL HS: 28 NG/L
CARDIAC TROPONIN I PNL SERPL HS: 29 NG/L
CARDIAC TROPONIN I PNL SERPL HS: 30 NG/L
CHLORIDE SERPL-SCNC: 102 MMOL/L (ref 96–108)
CO2 SERPL-SCNC: 27 MMOL/L (ref 21–32)
CREAT SERPL-MCNC: 1.25 MG/DL (ref 0.6–1.3)
EOSINOPHIL # BLD AUTO: 0.26 THOUSAND/ΜL (ref 0–0.61)
EOSINOPHIL NFR BLD AUTO: 3 % (ref 0–6)
ERYTHROCYTE [DISTWIDTH] IN BLOOD BY AUTOMATED COUNT: 12.7 % (ref 11.6–15.1)
GFR SERPL CREATININE-BSD FRML MDRD: 55 ML/MIN/1.73SQ M
GLUCOSE SERPL-MCNC: 113 MG/DL (ref 65–140)
GLUCOSE SERPL-MCNC: 115 MG/DL (ref 65–140)
HCT VFR BLD AUTO: 41.2 % (ref 36.5–49.3)
HGB BLD-MCNC: 13.6 G/DL (ref 12–17)
IMM GRANULOCYTES # BLD AUTO: 0.03 THOUSAND/UL (ref 0–0.2)
IMM GRANULOCYTES NFR BLD AUTO: 0 % (ref 0–2)
LYMPHOCYTES # BLD AUTO: 2.32 THOUSANDS/ΜL (ref 0.6–4.47)
LYMPHOCYTES NFR BLD AUTO: 26 % (ref 14–44)
MCH RBC QN AUTO: 30.6 PG (ref 26.8–34.3)
MCHC RBC AUTO-ENTMCNC: 33 G/DL (ref 31.4–37.4)
MCV RBC AUTO: 93 FL (ref 82–98)
MONOCYTES # BLD AUTO: 0.64 THOUSAND/ΜL (ref 0.17–1.22)
MONOCYTES NFR BLD AUTO: 7 % (ref 4–12)
NEUTROPHILS # BLD AUTO: 5.76 THOUSANDS/ΜL (ref 1.85–7.62)
NEUTS SEG NFR BLD AUTO: 63 % (ref 43–75)
NRBC BLD AUTO-RTO: 0 /100 WBCS
PLATELET # BLD AUTO: 185 THOUSANDS/UL (ref 149–390)
PMV BLD AUTO: 9.7 FL (ref 8.9–12.7)
POTASSIUM SERPL-SCNC: 4.3 MMOL/L (ref 3.5–5.3)
PROT SERPL-MCNC: 7.6 G/DL (ref 6.4–8.4)
RBC # BLD AUTO: 4.45 MILLION/UL (ref 3.88–5.62)
SODIUM SERPL-SCNC: 139 MMOL/L (ref 135–147)
WBC # BLD AUTO: 9.06 THOUSAND/UL (ref 4.31–10.16)

## 2022-07-29 PROCEDURE — 99215 OFFICE O/P EST HI 40 MIN: CPT | Performed by: STUDENT IN AN ORGANIZED HEALTH CARE EDUCATION/TRAINING PROGRAM

## 2022-07-29 PROCEDURE — 80053 COMPREHEN METABOLIC PANEL: CPT | Performed by: EMERGENCY MEDICINE

## 2022-07-29 PROCEDURE — 82948 REAGENT STRIP/BLOOD GLUCOSE: CPT

## 2022-07-29 PROCEDURE — 1123F ACP DISCUSS/DSCN MKR DOCD: CPT | Performed by: STUDENT IN AN ORGANIZED HEALTH CARE EDUCATION/TRAINING PROGRAM

## 2022-07-29 PROCEDURE — 99284 EMERGENCY DEPT VISIT MOD MDM: CPT

## 2022-07-29 PROCEDURE — 85025 COMPLETE CBC W/AUTO DIFF WBC: CPT | Performed by: EMERGENCY MEDICINE

## 2022-07-29 PROCEDURE — 93005 ELECTROCARDIOGRAM TRACING: CPT

## 2022-07-29 PROCEDURE — 36415 COLL VENOUS BLD VENIPUNCTURE: CPT

## 2022-07-29 PROCEDURE — 84484 ASSAY OF TROPONIN QUANT: CPT | Performed by: EMERGENCY MEDICINE

## 2022-07-29 PROCEDURE — 99285 EMERGENCY DEPT VISIT HI MDM: CPT | Performed by: EMERGENCY MEDICINE

## 2022-07-29 RX ORDER — ACETAMINOPHEN 325 MG/1
325 TABLET ORAL EVERY 6 HOURS PRN
Status: DISCONTINUED | OUTPATIENT
Start: 2022-07-29 | End: 2022-07-30 | Stop reason: HOSPADM

## 2022-07-29 RX ORDER — INSULIN LISPRO 100 [IU]/ML
1-5 INJECTION, SOLUTION INTRAVENOUS; SUBCUTANEOUS
Status: DISCONTINUED | OUTPATIENT
Start: 2022-07-30 | End: 2022-07-30 | Stop reason: HOSPADM

## 2022-07-29 RX ORDER — ATORVASTATIN CALCIUM 40 MG/1
80 TABLET, FILM COATED ORAL
Status: DISCONTINUED | OUTPATIENT
Start: 2022-07-30 | End: 2022-07-30 | Stop reason: HOSPADM

## 2022-07-29 RX ORDER — CARVEDILOL 6.25 MG/1
6.25 TABLET ORAL 2 TIMES DAILY WITH MEALS
Status: DISCONTINUED | OUTPATIENT
Start: 2022-07-30 | End: 2022-07-30 | Stop reason: HOSPADM

## 2022-07-29 RX ORDER — ASPIRIN 81 MG/1
81 TABLET, CHEWABLE ORAL DAILY
Status: DISCONTINUED | OUTPATIENT
Start: 2022-07-30 | End: 2022-07-30 | Stop reason: HOSPADM

## 2022-07-29 RX ORDER — INSULIN LISPRO 100 [IU]/ML
1-5 INJECTION, SOLUTION INTRAVENOUS; SUBCUTANEOUS
Status: DISCONTINUED | OUTPATIENT
Start: 2022-07-29 | End: 2022-07-30 | Stop reason: HOSPADM

## 2022-07-29 RX ORDER — HYDRALAZINE HYDROCHLORIDE 20 MG/ML
10 INJECTION INTRAMUSCULAR; INTRAVENOUS EVERY 6 HOURS PRN
Status: DISCONTINUED | OUTPATIENT
Start: 2022-07-29 | End: 2022-07-30 | Stop reason: HOSPADM

## 2022-07-29 RX ORDER — ENOXAPARIN SODIUM 100 MG/ML
40 INJECTION SUBCUTANEOUS DAILY
Status: DISCONTINUED | OUTPATIENT
Start: 2022-07-30 | End: 2022-07-30 | Stop reason: HOSPADM

## 2022-07-29 NOTE — ED PROVIDER NOTES
History  Chief Complaint   Patient presents with    Abnormal Lab     Pt presents to ED due to GP referral  Pt had syncopal event in shower (pt was sitting in shower when he synopized, neg thinners), visited GP who found "blip" in EKG and referred here  15-year-old male was in the shower this morning and he began feeling lightheaded, reports that he bent over to dropped a water and became severely lightheaded, sat down on the shower floor and lost consciousness  Patient reports he remembers sitting down, did not hit his head, patient's wife reports that she heard up banging sound and she suspects it is due to him leaning up against the shower doors, patient reports he does not currently have any pain in his head, his wife notes that he had no tonic clonic seizure activity at the time of the syncopal event, reports the event occurred approximately 6:00 a m  This morning, and notes that it when he awoke from his syncopal event he had bowel and urine incontinence  Patient's eyes primary care physician today, who reports he had some abnormality on his EKG and sent him in for further evaluation  Patient at this time has no complaints, including and no current dizziness, headache, nausea, vomiting, no focal weakness or deficits, no change in his vision, no cough, shortness of breath, no recent fevers, no chest pain, abdominal pain, GI or urinary abnormalities  Patient has a history of multiple bypass surgeries, HTN, HLD, dm, diabetic neuropathy, GERD, patient drinks approximately 2 drinks a night, does not smoke, or use drugs  Prior to Admission Medications   Prescriptions Last Dose Informant Patient Reported? Taking? Blood Glucose Monitoring Suppl (FreeStyle InsuLinx System) w/Device KIT 7/28/2022 at Unknown time Self No Yes   Sig: Test TID & QHS as directed  FreeStyle InsuLinx Test test strip 7/28/2022 at Unknown time Self No Yes   Sig: Test TID & QHS as directed     Lancets (freestyle) lancets 7/28/2022 at Unknown time Self No Yes   Sig: Test TID & QHS as directed  acetaminophen (TYLENOL) 325 mg tablet 7/29/2022 at Unknown time Self No Yes   Sig: Take 1-2 tablets Q4-6 hours PRN pain  Do not take more than 4 grams in 24 hours  aspirin 81 mg chewable tablet 7/29/2022 at Unknown time Self No Yes   Sig: Chew 1 tablet (81 mg total) daily   atorvastatin (LIPITOR) 80 mg tablet 7/29/2022 at Unknown time  No Yes   Sig: Take 1 tablet (80 mg total) by mouth daily with dinner   carvedilol (COREG) 6 25 mg tablet 7/29/2022 at Unknown time  No Yes   Sig: Take 1 tablet (6 25 mg total) by mouth 2 (two) times a day with meals   lisinopril-hydrochlorothiazide (PRINZIDE,ZESTORETIC) 20-25 MG per tablet 7/29/2022 at Unknown time  No Yes   Sig: Take 1 tablet by mouth daily   metFORMIN (GLUCOPHAGE) 1000 MG tablet 7/29/2022 at Unknown time Self No Yes   Sig: Take 1 tablet (1,000 mg total) by mouth 2 (two) times a day with meals      Facility-Administered Medications: None       Past Medical History:   Diagnosis Date    Arthritis     generalized    Asthma     seasonal-does not use inhaler    Atrial fibrillation with RVR ([facility] [address] ) 5/12/2021    Back pain     Basal cell carcinoma 09/23/2021    BCC- Right Posterior Calf     Carotid artery stenosis     Diabetes mellitus (HCC)     type    GERD (gastroesophageal reflux disease)     Hiatal hernia     Hyperlipidemia     Hypertension     Peyronie's disease     last assessed [id number]    Seasonal allergies     Wears glasses        Past Surgical History:   Procedure Laterality Date    APPENDECTOMY      CAROTID ENDARTERECTOMY Left 10/29/2020    COLONOSCOPY N/A 1/22/2016    Procedure: COLONOSCOPY;  Surgeon: Delmi Ricketts MD;  [facility] GI [facility];   Service:     KNEE ARTHROSCOPY Left     KNEE ARTHROSCOPY W/ MENISCAL REPAIR Right     MOHS SURGERY Right 11/01/2021    BCC- Right Posterior Calf- Dr Cande Kiran OPN W/STENTLESS TISSUE VALVE N/A 5/10/2021 Procedure: CORONARY ARTERY BYPASS GRAFT (CABG) X 5 USING LEFT GSV--> DIAGONAL,, RAMUS, PDA ,AND OM1, AND LEFT BALBRI-LAD WITH REPLACEMENT VALVE AORTIC USING 23 MM MURRAY MAGNA EASE(AVR); Surgeon: Babs Ojeda MD;  Location: [facility];  Service: Cardiac Surgery    CO THROMBOENDARTECTMY NECK,NECK INCIS Left 10/29/2020    Procedure: CAROTID ENDARTERECTOMY W/BOVINE PATCH ANGIOPLASTY AND COMPLETION DUPLEX IMAGING;  Surgeon: Pillo Shipman DO;  Location: [facility];  Service: Vascular       Family History   Problem Relation Age of Onset    Heart attack Mother [de-identified]        CABG    Kidney failure Father     Diabetes Father         pre-diabetic     I have reviewed and agree with the history as documented  E-Cigarette/Vaping    E-Cigarette Use Never User      E-Cigarette/Vaping Substances    Nicotine No     THC No     CBD No     Flavoring No     Other No     Unknown No      Social History     Tobacco Use    Smoking status: Never Smoker    Smokeless tobacco: Never Used   Vaping Use    Vaping Use: Never used   Substance Use Topics    Alcohol use: Yes     Alcohol/week: 10 0 standard drinks     Types: 7 Glasses of wine, 3 Standard drinks or equivalent per week    Drug use: No       Review of Systems   Constitutional: Negative for fever  HENT: Negative for congestion  Eyes: Negative for visual disturbance  Respiratory: Negative for cough and shortness of breath  Cardiovascular: Negative for chest pain  Gastrointestinal: Negative for abdominal pain, diarrhea and vomiting  Endocrine: Negative for polyuria  Genitourinary: Negative for dysuria and hematuria  Musculoskeletal: Negative for myalgias  Neurological: Positive for syncope and light-headedness  Negative for headaches  Physical Exam  Physical Exam  Constitutional:       Appearance: Normal appearance  HENT:      Head: Normocephalic and atraumatic        Right Ear: External ear normal       Left Ear: External ear normal  Mouth/Throat:      Mouth: Mucous membranes are moist       Pharynx: Oropharynx is clear  Eyes:      Conjunctiva/sclera: Conjunctivae normal       Pupils: Pupils are equal, round, and reactive to light  Cardiovascular:      Rate and Rhythm: Normal rate and regular rhythm  Heart sounds: Murmur heard  Systolic murmur is present with a grade of 2/6  Pulmonary:      Breath sounds: Normal breath sounds  Abdominal:      General: Abdomen is flat  There is no distension  Palpations: Abdomen is soft  Musculoskeletal:         General: No deformity  Normal range of motion  Cervical back: Normal range of motion  Skin:     General: Skin is warm and dry  Neurological:      General: No focal deficit present  Mental Status: He is alert and oriented to person, place, and time     Psychiatric:         Mood and Affect: Mood normal          Behavior: Behavior normal          Vital Signs  ED Triage Vitals   Temperature Pulse Respirations Blood Pressure SpO2   07/29/22 1618 07/29/22 1618 07/29/22 1618 07/29/22 1618 07/29/22 1618   98 6 °F (37 °C) 70 18 (!) 222/99 99 %      Temp Source Heart Rate Source Patient Position - Orthostatic VS BP Location FiO2 (%)   07/29/22 1618 07/29/22 1618 07/29/22 1618 07/29/22 1618 --   Oral Monitor Sitting Right arm       Pain Score       07/29/22 1900       No Pain           Vitals:    07/30/22 1143 07/30/22 1145 07/30/22 1146 07/30/22 1147   BP: 155/80 148/74 165/78 159/84   Pulse: 60 57 63 59   Patient Position - Orthostatic VS: Lying - Orthostatic VS Sitting - Orthostatic VS Standing - Orthostatic VS Standing - Orthostatic VS         Visual Acuity      ED Medications  Medications - No data to display    Diagnostic Studies  Results Reviewed     Procedure Component Value Units Date/Time    HS Troponin I 4hr [[id number]]  (Normal) Collected: 07/29/22 2003    Lab Status: Final result Specimen: Blood from Arm, Right Updated: 07/29/22 2036     hs TnI 4hr 29 ng/L      Delta 4hr hsTnI 1 ng/L     HS Troponin I 2hr [[id number]]  (Normal) Collected: 07/29/22 1828    Lab Status: Final result Specimen: Blood from Arm, Right Updated: 07/29/22 1905     hs TnI 2hr 30 ng/L      Delta 2hr hsTnI 2 ng/L     [place] draw [[id number]] Collected: 07/29/22 1625    Lab Status: Final result Specimen: Blood from Arm, Right Updated: 07/29/22 1804    Narrative: The following orders were created for panel order [place] draw  Procedure                               Abnormality         Status                     ---------                               -----------         ------                     Kee Rebel Top on ZYVY[[id number]]                           Final result                 Please view results for these tests on the individual orders      HS Troponin 0hr (reflex protocol) [[id number]]  (Normal) Collected: 07/29/22 1625    Lab Status: Final result Specimen: Blood from Arm, Right Updated: 07/29/22 1719     hs TnI 0hr 28 ng/L     Comprehensive metabolic panel [[id number]]  (Abnormal) Collected: 07/29/22 1625    Lab Status: Final result Specimen: Blood from Arm, Right Updated: 07/29/22 1714     Sodium 139 mmol/L      Potassium 4 3 mmol/L      Chloride 102 mmol/L      CO2 27 mmol/L      ANION GAP 10 mmol/L      BUN 26 mg/dL      Creatinine 1 25 mg/dL      Glucose 113 mg/dL      Calcium 9 8 mg/dL      AST 16 U/L      ALT 9 U/L      Alkaline Phosphatase 61 U/L      Total Protein 7 6 g/dL      Albumin 4 7 g/dL      Total Bilirubin 0 58 mg/dL      eGFR 55 ml/min/1 73sq m     Narrative:      [facility] guidelines for Chronic Kidney Disease (CKD):     Stage 1 with normal or high GFR (GFR > 90 mL/min/1 73 square meters)    Stage 2 Mild CKD (GFR = 60-89 mL/min/1 73 square meters)    Stage 3A Moderate CKD (GFR = 45-59 mL/min/1 73 square meters)    Stage 3B Moderate CKD (GFR = 30-44 mL/min/1 73 square meters)    Stage 4 Severe CKD (GFR = 15-29 mL/min/1 73 square meters)    Stage 5 End Stage CKD (GFR <15 mL/min/1 73 square meters)  Note: GFR calculation is accurate only with a steady state creatinine    CBC and differential [[id number]] Collected: 07/29/22 1625    Lab Status: Final result Specimen: Blood from Arm, Right Updated: 07/29/22 1645     WBC 9 06 Thousand/uL      RBC 4 45 Million/uL      Hemoglobin 13 6 g/dL      Hematocrit 41 2 %      MCV 93 fL      MCH 30 6 pg      MCHC 33 0 g/dL      RDW 12 7 %      MPV 9 7 fL      Platelets 857 Thousands/uL      nRBC 0 /100 WBCs      Neutrophils Relative 63 %      Immat GRANS % 0 %      Lymphocytes Relative 26 %      Monocytes Relative 7 %      Eosinophils Relative 3 %      Basophils Relative 1 %      Neutrophils Absolute 5 76 Thousands/µL      Immature Grans Absolute 0 03 Thousand/uL      Lymphocytes Absolute 2 32 Thousands/µL      Monocytes Absolute 0 64 Thousand/µL      Eosinophils Absolute 0 26 Thousand/µL      Basophils Absolute 0 05 Thousands/µL                  CT head wo contrast   Final Result by Radha Crain MD (07/30 1123)      1  No intracranial hemorrhage or calvarial fracture  2   Small region of hypodensity in the right frontal lobe, potentially encephalomalacia and gliosis from prior infarct in this region  Correlation with clinical history recommended  Further characterization with nonemergent contrast-enhanced MRI of the    brain could be obtained, especially in the absence of prior studies  If prior studies become available, an addendum could be issued comparing the current findings to prior studies  Further clinical follow-up recommended                    Workstation performed: [id number]                    Procedures  ECG 12 Lead Documentation Only    Date/Time: 7/29/2022 4:44 PM  Performed by: Ross Scott MD  Authorized by: Ross Scott MD     Patient location:  ED  Previous ECG:     Previous ECG:  Compared to current    Comparison ECG info:  Left bundle-branch block found in previous EKG is no longer present  Similarity:  Changes noted  Interpretation:     Interpretation: abnormal    Rate:     ECG rate:  68    ECG rate assessment: normal    Rhythm:     Rhythm: sinus rhythm    Ectopy:     Ectopy: none    QRS:     QRS axis:  Normal  Conduction:     Conduction: normal    ST segments:     ST segments:  Abnormal    Elevation:  AVR, V1, V2 and V3    Depression:  I, V5 and V6  T waves:     T waves: inverted      Inverted:  I, aVL, V5 and V6  Q waves:     Q waves:  V1 and V2    ECG 12 Lead Documentation Only    Date/Time: 7/29/2022 6:59 PM  Performed by: Sandro Moran MD  Authorized by: Sandro Moran MD     ECG reviewed by me, the ED Provider: yes    Patient location:  ED  Previous ECG:     Previous ECG:  Compared to current    Similarity:  No change  Interpretation:     Interpretation: abnormal    Rate:     ECG rate:  64    ECG rate assessment: normal    Rhythm:     Rhythm: sinus rhythm    Ectopy:     Ectopy: none    QRS:     QRS axis:  Left  Conduction:     Conduction: normal    ST segments:     ST segments:  Abnormal    Elevation:  AVR and V3    Depression:  I, V5 and V6  T waves:     T waves: inverted      Inverted:  I and aVL  Q waves:     Q waves:  AVR and V1             ED Course    MDM  Number of Diagnoses or Management Options  EKG abnormality  Syncope, unspecified syncope type  Diagnosis management comments: Patient's EKG is abnormal, but given patient is currently asymptomatic, and previous EKG being markedly different from current with left bundle branch block that is no longer there, repeat EKG will be obtained in 15 minutes to evaluate for dynamic changes and discussed with Cardiology  Patient will be evaluated for syncope with blood work, EKG, chest x-ray, disposition be determined after workup is complete  1954  Pt will be admitted for further evaluation of syncope         Disposition  Final diagnoses:   Syncope, unspecified syncope type   EKG abnormality Time reflects when diagnosis was documented in both MDM as applicable and the Disposition within this note     Time User Action Codes Description Comment    7/29/2022  7:54 PM Mar Gaviria Add [R55] Syncope, unspecified syncope type     7/29/2022  7:54 PM Mar Gaviria Add [R94 31] EKG abnormality     7/30/2022  2:40 PM Cindi Lerma Add [P48 84] 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       ED Disposition     ED Disposition   Admit    Condition   Stable    Date/Time   Fri Jul 29, 2022  7:54 PM    Comment   Case was discussed with Dr Lynda Jacobo and the patient's admission status was agreed to be Admission Status: observation status to the service of Dr Lynda Jacobo   Follow-up Information     Follow up With Specialties Details Why Contact Info Additional Nirmal [facility] Neurology Follow up in 6 week(s) Recommend follow-up with epilepsy AP or attending  Ann-Marie Galan [facility]  [address] [facility] Neurology [facility], [address], [place], [place], [address] [facility]    Antonieta Duarte MD Family Medicine Schedule an appointment as soon as possible for a visit in 1 week(s)  [address]       Rosa Hannah DO Cardiology Schedule an appointment as soon as possible for a visit for current symptom recheck  loop recorder implant discussion [address] [facility] [address]  [phone]             Discharge Medication List as of 7/30/2022  3:27 PM      CONTINUE these medications which have NOT CHANGED    Details   acetaminophen (TYLENOL) 325 mg tablet Take 1-2 tablets Q4-6 hours PRN pain   Do not take more than 4 grams in 24 hours , OTC      aspirin 81 mg chewable tablet Chew 1 tablet (81 mg total) daily, Starting Fri 5/14/2021, Normal      atorvastatin (LIPITOR) 80 mg tablet Take 1 tablet (80 mg total) by mouth daily with dinner, Starting Thu 6/23/2022, Normal      Blood Glucose Monitoring Suppl (FreeStyle InsuLinx System) w/Device KIT Test TID & QHS as directed , Normal      carvedilol (COREG) 6 25 mg tablet Take 1 tablet (6 25 mg total) by mouth 2 (two) times a day with meals, Starting Mon 6/27/2022, Normal      FreeStyle InsuLinx Test test strip Test TID & QHS as directed , Normal      Lancets (freestyle) lancets Test TID & QHS as directed , Normal      lisinopril-hydrochlorothiazide (PRINZIDE,ZESTORETIC) 20-25 MG per tablet Take 1 tablet by mouth daily, Starting Thu 7/28/2022, Normal      metFORMIN (GLUCOPHAGE) 1000 MG tablet Take 1 tablet (1,000 mg total) by mouth 2 (two) times a day with meals, Starting Tue 1/4/2022, Normal             Outpatient Discharge Orders   Activity as tolerated     Call provider for:  persistent nausea or vomiting     Call provider for:  severe uncontrolled pain     Call provider for:  redness, tenderness, or signs of infection (pain, swelling, redness, odor or green/yellow discharge around incision site)     Call provider for: active or persistent bleeding     Call provider for:  difficulty breathing, headache or visual disturbances     Call provider for:  hives     Call provider for:  persistent dizziness or light-headedness     Call provider for:  extreme fatigue     EEG Routine and awake   Standing Status: Future Standing Exp   Date: 07/30/23       PDMP Review       Value Time User    PDMP Reviewed  Yes 5/14/2021 11:09 AM Jenifer García PA-C          ED Provider  Electronically Signed by           Anita López MD  08/13/22 0394

## 2022-07-29 NOTE — PROGRESS NOTES
Clinic Visit Note  Halie Clinton 69 y o  male   MRN: [id number]    Assessment and Plan      Diagnoses and all orders for this visit:    Syncope, unspecified syncope type  Unclear etiology of syncope and collapse earlier, patient has not experienced this previously  EKG lateral leads concerning for early ischemic changes especially in the setting of previous earlier event today  Appears NSR, history PAF? Given symptomatology early this morning and cardiac risk factors/history, concern for NSTEMI that needs further hospital workup  Discussed with patient going to [facility] for further evaluation with ER, Cardiology  Patient in agreement and will have partner drive patient to [facility]  He requests not to use EMS  -     POCT ECG  -     Transfer to other facility    Controlled type 2 diabetes mellitus with diabetic neuropathy, without long-term current use of insulin ([facility])    Benign essential hypertension    Chronic systolic congestive heart failure ([facility] [address] )  -     Transfer to other facility    Coronary artery disease involving native coronary artery of native heart without angina pectoris  -     Transfer to other facility    PAF (paroxysmal atrial fibrillation) ([facility])    Hyperlipidemia, unspecified hyperlipidemia type    S/P AVR  -     Transfer to other facility    S/P CABG (coronary artery bypass graft)  -     Transfer to other facility      My impressions and treatment recommendations were discussed in detail with the patient who verbalized understanding and had no further questions  Discharge instructions were provided  Subjective     Chief Complaint:  Same-day visit    History of Present Illness:    Patient is a pleasant 23-year-old gentleman coming in as a same-day visit after syncope with collapse in shower earlier this morning  Patient remembers bending over in the shower to clean self, then waking up laying down, denies positive head strike but unsure    He also did lose his bowels but no postictal state  Patient notes at time of encounter that he is feeling perfectly fine, feels at baseline  The following portions of the patient's history were reviewed and updated as appropriate: allergies, current medications, past family history, past medical history, past social history, past surgical history and problem list     REVIEW OF SYSTEMS:  A complete 12-point review of systems is negative other than that noted in the HPI  Review of Systems   Constitutional: Negative for chills and fever  HENT: Negative for ear pain and sore throat  Eyes: Negative for pain and visual disturbance  Respiratory: Negative for cough, shortness of breath and wheezing  Cardiovascular: Negative for chest pain and palpitations  Gastrointestinal: Negative for abdominal pain and vomiting  Genitourinary: Negative for dysuria and hematuria  Musculoskeletal: Negative for arthralgias and back pain  Skin: Negative for color change and rash  Neurological: Positive for syncope  Negative for dizziness, seizures and headaches  All other systems reviewed and are negative  Current Outpatient Medications:     acetaminophen (TYLENOL) 325 mg tablet, Take 1-2 tablets Q4-6 hours PRN pain  Do not take more than 4 grams in 24 hours  , Disp: , Rfl: 0    aspirin 81 mg chewable tablet, Chew 1 tablet (81 mg total) daily, Disp: 30 tablet, Rfl: 2    atorvastatin (LIPITOR) 80 mg tablet, Take 1 tablet (80 mg total) by mouth daily with dinner, Disp: 90 tablet, Rfl: 2    Blood Glucose Monitoring Suppl (FreeStyle InsuLinx System) w/Device KIT, Test TID & QHS as directed , Disp: 1 kit, Rfl: 0    carvedilol (COREG) 6 25 mg tablet, Take 1 tablet (6 25 mg total) by mouth 2 (two) times a day with meals, Disp: 90 tablet, Rfl: 1    FreeStyle InsuLinx Test test strip, Test TID & QHS as directed , Disp: 100 each, Rfl: 2    Lancets (freestyle) lancets, Test TID & QHS as directed , Disp: 100 each, Rfl: 2   lisinopril-hydrochlorothiazide (PRINZIDE,ZESTORETIC) 20-25 MG per tablet, Take 1 tablet by mouth daily, Disp: 90 tablet, Rfl: 1    metFORMIN (GLUCOPHAGE) 1000 MG tablet, Take 1 tablet (1,000 mg total) by mouth 2 (two) times a day with meals, Disp: 180 tablet, Rfl: 2  Past Medical History:   Diagnosis Date    Arthritis     generalized    Asthma     seasonal-does not use inhaler    Atrial fibrillation with RVR ([facility] ) 5/12/2021    Back pain     Basal cell carcinoma 09/23/2021    BCC- Right Posterior Calf     Carotid artery stenosis     Diabetes mellitus ([facility] )     type    GERD (gastroesophageal reflux disease)     Hiatal hernia     Hyperlipidemia     Hypertension     Peyronie's disease     last assessed [id number]    Seasonal allergies     Wears glasses      Past Surgical History:   Procedure Laterality Date    APPENDECTOMY      CAROTID ENDARTERECTOMY Left 10/29/2020    COLONOSCOPY N/A 1/22/2016    Procedure: COLONOSCOPY;  Surgeon: Nicole Medellin MD;  Location: [facility] GI LAB; Service:     KNEE ARTHROSCOPY Left     KNEE ARTHROSCOPY W/ MENISCAL REPAIR Right     MOHS SURGERY Right 11/01/2021    BCC- Right Posterior Calf- Dr Savannah Nelson OPN W/STENTLESS TISSUE VALVE N/A 5/10/2021    Procedure: CORONARY ARTERY BYPASS GRAFT (CABG) X 5 USING LEFT GSV--> DIAGONAL,, RAMUS, PDA ,AND OM1, AND LEFT BALBIR-LAD WITH REPLACEMENT VALVE AORTIC USING 23 MM MURRAY MAGNA EASE(AVR);   Surgeon: Rashaad Hsu MD;  Location:  MAIN OR;  Service: Cardiac Surgery    CT THROMBOENDARTECTMY NECK,NECK INCIS Left 10/29/2020    Procedure: CAROTID ENDARTERECTOMY W/BOVINE PATCH ANGIOPLASTY AND COMPLETION DUPLEX IMAGING;  Surgeon: Danna Smith DO;  Location: [facility] OR;  Service: Vascular     Social History     Socioeconomic History    Marital status: Single     Spouse name: Not on file    Number of children: Not on file    Years of education: Not on file    Highest education level: Not on file Occupational History    Not on file   Tobacco Use    Smoking status: Never Smoker    Smokeless tobacco: Never Used   Vaping Use    Vaping Use: Never used   Substance and Sexual Activity    Alcohol use: Yes     Alcohol/week: 10 0 standard drinks     Types: 7 Glasses of wine, 3 Standard drinks or equivalent per week    Drug use: No    Sexual activity: Yes     Partners: Female   Other Topics Concern    Not on file   Social History Narrative    Daily caffeinnated coffee      Social Determinants of Health     Financial Resource Strain: Not on file   Food Insecurity: Not on file   Transportation Needs: Not on file   Physical Activity: Not on file   Stress: Not on file   Social Connections: Not on file   Intimate Partner Violence: Not on file   Housing Stability: Not on file     Family History   Problem Relation Age of Onset    Heart attack Mother [de-identified]        CABG    Kidney failure Father     Diabetes Father         pre-diabetic     No Known Allergies    Objective     Vitals:    07/29/22 1412   BP: (!) 200/110   BP Location: Left arm   Patient Position: Sitting   Cuff Size: Standard   Pulse: 76   Resp: 18   Temp: 98 2 °F (36 8 °C)   SpO2: 96%   Weight: 71 7 kg (158 lb)   Height: 5' 8" (1 727 m)       Physical Exam:     GENERAL: NAD, pleasant   HEENT:  NC/AT, PERRL, EOMI, no scleral icterus  CARDIAC:  RRR, +U9/B8, systolic murmur appreciated  PULMONARY:  CTA B/L, no wheezing/rales/rhonci, non-labored breathing  ABDOMEN:  Soft, NT/ND, no rebound/guarding/rigidity  Extremities:  No edema, cyanosis, or clubbing  Musculoskeletal:  Full range of motion intact in all extremities   NEUROLOGIC: Grossly intact, no focal deficits  SKIN:  No rashes or erythema noted on exposed skin  Psych: Normal affect, mood stable    ==  PLEASE NOTE:  This encounter was completed utilizing the M- Modal/Fluency Direct Speech Voice Recognition Software   Grammatical errors, random word insertions, pronoun errors and incomplete sentences are occasional consequences of the system due to software limitations, ambient noise and hardware issues  These may be missed by proof reading prior to affixing electronic signature  Any questions or concerns about the content, text or information contained within the body of this dictation should be directly addressed to the physician for clarification  Please do not hesitate to call me directly if you have any any questions or concerns      Avelino Done, DO [facility]   [facility]

## 2022-07-30 ENCOUNTER — APPOINTMENT (OUTPATIENT)
Dept: NON INVASIVE DIAGNOSTICS | Facility: HOSPITAL | Age: 77
End: 2022-07-30
Payer: MEDICARE

## 2022-07-30 ENCOUNTER — APPOINTMENT (OUTPATIENT)
Dept: CT IMAGING | Facility: HOSPITAL | Age: 77
End: 2022-07-30
Payer: MEDICARE

## 2022-07-30 ENCOUNTER — PREP FOR PROCEDURE (OUTPATIENT)
Dept: OTHER | Facility: HOSPITAL | Age: 77
End: 2022-07-30

## 2022-07-30 VITALS
BODY MASS INDEX: 22.96 KG/M2 | HEART RATE: 59 BPM | SYSTOLIC BLOOD PRESSURE: 159 MMHG | DIASTOLIC BLOOD PRESSURE: 84 MMHG | WEIGHT: 155 LBS | TEMPERATURE: 98 F | OXYGEN SATURATION: 99 % | HEIGHT: 69 IN | RESPIRATION RATE: 18 BRPM

## 2022-07-30 DIAGNOSIS — I63.9 STROKE (HCC): Primary | ICD-10-CM

## 2022-07-30 PROBLEM — Z86.73 OLD CEREBRAL INFARCT WITHOUT RESIDUAL DEFICIT: Chronic | Status: ACTIVE | Noted: 2022-07-30

## 2022-07-30 LAB
ANION GAP SERPL CALCULATED.3IONS-SCNC: 9 MMOL/L (ref 4–13)
AORTIC ROOT: 2.8 CM
AORTIC VALVE MEAN VELOCITY: 15 M/S
APICAL FOUR CHAMBER EJECTION FRACTION: 59 %
ATRIAL RATE: 68 BPM
ATRIAL RATE: 70 BPM
AV AREA BY CONTINUOUS VTI: 1.3 CM2
AV AREA PEAK VELOCITY: 1.2 CM2
AV LVOT MEAN GRADIENT: 2 MMHG
AV LVOT PEAK GRADIENT: 3 MMHG
AV MEAN GRADIENT: 10 MMHG
AV PEAK GRADIENT: 17 MMHG
AV VALVE AREA: 1.28 CM2
AV VELOCITY RATIO: 0.43
BUN SERPL-MCNC: 25 MG/DL (ref 5–25)
CALCIUM SERPL-MCNC: 9.2 MG/DL (ref 8.4–10.2)
CHLORIDE SERPL-SCNC: 103 MMOL/L (ref 96–108)
CO2 SERPL-SCNC: 27 MMOL/L (ref 21–32)
CREAT SERPL-MCNC: 1.21 MG/DL (ref 0.6–1.3)
DOP CALC AO PEAK VEL: 2.06 M/S
DOP CALC AO VTI: 45.36 CM
DOP CALC LVOT AREA: 2.83 CM2
DOP CALC LVOT DIAMETER: 1.9 CM
DOP CALC LVOT PEAK VEL VTI: 20.49 CM
DOP CALC LVOT PEAK VEL: 0.88 M/S
DOP CALC LVOT STROKE INDEX: 30.1 ML/M2
DOP CALC LVOT STROKE VOLUME: 58.07 CM3
DOP CALC MV VTI: 60.55 CM
E WAVE DECELERATION TIME: 329 MS
FRACTIONAL SHORTENING: 13 % (ref 28–44)
GFR SERPL CREATININE-BSD FRML MDRD: 57 ML/MIN/1.73SQ M
GLUCOSE SERPL-MCNC: 102 MG/DL (ref 65–140)
GLUCOSE SERPL-MCNC: 126 MG/DL (ref 65–140)
GLUCOSE SERPL-MCNC: 162 MG/DL (ref 65–140)
INTERVENTRICULAR SEPTUM IN DIASTOLE (PARASTERNAL SHORT AXIS VIEW): 1.3 CM
INTERVENTRICULAR SEPTUM: 1.3 CM (ref 0.6–1.1)
LAAS-AP2: 32.7 CM2
LAAS-AP4: 28.1 CM2
LEFT ATRIUM SIZE: 5.4 CM
LEFT INTERNAL DIMENSION IN SYSTOLE: 3.4 CM (ref 2.1–4)
LEFT VENTRICULAR INTERNAL DIMENSION IN DIASTOLE: 3.9 CM (ref 3.5–6)
LEFT VENTRICULAR POSTERIOR WALL IN END DIASTOLE: 1.3 CM
LEFT VENTRICULAR STROKE VOLUME: 19 ML
LVSV (TEICH): 19 ML
MV E'TISSUE VEL-SEP: 4 CM/S
MV MEAN GRADIENT: 4 MMHG
MV PEAK A VEL: 1.37 M/S
MV PEAK E VEL: 124 CM/S
MV PEAK GRADIENT: 11 MMHG
MV STENOSIS PRESSURE HALF TIME: 96 MS
MV VALVE AREA BY CONTINUITY EQUATION: 0.96 CM2
MV VALVE AREA P 1/2 METHOD: 2.29 CM2
P AXIS: 74 DEGREES
P AXIS: 80 DEGREES
POTASSIUM SERPL-SCNC: 3.8 MMOL/L (ref 3.5–5.3)
PR INTERVAL: 156 MS
PR INTERVAL: 172 MS
QRS AXIS: -11 DEGREES
QRS AXIS: -24 DEGREES
QRSD INTERVAL: 110 MS
QRSD INTERVAL: 98 MS
QT INTERVAL: 384 MS
QT INTERVAL: 424 MS
QTC INTERVAL: 409 MS
QTC INTERVAL: 438 MS
RIGHT ATRIUM AREA SYSTOLE A4C: 13.6 CM2
RIGHT VENTRICLE ID DIMENSION: 4.1 CM
SL CV LEFT ATRIUM LENGTH A2C: 5.8 CM
SL CV LV EF: 50
SL CV PED ECHO LEFT VENTRICLE DIASTOLIC VOLUME (MOD BIPLANE) 2D: 67 ML
SL CV PED ECHO LEFT VENTRICLE SYSTOLIC VOLUME (MOD BIPLANE) 2D: 48 ML
SODIUM SERPL-SCNC: 139 MMOL/L (ref 135–147)
T WAVE AXIS: 124 DEGREES
T WAVE AXIS: 136 DEGREES
TR MAX PG: 23 MMHG
TR PEAK VELOCITY: 2.4 M/S
TRICUSPID VALVE PEAK REGURGITATION VELOCITY: 2.39 M/S
VENTRICULAR RATE: 64 BPM
VENTRICULAR RATE: 68 BPM

## 2022-07-30 PROCEDURE — 93306 TTE W/DOPPLER COMPLETE: CPT | Performed by: INTERNAL MEDICINE

## 2022-07-30 PROCEDURE — 99204 OFFICE O/P NEW MOD 45 MIN: CPT | Performed by: PSYCHIATRY & NEUROLOGY

## 2022-07-30 PROCEDURE — 80048 BASIC METABOLIC PNL TOTAL CA: CPT

## 2022-07-30 PROCEDURE — 93010 ELECTROCARDIOGRAM REPORT: CPT | Performed by: INTERNAL MEDICINE

## 2022-07-30 PROCEDURE — 93306 TTE W/DOPPLER COMPLETE: CPT

## 2022-07-30 PROCEDURE — 99236 HOSP IP/OBS SAME DATE HI 85: CPT | Performed by: HOSPITALIST

## 2022-07-30 PROCEDURE — G1004 CDSM NDSC: HCPCS

## 2022-07-30 PROCEDURE — 82948 REAGENT STRIP/BLOOD GLUCOSE: CPT

## 2022-07-30 PROCEDURE — 70450 CT HEAD/BRAIN W/O DYE: CPT

## 2022-07-30 RX ADMIN — HYDROCHLOROTHIAZIDE: 25 TABLET ORAL at 08:12

## 2022-07-30 RX ADMIN — ENOXAPARIN SODIUM 40 MG: 40 INJECTION SUBCUTANEOUS at 08:13

## 2022-07-30 RX ADMIN — INSULIN LISPRO 1 UNITS: 100 INJECTION, SOLUTION INTRAVENOUS; SUBCUTANEOUS at 12:03

## 2022-07-30 RX ADMIN — CARVEDILOL 6.25 MG: 6.25 TABLET, FILM COATED ORAL at 08:12

## 2022-07-30 RX ADMIN — ASPIRIN 81 MG: 81 TABLET, CHEWABLE ORAL at 08:13

## 2022-07-30 NOTE — ASSESSMENT & PLAN NOTE
Patient's blood pressure in the ED has been ranging from 747 to 444 systolic and 83 to 99 diastolic  No acute symptoms      Plan:  Monitor BP  Continue home Blood pressure medication to maximize BP control: Coreg, lisinopril-hydrochlorothiazide  Hydralazine PRN for systolic BP over 639

## 2022-07-30 NOTE — CONSULTS
Consultation - Neurology   Mandeep Delgado 69 y o  male MRN: [id number]  Unit/Bed#: S -01 Encounter: [id number]      Assessment/Plan     * Syncope  Assessment & Plan  69 y o  right handed male with diabetes, HTN, CHF, PAF following cardiac catheterization, HLD, s/p L CEA, who presented to [facility] on 7/29/22 with syncope  Pt reports he felt light headed going into the shower yesterday morning x10-15 minutes  Pt reports feeling progressively dizzier while in the shower  Then, pt was using a squi-rachelle for his shower glass and felt so dizzy that he had to sit down in the shower  Pt denies any history of tongue bite  Pt denies previous episodes of BM incontinence  Pt reports losing bowel continence in shower yesterday  Pt denies tongue bite yesterday  More likely cardiac syncopal event, less likely seizure  Plan   - [facility] completed 7/30/22   - recommend loop recorder placement due to [facility] 7/30/22 showing chronic infarct  - recommend outpatient routine EEG   - continue home ASA 81 mg daily   - continue home atorvastatin   - no further inpatient neurology recommendations at this time  Controlled diabetes mellitus with diabetic neuropathy [facility])  Assessment & Plan  Lab Results   Component Value Date    HGBA1C 6 2 02/03/2022       Recent Labs     07/29/22  2249 07/30/22  0745 07/30/22  1130   POCGLU 115 126 162*       Blood Sugar Average: Last 72 hrs:  (P) 142 [id number]     - goal euglycemia   - continue management per primary team     Benign essential hypertension  Assessment & Plan  - goal normotension   - continue management per primary team         Mandeep Delgado will need follow up in in 6 weeks with epilepsy attending or advance practitioner  He will require a routine EEG within 6 weeks       **History and physical examination obtained by attending neurologist  STEVAN in room as witness to history and physical examination obtained and acted solely as a scribe for attending neurologist  Marisol CALLES did not provide any decision making for this encounter  **      History of Present Illness     Reason for Consult / Principal Problem: Syncope  Hx and PE limited by: N/A  HPI: Halie Clinton is a 69 y o  right handed male with diabetes, HTN, CHF, PAF following cardiac catheterization, HLD, s/p L CEA, who presented to [facility] on 7/29/22 with syncope  Per ED notes, pt reported showering the morning of 7/29/22, began feeling lightheaded, bent over, became severely lightheaded, and sat down on the shower floor where he lost consciousness  When pt woke up from the syncopal event, pt's wife reported to ED that pt had bowel and bladder incontinence  Pt reports he felt light headed going into the shower yesterday morning at 6:30 AM x10-15 minutes  Pt reports feeling progressively dizzier while in the hot shower  Then, pt was using a squi-rachelle for his shower glass and felt so dizzy that he had to sit down in the shower  Pt denies any history of tongue bite  Pt denies previous episodes of BM incontinence  Pt reports losing bowel continence in shower yesterday  Pt denies tongue bite yesterday  Pt  Denies palpitations  Pt report SBP at home usually 140-145  Pt denies feeling sweaty/clamy before the event  Pt reports feeling clamy after the event  Pt takes a statin and ASA at home  Inpatient consult to Neurology  Consult performed by: Joey Macario PA-C  Consult ordered by: Jovanny Barnett MD          Review of Systems   Constitutional: Negative for chills and fever  HENT: Negative for tinnitus  Eyes: Negative for photophobia and visual disturbance  Respiratory: Negative for cough and shortness of breath  Cardiovascular: Negative for chest pain and palpitations  Gastrointestinal: Negative for nausea and vomiting  Genitourinary: Negative for difficulty urinating and dysuria  Musculoskeletal: Positive for arthralgias (chronic, b/l shoulders, has limited ROM R shoulder abduction)  Neurological: Negative for dizziness, weakness and light-headedness  Psychiatric/Behavioral: Negative for agitation and confusion  Historical Information   Past Medical History:   Diagnosis Date    Arthritis     generalized    Asthma     seasonal-does not use inhaler    Atrial fibrillation with RVR ([facility] [address] ) 5/12/2021    Back pain     Basal cell carcinoma 09/23/2021    BCC- Right Posterior Calf     Carotid artery stenosis     Diabetes mellitus (HCC)     type    GERD (gastroesophageal reflux disease)     Hiatal hernia     Hyperlipidemia     Hypertension     Peyronie's disease     last assessed [id number]    Seasonal allergies     Wears glasses      Past Surgical History:   Procedure Laterality Date    APPENDECTOMY      CAROTID ENDARTERECTOMY Left 10/29/2020    COLONOSCOPY N/A 1/22/2016    Procedure: COLONOSCOPY;  Surgeon: Joao Rankin MD;  Location: [place] [address] GI LAB; Service:     KNEE ARTHROSCOPY Left     KNEE ARTHROSCOPY W/ MENISCAL REPAIR Right     MOHS SURGERY Right 11/01/2021    BCC- Right Posterior Calf- Dr Hubert Mcqueen OPN W/STENTLESS TISSUE VALVE N/A 5/10/2021    Procedure: CORONARY ARTERY BYPASS GRAFT (CABG) X 5 USING LEFT GSV--> DIAGONAL,, RAMUS, PDA ,AND OM1, AND LEFT BALBIR-LAD WITH REPLACEMENT VALVE AORTIC USING 23 MM MURRAY MAGNA EASE(AVR);   Surgeon: Maddie Wheatley MD;  Location:  MAIN OR;  Service: Cardiac Surgery    IL THROMBOENDARTECTMY NECK,NECK INCIS Left 10/29/2020    Procedure: CAROTID ENDARTERECTOMY W/BOVINE PATCH ANGIOPLASTY AND COMPLETION DUPLEX IMAGING;  Surgeon: Neisha Infante DO;  Location: [facility];  Service: Vascular     Social History   Social History     Substance and Sexual Activity   Alcohol Use Yes    Alcohol/week: 10 0 standard drinks    Types: 7 Glasses of wine, 3 Standard drinks or equivalent per week     Social History     Substance and Sexual Activity   Drug Use No     E-Cigarette/Vaping    E-Cigarette Use Never User E-Cigarette/Vaping Substances    Nicotine No     THC No     CBD No     Flavoring No     Other No     Unknown No      Social History     Tobacco Use   Smoking Status Never Smoker   Smokeless Tobacco Never Used     Family History:   Family History   Problem Relation Age of Onset    Heart attack Mother [de-identified]        CABG    Kidney failure Father     Diabetes Father         pre-diabetic       Review of previous medical records was  completed  Meds/Allergies   current meds:   Current Facility-Administered Medications   Medication Dose Route Frequency    acetaminophen (TYLENOL) tablet 325 mg  325 mg Oral Q6H PRN    aspirin chewable tablet 81 mg  81 mg Oral Daily    atorvastatin (LIPITOR) tablet 80 mg  80 mg Oral Daily With Dinner    carvedilol (COREG) tablet 6 25 mg  6 25 mg Oral BID With Meals    enoxaparin (LOVENOX) subcutaneous injection 40 mg  40 mg Subcutaneous Daily    hydrALAZINE (APRESOLINE) injection 10 mg  10 mg Intravenous Q6H PRN    insulin lispro (HumaLOG) 100 units/mL subcutaneous injection 1-5 Units  1-5 Units Subcutaneous TID AC    insulin lispro (HumaLOG) 100 units/mL subcutaneous injection 1-5 Units  1-5 Units Subcutaneous HS    lisinopril-hydrochlorothiazide (PRINZIDE 20/25) combo dose   Oral Daily    and PTA meds:   Prior to Admission Medications   Prescriptions Last Dose Informant Patient Reported? Taking? Blood Glucose Monitoring Suppl (FreeStyle InsuLinx System) w/Device KIT 7/28/2022 at Unknown time Self No Yes   Sig: Test TID & QHS as directed  FreeStyle InsuLinx Test test strip 7/28/2022 at Unknown time Self No Yes   Sig: Test TID & QHS as directed  Lancets (freestyle) lancets 7/28/2022 at Unknown time Self No Yes   Sig: Test TID & QHS as directed  acetaminophen (TYLENOL) 325 mg tablet 7/29/2022 at Unknown time Self No Yes   Sig: Take 1-2 tablets Q4-6 hours PRN pain  Do not take more than 4 grams in 24 hours     aspirin 81 mg chewable tablet 7/29/2022 at Unknown time Self No Yes   Sig: Chew 1 tablet (81 mg total) daily   atorvastatin (LIPITOR) 80 mg tablet 7/29/2022 at Unknown time  No Yes   Sig: Take 1 tablet (80 mg total) by mouth daily with dinner   carvedilol (COREG) 6 25 mg tablet 7/29/2022 at Unknown time  No Yes   Sig: Take 1 tablet (6 25 mg total) by mouth 2 (two) times a day with meals   lisinopril-hydrochlorothiazide (PRINZIDE,ZESTORETIC) 20-25 MG per tablet 7/29/2022 at Unknown time  No Yes   Sig: Take 1 tablet by mouth daily   metFORMIN (GLUCOPHAGE) 1000 MG tablet 7/29/2022 at Unknown time Self No Yes   Sig: Take 1 tablet (1,000 mg total) by mouth 2 (two) times a day with meals      Facility-Administered Medications: None       No Known Allergies    Objective   Vitals:Blood pressure 159/84, pulse 59, temperature 98 °F (36 7 °C), resp  rate 18, height 5' 9" (1 753 m), weight 70 3 kg (155 lb), SpO2 99 %  ,Body mass index is 22 89 kg/m²  Intake/Output Summary (Last 24 hours) at 7/30/2022 1313  Last data filed at 7/30/2022 1249  Gross per 24 hour   Intake 350 ml   Output --   Net 350 ml       Invasive Devices: Invasive Devices  Report    Peripheral Intravenous Line  Duration           Peripheral IV 07/29/22 Right Antecubital <1 day                Physical Exam  Vitals and nursing note reviewed  Constitutional:       General: He is not in acute distress  Appearance: He is normal weight  He is not diaphoretic  HENT:      Head: Normocephalic and atraumatic  Nose: Nose normal  No congestion or rhinorrhea  Mouth/Throat:      Mouth: Mucous membranes are moist    Eyes:      Extraocular Movements: EOM normal       Pupils: Pupils are equal, round, and reactive to light  Cardiovascular:      Rate and Rhythm: Normal rate  Pulmonary:      Effort: Pulmonary effort is normal    Musculoskeletal:      Right lower leg: No edema  Left lower leg: No edema  Neurological:      Mental Status: He is alert        Coordination: Finger-Nose-Finger Test and Heel to [place] Test normal       Gait: Gait is intact  Deep Tendon Reflexes:      Reflex Scores:       Tricep reflexes are 2+ on the right side and 2+ on the left side  Bicep reflexes are 2+ on the right side and 2+ on the left side  Brachioradialis reflexes are 2+ on the right side and 2+ on the left side  Psychiatric:      Comments: Pleasant and cooperative during exam        Neurologic Exam     Mental Status   Follows 1 step commands  Attention: appropriately attends to provider  Concentration: no redirection required during exam    Speech: (No dysarthria on exam  Speech fluent)  Level of consciousness: alert  - oriented to self  - oriented to place   - oriented to month and year      Cranial Nerves     CN II   Visual acuity: (grossly intact )  Right visual field deficit: none  Left visual field deficit: none     CN III, IV, VI   Pupils are equal, round, and reactive to light  Extraocular motions are normal    Right pupil: Size: 3 mm  Shape: regular  Reactivity: brisk  Left pupil: Size: 3 mm  Shape: regular  Reactivity: brisk  Nystagmus: none     CN VII   Facial expression full, symmetric     Right facial weakness: none  Left facial weakness: none    CN VIII   Hearing impaired: audition intact to finger rub bilaterally     CN XII   CN XII normal    Tongue: not atrophic  Fasciculations: absent  Tongue deviation: none    Motor Exam   Muscle bulk: normal  Overall muscle tone: normal- b/l hand  5/5  - b/l elbow flexion and extension 5/5  - b/l hip flexion 5/5  - b/l knee flexion and extension 5/5  - b/l plantar flexion and dorsi flexion 5/5     Sensory Exam   Light touch normal    Right arm light touch: normal  Left arm light touch: normal  Right leg light touch: normal  Left leg light touch: normal  - sensation to temperature intact x 4 extremities   - proprioception intact b/l great toes      Gait, Coordination, and Reflexes     Gait  Gait: normal    Coordination   Finger to nose coordination: normal  Heel to shin coordination: normal    Reflexes   Right brachioradialis: 2+  Left brachioradialis: 2+  Right biceps: 2+  Left biceps: 2+  Right triceps: 2+  Left triceps: 2+  Right patellar reflex grade: trace  Left patellar reflex grade: trace  Right plantar reflex: downgoing  Left plantar reflex: downgoing       Lab Results:   CBC:   Results from last 7 days   Lab Units 07/29/22  1625   WBC Thousand/uL 9 06   RBC Million/uL 4 45   HEMOGLOBIN g/dL 13 6   HEMATOCRIT % 41 2   MCV fL 93   PLATELETS Thousands/uL 185   , BMP/CMP:   Results from last 7 days   Lab Units 07/30/22  0516 07/29/22  1625   SODIUM mmol/L 139 139   POTASSIUM mmol/L 3 8 4 3   CHLORIDE mmol/L 103 102   CO2 mmol/L 27 27   BUN mg/dL 25 26*   CREATININE mg/dL 1 21 1 25   CALCIUM mg/dL 9 2 9 8   AST U/L  --  16   ALT U/L  --  9   ALK PHOS U/L  --  61   EGFR ml/min/1 73sq m 57 55   , Vitamin B12:   , HgBA1C:   , TSH:   , Coagulation:   , Lipid Profile:   , Ammonia:   , Urinalysis:       Invalid input(s): URIBILINOGEN, Drug Screen:   , Medication Drug Levels:       Invalid input(s): CARBAMAZEPINE,  PHENOBARB, LACOSAMIDE, OXCARBAZEPINE  Imaging Studies: I have personally reviewed pertinent reports  and I have personally reviewed pertinent films in PACS [facility] wo contrast 7/30/22  EKG, Pathology, and Other Studies: I have personally reviewed pertinent reports  Echo 7/30/22  VTE Prophylaxis: Sequential compression device (Venodyne)  and Enoxaparin (Lovenox)    Code Status: Level 1 - Full Code  Advance Directive and Living Will:      Power of :    POLST:      Counseling / Coordination of Care  Please see attending attestation for amount of time spent  **History and physical examination obtained by attending neurologist  AP in room as witness to history and physical examination obtained and acted solely as a scribe for attending neurologist  This AP did not provide any decision making for this encounter  **

## 2022-07-30 NOTE — ASSESSMENT & PLAN NOTE
· Patient with history of diabetes mellitus well controlled on metformin  · Last A1c 6 2    Plan:  · Hold home medications  · Sliding scale insulin  · Diabetic diet

## 2022-07-30 NOTE — ASSESSMENT & PLAN NOTE
Patient's blood glucose is well controlled with metformin outpatient  His A1C in 2/22 was 6 2 with a random glucose of 113 today  Patient has some peripheral neuropathy resulting in tingling sensations in his feet off and on  Patient had no sensory deficit on examination      Plan:  Diabetic diet  Sliding scale insulin for BG control inpatient

## 2022-07-30 NOTE — ASSESSMENT & PLAN NOTE
Patient had a syncopal episode this AM  He bent forward to clean water off of the shower door, felt weak and sat on the floor because of light headedness and he lost consciousness  Found by partner a few minutes later  He had bowel and bladder incontinence during the episode but denies confusion after regaining consciousness  He denies hitting is head  Syncope possibly secondary to seizure like activity, arrhythmia  Patient has a history of left carotid endarterctomy, Coronary Bypass, and aortic valve repacement in 2021  Electrolytes are within range  Plan:  Telemetry to monitor heart rhythm    Orthostatic BP monitoring  Echo  EEG   Neurology Consult

## 2022-07-30 NOTE — DISCHARGE INSTR - AVS FIRST PAGE
Dear Mandeep Delgado,     It was our pleasure to care for you here at [facility]  It is our hope that we were always able to exceed the expected standards for your care during your stay  You were hospitalized due to syncope  You were cared for on the 3rd  floor by Andre Cabrera MD under the service of Dede Perez MD with the [facility] who covers for your primary care physician (PCP), Javad Patel MD, while you were hospitalized  If you have any questions or concerns related to this hospitalization, you may contact us at [phone]  For follow up as well as any medication refills, we recommend that you follow up with your primary care physician  A registered nurse will reach out to you by phone within a few days after your discharge to answer any additional questions that you may have after going home  However, at this time we provide for you here, the most important instructions / recommendations at discharge:     Notable Medication Adjustments -   Please take your medications as before  Testing Required after Discharge -   Loop recorder, EEG   Important follow up information -   Please follow up with your primary care doctor   Please call your cardiologist, Dr Tex Estrada and make an appointment  Neurology recommended a loop recorder implant( done by cardiology)  Discuss this with Dr Tex Estrada as soon as possible   Follow up with neurology   Other Instructions -   Please review this entire after visit summary as additional general instructions including medication list, appointments, activity, diet, any pertinent wound care, and other additional recommendations from your care team that may be provided for you        Sincerely,     Andre Cabrera MD

## 2022-07-30 NOTE — H&P
[facility]  H&P- Roladna Dey 1945, 69 y o  male MRN: [id number]  Unit/Bed#: S -01 Encounter: [id number]  Primary Care Provider: Clay Owusu MD   Date and time admitted to hospital: 7/29/2022  4:25 PM      * Syncope  Assessment & Plan  Patient had a syncopal episode this AM  He bent forward to clean water off of the shower door, felt weak and sat on the floor because of light headedness and he lost consciousness  Found by partner a few minutes later  He had bowel and bladder incontinence during the episode but denies confusion after regaining consciousness  He denies hitting is head  Syncope possibly secondary to seizure like activity, arrhythmia  Patient has a history of left carotid endarterctomy, Coronary Bypass, and aortic valve repacement in 2021  Electrolytes are within range  Troponin's are negative     Plan:  Telemetry to monitor heart rhythm  Orthostatic BP monitoring  Echo  EEG   Neurology Consult     Benign essential hypertension  Assessment & Plan  Patient's blood pressure in the ED has been ranging from 220 to 705 systolic and 83 to 99 diastolic  No acute symptoms      Plan:  Monitor BP  Continue home Blood pressure medication to maximize BP control: Coreg, lisinopril-hydrochlorothiazide  Hydralazine PRN for systolic BP over 523    Controlled diabetes mellitus with diabetic neuropathy ([facility] [address] )  Assessment & Plan  Patient's blood glucose is well controlled with metformin outpatient  His A1C in 2/22 was 6 2 with a random glucose of 113 today  Patient has some peripheral neuropathy resulting in tingling sensations in his feet off and on  Patient had no sensory deficit on examination      Plan:  Diabetic diet  Sliding scale insulin for BG control inpatient  VTE Pharmacologic Prophylaxis: VTE Score: 4 High Risk (Score >/= 5) - Pharmacological DVT Prophylaxis Ordered: enoxaparin (Lovenox)  Sequential Compression Devices Ordered    Code Status: Level 1 - Full Code Discussion with family: Patient declined call to   Anticipated Length of Stay: Patient will be admitted on an observation basis with an anticipated length of stay of less than 2 midnights secondary to syncope  Chief Complaint: Syncope    History of Present Illness:  Scott Bello is a 69 y o  male with a PMH of HTN, diabetes, carotid stenosis S/P left carotid enterectomy, coronary artery disease S/P CABG, and Aortic valve replacement who presents with syncope  He felt light headed while walking to the bathroom and while he showered  He bent forward to clean the water off of the bathroom door he felt weak, sat on the floor because of light headedness and lost consciousness  His partner heard a noise and found him in the shower after a few minutes, once he regained consciousness he remembered what had happened and denies hitting his head, he had bowel and bladder incontinence during the episode  He had some chills, he went back to bed and woke up a few hours at which time his symptoms had resolved  Patient went to see his primary care about the syncopal episode, and some abnormality was found on his EKG and patient came to the ED for further evaluation  In the ED Patient was asymptomatic  He denied chest pain, diaphoresis, light headedness, shortness of breath, headache, nausea vomiting, fever, chills, constipation or diarrhea  Patient drinks a couple of glasses of wine at night and denies smoking or use of drugs  Patient denies any history of seizures  After CABG procedure last year, he had an episode of atrial fibrillation which was converted with amiodarone  Patient was on a Holter monitor for a month and anticoagulation  No further rhythm abnormalities were found and anticoagulation was discontinued, patient currently takes a daily aspirin  Review of Systems:  Review of Systems   Constitutional: Positive for chills (brieftly after syncopal episode)   Negative for activity change, appetite change and fever  HENT: Negative for rhinorrhea and sore throat  Respiratory: Negative for cough, chest tightness and shortness of breath  Cardiovascular: Negative for chest pain, palpitations and leg swelling  Gastrointestinal: Positive for abdominal pain  Negative for constipation, diarrhea, nausea and vomiting  Genitourinary: Negative for difficulty urinating and dysuria  Musculoskeletal: Negative for arthralgias  Neurological: Positive for syncope and light-headedness  Negative for headaches  Psychiatric/Behavioral: Negative for confusion  Past Medical and Surgical History:   Past Medical History:   Diagnosis Date    Arthritis     generalized    Asthma     seasonal-does not use inhaler    Atrial fibrillation with RVR ([address] ) 5/12/2021    Back pain     Basal cell carcinoma 09/23/2021    BCC- Right Posterior Calf     Carotid artery stenosis     Diabetes mellitus (HCC)     type    GERD (gastroesophageal reflux disease)     Hiatal hernia     Hyperlipidemia     Hypertension     Peyronie's disease     last assessed [id number]    Seasonal allergies     Wears glasses        Past Surgical History:   Procedure Laterality Date    APPENDECTOMY      CAROTID ENDARTERECTOMY Left 10/29/2020    COLONOSCOPY N/A 1/22/2016    Procedure: COLONOSCOPY;  Surgeon: Carlos Wooten MD;  Location: [facility] GI [facility]; Service:     KNEE ARTHROSCOPY Left     KNEE ARTHROSCOPY W/ MENISCAL REPAIR Right     MOHS SURGERY Right 11/01/2021    BCC- Right Posterior Calf- Dr Federica Luna OPN W/STENTLESS TISSUE VALVE N/A 5/10/2021    Procedure: CORONARY ARTERY BYPASS GRAFT (CABG) X 5 USING LEFT GSV--> DIAGONAL,, RAMUS, PDA ,AND OM1, AND LEFT BALBIR-LAD WITH REPLACEMENT VALVE AORTIC USING 23 MM MURRAY MAGNA EASE(AVR);   Surgeon: Curtis Epps MD;  Location: [facility];  Service: Cardiac Surgery    CA THROMBOENDARTECTMY Manda Innocent INCIS Left 10/29/2020    Procedure: CAROTID ENDARTERECTOMY W/BOVINE PATCH ANGIOPLASTY AND COMPLETION DUPLEX IMAGING;  Surgeon: Pillo Shipman DO;  Location: [facility];  Service: Vascular       Meds/Allergies:  Prior to Admission medications    Medication Sig Start Date End Date Taking? Authorizing Provider   acetaminophen (TYLENOL) 325 mg tablet Take 1-2 tablets Q4-6 hours PRN pain  Do not take more than 4 grams in 24 hours  5/14/21  Yes Amanda Jones PA-C   aspirin 81 mg chewable tablet Chew 1 tablet (81 mg total) daily 5/14/21  Yes Amanda Jones PA-C   atorvastatin (LIPITOR) 80 mg tablet Take 1 tablet (80 mg total) by mouth daily with dinner 6/23/22  Yes Lizzie Trujillo DO   Blood Glucose Monitoring Suppl (FreeStyle [employer] System) w/Device KIT Test TID & QHS as directed  5/14/21  Yes Amanda Jones PA-C   carvedilol (COREG) 6 25 mg tablet Take 1 tablet (6 25 mg total) by mouth 2 (two) times a day with meals 6/27/22  Yes Lizzie Trujillo DO   FreeStyle [employer]x Test test strip Test TID & QHS as directed  5/14/21  Yes Amanda Jones PA-C   Lancets (freestyle) lancets Test TID & QHS as directed  5/14/21  Yes Amanda Jones PA-C   lisinopril-hydrochlorothiazide (PRINZIDE,ZESTORETIC) 20-25 MG per tablet Take 1 tablet by mouth daily 7/28/22  Yes Lizzie Trujillo DO   metFORMIN (GLUCOPHAGE) 1000 MG tablet Take 1 tablet (1,000 mg total) by mouth 2 (two) times a day with meals 1/4/22  Yes Jemal Langston MD     I have reviewed home medications with patient personally      Allergies: No Known Allergies    Social History:  Marital Status: Single   Occupation: Retired, worked in marketing  Patient Pre-hospital Living Situation: Home  Patient Pre-hospital Level of Mobility: walks  Patient Pre-hospital Diet Restrictions: diabetic diet  Substance Use History:   Social History     Substance and Sexual Activity   Alcohol Use Yes    Alcohol/week: 10 0 standard drinks    Types: 7 Glasses of wine, 3 Standard drinks or equivalent per week     Social History     Tobacco Use   Smoking Status Never Smoker   Smokeless Tobacco Never Used     Social History     Substance and Sexual Activity   Drug Use No       Family History:  Family History   Problem Relation Age of Onset    Heart attack Mother [de-identified]        CABG    Kidney failure Father     Diabetes Father         pre-diabetic       Physical Exam:     Vitals:   Blood Pressure: (!) 172/88 (07/29/22 2204)  Pulse: 64 (07/29/22 2202)  Temperature: 98 °F (36 7 °C) (07/29/22 2202)  Temp Source: Oral (07/29/22 2202)  Respirations: 18 (07/29/22 2202)  Height: 5' 9" (175 3 cm) (07/29/22 2208)  Weight - Scale: 70 6 kg (155 lb 10 3 oz) (07/29/22 2208)  SpO2: 98 % (07/29/22 2208)    Physical Exam  Constitutional:       Appearance: Normal appearance  He is normal weight  HENT:      Head: Normocephalic and atraumatic  Mouth/Throat:      Mouth: Mucous membranes are moist    Eyes:      Extraocular Movements: Extraocular movements intact  Pupils: Pupils are equal, round, and reactive to light  Cardiovascular:      Rate and Rhythm: Normal rate and regular rhythm  Heart sounds: Murmur heard  Pulmonary:      Effort: Pulmonary effort is normal       Breath sounds: No wheezing  Abdominal:      General: Abdomen is flat  Bowel sounds are normal       Tenderness: There is no abdominal tenderness  There is no guarding  Musculoskeletal:         General: No swelling or tenderness  Cervical back: Normal range of motion  No rigidity or tenderness  Skin:     General: Skin is warm  Capillary Refill: Capillary refill takes less than 2 seconds  Neurological:      General: No focal deficit present  Mental Status: He is alert and oriented to person, place, and time  Mental status is at baseline  Cranial Nerves: No cranial nerve deficit  Sensory: No sensory deficit  Motor: No weakness        Deep Tendon Reflexes: Reflexes normal    Psychiatric:         Mood and Affect: Mood normal          Behavior: Behavior normal  Thought Content: Thought content normal          Judgment: Judgment normal        Additional Data:     Lab Results:  Results from last 7 days   Lab Units 07/29/22  1625   WBC Thousand/uL 9 06   HEMOGLOBIN g/dL 13 6   HEMATOCRIT % 41 2   PLATELETS Thousands/uL 185   NEUTROS PCT % 63   LYMPHS PCT % 26   MONOS PCT % 7   EOS PCT % 3     Results from last 7 days   Lab Units 07/29/22  1625   SODIUM mmol/L 139   POTASSIUM mmol/L 4 3   CHLORIDE mmol/L 102   CO2 mmol/L 27   BUN mg/dL 26*   CREATININE mg/dL 1 25   ANION GAP mmol/L 10   CALCIUM mg/dL 9 8   ALBUMIN g/dL 4 7   TOTAL BILIRUBIN mg/dL 0 58   ALK PHOS U/L 61   ALT U/L 9   AST U/L 16   GLUCOSE RANDOM mg/dL 113         Results from last 7 days   Lab Units 07/29/22  2249   POC GLUCOSE mg/dl 115               Imaging: No pertinent imaging reviewed  No orders to display       EKG and Other Studies Reviewed on Admission:   · EKG: NSR  HR 68     ** Please Note: This note has been constructed using a voice recognition system   **

## 2022-07-30 NOTE — ASSESSMENT & PLAN NOTE
Patient has a CT head after syncopal episode with bowel incontinence; syncope vs seizure  Incidental finding on CT head:  Small region of hypodensity in the right frontal lobe potentially encephalomalacia and gliosis from prior infarct in this region      Follow-up with neurology and Cardiology   Patient will be discharged and have outpatient loop recorder and EEG

## 2022-07-30 NOTE — ASSESSMENT & PLAN NOTE
69 y o  right handed male with diabetes, HTN, CHF, PAF following cardiac catheterization, HLD, s/p L CEA, who presented to [facility] on 7/29/22 with syncope  Pt reports he felt light headed going into the shower yesterday morning x10-15 minutes  Pt reports feeling progressively dizzier while in the shower  Then, pt was using a squi-rachelle for his shower glass and felt so dizzy that he had to sit down in the shower  Pt denies any history of tongue bite  Pt denies previous episodes of BM incontinence  Pt reports losing bowel continence in shower yesterday  Pt denies tongue bite yesterday  More likely cardiac syncopal event, less likely seizure  Plan   - 14 Lashawn Farr completed 7/30/22   - recommend loop recorder placement due to 14 Lashawn Farr 7/30/22 showing chronic infarct  - recommend outpatient routine EEG   - continue home ASA 81 mg daily   - continue home atorvastatin   - no further inpatient neurology recommendations at this time

## 2022-07-30 NOTE — PROGRESS NOTES
[facility]  Progress Note - Sharilyn Soulier 1945, 69 y o  male MRN: [id number]  Unit/Bed#: S -01 Encounter: [id number]  Primary Care Provider: Shawna King MD   Date and time admitted to hospital: 7/29/2022  4:25 PM    * Syncope  Assessment & Plan  Patient had a syncopal episode this AM  He bent forward to clean water off of the shower door, felt weak and sat on the floor because of light headedness and he lost consciousness  Found by partner a few minutes later  He had bowel and bladder incontinence during the episode but denies confusion after regaining consciousness  He denies hitting is head  Syncope possibly secondary to seizure like activity, arrhythmia  Patient has a history of left carotid endarterctomy, Coronary Bypass, and aortic valve repacement in 2021  Electrolytes are within range  Plan:  · CT head ordered because of abnormal presentation of syncope with bowel incontinence  · Will follow  · Telemetry to monitor heart rhythm  · Orthostatic BP monitoring  · Echo  EEG   Neurology Consult     Controlled diabetes mellitus with diabetic neuropathy ([facility] [address] )  Assessment & Plan  · Patient with history of diabetes mellitus well controlled on metformin  · Last A1c 6 2    Plan:  · Hold home medications  · Sliding scale insulin  · Diabetic diet    Assessment & Plan  Patient's blood glucose is well controlled with metformin outpatient  His A1C in 2/22 was 6 2 with a random glucose of 113 today  Patient has some peripheral neuropathy resulting in tingling sensations in his feet off and on  Patient had no sensory deficit on examination  Plan:  Diabetic diet  Sliding scale insulin for BG control inpatient  Benign essential hypertension  Assessment & Plan  Patient's blood pressure in the ED has been ranging from 824 to 007 systolic and 83 to 99 diastolic  No acute symptoms      Plan:  Monitor BP  Continue home Blood pressure medication to maximize BP control: Coreg, lisinopril-hydrochlorothiazide  Hydralazine PRN for systolic BP over 504          VTE Pharmacologic Prophylaxis: VTE Score: 4 Moderate Risk (Score 3-4) - Pharmacological DVT Prophylaxis Ordered: enoxaparin (Lovenox)  Patient Centered Rounds: I performed bedside rounds with nursing staff today  Discussions with Specialists or Other Care Team Provider:  None    Education and Discussions with Family / Patient: Patient declined call to   Current Length of Stay: 0 day(s)  Current Patient Status: Observation   Certification Statement: The patient will continue to require additional inpatient hospital stay due to Monitor for syncope  Discharge Plan: Anticipate discharge tomorrow to home  Code Status: Level 1 - Full Code    Subjective:   Patient seen today  He has no complaints  He is not feeling dizzy or lightheaded  Spoke with him to further clarify his syncopal episode  He stated he had incontinence of bowel and also fell asleep and was snoring per his significant other  Stated he has been eating and drinking well over the past few days  Orthostatic blood pressure was normal   Has never had episode like this prior patient denies seizure or stroke history  She denies CP, SOB, abdominal pain, nausea, vomiting, fever, chills, or recent illness  Objective:     Vitals:   Temp (24hrs), Av 4 °F (36 9 °C), Min:98 °F (36 7 °C), Max:99 °F (37 2 °C)    Temp:  [98 °F (36 7 °C)-99 °F (37 2 °C)] 98 °F (36 7 °C)  HR:  [57-76] 57  Resp:  [18] 18  BP: (156-222)/() 166/85  SpO2:  [96 %-100 %] 100 %  Body mass index is 22 98 kg/m²  Input and Output Summary (last 24 hours):   No intake or output data in the 24 hours ending 22 1138    Physical Exam:   Physical Exam  Vitals and nursing note reviewed  Constitutional:       Appearance: He is well-developed  HENT:      Head: Normocephalic and atraumatic  Eyes:      Extraocular Movements: Extraocular movements intact  Conjunctiva/sclera: Conjunctivae normal    Cardiovascular:      Rate and Rhythm: Normal rate and regular rhythm  Heart sounds: No murmur heard  Pulmonary:      Effort: Pulmonary effort is normal  No respiratory distress  Breath sounds: Normal breath sounds  Abdominal:      Palpations: Abdomen is soft  Tenderness: There is no abdominal tenderness  Musculoskeletal:      Cervical back: Neck supple  Skin:     General: Skin is warm and dry  Neurological:      Mental Status: He is alert            Additional Data:     Labs:  Results from last 7 days   Lab Units 07/29/22  1625   WBC Thousand/uL 9 06   HEMOGLOBIN g/dL 13 6   HEMATOCRIT % 41 2   PLATELETS Thousands/uL 185   NEUTROS PCT % 63   LYMPHS PCT % 26   MONOS PCT % 7   EOS PCT % 3     Results from last 7 days   Lab Units 07/30/22  0516 07/29/22  1625   SODIUM mmol/L 139 139   POTASSIUM mmol/L 3 8 4 3   CHLORIDE mmol/L 103 102   CO2 mmol/L 27 27   BUN mg/dL 25 26*   CREATININE mg/dL 1 21 1 25   ANION GAP mmol/L 9 10   CALCIUM mg/dL 9 2 9 8   ALBUMIN g/dL  --  4 7   TOTAL BILIRUBIN mg/dL  --  0 58   ALK PHOS U/L  --  61   ALT U/L  --  9   AST U/L  --  16   GLUCOSE RANDOM mg/dL 102 113         Results from last 7 days   Lab Units 07/30/22  1130 07/30/22  0745 07/29/22  2249   POC GLUCOSE mg/dl 162* 126 115               Lines/Drains:  Invasive Devices  Report    Peripheral Intravenous Line  Duration           Peripheral IV 07/29/22 Right Antecubital <1 day                  Telemetry:  Telemetry Orders (From admission, onward)             48 Hour Telemetry Monitoring  Continuous x 48 hours        References:    Telemetry Guidelines   Question:  Reason for 48 Hour Telemetry  Answer:  Acute MI, chest pain - R/O MI, or unstable angina                 Telemetry Reviewed: Normal Sinus Rhythm  Indication for Continued Telemetry Use: Syncope             Imaging: Reviewed radiology reports from this admission including: CT head    Recent Cultures (last 7 days): Last 24 Hours Medication List:   Current Facility-Administered Medications   Medication Dose Route Frequency Provider Last Rate    acetaminophen  325 mg Oral Q6H PRN Honorio Gonzalez MD      aspirin  81 mg Oral Daily Honorio Gonzalez MD      atorvastatin  80 mg Oral Daily With Ashlee Atwood MD      carvedilol  6 25 mg Oral BID With Meals Honorio Gonzalez MD      enoxaparin  40 mg Subcutaneous Daily Honorio Gonzalez MD      hydrALAZINE  10 mg Intravenous Q6H PRN MD Cora Mancia insulin lispro  1-5 Units Subcutaneous TID AC MD Cora Mancia insulin lispro  1-5 Units Subcutaneous HS Honorio Gonzalez MD      lisinopril-hydrochlorothiazide (PRINZIDE 20/25) combo dose   Oral Daily Honorio Gonzalez MD          Today, Patient Was Seen By: Jesús English MD    **Please Note: This note may have been constructed using a voice recognition system  **

## 2022-07-30 NOTE — ASSESSMENT & PLAN NOTE
Patient's blood pressure in the ED has been ranging from 463 to 911 systolic and 83 to 99 diastolic  No acute symptoms      Continue PTA blood pressure medications: Coreg, lisinopril-hydrochlorothiazide

## 2022-07-30 NOTE — PROGRESS NOTES
[facility]  Progress Note - Aicha Payne 1945, 69 y o  male MRN: [id number]  Unit/Bed#: S -01 Encounter: [id number]  Primary Care Provider: Rosalva Gaitan MD   Date and time admitted to hospital: 7/29/2022  4:25 PM    * Syncope  Assessment & Plan  Patient had a syncopal episode this AM  He bent forward to use a sgueegee to get water off of the shower door, felt weak and sat on the floor because of light headedness and he lost consciousness  Found by partner a few minutes later  He had bowel and bladder incontinence during the episode but denies confusion after regaining consciousness  He denies hitting is head  Syncope possibly secondary to seizure like activity, arrhythmia  Patient has a history of left carotid endarterctomy, Coronary Bypass, and aortic valve repacement in 2021  Electrolytes are within range  Plan:  Telemetry to monitor heart rhythm  Orthostatic BP monitoring  Echo  EEG   Neurology Consult     Benign essential hypertension  Assessment & Plan  Patient's blood pressure in the ED has been ranging from 899 to 430 systolic and 83 to 99 diastolic  No acute symptoms  Plan:  Monitor BP  Continue home Blood pressure medication to maximize BP control: Coreg, lisinopril-hydrochlorothiazide  Hydralazine PRN for systolic BP over 162    Controlled diabetes mellitus with diabetic neuropathy ([facility] [address] )  Assessment & Plan  Patient's blood glucose is well controlled with metformin outpatient  His A1C in 2/22 was 6 2 with a random glucose of 113 today  Patient has some peripheral neuropathy resulting in tingling sensations in his feet off and on  Patient had no sensory deficit on examination  Plan:  Diabetic diet  Sliding scale insulin for BG control inpatient  VTE Pharmacologic Prophylaxis: VTE Score: 4 High Risk (Score >/= 5) - Pharmacological DVT Prophylaxis Ordered: enoxaparin (Lovenox)  Sequential Compression Devices Ordered    Code Status: Level 1 - Full Code Discussion with family: Patient declined call to   Anticipated Length of Stay: Patient will be admitted on an observation basis with an anticipated length of stay of less than 2 midnights secondary to syncope  Chief Complaint: Syncope    History of Present Illness:  Raudel Bergeron is a 69 y o  male with a PMH of HTN, diabetes, carotid stenosis S/P left carotid enterectomy, coronary artery disease S/P CABG, and Aortic valve replacement who presents with syncope  He felt light headed while walking to the bathroom and while he showered  He bent forward to clean the water off of the bathroom door he felt weak, sat on the floor because of light headedness and lost consciousness  His partner heard a noise and found him in the shower after a few minutes, once he regained consciousness he remembered what had happened and denies hitting his head, he had bowel and bladder incontinence during the episode  He had some chills, he went back to bed and woke up a few hours at which time his symptoms had resolved  Patient went to see his primary care about the syncopal episode, and some abnormality was found on his EKG and patient came to the ED for further evaluation  In the ED Patient was asymptomatic  He denied chest pain, diaphoresis, light headedness, shortness of breath, headache, nausea vomiting, fever, chills, constipation or diarrhea  Patient drinks a couple of glasses of wine at night and denies smoking or use of drugs  Patient denies any history of seizures  After CABG procedure last year, he had an episode of atrial fibrillation which was converted with amiodarone  Patient was on a Holter monitor for a month and anticoagulation  No further rhythm abnormalities were found and anticoagulation was discontinued, patient currently takes a daily aspirin  Review of Systems:  Review of Systems   Constitutional: Positive for chills (brieftly after syncopal episode)   Negative for activity change, appetite change and fever  HENT: Negative for rhinorrhea and sore throat  Respiratory: Negative for cough, chest tightness and shortness of breath  Cardiovascular: Negative for chest pain, palpitations and leg swelling  Gastrointestinal: Positive for abdominal pain  Negative for constipation, diarrhea, nausea and vomiting  Genitourinary: Negative for difficulty urinating and dysuria  Musculoskeletal: Negative for arthralgias  Neurological: Positive for syncope and light-headedness  Negative for headaches  Psychiatric/Behavioral: Negative for confusion  Past Medical and Surgical History:   Past Medical History:   Diagnosis Date    Arthritis     generalized    Asthma     seasonal-does not use inhaler    Atrial fibrillation with RVR ([address] ) 5/12/2021    Back pain     Basal cell carcinoma 09/23/2021    BCC- Right Posterior Calf     Carotid artery stenosis     Diabetes mellitus (HCC)     type    GERD (gastroesophageal reflux disease)     Hiatal hernia     Hyperlipidemia     Hypertension     Peyronie's disease     last assessed [id number]    Seasonal allergies     Wears glasses        Past Surgical History:   Procedure Laterality Date    APPENDECTOMY      CAROTID ENDARTERECTOMY Left 10/29/2020    COLONOSCOPY N/A 1/22/2016    Procedure: COLONOSCOPY;  Surgeon: Donta Joyner MD;  Location: [facility]; Service:     KNEE ARTHROSCOPY Left     KNEE ARTHROSCOPY W/ MENISCAL REPAIR Right     MOHS SURGERY Right 11/01/2021    BCC- Right Posterior Calf- Dr Radu Berry OPN W/STENTLESS TISSUE VALVE N/A 5/10/2021    Procedure: CORONARY ARTERY BYPASS GRAFT (CABG) X 5 USING LEFT GSV--> DIAGONAL,, RAMUS, PDA ,AND OM1, AND LEFT BALBIR-LAD WITH REPLACEMENT VALVE AORTIC USING 23 MM MURRAY MAGNA EASE(AVR);   Surgeon: Brooke Osorio MD;  Location:  MAIN OR;  Service: Cardiac Surgery    [place] THROMBOENDARTECTMY Meredith Moyere INCIS Left 10/29/2020    Procedure: CAROTID ENDARTERECTOMY W/BOVINE PATCH ANGIOPLASTY AND COMPLETION DUPLEX IMAGING;  Surgeon: Candelaria Zazueta DO;  Location: [facility];  Service: Vascular       Meds/Allergies:  Prior to Admission medications    Medication Sig Start Date End Date Taking? Authorizing Provider   acetaminophen (TYLENOL) 325 mg tablet Take 1-2 tablets Q4-6 hours PRN pain  Do not take more than 4 grams in 24 hours  5/14/21  Yes Amanda Jones PA-C   aspirin 81 mg chewable tablet Chew 1 tablet (81 mg total) daily 5/14/21  Yes Amanda Jones PA-C   atorvastatin (LIPITOR) 80 mg tablet Take 1 tablet (80 mg total) by mouth daily with dinner 6/23/22  Yes Lizzie Trujillo DO   Blood Glucose Monitoring Suppl (FreeStyle InsuLin[employer] System) w/Device KIT Test TID & QHS as directed  5/14/21  Yes Amanda Jones PA-C   carvedilol (COREG) 6 25 mg tablet Take 1 tablet (6 25 mg total) by mouth 2 (two) times a day with meals 6/27/22  Yes Lizzie Trujillo DO   FreeStyle InsuLinx Test test strip Test TID & QHS as directed  5/14/21  Yes Amanda Jones PA-C   Lancets (freestyle) lancets Test TID & QHS as directed  5/14/21  Yes Amanda Jones PA-C   lisinopril-hydrochlorothiazide (PRINZIDE,ZESTORETIC) 20-25 MG per tablet Take 1 tablet by mouth daily 7/28/22  Yes Lizzie Trujillo DO   metFORMIN (GLUCOPHAGE) 1000 MG tablet Take 1 tablet (1,000 mg total) by mouth 2 (two) times a day with meals 1/4/22  Yes Javad Patel MD     I have reviewed home medications with patient personally      Allergies: No Known Allergies    Social History:  Marital Status: Single   Occupation: Retired, worked in marketing  Patient Pre-hospital Living Situation: Home  Patient Pre-hospital Level of Mobility: walks  Patient Pre-hospital Diet Restrictions: diabetic diet  Substance Use History:   Social History     Substance and Sexual Activity   Alcohol Use Yes    Alcohol/week: 10 0 standard drinks    Types: 7 Glasses of wine, 3 Standard drinks or equivalent per week     Social History     Tobacco Use   Smoking Status Never Smoker   Smokeless Tobacco Never Used     Social History     Substance and Sexual Activity   Drug Use No       Family History:  Family History   Problem Relation Age of Onset    Heart attack Mother [de-identified]        CABG    Kidney failure Father     Diabetes Father         pre-diabetic       Physical Exam:     Vitals:   Blood Pressure: (!) 172/88 (07/29/22 2204)  Pulse: 64 (07/29/22 2202)  Temperature: 98 °F (36 7 °C) (07/29/22 2202)  Temp Source: Oral (07/29/22 2202)  Respirations: 18 (07/29/22 2202)  Height: 5' 9" (175 3 cm) (07/29/22 2208)  Weight - Scale: 70 6 kg (155 lb 10 3 oz) (07/29/22 2208)  SpO2: 98 % (07/29/22 2208)    Physical Exam  Constitutional:       Appearance: Normal appearance  He is normal weight  HENT:      Head: Normocephalic and atraumatic  Mouth/Throat:      Mouth: Mucous membranes are moist    Eyes:      Extraocular Movements: Extraocular movements intact  Pupils: Pupils are equal, round, and reactive to light  Cardiovascular:      Rate and Rhythm: Normal rate and regular rhythm  Heart sounds: Murmur heard  Pulmonary:      Effort: Pulmonary effort is normal       Breath sounds: No wheezing  Abdominal:      General: Abdomen is flat  Bowel sounds are normal       Tenderness: There is no abdominal tenderness  There is no guarding  Musculoskeletal:         General: No swelling or tenderness  Cervical back: Normal range of motion  No rigidity or tenderness  Skin:     General: Skin is warm  Capillary Refill: Capillary refill takes less than 2 seconds  Neurological:      General: No focal deficit present  Mental Status: He is alert and oriented to person, place, and time  Mental status is at baseline  Cranial Nerves: No cranial nerve deficit  Sensory: No sensory deficit  Motor: No weakness        Deep Tendon Reflexes: Reflexes normal    Psychiatric:         Mood and Affect: Mood normal          Behavior: Behavior normal  Thought Content: Thought content normal          Judgment: Judgment normal        Additional Data:     Lab Results:  Results from last 7 days   Lab Units 07/29/22  1625   WBC Thousand/uL 9 06   HEMOGLOBIN g/dL 13 6   HEMATOCRIT % 41 2   PLATELETS Thousands/uL 185   NEUTROS PCT % 63   LYMPHS PCT % 26   MONOS PCT % 7   EOS PCT % 3     Results from last 7 days   Lab Units 07/29/22  1625   SODIUM mmol/L 139   POTASSIUM mmol/L 4 3   CHLORIDE mmol/L 102   CO2 mmol/L 27   BUN mg/dL 26*   CREATININE mg/dL 1 25   ANION GAP mmol/L 10   CALCIUM mg/dL 9 8   ALBUMIN g/dL 4 7   TOTAL BILIRUBIN mg/dL 0 58   ALK PHOS U/L 61   ALT U/L 9   AST U/L 16   GLUCOSE RANDOM mg/dL 113         Results from last 7 days   Lab Units 07/29/22  2249   POC GLUCOSE mg/dl 115               Imaging: No pertinent imaging reviewed  No orders to display       EKG and Other Studies Reviewed on Admission:   · EKG: NSR  HR 68     ** Please Note: This note has been constructed using a voice recognition system   **

## 2022-07-30 NOTE — ASSESSMENT & PLAN NOTE
Patient's blood pressure in the ED has been ranging from 157 to 997 systolic and 83 to 99 diastolic  No acute symptoms      Plan:  Monitor BP  Continue home Blood pressure medication to maximize BP control: Coreg, lisinopril-hydrochlorothiazide  Hydralazine PRN for systolic BP over 856

## 2022-07-30 NOTE — H&P
[facility]  H&P- Halie Clinton 1945, 69 y o  male MRN: [id number]  Unit/Bed#: S -01 Encounter: [id number]  Primary Care Provider: Nacho Zhang MD   Date and time admitted to hospital: 7/29/2022  4:25 PM    * Syncope  Assessment & Plan  Patient had a syncopal episode this AM  He bent forward to use a sgueegee to get water off of the shower door, felt weak and sat on the floor because of light headedness and he lost consciousness  Found by partner a few minutes later  He had bowel and bladder incontinence during the episode but denies confusion after regaining consciousness  He denies hitting is head  Syncope possibly secondary to seizure like activity, arrhythmia  Patient has a history of left carotid endarterctomy, Coronary Bypass, and aortic valve repacement in 2021  Electrolytes are within range  Plan:  Telemetry to monitor heart rhythm  Orthostatic BP monitoring  Echo  EEG   Neurology Consult     Benign essential hypertension  Assessment & Plan  Patient's blood pressure in the ED has been ranging from 328 to 134 systolic and 83 to 99 diastolic  No acute symptoms  Plan:  Monitor BP  Continue home Blood pressure medication to maximize BP control: Coreg, lisinopril-hydrochlorothiazide  Hydralazine PRN for systolic BP over 654    Controlled diabetes mellitus with diabetic neuropathy ([facility] [address] )  Assessment & Plan  Patient's blood glucose is well controlled with metformin outpatient  His A1C in 2/22 was 6 2 with a random glucose of 113 today  Patient has some peripheral neuropathy resulting in tingling sensations in his feet off and on  Patient had no sensory deficit on examination  Plan:  Diabetic diet  Sliding scale insulin for BG control inpatient  VTE Pharmacologic Prophylaxis: VTE Score: 4 High Risk (Score >/= 5) - Pharmacological DVT Prophylaxis Ordered: enoxaparin (Lovenox)  Sequential Compression Devices Ordered    Code Status: Level 1 - Full Code Discussion with family: Patient declined call to   Anticipated Length of Stay: Patient will be admitted on an observation basis with an anticipated length of stay of less than 2 midnights secondary to syncope  Chief Complaint: Syncope    History of Present Illness:  Cruz Dotson is a 69 y o  male with a PMH of HTN, diabetes, carotid stenosis S/P left carotid enterectomy, coronary artery disease S/P CABG, and Aortic valve replacement who presents with syncope  He felt light headed while walking to the bathroom and while he showered  He bent forward to clean the water off of the bathroom door he felt weak, sat on the floor because of light headedness and lost consciousness  His partner heard a noise and found him in the shower after a few minutes, once he regained consciousness he remembered what had happened and denies hitting his head, he had bowel and bladder incontinence during the episode  He had some chills, he went back to bed and woke up a few hours at which time his symptoms had resolved  Patient went to see his primary care about the syncopal episode, and some abnormality was found on his EKG and patient came to the ED for further evaluation  In the ED Patient was asymptomatic  He denied chest pain, diaphoresis, light headedness, shortness of breath, headache, nausea vomiting, fever, chills, constipation or diarrhea  Patient drinks a couple of glasses of wine at night and denies smoking or use of drugs  Patient denies any history of seizures  After CABG procedure last year, he had an episode of atrial fibrillation which was converted with amiodarone  Patient was on a Holter monitor for a month and anticoagulation  No further rhythm abnormalities were found and anticoagulation was discontinued, patient currently takes a daily aspirin  Review of Systems:  Review of Systems   Constitutional: Positive for chills (brieftly after syncopal episode)   Negative for activity change, appetite change and fever  HENT: Negative for rhinorrhea and sore throat  Respiratory: Negative for cough, chest tightness and shortness of breath  Cardiovascular: Negative for chest pain, palpitations and leg swelling  Gastrointestinal: Positive for abdominal pain  Negative for constipation, diarrhea, nausea and vomiting  Genitourinary: Negative for difficulty urinating and dysuria  Musculoskeletal: Negative for arthralgias  Neurological: Positive for syncope and light-headedness  Negative for headaches  Psychiatric/Behavioral: Negative for confusion  Past Medical and Surgical History:   Past Medical History:   Diagnosis Date    Arthritis     generalized    Asthma     seasonal-does not use inhaler    Atrial fibrillation with RVR ([address] ) 5/12/2021    Back pain     Basal cell carcinoma 09/23/2021    BCC- Right Posterior Calf     Carotid artery stenosis     Diabetes mellitus (HCC)     type    GERD (gastroesophageal reflux disease)     Hiatal hernia     Hyperlipidemia     Hypertension     Peyronie's disease     last assessed [id number]    Seasonal allergies     Wears glasses        Past Surgical History:   Procedure Laterality Date    APPENDECTOMY      CAROTID ENDARTERECTOMY Left 10/29/2020    COLONOSCOPY N/A 1/22/2016    Procedure: COLONOSCOPY;  Surgeon: Dayne Walsh MD;  Location: [facility]; Service:     KNEE ARTHROSCOPY Left     KNEE ARTHROSCOPY W/ MENISCAL REPAIR Right     MOHS SURGERY Right 11/01/2021    BCC- Right Posterior Calf- Dr Darrel Celis OPN W/STENTLESS TISSUE VALVE N/A 5/10/2021    Procedure: CORONARY ARTERY BYPASS GRAFT (CABG) X 5 USING LEFT GSV--> DIAGONAL,, RAMUS, PDA ,AND OM1, AND LEFT BALBIR-LAD WITH REPLACEMENT VALVE AORTIC USING 23 MM MURRAY MAGNA EASE(AVR);   Surgeon: Babs Ojeda MD;  Location:  MAIN OR;  Service: Cardiac Surgery    [place] THROMBOENDARTECTMY Conneula Noel INCIS Left 10/29/2020    Procedure: CAROTID ENDARTERECTOMY W/BOVINE PATCH ANGIOPLASTY AND COMPLETION DUPLEX IMAGING;  Surgeon: Tracey Brooks DO;  Location: [facility];  Service: Vascular       Meds/Allergies:  Prior to Admission medications    Medication Sig Start Date End Date Taking? Authorizing Provider   acetaminophen (TYLENOL) 325 mg tablet Take 1-2 tablets Q4-6 hours PRN pain  Do not take more than 4 grams in 24 hours  5/14/21  Yes Amanda Jones PA-C   aspirin 81 mg chewable tablet Chew 1 tablet (81 mg total) daily 5/14/21  Yes Amanda Jones PA-C   atorvastatin (LIPITOR) 80 mg tablet Take 1 tablet (80 mg total) by mouth daily with dinner 6/23/22  Yes Lizzie Trujillo DO   Blood Glucose Monitoring Suppl (FreeStyle Insu[employer] System) w/Device KIT Test TID & QHS as directed  5/14/21  Yes Amanda Jones PA-C   carvedilol (COREG) 6 25 mg tablet Take 1 tablet (6 25 mg total) by mouth 2 (two) times a day with meals 6/27/22  Yes Lizzie Trujillo DO   FreeStyle Insu[employer]x Test test strip Test TID & QHS as directed  5/14/21  Yes Amanda Jones PA-C   Lancets (freestyle) lancets Test TID & QHS as directed  5/14/21  Yes Amanda Jones PA-C   lisinopril-hydrochlorothiazide (PRINZIDE,ZESTORETIC) 20-25 MG per tablet Take 1 tablet by mouth daily 7/28/22  Yes Lizzie Trujillo DO   metFORMIN (GLUCOPHAGE) 1000 MG tablet Take 1 tablet (1,000 mg total) by mouth 2 (two) times a day with meals 1/4/22  Yes Saed Crespo MD     I have reviewed home medications with patient personally      Allergies: No Known Allergies    Social History:  Marital Status: Single   Occupation: Retired, worked in marketing  Patient Pre-hospital Living Situation: Home  Patient Pre-hospital Level of Mobility: walks  Patient Pre-hospital Diet Restrictions: diabetic diet  Substance Use History:   Social History     Substance and Sexual Activity   Alcohol Use Yes    Alcohol/week: 10 0 standard drinks    Types: 7 Glasses of wine, 3 Standard drinks or equivalent per week     Social History     Tobacco Use   Smoking Status Never Smoker   Smokeless Tobacco Never Used     Social History     Substance and Sexual Activity   Drug Use No       Family History:  Family History   Problem Relation Age of Onset    Heart attack Mother [de-identified]        CABG    Kidney failure Father     Diabetes Father         pre-diabetic       Physical Exam:     Vitals:   Blood Pressure: (!) 172/88 (07/29/22 2204)  Pulse: 64 (07/29/22 2202)  Temperature: 98 °F (36 7 °C) (07/29/22 2202)  Temp Source: Oral (07/29/22 2202)  Respirations: 18 (07/29/22 2202)  Height: 5' 9" (175 3 cm) (07/29/22 2208)  Weight - Scale: 70 6 kg (155 lb 10 3 oz) (07/29/22 2208)  SpO2: 98 % (07/29/22 2208)    Physical Exam  Constitutional:       Appearance: Normal appearance  He is normal weight  HENT:      Head: Normocephalic and atraumatic  Mouth/Throat:      Mouth: Mucous membranes are moist    Eyes:      Extraocular Movements: Extraocular movements intact  Pupils: Pupils are equal, round, and reactive to light  Cardiovascular:      Rate and Rhythm: Normal rate and regular rhythm  Heart sounds: Murmur heard  Pulmonary:      Effort: Pulmonary effort is normal       Breath sounds: No wheezing  Abdominal:      General: Abdomen is flat  Bowel sounds are normal       Tenderness: There is no abdominal tenderness  There is no guarding  Musculoskeletal:         General: No swelling or tenderness  Cervical back: Normal range of motion  No rigidity or tenderness  Skin:     General: Skin is warm  Capillary Refill: Capillary refill takes less than 2 seconds  Neurological:      General: No focal deficit present  Mental Status: He is alert and oriented to person, place, and time  Mental status is at baseline  Cranial Nerves: No cranial nerve deficit  Sensory: No sensory deficit  Motor: No weakness        Deep Tendon Reflexes: Reflexes normal    Psychiatric:         Mood and Affect: Mood normal          Behavior: Behavior normal  Thought Content: Thought content normal          Judgment: Judgment normal        Additional Data:     Lab Results:  Results from last 7 days   Lab Units 07/29/22  1625   WBC Thousand/uL 9 06   HEMOGLOBIN g/dL 13 6   HEMATOCRIT % 41 2   PLATELETS Thousands/uL 185   NEUTROS PCT % 63   LYMPHS PCT % 26   MONOS PCT % 7   EOS PCT % 3     Results from last 7 days   Lab Units 07/29/22  1625   SODIUM mmol/L 139   POTASSIUM mmol/L 4 3   CHLORIDE mmol/L 102   CO2 mmol/L 27   BUN mg/dL 26*   CREATININE mg/dL 1 25   ANION GAP mmol/L 10   CALCIUM mg/dL 9 8   ALBUMIN g/dL 4 7   TOTAL BILIRUBIN mg/dL 0 58   ALK PHOS U/L 61   ALT U/L 9   AST U/L 16   GLUCOSE RANDOM mg/dL 113         Results from last 7 days   Lab Units 07/29/22  2249   POC GLUCOSE mg/dl 115               Imaging: No pertinent imaging reviewed  No orders to display       EKG and Other Studies Reviewed on Admission:   · EKG: NSR  HR 68     ** Please Note: This note has been constructed using a voice recognition system   **

## 2022-07-30 NOTE — ASSESSMENT & PLAN NOTE
Patient had a syncopal episode this AM  He bent forward to clean water off of the shower door, felt weak and sat on the floor because of light headedness and he lost consciousness  Found by partner a few minutes later  He had bowel and bladder incontinence during the episode but denies confusion after regaining consciousness  He denies hitting is head  Syncope possibly secondary to seizure like activity, arrhythmia  Patient has a history of left carotid endarterctomy, Coronary Bypass, and aortic valve repacement in 2021  Electrolytes are within range        · CT head ordered because of abnormal presentation of syncope with bowel incontinence  · Small region of hypodensity in the right frontal lobe potentially encephalomalacia and gliosis from prior infarct demonstrated  EEG outpatient  Loop recorder patient  Neurology and Cardiology outpatient referrals

## 2022-07-30 NOTE — ASSESSMENT & PLAN NOTE
Patient had a syncopal episode this AM  He bent forward to clean water off of the shower door, felt weak and sat on the floor because of light headedness and he lost consciousness  Found by partner a few minutes later  He had bowel and bladder incontinence during the episode but denies confusion after regaining consciousness  He denies hitting is head  Syncope possibly secondary to seizure like activity, arrhythmia  Patient has a history of left carotid endarterctomy, Coronary Bypass, and aortic valve repacement in 2021  Electrolytes are within range  Plan:  · CT head ordered because of abnormal presentation of syncope with bowel incontinence  · Will follow  · Telemetry to monitor heart rhythm    · Orthostatic BP monitoring: negative  · Echo  EEG   Neurology Consult

## 2022-07-30 NOTE — ASSESSMENT & PLAN NOTE
· Patient with history of diabetes mellitus well controlled on metformin  · Last A1c 6 2    · Restart home medications:  Metformin

## 2022-07-30 NOTE — ASSESSMENT & PLAN NOTE
Lab Results   Component Value Date    HGBA1C 6 2 02/03/2022       Recent Labs     07/29/22  2249 07/30/22  0745 07/30/22  1130   POCGLU 115 126 162*       Blood Sugar Average: Last 72 hrs:  (P) 766 [id number]     - goal euglycemia   - continue management per primary team

## 2022-07-30 NOTE — ASSESSMENT & PLAN NOTE
Patient's blood pressure in the ED has been ranging from 558 to 297 systolic and 83 to 99 diastolic   No acute symptoms      Plan:  Monitor BP  Continue home Blood pressure medication to maximize BP control: Coreg, lisinopril-hydrochlorothiazide  Hydralazine PRN for systolic BP over 103

## 2022-07-30 NOTE — PLAN OF CARE
Problem: Potential for Falls  Goal: Patient will remain free of falls  Description: INTERVENTIONS:  - Educate patient/family on patient safety including physical limitations  - Instruct patient to call for assistance with activity   - Consult OT/PT to assist with strengthening/mobility   - Keep Call bell within reach  - Keep bed low and locked with side rails adjusted as appropriate  - Keep care items and personal belongings within reach  - Initiate and maintain comfort rounds  - Make Fall Risk Sign visible to staff  - Offer Toileting every  Hours, in advance of need  - Initiate/Maintain alarm  - Obtain necessary fall risk management equipment:   - Apply yellow socks and bracelet for high fall risk patients  - Consider moving patient to room near nurses station  Outcome: Progressing     Problem: SAFETY ADULT  Goal: Patient will remain free of falls  Description: INTERVENTIONS:  - Educate patient/family on patient safety including physical limitations  - Instruct patient to call for assistance with activity   - Consult OT/PT to assist with strengthening/mobility   - Keep Call bell within reach  - Keep bed low and locked with side rails adjusted as appropriate  - Keep care items and personal belongings within reach  - Initiate and maintain comfort rounds  - Make Fall Risk Sign visible to staff  - Offer Toileting every  Hours, in advance of need  - Initiate/Maintain alarm  - Obtain necessary fall risk management equipme  - Apply yellow socks and bracelet for high fall risk patients  - Consider moving patient to room near nurses station  Outcome: Progressing     Problem: DISCHARGE PLANNING  Goal: Discharge to home or other facility with appropriate resources  Description: INTERVENTIONS:  - Identify barriers to discharge w/patient and caregiver  - Arrange for needed discharge resources and transportation as appropriate  - Identify discharge learning needs (meds, wound care, etc )  - Arrange for interpretive services to assist at discharge as needed  - Refer to Case Management Department for coordinating discharge planning if the patient needs post-hospital services based on physician/advanced practitioner order or complex needs related to functional status, cognitive ability, or social support system  Outcome: Progressing     Problem: Knowledge Deficit  Goal: Patient/family/caregiver demonstrates understanding of disease process, treatment plan, medications, and discharge instructions  Description: Complete learning assessment and assess knowledge base    Interventions:  - Provide teaching at level of understanding  - Provide teaching via preferred learning methods  Outcome: Progressing     Problem: CARDIOVASCULAR - ADULT  Goal: Maintains optimal cardiac output and hemodynamic stability  Description: INTERVENTIONS:  - Monitor I/O, vital signs and rhythm  - Monitor for S/S and trends of decreased cardiac output  - Administer and titrate ordered vasoactive medications to optimize hemodynamic stability  - Assess quality of pulses, skin color and temperature  - Assess for signs of decreased coronary artery perfusion  - Instruct patient to report change in severity of symptoms  Outcome: Progressing  Goal: Absence of cardiac dysrhythmias or at baseline rhythm  Description: INTERVENTIONS:  - Continuous cardiac monitoring, vital signs, obtain 12 lead EKG if ordered  - Administer antiarrhythmic and heart rate control medications as ordered  - Monitor electrolytes and administer replacement therapy as ordered  Outcome: Progressing

## 2022-07-30 NOTE — DISCHARGE SUMMARY
[facility]  Discharge- [facility] 1945, 69 y o  male MRN: [id number]  Unit/Bed#: S -01 Encounter: [id number]  Primary Care Provider: Gregory Nugent MD   Date and time admitted to hospital: 7/29/2022  4:25 PM    * Syncope  Assessment & Plan  Patient had a syncopal episode this AM  He bent forward to clean water off of the shower door, felt weak and sat on the floor because of light headedness and he lost consciousness  Found by partner a few minutes later  He had bowel and bladder incontinence during the episode but denies confusion after regaining consciousness  He denies hitting is head  Syncope possibly secondary to seizure like activity, arrhythmia  Patient has a history of left carotid endarterctomy, Coronary Bypass, and aortic valve repacement in 2021  Electrolytes are within range  · CT head ordered because of abnormal presentation of syncope with bowel incontinence  · Small region of hypodensity in the right frontal lobe potentially encephalomalacia and gliosis from prior infarct demonstrated  EEG outpatient  Loop recorder patient  Neurology and Cardiology outpatient referrals    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  Assessment & Plan  Patient has a CT head after syncopal episode with bowel incontinence; syncope vs seizure  Incidental finding on CT head:  Small region of hypodensity in the right frontal lobe potentially encephalomalacia and gliosis from prior infarct in this region      Follow-up with neurology and Cardiology   Patient will be discharged and have outpatient loop recorder and EEG        Controlled diabetes mellitus with diabetic neuropathy ([facility] [address] )  Assessment & Plan  · Patient with history of diabetes mellitus well controlled on metformin  · Last A1c 6 2    · Restart home medications:  Metformin    Benign essential hypertension  Assessment & Plan  Patient's blood pressure in the ED has been ranging from 658 to 533 systolic and 83 to 99 diastolic  No acute symptoms  Continue PTA blood pressure medications: Coreg, lisinopril-hydrochlorothiazide        Discharging Resident Physician: Cj Urbina MD  Attending: Cindy Atwood MD  PCP: Osmani Lange MD  Admission Date: 7/29/2022  Discharge Date: 07/30/22    Disposition:     Home    Reason for Admission:  Syncopal episode    Consultations During Hospital Stay:  · Neurology    Procedures Performed:     · None    Significant Findings / Test Results:     CT head wo contrast    Result Date: 7/30/2022  Impression: 1  No intracranial hemorrhage or calvarial fracture  2   Small region of hypodensity in the right frontal lobe, potentially encephalomalacia and gliosis from prior infarct in this region  Correlation with clinical history recommended  Further characterization with nonemergent contrast-enhanced MRI of the  brain could be obtained, especially in the absence of prior studies  If prior studies become available, an addendum could be issued comparing the current findings to prior studies  Further clinical follow-up recommended    Echo complete w/ contrast if indicated    Interpretation Summary    Left Ventricle: Left ventricular cavity size is normal  Wall thickness is mildly increased  There is mild concentric hypertrophy  The left ventricular ejection fraction is 50%  Systolic function is low normal  Wall motion is normal     Left Atrium: The atrium is moderately dilated    Right Atrium: The atrium is mildly dilated    Aortic Valve: There is a bioprosthetic valve  The prosthetic valve appears to be functioning normally  There is no evidence of regurgitation  There is no evidence of paravalvular regurgitation    Mitral Valve: There is moderate diffuse thickening  There is moderate diffuse calcification  There is moderately reduced mobility  There is mild regurgitation  There is mild to moderate stenosis    Tricuspid Valve: There is mild regurgitation     Pulmonary Artery: The pulmonary artery systolic pressure is normal       Incidental Findings:   · CT head showing:  Small region of hypodensity in the right frontal lobe, potentially encephalomalacia and gliosis from prior infarct in this region  Test Results Pending at Discharge (will require follow up): · None     Outpatient Tests Requested:  · EEG  · Loop recorder    Complications:  None    Hospital Course:     Leandro Walker is a 69 y o  male patient who originally presented to the hospital on 7/29/2022 due to syncopal episode  Patient was feeling lightheaded when walking to the bathroom and when he was taking a shower  He  was bending over to wipe down the glass afterward his shower, sat down and lost consciousness  He denies hitting his head  Patient was awoken by his significant other who stated he was snoring  He noticed he had bowel incontinence  Patient went back to bed and symptoms resolved  He then went to his PCP who suggested he come to the emergency department  Workup in the emergency department was negative  EKG showed possible left atrial enlargement and incomplete right bundle branch block and left ventricular hypertrophy  Patient was not experiencing symptoms fall in the emergency department including dizziness, lightheadedness, vertigo chest pain, SOB, nausea, vomiting, fever or chills  He was admitted and monitored on tele while in the hospital   Telemetry showed no anomalies  CT head because patient experienced bowel incontinence during the syncopal episode  CT head showed incidental finding of small region of hypodensity in the right frontal lobe potentially encephalomalacia and gliosis from prior infarct in this region  Echocardiogram was also performed showing moderately dilated left atrium and ejection fraction of 50%    The patient was discharged with follow ups for Neurology and Cardiology with outpatient EEG and loop recorder referral   Patient was discharged to home in stable condition  Condition at Discharge: stable     Discharge Day Visit / Exam:     Subjective:  Patient was seen today and does not have any complaints  He denies dizziness, lightheadedness, syncope  Today he is eating and drinking well  Vitals: Blood Pressure: 159/84 (07/30/22 1147)  Pulse: 59 (07/30/22 1147)  Temperature: 98 °F (36 7 °C) (07/30/22 0808)  Temp Source: Oral (07/29/22 2202)  Respirations: 18 (07/30/22 1147)  Height: 5' 9" (175 3 cm) (07/30/22 1147)  Weight - Scale: 70 3 kg (155 lb) (07/30/22 1147)  SpO2: 99 % (07/30/22 1147)  Exam:   Physical Exam  Constitutional:       General: He is not in acute distress  Appearance: He is not ill-appearing or toxic-appearing  HENT:      Head: Normocephalic and atraumatic  Nose: No congestion or rhinorrhea  Mouth/Throat:      Pharynx: No oropharyngeal exudate or posterior oropharyngeal erythema  Eyes:      General: No scleral icterus  Right eye: No discharge  Left eye: No discharge  Extraocular Movements: Extraocular movements intact  Pupils: Pupils are equal, round, and reactive to light  Cardiovascular:      Rate and Rhythm: Normal rate and regular rhythm  Pulses: Normal pulses  Heart sounds: Normal heart sounds  No murmur heard  No friction rub  No gallop  Pulmonary:      Effort: Pulmonary effort is normal  No respiratory distress  Breath sounds: Normal breath sounds  No wheezing, rhonchi or rales  Abdominal:      Palpations: Abdomen is soft  Tenderness: There is no abdominal tenderness  There is no right CVA tenderness, left CVA tenderness, guarding or rebound  Musculoskeletal:         General: Normal range of motion  Cervical back: Normal range of motion and neck supple  No rigidity or tenderness  Right lower leg: No edema  Left lower leg: No edema  Skin:     General: Skin is warm and dry  Coloration: Skin is not jaundiced     Neurological: General: No focal deficit present  Mental Status: He is alert and oriented to person, place, and time  Sensory: No sensory deficit  Motor: No weakness  Psychiatric:         Mood and Affect: Mood normal          Behavior: Behavior normal          Thought Content: Thought content normal          Judgment: Judgment normal        Discussion with Family:  Denied request for me to call family  Discharge instructions/Information to patient and family:   See after visit summary for information provided to patient and family  Provisions for Follow-Up Care:  See after visit summary for information related to follow-up care and any pertinent home health orders  Planned Readmission:  None     Discharge Medications:  See after visit summary for reconciled discharge medications provided to patient and family        ** Please Note: This note has been constructed using a voice recognition system **

## 2022-07-30 NOTE — ASSESSMENT & PLAN NOTE
Patient had a syncopal episode this AM  He bent forward to clean water off of the shower door, felt weak and sat on the floor because of light headedness and he lost consciousness  Found by partner a few minutes later  He had bowel and bladder incontinence during the episode but denies confusion after regaining consciousness  He denies hitting is head  Syncope possibly secondary to seizure like activity, arrhythmia  Patient has a history of left carotid endarterctomy, Coronary Bypass, and aortic valve repacement in 2021  Electrolytes are within range  Troponin's are negative     Plan:  Telemetry to monitor heart rhythm    Orthostatic BP monitoring  Echo  EEG   Neurology Consult

## 2022-08-01 ENCOUNTER — TRANSITIONAL CARE MANAGEMENT (OUTPATIENT)
Dept: FAMILY MEDICINE CLINIC | Facility: CLINIC | Age: 77
End: 2022-08-01

## 2022-08-02 ENCOUNTER — TELEPHONE (OUTPATIENT)
Dept: NEUROLOGY | Facility: CLINIC | Age: 77
End: 2022-08-02

## 2022-08-02 NOTE — TELEPHONE ENCOUNTER
SLW/SYNCOPE              NOTES:[place] [employer] Evern Cowden will need follow up in in 6 weeks with epilepsy attending or advance practitioner  He will require a routine EEG within 6 weeks  SCHEDULED: 11/29/22 @ 2:30PM W/KAT[facility] IN [place]  ADDED PATIENT TO THE WAIT LIST  I WILL SEND PATIENT A LETTER W/APPOINTMENT DETAILS/DIRECTIONS

## 2022-08-03 ENCOUNTER — TELEPHONE (OUTPATIENT)
Dept: CARDIOLOGY CLINIC | Facility: CLINIC | Age: 77
End: 2022-08-03

## 2022-08-03 NOTE — TELEPHONE ENCOUNTER
Patient called for a follow up from [employer]  He said it was recommended by Neurology to see you regarding getting a Loop recorder  Please advise where to put him

## 2022-08-04 ENCOUNTER — OFFICE VISIT (OUTPATIENT)
Dept: FAMILY MEDICINE CLINIC | Facility: CLINIC | Age: 77
End: 2022-08-04
Payer: MEDICARE

## 2022-08-04 VITALS
SYSTOLIC BLOOD PRESSURE: 110 MMHG | WEIGHT: 157 LBS | RESPIRATION RATE: 18 BRPM | OXYGEN SATURATION: 99 % | TEMPERATURE: 97.8 F | BODY MASS INDEX: 23.25 KG/M2 | DIASTOLIC BLOOD PRESSURE: 70 MMHG | HEIGHT: 69 IN | HEART RATE: 64 BPM

## 2022-08-04 DIAGNOSIS — E11.40 CONTROLLED TYPE 2 DIABETES MELLITUS WITH DIABETIC NEUROPATHY, WITHOUT LONG-TERM CURRENT USE OF INSULIN (HCC): ICD-10-CM

## 2022-08-04 DIAGNOSIS — I48.91 ATRIAL FIBRILLATION WITH RVR (HCC): ICD-10-CM

## 2022-08-04 DIAGNOSIS — N18.31 STAGE 3A CHRONIC KIDNEY DISEASE (HCC): ICD-10-CM

## 2022-08-04 DIAGNOSIS — R55 SYNCOPE, UNSPECIFIED SYNCOPE TYPE: Primary | ICD-10-CM

## 2022-08-04 DIAGNOSIS — M25.562 ACUTE PAIN OF LEFT KNEE: ICD-10-CM

## 2022-08-04 LAB — SL AMB POCT HEMOGLOBIN AIC: 6.6 (ref ?–6.5)

## 2022-08-04 PROCEDURE — 99495 TRANSJ CARE MGMT MOD F2F 14D: CPT | Performed by: FAMILY MEDICINE

## 2022-08-04 PROCEDURE — 83036 HEMOGLOBIN GLYCOSYLATED A1C: CPT | Performed by: FAMILY MEDICINE

## 2022-08-04 NOTE — TELEPHONE ENCOUNTER
Are we able to reach out to the patient again  He was looking to see Dr Geraldine Ferreira after hospital stay

## 2022-08-04 NOTE — PROGRESS NOTES
TRANSITION OF CARE OFFICE VISIT  [facility] - [facility]    NAME: Pamela Coates  AGE: 69 y o  SEX: male  : 1945     DATE: 2022     Assessment and Plan:     Diagnoses and all orders for this visit:    Syncope, unspecified syncope type    Controlled type 2 diabetes mellitus with diabetic neuropathy, without long-term current use of insulin ([facility])  -     POCT hemoglobin A1c    Atrial fibrillation with RVR ([facility])    Stage 3a chronic kidney disease ([address] )    Acute pain of left knee  -     Ambulatory Referral to Orthopedic Surgery; Future  At this time will send EEG to [facility] to schedule  Also will send a message to cardio given the patient does want to see his regular cardiologist   Patient will be sent to orthopedic for his left knee pain secondary to the trauma  DM slightly elevated recommend monitoring carb intake repeat in 3 months  DM foot exam performed today which was a normal limits  Counseling:     Medication Side Effects: Adverse side effects of medications were reviewed with the patient/guardian today  I have spent 30 minutes with Patient and family today in which greater than 50% of this time was spent in counseling/coordination of care regarding Diagnostic results, Prognosis and Risks and benefits of tx options  Barriers to treatment include: No identified barriers     Transitional Care Management Review:     Pamela Coates is a 69 y o  male here for TCM follow-up    During the TCM phone call patient stated:    TCM Call     Date and time call was made  2022  2:51 PM    Patient was hospitialized at  [address] [place] [address]    Date of Admission  22    Date of discharge  22    Diagnosis  syncope, lightheaded    Disposition  Home    Were the patients medications reviewed and updated  Yes    Current Symptoms  None      TCM Call     Post hospital issues  None    Should patient be enrolled in anticoag monitoring? No    Scheduled for follow up?   Yes Referrals needed  Vascular    Did you obtain your prescribed medications  Yes    Do you need help managing your prescriptions or medications  No    Is transportation to your appointment needed  No    I have advised the patient to call PCP with any new or worsening symptoms  [id number]    Living Arrangements  Spouse or Significiant other    Are you recieving any outpatient services  No    Are you recieving home care services  No    Are you using any community resources  No    Current waiver services  No    Have you fallen in the last 12 months  No    Interperter language line needed  No           HPI:       Was getting ready to go play golf, went to the shower  While walking the patient was feeling dizzy, in the shower he bent down dizzy worsen and passed out  Started snoring very loudly on the floor of the shower, which was witnessed by his other half  1-2 mins was passed out, woke up disoriented  Fecal incontinence  Episode  Then took a nap  Patient was admitted to the hospital was found to be stable  At this time they are ruling out seizures versus cardiac activity  Patient is referred to did get an EEG and a loop monitor  Has not had any more events since that 1 time episode  Left knee pain has always had chronic knee pain  However now with the fall he feels like it is worsen  Would like evaluation by an orthopedic if possible    The following portions of the patient's history were reviewed and updated as appropriate: allergies, current medications, past family history, past medical history, past social history, past surgical history and problem list      Review of Systems:     Review of Systems   Constitutional: Negative for activity change, appetite change, chills, fatigue and fever  HENT: Negative for congestion  Respiratory: Negative for cough, chest tightness and shortness of breath  Cardiovascular: Negative for chest pain and leg swelling     Gastrointestinal: Negative for abdominal distention, abdominal pain, constipation, diarrhea, nausea and vomiting  Musculoskeletal: Positive for arthralgias  All other systems reviewed and are negative  Problem List:     Patient Active Problem List   Diagnosis    Benign essential hypertension    Controlled diabetes mellitus with diabetic neuropathy ([facility] )    Hyperlipidemia    Murmur    Organic impotence    Mass of right side of neck    ICAO (internal carotid artery occlusion), right    Internal carotid artery occlusion, right    Carotid stenosis, left    Asthma    Status post carotid endarterectomy    Chronic systolic congestive heart failure ([facility] )    Coronary artery disease involving native coronary artery of native heart    Rheumatic aortic stenosis    S/P AVR    S/P CABG (coronary artery bypass graft)    Acute blood loss anemia    Thrombocytopenia ([facility])    LBBB (left bundle branch block)    1st degree AV block    Anemia    Encounter for postoperative care    PAF (paroxysmal atrial fibrillation) ([facility])    Syncope    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    Stage 3a chronic kidney disease ([facility])        Objective:     /70 (BP Location: Left arm, Patient Position: Sitting, Cuff Size: Standard)   Pulse 64   Temp 97 8 °F (36 6 °C)   Resp 18   Ht 5' 9" (1 753 m)   Wt 71 2 kg (157 lb)   SpO2 99%   BMI 23 18 kg/m²     Physical Exam  Vitals reviewed  Constitutional:       Appearance: He is well-developed  HENT:      Head: Normocephalic and atraumatic  Eyes:      Conjunctiva/sclera: Conjunctivae normal       Pupils: Pupils are equal, round, and reactive to light  Cardiovascular:      Rate and Rhythm: Normal rate and regular rhythm  Pulses: no weak pulses          Dorsalis pedis pulses are 2+ on the right side and 2+ on the left side  Posterior tibial pulses are 2+ on the right side and 2+ on the left side  Heart sounds: Normal heart sounds     Pulmonary:      Effort: Pulmonary effort is normal  No respiratory distress  Breath sounds: Normal breath sounds  Musculoskeletal:         General: Normal range of motion  Cervical back: Normal range of motion and neck supple  Feet:    Feet:      Right foot:      Skin integrity: No ulcer, skin breakdown, erythema, warmth, callus or dry skin  Left foot:      Skin integrity: No ulcer, skin breakdown, erythema, warmth, callus or dry skin  Skin:     General: Skin is warm  Capillary Refill: Capillary refill takes less than 2 seconds  Comments: Right great toe; erythema, being evaluated by podiatry      Neurological:      Mental Status: He is alert and oriented to person, place, and time  Laboratory Results: I have personally reviewed the pertinent laboratory results/reports     Radiology/Other Diagnostic Testing Results: I have personally reviewed pertinent reports  CT head wo contrast    Result Date: 7/30/2022  CT BRAIN - WITHOUT CONTRAST INDICATION:   Syncope, simple, normal neuro exam 75yo M  Episode of syncope while in shower  Bowel incontinence at time of episode    COMPARISON:  None  TECHNIQUE:  CT examination of the brain was performed  In addition to axial images, sagittal and coronal 2D reformatted images were created and submitted for interpretation  Radiation dose length product (DLP) for this visit:  930 4 mGy-cm   This examination, like all CT scans performed in the [facility], was performed utilizing techniques to minimize radiation dose exposure, including the use of iterative reconstruction and automated exposure control  IMAGE QUALITY:  Diagnostic  FINDINGS: PARENCHYMA:  There is a wedge-shaped region of encephalomalacia and gliosis in the right frontal lobe, at the level of the body of the lateral ventricles on series 2, image 27 likely related to a small region of chronic infarction  No acute intracranial  hemorrhage   Atherosclerotic calcifications of the cavernous segment of the internal carotid artery are mild  VENTRICLES AND EXTRA-AXIAL SPACES:  Ventricles and extra-axial CSF spaces are prominent commensurate with the degree of volume loss  No hydrocephalus  No acute extra-axial hemorrhage  VISUALIZED ORBITS AND PARANASAL SINUSES:  No acute abnormality involving the orbits  Mild scattered sinus mucosal thickening is noted  No fluid levels are seen  CALVARIUM AND EXTRACRANIAL SOFT TISSUES:  Normal      1   No intracranial hemorrhage or calvarial fracture  2   Small region of hypodensity in the right frontal lobe, potentially encephalomalacia and gliosis from prior infarct in this region  Correlation with clinical history recommended  Further characterization with nonemergent contrast-enhanced MRI of the  brain could be obtained, especially in the absence of prior studies  If prior studies become available, an addendum could be issued comparing the current findings to prior studies  Further clinical follow-up recommended  Workstation performed: [id number]     Echo complete w/ contrast if indicated    Result Date: 7/30/2022    Left Ventricle: Left ventricular cavity size is normal  Wall thickness is mildly increased  There is mild concentric hypertrophy  The left ventricular ejection fraction is 50%  Systolic function is low normal  Wall motion is normal    Left Atrium: The atrium is moderately dilated    Right Atrium: The atrium is mildly dilated    Aortic Valve: There is a bioprosthetic valve  The prosthetic valve appears to be functioning normally  There is no evidence of regurgitation  There is no evidence of paravalvular regurgitation    Mitral Valve: There is moderate diffuse thickening  There is moderate diffuse calcification  There is moderately reduced mobility  There is mild regurgitation  There is mild to moderate stenosis    Tricuspid Valve: There is mild regurgitation     Pulmonary Artery: The pulmonary artery systolic pressure is normal         Current Medications:     Outpatient Medications Prior to Visit   Medication Sig Dispense Refill    acetaminophen (TYLENOL) 325 mg tablet Take 1-2 tablets Q4-6 hours PRN pain  Do not take more than 4 grams in 24 hours  0    aspirin 81 mg chewable tablet Chew 1 tablet (81 mg total) daily 30 tablet 2    atorvastatin (LIPITOR) 80 mg tablet Take 1 tablet (80 mg total) by mouth daily with dinner 90 tablet 2    Blood Glucose Monitoring Suppl (FreeStyle InsuLinx System) w/Device KIT Test TID & QHS as directed  1 kit 0    carvedilol (COREG) 6 25 mg tablet Take 1 tablet (6 25 mg total) by mouth 2 (two) times a day with meals 90 tablet 1    FreeStyle InsuLinx Test test strip Test TID & QHS as directed  100 each 2    Lancets (freestyle) lancets Test TID & QHS as directed  100 each 2    lisinopril-hydrochlorothiazide (PRINZIDE,ZESTORETIC) 20-25 MG per tablet Take 1 tablet by mouth daily 90 tablet 1    metFORMIN (GLUCOPHAGE) 1000 MG tablet Take 1 tablet (1,000 mg total) by mouth 2 (two) times a day with meals 180 tablet 2     No facility-administered medications prior to visit  Patient's shoes and socks removed  Right Foot/Ankle   Right Foot Inspection  Skin Exam: skin normal and skin intact  No dry skin, no warmth, no callus, no erythema, no maceration, no abnormal color, no pre-ulcer, no ulcer and no callus  Toe Exam: ROM and strength within normal limits  No swelling    Sensory   Vibration: intact  Proprioception: intact  Monofilament testing: diminished    Vascular  Capillary refills: < 3 seconds  The right DP pulse is 2+  The right PT pulse is 2+  Left Foot/Ankle  Left Foot Inspection  Skin Exam: skin normal and skin intact  No dry skin, no warmth, no erythema, no maceration, normal color, no pre-ulcer, no ulcer and no callus  Toe Exam: ROM and strength within normal limits  No swelling       Sensory   Vibration: intact  Proprioception: intact  Monofilament testing: intact    Vascular  Capillary refills: < 3 seconds  The left DP pulse is 2+  The left PT pulse is 2+       Assign Risk Category  No deformity present  No loss of protective sensation  No weak pulses  Risk: 0          Gregory Nugent MD  [address]

## 2022-08-16 ENCOUNTER — HOSPITAL ENCOUNTER (OUTPATIENT)
Dept: NEUROLOGY | Facility: HOSPITAL | Age: 77
Discharge: HOME | End: 2022-08-16
Payer: MEDICARE

## 2022-08-16 DIAGNOSIS — R55 SYNCOPE, UNSPECIFIED SYNCOPE TYPE: ICD-10-CM

## 2022-08-16 PROCEDURE — 95816 EEG AWAKE AND DROWSY: CPT

## 2022-08-18 PROCEDURE — 95813 EEG EXTND MNTR 61-119 MIN: CPT | Performed by: PSYCHIATRY & NEUROLOGY

## 2022-08-26 ENCOUNTER — OFFICE VISIT (OUTPATIENT)
Dept: CARDIOLOGY CLINIC | Facility: CLINIC | Age: 77
End: 2022-08-26
Payer: MEDICARE

## 2022-08-26 VITALS
WEIGHT: 156 LBS | BODY MASS INDEX: 23.11 KG/M2 | TEMPERATURE: 98.2 F | HEIGHT: 69 IN | SYSTOLIC BLOOD PRESSURE: 160 MMHG | HEART RATE: 62 BPM | OXYGEN SATURATION: 100 % | DIASTOLIC BLOOD PRESSURE: 80 MMHG

## 2022-08-26 DIAGNOSIS — I10 BENIGN ESSENTIAL HYPERTENSION: ICD-10-CM

## 2022-08-26 DIAGNOSIS — I44.7 LBBB (LEFT BUNDLE BRANCH BLOCK): ICD-10-CM

## 2022-08-26 DIAGNOSIS — Z95.2 S/P AVR: ICD-10-CM

## 2022-08-26 DIAGNOSIS — E78.2 MIXED HYPERLIPIDEMIA: ICD-10-CM

## 2022-08-26 DIAGNOSIS — Z95.1 S/P CABG (CORONARY ARTERY BYPASS GRAFT): ICD-10-CM

## 2022-08-26 DIAGNOSIS — R55 SYNCOPE, UNSPECIFIED SYNCOPE TYPE: Primary | ICD-10-CM

## 2022-08-26 DIAGNOSIS — I50.22 CHRONIC SYSTOLIC CONGESTIVE HEART FAILURE (HCC): ICD-10-CM

## 2022-08-26 PROCEDURE — 99214 OFFICE O/P EST MOD 30 MIN: CPT | Performed by: INTERNAL MEDICINE

## 2022-08-26 NOTE — PROGRESS NOTES
Cardiology   Rosa Hannah DO, Alison De La Vega MD, Andrew Lewis MD, Dexter Brown MD, [facility] [place]  -------------------------------------------------------------------  [facility]  [address], [address], [address] [place] [address], [address]  [phone]    Cardiology Follow Up  Anamaria Shultz  1945  [id number]          Assessment/Plan:    1  Syncope, unspecified syncope type    2  S/P AVR    3  S/P CABG (coronary artery bypass graft)    4  Benign essential hypertension    5  LBBB (left bundle branch block)    6  Mixed hyperlipidemia    7  Chronic systolic congestive heart failure ([address] )      - patient had a syncopal event which likely was caused by vasovagal episode as event occurred in the shower while bending over  He has no history of syncope  He does have a history of structural heart disease with previously mildly reduced ejection fraction  His echocardiogram showed ejection fraction of 50%  Discussed 2 week monitor with him  Does not wish to have 1 done at this time  - he will follow up with Neurology for brain MRI  - continue current medication regimen including lisinopril-hydrochlorothiazide, carvedilol and atorvastatin  - continue metformin  - patient's last LDL cholesterol was 121 with an HDL of 50  Target cholesterol should be less than 70  Will repeat lipid panel and if not at goal, will need to adjust medication  Encouraged adherence with atorvastatin  Interval History:     Anamaria Shultz is 69 y o  male here for follow up of recent hospitalization for syncope  Patient had a syncopal event while he was in his shower  He bent over to clean the shower door and felt lightheaded and had a sudden loss of consciousness  He had bowel incontinence during the event  He awoke after his girlfriend came into the room  He has no history of similar syncope    He did not have any palpitations or chest pain prior to or after the event  He went to sleep afterwards and awoke feeling fine  He went to the emergency room afterwards for further evaluation  Workup including CT head was normal   There was an incidental finding on CT which requires an MRI which he will follow up with neurologist to schedule  He had an echocardiogram done while admitted which showed ejection fraction of 50% with normal functioning bioprosthetic aortic valve  He has a history of CAD and congestive heart failure  On 4/6/21, the patient underwent nuclear stress test  because of congestive heart failure  He had an ejection fraction of 35-40% seen on echocardiogram   He was not having any symptoms of chest pain or shortness of breath  Nuclear stress test was done which showed ischemia of the lateral and inferoseptal walls  Cardiac catheterization was done afterwards which showed severe multivessel CAD  He subsequently underwent CABGx5 (LIMA to LAD, SVG to D1, SVG -Y- to ramus intermedius and OM1, SVG to R PDA) and tissue AVR 23 mm [facility] Ease    Procedure complicated by a new left bundle-branch block on postop ECG and atrial fibrillation  He converted to sinus rhythm with IV amiodarone  Patient had a 30 day biotel monitor placed after discharge  There were no events noted  Coumadin was stopped       Past Medical History:   Diagnosis Date    Arthritis     generalized    Asthma     seasonal-does not use inhaler    Atrial fibrillation with RVR ([facility] ) 5/12/2021    Back pain     Basal cell carcinoma 09/23/2021    BCC- Right Posterior Calf     Carotid artery stenosis     Diabetes mellitus (HCC)     type    GERD (gastroesophageal reflux disease)     Hiatal hernia     Hyperlipidemia     Hypertension     Peyronie's disease     last assessed [id number]    Seasonal allergies     Wears glasses      Social History     Socioeconomic History    Marital status: Single     Spouse name: Not on file    Number of children: Not on file    Years of education: Not on file    Highest education level: Not on file   Occupational History    Not on file   Tobacco Use    Smoking status: Never Smoker    Smokeless tobacco: Never Used   Vaping Use    Vaping Use: Never used   Substance and Sexual Activity    Alcohol use: Yes     Alcohol/week: 10 0 standard drinks     Types: 7 Glasses of wine, 3 Standard drinks or equivalent per week    Drug use: No    Sexual activity: Yes     Partners: Female   Other Topics Concern    Not on file   Social History Narrative    Daily caffeinnated coffee      Social Determinants of Health     Financial Resource Strain: Not on file   Food Insecurity: Not on file   Transportation Needs: Not on file   Physical Activity: Not on file   Stress: Not on file   Social Connections: Not on file   Intimate Partner Violence: Not on file   Housing Stability: Not on file      Family History   Problem Relation Age of Onset    Heart attack Mother [de-identified]        CABG    Kidney failure Father     Diabetes Father         pre-diabetic     Past Surgical History:   Procedure Laterality Date    APPENDECTOMY      CAROTID ENDARTERECTOMY Left 10/29/2020    COLONOSCOPY N/A 1/22/2016    Procedure: COLONOSCOPY;  Surgeon: Antonio Souza MD;  Location: [place] [address] GI LAB; Service:     KNEE ARTHROSCOPY Left     KNEE ARTHROSCOPY W/ MENISCAL REPAIR Right     MOHS SURGERY Right 11/01/2021    BCC- Right Posterior Calf- Dr Leonila Musa OPN W/STENTLESS TISSUE VALVE N/A 5/10/2021    Procedure: CORONARY ARTERY BYPASS GRAFT (CABG) X 5 USING LEFT GSV--> DIAGONAL,, RAMUS, PDA ,AND OM1, AND LEFT BALBIR-LAD WITH REPLACEMENT VALVE AORTIC USING 23 MM MURRAY MAGNA EASE(AVR);   Surgeon: Luis Antonio Hager MD;  Location: [facility];  Service: Cardiac Surgery    SC THROMBOENDARTECTMY Celena Pears INCIS Left 10/29/2020    Procedure: CAROTID ENDARTERECTOMY W/BOVINE PATCH ANGIOPLASTY AND COMPLETION DUPLEX IMAGING;  Surgeon: German Pierce DO;  Location: [facility]; Service: Vascular       Current Outpatient Medications:     acetaminophen (TYLENOL) 325 mg tablet, Take 1-2 tablets Q4-6 hours PRN pain  Do not take more than 4 grams in 24 hours  , Disp: , Rfl: 0    aspirin 81 mg chewable tablet, Chew 1 tablet (81 mg total) daily, Disp: 30 tablet, Rfl: 2    atorvastatin (LIPITOR) 80 mg tablet, Take 1 tablet (80 mg total) by mouth daily with dinner, Disp: 90 tablet, Rfl: 2    Blood Glucose Monitoring Suppl (FreeStyle InsuLinx System) w/Device KIT, Test TID & QHS as directed , Disp: 1 kit, Rfl: 0    carvedilol (COREG) 6 25 mg tablet, Take 1 tablet (6 25 mg total) by mouth 2 (two) times a day with meals, Disp: 90 tablet, Rfl: 1    FreeStyle InsuLinx Test test strip, Test TID & QHS as directed , Disp: 100 each, Rfl: 2    Lancets (freestyle) lancets, Test TID & QHS as directed , Disp: 100 each, Rfl: 2    lisinopril-hydrochlorothiazide (PRINZIDE,ZESTORETIC) 20-25 MG per tablet, Take 1 tablet by mouth daily, Disp: 90 tablet, Rfl: 1    metFORMIN (GLUCOPHAGE) 1000 MG tablet, Take 1 tablet (1,000 mg total) by mouth 2 (two) times a day with meals, Disp: 180 tablet, Rfl: 2        Review of Systems:  Review of Systems   Respiratory: Negative for shortness of breath  Cardiovascular: Negative for chest pain, palpitations and leg swelling  Neurological: Positive for syncope  All other systems reviewed and are negative  Physical Exam:  Vitals:  Vitals:    08/26/22 1443 08/26/22 1447 08/26/22 1449   BP: 150/88 140/80 160/80   BP Location: Right arm Right arm Right arm   Patient Position: Supine Sitting Standing   Cuff Size: Standard Standard Standard   Pulse: (!) 54 62 62   Temp: 98 2 °F (36 8 °C)     SpO2: 100%     Weight: 70 8 kg (156 lb)     Height: 5' 9" (1 753 m)       Physical Exam   Constitutional: He appears healthy  No distress  Eyes: Pupils are equal, round, and reactive to light  Conjunctivae are normal    Neck: No JVD present     Cardiovascular: Normal rate, regular rhythm and normal heart sounds  Exam reveals no gallop and no friction rub  No murmur heard  Pulmonary/Chest: Effort normal and breath sounds normal  He has no wheezes  He has no rales  Musculoskeletal:         General: No tenderness, deformity or edema  Cervical back: Normal range of motion and neck supple  Neurological: He is alert and oriented to person, place, and time  Skin: Skin is warm and dry  Cardiographics:  EKG: Personally reviewed    Normal sinus rhythm at 60 beats per minute with incomplete right bundle-branch block, PVCs and T-wave inversions laterally  Last known EF: 50%    This note was completed in part utilizing [employer] Direct Software  Grammatical errors, random word insertions, spelling mistakes, and incomplete sentences can be an occasional consequence of this system secondary to software limitations, ambient noise, and hardware issues  If you have any questions or concerns about the content, text, or information contained within the body of this dictation, please contact the provider for clarification

## 2022-09-06 ENCOUNTER — HOSPITAL ENCOUNTER (OUTPATIENT)
Dept: RADIOLOGY | Facility: HOSPITAL | Age: 77
Discharge: HOME | End: 2022-09-06
Payer: MEDICARE

## 2022-09-06 DIAGNOSIS — I65.22 CAROTID STENOSIS, LEFT: ICD-10-CM

## 2022-09-06 PROCEDURE — 93880 EXTRACRANIAL BILAT STUDY: CPT | Performed by: SURGERY

## 2022-09-06 PROCEDURE — 93880 EXTRACRANIAL BILAT STUDY: CPT

## 2022-09-08 ENCOUNTER — TELEPHONE (OUTPATIENT)
Dept: VASCULAR SURGERY | Facility: CLINIC | Age: 77
End: 2022-09-08

## 2022-09-08 NOTE — TELEPHONE ENCOUNTER
Attempted to contact patient to schedule appointment(s) listed below  Requested patient call [phone] option 3 to schedule appointment(s)  Patient's appointment(s) are due now      Dopplers  [] Abdominal Aorta Iliac (AOIL)  [] Carotid (CV)   [] Celiac and/or Mesenteric  [] Endovascular Aortic Repair (EVAR)   [] Hemodialysis Access (HD)   [] Lower Limb Arterial (SANTIAGO)  [] Lower Limb Venous Duplex with Reflux (LEVDR)  [] Renal Artery  [] Upper Limb Arterial (UEA)    [] Upper Limb Venous (UEV)              [] MIGUELITO and Waveform analysis     Advanced Imaging   [] CTA head/neck    [] CTA abdomen    [] CTA abdomen & pelvis    [] CT abdomen with/ without contrast  [] CT abdomen with contrast  [] CT abdomen without contrast    [] CT abdomen & pelvis with/ without contrast  [] CT abdomen & pelvis with contrast  [] CT abdomen & pelvis without contrast    Office Visit   [] New patient, patient last seen over 3 years ago  [] Risk factor modification (RFM)   [x] Follow up   [] Lost to follow up (LTFU)  Called patient & LMOM to schedule vqi/l cea cgd/rev cv 9/8/22 sched ov w/RD

## 2022-09-09 ENCOUNTER — APPOINTMENT (OUTPATIENT)
Dept: RADIOLOGY | Facility: CLINIC | Age: 77
End: 2022-09-09
Payer: MEDICARE

## 2022-09-09 ENCOUNTER — CONSULT (OUTPATIENT)
Dept: OBGYN CLINIC | Facility: CLINIC | Age: 77
End: 2022-09-09
Payer: MEDICARE

## 2022-09-09 VITALS
HEART RATE: 144 BPM | TEMPERATURE: 98.4 F | SYSTOLIC BLOOD PRESSURE: 167 MMHG | DIASTOLIC BLOOD PRESSURE: 105 MMHG | WEIGHT: 156 LBS | BODY MASS INDEX: 23.04 KG/M2

## 2022-09-09 DIAGNOSIS — M25.561 RIGHT KNEE PAIN, UNSPECIFIED CHRONICITY: ICD-10-CM

## 2022-09-09 DIAGNOSIS — M16.11 PRIMARY OSTEOARTHRITIS OF ONE HIP, RIGHT: ICD-10-CM

## 2022-09-09 DIAGNOSIS — M17.11 PRIMARY OSTEOARTHRITIS OF RIGHT KNEE: ICD-10-CM

## 2022-09-09 DIAGNOSIS — M16.12 PRIMARY OSTEOARTHRITIS OF ONE HIP, LEFT: ICD-10-CM

## 2022-09-09 DIAGNOSIS — M17.12 PRIMARY OSTEOARTHRITIS OF LEFT KNEE: Primary | ICD-10-CM

## 2022-09-09 DIAGNOSIS — M62.50 DISUSE ATROPHY OF MUSCLE: ICD-10-CM

## 2022-09-09 DIAGNOSIS — M25.562 LEFT KNEE PAIN, UNSPECIFIED CHRONICITY: ICD-10-CM

## 2022-09-09 PROCEDURE — 73502 X-RAY EXAM HIP UNI 2-3 VIEWS: CPT

## 2022-09-09 PROCEDURE — 73562 X-RAY EXAM OF KNEE 3: CPT

## 2022-09-09 PROCEDURE — 99204 OFFICE O/P NEW MOD 45 MIN: CPT | Performed by: ORTHOPAEDIC SURGERY

## 2022-09-09 NOTE — PROGRESS NOTES
Assessment/Plan:  1  Primary osteoarthritis of left knee  XR knee 3 vw left non injury    Ambulatory Referral to Physical Therapy    Injection Procedure Prior Authorization   2  Primary osteoarthritis of right knee  XR knee 3 vw right non injury    XR hip/pelv 2-3 vws right if performed    Ambulatory Referral to Physical Therapy    Injection Procedure Prior Authorization   3  Disuse atrophy of muscle, right thigh  Ambulatory Referral to Physical Therapy   4  Primary osteoarthritis of one hip, right  Ambulatory Referral to Physical Therapy   5  Primary osteoarthritis of one hip, left  Ambulatory Referral to Physical Therapy     Scribe Attestation    I,:  Cordelia Krabbe am acting as a scribe while in the presence of the attending physician :       I,:  Mattie Boss MD personally performed the services described in this documentation    as scribed in my presence :             On examination and review the x-rays of the left and right knees as well as the right hip does demonstrate severe left knee osteoarthritis and moderate to severe right knee osteoarthritis  There is moderate to severe right hip osteoarthritis also evident on x-rays  Due to the lack of use of the right lower extremity he has developed disuse atrophy of the right thigh and hip  I did discuss further delineation of care in regards to maintaining a healthy lifestyle and maintaining activity to keep his knees from developing further stiffness  I do believe formal physical therapy would be beneficial to address the weakness in the lower extremities particularly the right side  As he has had success with viscosupplementation injections in the past I do believe this would be beneficial for him as well  I did place an order to have Euflexxa injections authorized for both the left and right knees  My office will help facilitate having those authorized and will schedule appropriately    He will have the injections in conjunction to physical therapy  He will follow up in 6 weeks time for repeat clinical evaluation  Subjective:   Los Wetzel is a 69 y o  male who presents for initial evaluation his bilateral knee pain and right hip pain  He is referred to me today by Dr Magali Kearney  He has been experiencing along activity related pain into the knees several years  He has had joint location injection into right knee previously  He states that this did him with great symptomatic relief  He notes that his left knee is currently more symptomatic and is limited in range of motion  He states that he is unable to flex the knee past 90°  He states that he is weak in the right lower extremity  He notes that the weakness into the right lower extremity was exacerbated with an injury to the right hip while golfing  He states that he attempted a swing and hit a root resulting in a jarring force through his body  This did exacerbate further difficulty with walking and has noticed atrophy into the right lower extremity  He has been trying to do home exercises on his own at home for quad and hip strengthening  However, has had minimal symptomatic relief  He notes that ascending and descending stairs will also exacerbate pain and or problematic  He denies any gross instability of the knees  Today he denies any distal paresthesias  Review of Systems   Constitutional: Negative for chills, fever and unexpected weight change  HENT: Negative for hearing loss, nosebleeds and sore throat  Eyes: Negative for pain, redness and visual disturbance  Respiratory: Negative for cough, shortness of breath and wheezing  Cardiovascular: Negative for chest pain, palpitations and leg swelling  Gastrointestinal: Negative for abdominal pain, nausea and vomiting  Endocrine: Negative for polyphagia and polyuria  Genitourinary: Negative for dysuria and hematuria  Musculoskeletal: Positive for arthralgias, gait problem and myalgias          See HPI   Skin: Negative for rash and wound  Neurological: Negative for dizziness, numbness and headaches  Psychiatric/Behavioral: Negative for decreased concentration and suicidal ideas  The patient is not nervous/anxious  Past Medical History:   Diagnosis Date    Arthritis     generalized    Asthma     seasonal-does not use inhaler    Atrial fibrillation with RVR ([address] ) 5/12/2021    Back pain     Basal cell carcinoma 09/23/2021    BCC- Right Posterior Calf     Carotid artery stenosis     Diabetes mellitus (HCC)     type    GERD (gastroesophageal reflux disease)     Hiatal hernia     Hyperlipidemia     Hypertension     Peyronie's disease     last assessed [id number]    Seasonal allergies     Wears glasses        Past Surgical History:   Procedure Laterality Date    APPENDECTOMY      CAROTID ENDARTERECTOMY Left 10/29/2020    COLONOSCOPY N/A 1/22/2016    Procedure: COLONOSCOPY;  Surgeon: Nicole Medellin MD;  Location: [place] [address] GI LAB; Service:     KNEE ARTHROSCOPY Left     KNEE ARTHROSCOPY W/ MENISCAL REPAIR Right     MOHS SURGERY Right 11/01/2021    BCC- Right Posterior Calf- Dr Savannah Nelson OPN W/STENTLESS TISSUE VALVE N/A 5/10/2021    Procedure: CORONARY ARTERY BYPASS GRAFT (CABG) X 5 USING LEFT GSV--> DIAGONAL,, RAMUS, PDA ,AND OM1, AND LEFT BALBIR-LAD WITH REPLACEMENT VALVE AORTIC USING 23 MM MURRAY MAGNA EASE(AVR);   Surgeon: Rashaad Hsu MD;  Location:  MAIN OR;  Service: Cardiac Surgery    ID THROMBOENDARTECTMY NECK,NECK INCIS Left 10/29/2020    Procedure: CAROTID ENDARTERECTOMY W/BOVINE PATCH ANGIOPLASTY AND COMPLETION DUPLEX IMAGING;  Surgeon: Danna Smith DO;  Location: [facility];  Service: Vascular       Family History   Problem Relation Age of Onset    Heart attack Mother [de-identified]        CABG    Kidney failure Father     Diabetes Father         pre-diabetic       Social History     Occupational History    Not on file   Tobacco Use    Smoking status: Never Smoker    Smokeless tobacco: Never Used   Vaping Use    Vaping Use: Never used   Substance and Sexual Activity    Alcohol use: Yes     Alcohol/week: 10 0 standard drinks     Types: 7 Glasses of wine, 3 Standard drinks or equivalent per week    Drug use: No    Sexual activity: Yes     Partners: Female         Current Outpatient Medications:     acetaminophen (TYLENOL) 325 mg tablet, Take 1-2 tablets Q4-6 hours PRN pain  Do not take more than 4 grams in 24 hours  , Disp: , Rfl: 0    aspirin 81 mg chewable tablet, Chew 1 tablet (81 mg total) daily, Disp: 30 tablet, Rfl: 2    atorvastatin (LIPITOR) 80 mg tablet, Take 1 tablet (80 mg total) by mouth daily with dinner, Disp: 90 tablet, Rfl: 2    Blood Glucose Monitoring Suppl (FreeStyle InsuLinx System) w/Device KIT, Test TID & QHS as directed , Disp: 1 kit, Rfl: 0    carvedilol (COREG) 6 25 mg tablet, Take 1 tablet (6 25 mg total) by mouth 2 (two) times a day with meals, Disp: 90 tablet, Rfl: 1    FreeStyle InsuLinx Test test strip, Test TID & QHS as directed , Disp: 100 each, Rfl: 2    Lancets (freestyle) lancets, Test TID & QHS as directed , Disp: 100 each, Rfl: 2    lisinopril-hydrochlorothiazide (PRINZIDE,ZESTORETIC) 20-25 MG per tablet, Take 1 tablet by mouth daily, Disp: 90 tablet, Rfl: 1    metFORMIN (GLUCOPHAGE) 1000 MG tablet, Take 1 tablet (1,000 mg total) by mouth 2 (two) times a day with meals, Disp: 180 tablet, Rfl: 2    No Known Allergies    Objective:  Vitals:    09/09/22 1329   BP: (!) 167/105   Pulse: (!) 144   Temp: 98 4 °F (36 9 °C)       Right Knee Exam     Tenderness   The patient is experiencing tenderness in the lateral joint line      Range of Motion   Extension: -5   Flexion: 120     Tests   Varus: negative Valgus: positive  Drawer:  Anterior - negative    Posterior - negative    Other   Erythema: absent  Scars: absent  Sensation: normal  Pulse: present  Effusion: effusion (trace) present      Left Knee Exam     Tenderness   The patient is experiencing tenderness in the medial joint line  Range of Motion   Extension: -5   Flexion: 100     Tests   Varus: negative Valgus: positive  Drawer:  Anterior - negative     Posterior - negative    Other   Erythema: absent  Scars: absent  Sensation: normal  Effusion: effusion (1+) present          Observations   Left Knee   Positive for effusion (1+)  Right Knee   Positive for effusion (trace)  Physical Exam  Musculoskeletal:      Right knee: Effusion (trace) present  Left knee: Effusion (1+) present  I have personally reviewed pertinent films in PACS and my interpretation is as follows:    X-rays of the left knee demonstrate severe medial compartment osteoarthritis  There is severe patellofemoral compartment osteoarthritis  X-rays of the right knee demonstrate moderate to severe medial compartment osteoarthritis  Severe patellofemoral compartment osteoarthritis is evident  No obvious lytic or blastic lesions demonstrated  X-rays of the right hip demonstrates moderate to severe hip joint osteoarthritis  Similar findings are evident in the left hip  No acute fractures demonstrated  This document was created using speech voice recognition software  Grammatical errors, random word insertions, pronoun errors, and incomplete sentences are an occasional consequence of this system due to software limitations, ambient noise, and hardware issues  Any formal questions or concerns about content, text, or information contained within the body of this dictation should be directly addressed to the provider for clarification

## 2022-09-09 NOTE — PROGRESS NOTES
Assessment/Plan:  1  Left knee pain, unspecified chronicity  XR knee 3 vw left non injury   2  Right knee pain, unspecified chronicity  XR knee 3 vw right non injury    XR hip/pelv 2-3 vws right if performed     Scribe Attestation    I,:   am acting as a scribe while in the presence of the attending physician :       I,:   personally performed the services described in this documentation    as scribed in my presence  :             ***    Subjective:   Los Wetzel is a 69 y o  male who presents ***      Review of Systems      Past Medical History:   Diagnosis Date    Arthritis     generalized    Asthma     seasonal-does not use inhaler    Atrial fibrillation with RVR ([address] ) 5/12/2021    Back pain     Basal cell carcinoma 09/23/2021    BCC- Right Posterior Calf     Carotid artery stenosis     Diabetes mellitus (HCC)     type    GERD (gastroesophageal reflux disease)     Hiatal hernia     Hyperlipidemia     Hypertension     Peyronie's disease     last assessed [id number]    Seasonal allergies     Wears glasses        Past Surgical History:   Procedure Laterality Date    APPENDECTOMY      CAROTID ENDARTERECTOMY Left 10/29/2020    COLONOSCOPY N/A 1/22/2016    Procedure: COLONOSCOPY;  Surgeon: Gabriel Navarrete MD;  Location: [facility]; Service:     KNEE ARTHROSCOPY Left     KNEE ARTHROSCOPY W/ MENISCAL REPAIR Right     MOHS SURGERY Right 11/01/2021    BCC- Right Posterior Calf- Dr Nicholas Vernon OPN W/STENTLESS TISSUE VALVE N/A 5/10/2021    Procedure: CORONARY ARTERY BYPASS GRAFT (CABG) X 5 USING LEFT GSV--> DIAGONAL,, RAMUS, PDA ,AND OM1, AND LEFT BALBIR-LAD WITH REPLACEMENT VALVE AORTIC USING 23 MM MURRAY MAGNA EASE(AVR);   Surgeon: Duncan Marks MD;  Location:  MAIN OR;  Service: Cardiac Surgery    MN THROMBOENDARTECTMY Brooklynn Shearing INCIS Left 10/29/2020    Procedure: CAROTID ENDARTERECTOMY W/BOVINE PATCH ANGIOPLASTY AND COMPLETION DUPLEX IMAGING;  Surgeon: Tracey Brooks DO;  Location: [facility];  Service: Vascular       Family History   Problem Relation Age of Onset    Heart attack Mother [de-identified]        CABG    Kidney failure Father     Diabetes Father         pre-diabetic       Social History     Occupational History    Not on file   Tobacco Use    Smoking status: Never Smoker    Smokeless tobacco: Never Used   Vaping Use    Vaping Use: Never used   Substance and Sexual Activity    Alcohol use: Yes     Alcohol/week: 10 0 standard drinks     Types: 7 Glasses of wine, 3 Standard drinks or equivalent per week    Drug use: No    Sexual activity: Yes     Partners: Female         Current Outpatient Medications:     acetaminophen (TYLENOL) 325 mg tablet, Take 1-2 tablets Q4-6 hours PRN pain  Do not take more than 4 grams in 24 hours  , Disp: , Rfl: 0    aspirin 81 mg chewable tablet, Chew 1 tablet (81 mg total) daily, Disp: 30 tablet, Rfl: 2    atorvastatin (LIPITOR) 80 mg tablet, Take 1 tablet (80 mg total) by mouth daily with dinner, Disp: 90 tablet, Rfl: 2    Blood Glucose Monitoring Suppl (FreeStyle InsuLinx System) w/Device KIT, Test TID & QHS as directed , Disp: 1 kit, Rfl: 0    carvedilol (COREG) 6 25 mg tablet, Take 1 tablet (6 25 mg total) by mouth 2 (two) times a day with meals, Disp: 90 tablet, Rfl: 1    FreeStyle InsuLinx Test test strip, Test TID & QHS as directed , Disp: 100 each, Rfl: 2    Lancets (freestyle) lancets, Test TID & QHS as directed , Disp: 100 each, Rfl: 2    lisinopril-hydrochlorothiazide (PRINZIDE,ZESTORETIC) 20-25 MG per tablet, Take 1 tablet by mouth daily, Disp: 90 tablet, Rfl: 1    metFORMIN (GLUCOPHAGE) 1000 MG tablet, Take 1 tablet (1,000 mg total) by mouth 2 (two) times a day with meals, Disp: 180 tablet, Rfl: 2    No Known Allergies    Objective:  Vitals:    09/09/22 1329   BP: (!) 167/105   Pulse: (!) 144   Temp: 98 4 °F (36 9 °C)       Ortho Exam    Physical Exam    I have personally reviewed pertinent films in PACS and my interpretation is as follows:  ***      This document was created using speech voice recognition software  Grammatical errors, random word insertions, pronoun errors, and incomplete sentences are an occasional consequence of this system due to software limitations, ambient noise, and hardware issues  Any formal questions or concerns about content, text, or information contained within the body of this dictation should be directly addressed to the provider for clarification

## 2022-09-09 NOTE — LETTER
September 9, 2022     [place], [address] [facility]    Patient: Mary Cleveland   YOB: 1945   Date of Visit: 9/9/2022     Dear Dr Spencer Mishra      Thank you for referring Roxi Swanson to me for evaluation  Below are the relevant portions of my assessment and plan of care  If you have questions, please do not hesitate to call me  I look forward to following Bita Gray along with you           Sincerely,        Shweta Ha MD        CC: No Recipients    Progress Notes:

## 2022-09-19 ENCOUNTER — EVALUATION (OUTPATIENT)
Dept: PHYSICAL THERAPY | Facility: CLINIC | Age: 77
End: 2022-09-19
Payer: MEDICARE

## 2022-09-19 DIAGNOSIS — M62.50 DISUSE ATROPHY OF MUSCLE: ICD-10-CM

## 2022-09-19 DIAGNOSIS — M16.12 PRIMARY OSTEOARTHRITIS OF ONE HIP, LEFT: ICD-10-CM

## 2022-09-19 DIAGNOSIS — M17.12 PRIMARY OSTEOARTHRITIS OF LEFT KNEE: Primary | ICD-10-CM

## 2022-09-19 DIAGNOSIS — M16.11 PRIMARY OSTEOARTHRITIS OF ONE HIP, RIGHT: ICD-10-CM

## 2022-09-19 DIAGNOSIS — M17.11 PRIMARY OSTEOARTHRITIS OF RIGHT KNEE: ICD-10-CM

## 2022-09-19 PROCEDURE — 97161 PT EVAL LOW COMPLEX 20 MIN: CPT

## 2022-09-19 PROCEDURE — 97161 PT EVAL LOW COMPLEX 20 MIN: CPT | Performed by: PHYSICAL MEDICINE & REHABILITATION

## 2022-09-19 NOTE — PROGRESS NOTES
PT Evaluation     Today's date: 2022  Patient name: Jose Monte  : 1945  MRN: [id number]  Referring provider: Jesse Ocampo*  Dx:   Encounter Diagnosis     ICD-10-CM    1  Primary osteoarthritis of left knee  M17 12 Ambulatory Referral to Physical Therapy   2  Primary osteoarthritis of right knee  M17 11 Ambulatory Referral to Physical Therapy   3  Disuse atrophy of muscle, right thigh  M62 50 Ambulatory Referral to Physical Therapy   4  Primary osteoarthritis of one hip, right  M16 11 Ambulatory Referral to Physical Therapy   5  Primary osteoarthritis of one hip, left  M16 12 Ambulatory Referral to Physical Therapy                  Assessment  Assessment details: Jose Monte is an 69 y o  male  arriving to clinic with complaints of bilateral knee and hip pain  Objectively patient exhibits poor L>R knee range of motion, bilateral hip range of motion into hip internal rotation and extension, as well as impaired strength  Observationally patient does have right quadriceps atrophy and proximal hip atrophy aligning with strength deficits  Patient does also have right sciatic nerve tension  Lumbar screened with no significant findings  Due to current deficits, patient is limited functionally with ADL's, recreational activities, engaging in social activities, ambulation, stair negotiation, lifting/carrying, transfers  Patient has been educated in home exercise program and plan of care  Patient would benefit from skilled physical therapy services to address their aforementioned functional limitations and progress towards prior level of function and independence with home exercise program     Impairments: abnormal gait, abnormal or restricted ROM, activity intolerance, impaired physical strength, lacks appropriate home exercise program, pain with function, weight-bearing intolerance, poor posture  and poor body mechanics    Symptom irritability: moderate  Goals  Short Term Goals:  4 weeks  1  Initiate and advance home exercise program to self manage symptoms  2  Improve postural awareness and demonstrate self postural correction  3  Patient to improve knee flexion to 130 bilaterally to assist in stair descent   4  Patient to reduce pain at worst to 4/10 at worst in hips and knees to improve activity tolerance  Long Term Goals:  12 weeks  1  Patient to exhibit full independence with home exercise program for symptoms relief and prevention   2  Patient to improve hip internal rotation bilaterally by 5 degrees  3  Improve LE strength to 4+/5 grossly to improve general mobility  4  Patient to descend stairs with reciprocal pattern    Plan  Patient would benefit from: skilled physical therapy  Planned modality interventions: TENS, unattended electrical stimulation, thermotherapy: hydrocollator packs and cryotherapy  Planned therapy interventions: abdominal trunk stabilization, flexibility, functional ROM exercises, home exercise program, therapeutic exercise, therapeutic activities, stretching, strengthening, self care, postural training, patient education, neuromuscular re-education, massage, manual therapy and joint mobilization  Frequency: 2x week  Duration in weeks: 12  Treatment plan discussed with: patient        Subjective Evaluation    History of Present Illness  Mechanism of injury: Patient reports a history of bilateral knee and hip pain secondary to osteoarthritis  Patient states he has been +10 year history  Patient states at this time his left knee is most painful and restrictive and at times unstable  Patient also notes his right hip is worse than his left that was exacerbated in 2015 when playing golf  Patient notes he does have a history of multiple injections in his knees and will be going for synvisc injections as well  Patient states he has noticed increasing weakness in his right leg  Patient notes he does do exercises at home to include stretching            Recurrent probem    Pain  Current pain ratin  At best pain ratin  At worst pain ratin  Location: Bilateral hips/knees  Quality: sharp, knife-like, discomfort and dull ache  Relieving factors: heat  Aggravating factors: stair climbing  Progression: no change    Social Support    Employment status: not working    Diagnostic Tests  X-ray: abnormal  Treatments  Previous treatment: chiropractic  Patient Goals  Patient goals for therapy: increased strength, increased motion, decreased pain and return to sport/leisure activities          Objective     MMT:    Right  Left    Hip Flexion:   5  4+/5  Hip Abduction:     4/5  Hip Adduction:     5/5  Hip Extension:     4/5  Knee Flexion:     5/5  Knee Extension:    5/5  Ankle Dorsiflexion:    5/5  Ankle Plantarflexion:    5/5    AROM:    Right  Left    Knee Flexion:   130  110  Knee Extension:  -5  -8  Hip Flexion   110  115  Hip IR    5  5  Hip ER    40  45  Hip Extension   20  15    AMBULATION:  Antalgic, decreased stance time on RLE, trandelenburg pattern with right lean    PALPATION:  (+) TTP to bilateral joint lines of knees    FLEXIBILITY:  Impaired flexibility of bilateral quadriceps, hip flexors    Reflexes:  1+ bilateral patellar and achilles    (+) R sciatic nerve tension         Precautions:   Past Medical History:   Diagnosis Date    Arthritis     generalized    Asthma     seasonal-does not use inhaler    Atrial fibrillation with RVR (HCC) 2021    Back pain     Basal cell carcinoma 2021    BCC- Right Posterior Calf     Carotid artery stenosis     Diabetes mellitus (HCC)     type    GERD (gastroesophageal reflux disease)     Hiatal hernia     Hyperlipidemia     Hypertension     Peyronie's disease     last assessed [id number]    Seasonal allergies     Wears glasses            Date: 2022       Visit # 1 Eval       Manuals        Mobilization into hip IR and L knee flexion                                Neuro Re-Ed Sciatic nerve glides RLE        Bridges         Clamshells                                        Ther Ex        SLR flex/abd        Resisted walking        Prone hip IR        Reverse clamshells        STS                                Ther Activity                        Gait Training                        Modalities                          Access Code: [email or web address]  URL: [email or web address]  Date: 09/19/2022  Prepared by: Joe Lyn    Exercises  · Prone Hip Internal Rotation AROM - 1 x daily - 5 x weekly - 2 sets - 10 reps  · Supine Bridge - 1 x daily - 5 x weekly - 2 sets - 10 reps  · Clamshell with Resistance - 1 x daily - 5 x weekly - 2 sets - 10 reps  · Active Straight Leg Raise with Quad Set - 1 x daily - 5 x weekly - 2 sets - 10 reps

## 2022-10-04 ENCOUNTER — APPOINTMENT (OUTPATIENT)
Dept: PHYSICAL THERAPY | Facility: CLINIC | Age: 77
End: 2022-10-04

## 2022-10-06 ENCOUNTER — TELEPHONE (OUTPATIENT)
Dept: CARDIOLOGY CLINIC | Facility: CLINIC | Age: 77
End: 2022-10-06

## 2022-10-06 ENCOUNTER — OFFICE VISIT (OUTPATIENT)
Dept: PHYSICAL THERAPY | Facility: CLINIC | Age: 77
End: 2022-10-06
Payer: MEDICARE

## 2022-10-06 DIAGNOSIS — M16.11 PRIMARY OSTEOARTHRITIS OF ONE HIP, RIGHT: ICD-10-CM

## 2022-10-06 DIAGNOSIS — M17.12 PRIMARY OSTEOARTHRITIS OF LEFT KNEE: Primary | ICD-10-CM

## 2022-10-06 DIAGNOSIS — M62.50 DISUSE ATROPHY OF MUSCLE: ICD-10-CM

## 2022-10-06 DIAGNOSIS — I10 BENIGN ESSENTIAL HYPERTENSION: ICD-10-CM

## 2022-10-06 DIAGNOSIS — M16.12 PRIMARY OSTEOARTHRITIS OF ONE HIP, LEFT: ICD-10-CM

## 2022-10-06 DIAGNOSIS — M17.11 PRIMARY OSTEOARTHRITIS OF RIGHT KNEE: ICD-10-CM

## 2022-10-06 PROCEDURE — 97112 NEUROMUSCULAR REEDUCATION: CPT | Performed by: PHYSICAL MEDICINE & REHABILITATION

## 2022-10-06 PROCEDURE — 97110 THERAPEUTIC EXERCISES: CPT

## 2022-10-06 PROCEDURE — 97110 THERAPEUTIC EXERCISES: CPT | Performed by: PHYSICAL MEDICINE & REHABILITATION

## 2022-10-06 PROCEDURE — 97112 NEUROMUSCULAR REEDUCATION: CPT

## 2022-10-06 NOTE — TELEPHONE ENCOUNTER
Patient needs refill for carvedilol [facility]  Patient of Dr Preston Lind 90 days

## 2022-10-06 NOTE — PROGRESS NOTES
Daily Note     Today's date: 10/6/2022  Patient name: Benny Cuenca  : 1945  MRN: [id number]  Referring provider: Jerman Padron*  Dx:   Encounter Diagnosis     ICD-10-CM    1  Primary osteoarthritis of left knee  M17 12    2  Primary osteoarthritis of right knee  M17 11    3  Disuse atrophy of muscle, right thigh  M62 50    4  Primary osteoarthritis of one hip, right  M16 11    5  Primary osteoarthritis of one hip, left  M16 12                   Subjective: Patient reports his knees have been feeling quite good, however states his hips, right in particular have been quite sore  Patient states he has been compliant with his HEP since initial evaluation  Patient notes he plans on playing golf tomorrow  Objective: See treatment diary below      Assessment: Tolerated treatment well  Initiated formal physical therapy with focus on LB strengthening and joint mobilization  Patient exhibited good technique with therapeutic exercises and would benefit from continued PT      Plan: Continue per plan of care        Precautions:   Past Medical History:   Diagnosis Date    Arthritis     generalized    Asthma     seasonal-does not use inhaler    Atrial fibrillation with RVR ([address] ) 2021    Back pain     Basal cell carcinoma 2021    BCC- Right Posterior Calf     Carotid artery stenosis     Diabetes mellitus (HCC)     type    GERD (gastroesophageal reflux disease)     Hiatal hernia     Hyperlipidemia     Hypertension     Peyronie's disease     last assessed [id number]    Seasonal allergies     Wears glasses            Date: 2022 10/06/2022      Visit # 1 Eval 2      Manuals        Mobilization into hip IR and L knee flexion  TC                              Neuro Re-Ed        Sciatic nerve glides RLE        Bridges   On PB 2x10      Clamshells  RTB 2x10                                      Ther Ex        Bike RIVERA  10' lvl 3      FSU  6" 2x10 R/L      SLR flex/abd        Resisted walking  #12 5 4-way 5x each      Prone hip IR  20x BLE      Reverse clamshells  10x R/L      STS  2x10 from chair                               Ther Activity                        Gait Training                        Modalities                          Access Code: [email or web address]  URL: [email or web address]  Date: 09/19/2022  Prepared by: Ruba Astorga    Exercises  · Prone Hip Internal Rotation AROM - 1 x daily - 5 x weekly - 2 sets - 10 reps  · Supine Bridge - 1 x daily - 5 x weekly - 2 sets - 10 reps  · Clamshell with Resistance - 1 x daily - 5 x weekly - 2 sets - 10 reps  · Active Straight Leg Raise with Quad Set - 1 x daily - 5 x weekly - 2 sets - 10 reps

## 2022-10-07 RX ORDER — CARVEDILOL 6.25 MG/1
6.25 TABLET ORAL 2 TIMES DAILY WITH MEALS
Qty: 90 TABLET | Refills: 2 | Status: SHIPPED | OUTPATIENT
Start: 2022-10-07

## 2022-10-11 ENCOUNTER — OFFICE VISIT (OUTPATIENT)
Dept: PHYSICAL THERAPY | Facility: CLINIC | Age: 77
End: 2022-10-11
Payer: MEDICARE

## 2022-10-11 DIAGNOSIS — M17.12 PRIMARY OSTEOARTHRITIS OF LEFT KNEE: Primary | ICD-10-CM

## 2022-10-11 DIAGNOSIS — M16.11 PRIMARY OSTEOARTHRITIS OF ONE HIP, RIGHT: ICD-10-CM

## 2022-10-11 DIAGNOSIS — M16.12 PRIMARY OSTEOARTHRITIS OF ONE HIP, LEFT: ICD-10-CM

## 2022-10-11 DIAGNOSIS — M17.11 PRIMARY OSTEOARTHRITIS OF RIGHT KNEE: ICD-10-CM

## 2022-10-11 DIAGNOSIS — M62.50 DISUSE ATROPHY OF MUSCLE: ICD-10-CM

## 2022-10-11 PROCEDURE — 97110 THERAPEUTIC EXERCISES: CPT

## 2022-10-11 PROCEDURE — 97112 NEUROMUSCULAR REEDUCATION: CPT

## 2022-10-11 NOTE — PROGRESS NOTES
Daily Note     Today's date: 10/11/2022  Patient name: Jose Current  : 1945  MRN: [id number]  Referring provider: Jesse Ocampo*  Dx:   Encounter Diagnosis     ICD-10-CM    1  Primary osteoarthritis of left knee  M17 12    2  Primary osteoarthritis of right knee  M17 11    3  Disuse atrophy of muscle, right thigh  M62 50    4  Primary osteoarthritis of one hip, right  M16 11    5  Primary osteoarthritis of one hip, left  M16 12        Start Time: 1019          Subjective: Playing golf really aggravates my R hip  Objective: See treatment diary below      Assessment: Tolerated treatment fair  Patient demonstrated fatigue post treatment and would benefit from continued PT      Plan: Progress treatment as tolerated         Precautions:   Past Medical History:   Diagnosis Date   • Arthritis     generalized   • Asthma     seasonal-does not use inhaler   • Atrial fibrillation with RVR ([address] ) 2021   • Back pain    • Basal cell carcinoma 2021    BCC- Right Posterior Calf    • Carotid artery stenosis    • Diabetes mellitus (HCC)     type   • GERD (gastroesophageal reflux disease)    • Hiatal hernia    • Hyperlipidemia    • Hypertension    • Peyronie's disease     last assessed    • Seasonal allergies    • Wears glasses            Date: 2022 10/06/2022 10/11     Visit # 1 Eval 2 3     Manuals        Mobilization into hip IR and L knee flexion  TC AD (hip IR)        B HS/gastroc/piriformis stretching                      Neuro Re-Ed        Sciatic nerve glides RLE   ---     Radha Salle   On PB 2x10 2x10 reps w/ glut sets     Clamshells  RTB 2x10 2x10 reps RTB                                      Ther Ex        Bike RIVERA  10' lvl 3 10 min L3     FSU  6" 2x10 R/L 6" 2x10 R/L     SLR flex/abd   ---     Resisted walking  #12 5 4-way 5x each #10 -  4-way 5x each     Prone hip IR  20x BLE ---     Reverse clamshells  10x R/L ----     STS  2x10 from chair  2x10 from chair Ther Activity                        Gait Training                        Modalities                          Access Code: [email or web address]  URL: [email or web address]  Date: 09/19/2022  Prepared by: Joe Lyn    Exercises  · Prone Hip Internal Rotation AROM - 1 x daily - 5 x weekly - 2 sets - 10 reps  · Supine Bridge - 1 x daily - 5 x weekly - 2 sets - 10 reps  · Clamshell with Resistance - 1 x daily - 5 x weekly - 2 sets - 10 reps  · Active Straight Leg Raise with Quad Set - 1 x daily - 5 x weekly - 2 sets - 10 reps

## 2022-10-13 ENCOUNTER — OFFICE VISIT (OUTPATIENT)
Dept: PHYSICAL THERAPY | Facility: CLINIC | Age: 77
End: 2022-10-13
Payer: MEDICARE

## 2022-10-13 DIAGNOSIS — M17.12 PRIMARY OSTEOARTHRITIS OF LEFT KNEE: Primary | ICD-10-CM

## 2022-10-13 DIAGNOSIS — M62.50 DISUSE ATROPHY OF MUSCLE: ICD-10-CM

## 2022-10-13 DIAGNOSIS — M16.12 PRIMARY OSTEOARTHRITIS OF ONE HIP, LEFT: ICD-10-CM

## 2022-10-13 DIAGNOSIS — M16.11 PRIMARY OSTEOARTHRITIS OF ONE HIP, RIGHT: ICD-10-CM

## 2022-10-13 DIAGNOSIS — M17.11 PRIMARY OSTEOARTHRITIS OF RIGHT KNEE: ICD-10-CM

## 2022-10-13 PROCEDURE — 97112 NEUROMUSCULAR REEDUCATION: CPT | Performed by: PHYSICAL MEDICINE & REHABILITATION

## 2022-10-13 PROCEDURE — 97110 THERAPEUTIC EXERCISES: CPT | Performed by: PHYSICAL MEDICINE & REHABILITATION

## 2022-10-13 NOTE — PROGRESS NOTES
Daily Note     Today's date: 10/13/2022  Patient name: Frida Kwong  : 1945  MRN: [id number]  Referring provider: Randi Monte*  Dx:   Encounter Diagnosis     ICD-10-CM    1  Primary osteoarthritis of left knee  M17 12    2  Primary osteoarthritis of right knee  M17 11    3  Disuse atrophy of muscle, right thigh  M62 50    4  Primary osteoarthritis of one hip, right  M16 11    5  Primary osteoarthritis of one hip, left  M16 12                   Subjective: Patient notes he is feeling "okay" today, noting that he has some stiffness in his hips  Patient notes he will be umpiring softball this weekend  Objective: See treatment diary below      Assessment: Tolerated treatment well  Patient continues to be appropriately challenged by current program  Patient demonstrated fatigue post treatment and would benefit from continued PT      Plan: Progress treatment as tolerated         Precautions:   Past Medical History:   Diagnosis Date   • Arthritis     generalized   • Asthma     seasonal-does not use inhaler   • Atrial fibrillation with RVR ([address] ) 2021   • Back pain    • Basal cell carcinoma 2021    BCC- Right Posterior Calf    • Carotid artery stenosis    • Diabetes mellitus (HCC)     type   • GERD (gastroesophageal reflux disease)    • Hiatal hernia    • Hyperlipidemia    • Hypertension    • Peyronie's disease     last assessed [id number]   • Seasonal allergies    • Wears glasses            Date: 2022 10/06/2022 10/11 10/13/2022    Visit # 1 Eval 2 3 4    Manuals        Mobilization into hip IR and L knee flexion  TC AD (hip IR) TC       B HS/gastroc/piriformis stretching  TC                    Neuro Re-Ed        Sciatic nerve glides RLE   ---     Bridges   On PB 2x10 2x10 reps w/ glut sets On PB 2x10    Clamshells  RTB 2x10 2x10 reps RTB  RTB 2x10 R/L                                    Ther Ex        Bike RIVERA  10' lvl 3 10 min L3 10 min L3    FSU  6" 2x10 R/L 6" 2x10 R/L 6" 2x10 R/L and LSU 2x10 R/L    SLR flex/abd   ---     Resisted walking  #12 5 4-way 5x each #10 -  4-way 5x each Lateral walks with RTB 5 laps     Prone hip IR  20x BLE --- 20x BLE    Reverse clamshells  10x R/L ---- 10x R/L    STS  2x10 from chair  2x10 from chair  2x10 from chair                             Ther Activity                        Gait Training                        Modalities                          Access Code: [email or web address]  URL: [email or web address]  Date: 09/19/2022  Prepared by: Dorinda Sanchez    Exercises  · Prone Hip Internal Rotation AROM - 1 x daily - 5 x weekly - 2 sets - 10 reps  · Supine Bridge - 1 x daily - 5 x weekly - 2 sets - 10 reps  · Clamshell with Resistance - 1 x daily - 5 x weekly - 2 sets - 10 reps  · Active Straight Leg Raise with Quad Set - 1 x daily - 5 x weekly - 2 sets - 10 reps

## 2022-10-18 ENCOUNTER — OFFICE VISIT (OUTPATIENT)
Dept: PHYSICAL THERAPY | Facility: CLINIC | Age: 77
End: 2022-10-18
Payer: MEDICARE

## 2022-10-18 DIAGNOSIS — M16.12 PRIMARY OSTEOARTHRITIS OF ONE HIP, LEFT: ICD-10-CM

## 2022-10-18 DIAGNOSIS — M62.50 DISUSE ATROPHY OF MUSCLE: ICD-10-CM

## 2022-10-18 DIAGNOSIS — M17.11 PRIMARY OSTEOARTHRITIS OF RIGHT KNEE: ICD-10-CM

## 2022-10-18 DIAGNOSIS — M17.12 PRIMARY OSTEOARTHRITIS OF LEFT KNEE: Primary | ICD-10-CM

## 2022-10-18 DIAGNOSIS — M16.11 PRIMARY OSTEOARTHRITIS OF ONE HIP, RIGHT: ICD-10-CM

## 2022-10-18 PROCEDURE — 97112 NEUROMUSCULAR REEDUCATION: CPT | Performed by: PHYSICAL MEDICINE & REHABILITATION

## 2022-10-18 PROCEDURE — 97110 THERAPEUTIC EXERCISES: CPT | Performed by: PHYSICAL MEDICINE & REHABILITATION

## 2022-10-18 NOTE — PROGRESS NOTES
Daily Note     Today's date: 10/18/2022  Patient name: John Medellin  : 1945  MRN: [id number]  Referring provider: May Cavazos*  Dx:   Encounter Diagnosis     ICD-10-CM    1  Primary osteoarthritis of left knee  M17 12    2  Primary osteoarthritis of right knee  M17 11    3  Disuse atrophy of muscle, right thigh  M62 50    4  Primary osteoarthritis of one hip, right  M16 11    5  Primary osteoarthritis of one hip, left  M16 12                  Subjective: Patient reports that he did umpire this past weekend, noting that he did not feel soreness during the game but had some knee pains after the game  Patient notes his right hip has also been bothersome in the morning  Patient states he will be getting injections in his knees this Friday  Objective: See treatment diary below      Assessment: Tolerated treatment well  Advanced patient's program today to add proprioceptive exercises, some CS required at times  Patient demonstrated fatigue post treatment and would benefit from continued PT      Plan: Progress treatment as tolerated         Precautions:   Past Medical History:   Diagnosis Date   • Arthritis     generalized   • Asthma     seasonal-does not use inhaler   • Atrial fibrillation with RVR ([facility]) 2021   • Back pain    • Basal cell carcinoma 2021    BCC- Right Posterior Calf    • Carotid artery stenosis    • Diabetes mellitus ([facility])     type   • GERD (gastroesophageal reflux disease)    • Hiatal hernia    • Hyperlipidemia    • Hypertension    • Peyronie's disease     last assessed [id number]   • Seasonal allergies    • Wears glasses            Date: 2022 10/06/2022 10/11 10/13/2022 10/18/2022   Visit # 1 Eval 2 3 4 5   Manuals        Mobilization into hip IR and L knee flexion  TC AD (hip IR) TC TC      B HS/gastroc/piriformis stretching  TC TC                   Neuro Re-Ed        Sciatic nerve glides RLE   ---     Bridges   On PB 2x10 2x10 reps w/ glut sets On PB 2x10 On PB 2x10   Clamshells  RTB 2x10 2x10 reps RTB  RTB 2x10 R/L RTB 2x10 R/L   Marching on beam     5 laps fwd/retro with CS   SLS cone taps     Bilateral 15x R/L on level ground                   Ther Ex        Bike RIVERA  10' lvl 3 10 min L3 10 min L3 10 min L3   FSU  6" 2x10 R/L 6" 2x10 R/L 6" 2x10 R/L and LSU 2x10 R/L 6" 2x10 R/L and LSU 2x10 R/L   SLR flex/abd   ---     Resisted walking  #12 5 4-way 5x each #10 -  4-way 5x each Lateral walks with RTB 5 laps  #12 5 at zia 8 laps laterally   Prone hip IR  20x BLE --- 20x BLE 20x BLE   Reverse clamshells  10x R/L ---- 10x R/L 10x R/L   STS  2x10 from chair  2x10 from chair  2x10 from chair  2x10 from chair                            Ther Activity                        Gait Training                        Modalities                          Access Code: [email or web address]  URL: [email or web address]  Date: 09/19/2022  Prepared by: Joe Lyn    Exercises  · Prone Hip Internal Rotation AROM - 1 x daily - 5 x weekly - 2 sets - 10 reps  · Supine Bridge - 1 x daily - 5 x weekly - 2 sets - 10 reps  · Clamshell with Resistance - 1 x daily - 5 x weekly - 2 sets - 10 reps  · Active Straight Leg Raise with Quad Set - 1 x daily - 5 x weekly - 2 sets - 10 reps

## 2022-10-19 ENCOUNTER — IMMUNIZATIONS (OUTPATIENT)
Dept: FAMILY MEDICINE CLINIC | Facility: CLINIC | Age: 77
End: 2022-10-19
Payer: MEDICARE

## 2022-10-19 DIAGNOSIS — Z23 ENCOUNTER FOR IMMUNIZATION: Primary | ICD-10-CM

## 2022-10-19 PROCEDURE — 0124A ADM SARSCV2 BVL 30MCG/.3ML B: CPT

## 2022-10-19 PROCEDURE — 91312 SARSCOV2 VAC BVL 30MCG/0.3ML: CPT

## 2022-10-20 ENCOUNTER — OFFICE VISIT (OUTPATIENT)
Dept: PHYSICAL THERAPY | Facility: CLINIC | Age: 77
End: 2022-10-20
Payer: MEDICARE

## 2022-10-20 DIAGNOSIS — M16.12 PRIMARY OSTEOARTHRITIS OF ONE HIP, LEFT: ICD-10-CM

## 2022-10-20 DIAGNOSIS — M16.11 PRIMARY OSTEOARTHRITIS OF ONE HIP, RIGHT: ICD-10-CM

## 2022-10-20 DIAGNOSIS — M17.11 PRIMARY OSTEOARTHRITIS OF RIGHT KNEE: ICD-10-CM

## 2022-10-20 DIAGNOSIS — M62.50 DISUSE ATROPHY OF MUSCLE: ICD-10-CM

## 2022-10-20 DIAGNOSIS — M17.12 PRIMARY OSTEOARTHRITIS OF LEFT KNEE: Primary | ICD-10-CM

## 2022-10-20 PROCEDURE — 97112 NEUROMUSCULAR REEDUCATION: CPT | Performed by: PHYSICAL MEDICINE & REHABILITATION

## 2022-10-20 PROCEDURE — 97110 THERAPEUTIC EXERCISES: CPT | Performed by: PHYSICAL MEDICINE & REHABILITATION

## 2022-10-20 NOTE — PROGRESS NOTES
Daily Note     Today's date: 10/20/2022  Patient name: July Farris  : 1945  MRN: [id number]  Referring provider: Aditya Ndiaye*  Dx:   Encounter Diagnosis     ICD-10-CM    1  Primary osteoarthritis of left knee  M17 12    2  Primary osteoarthritis of right knee  M17 11    3  Disuse atrophy of muscle, right thigh  M62 50    4  Primary osteoarthritis of one hip, right  M16 11    5  Primary osteoarthritis of one hip, left  M16 12                  Subjective: Patient reports he will be getting gel injections in both knees tomorrow  Patient notes otherwise is feeling pretty good  Objective: See treatment diary below      Assessment: Tolerated treatment well  Advanced patient's program today to add proprioceptive exercises, some CS required at times  Patient demonstrated fatigue post treatment and would benefit from continued PT      Plan: Progress treatment as tolerated         Precautions:   Past Medical History:   Diagnosis Date   • Arthritis     generalized   • Asthma     seasonal-does not use inhaler   • Atrial fibrillation with RVR ([address] ) 2021   • Back pain    • Basal cell carcinoma 2021    BCC- Right Posterior Calf    • Carotid artery stenosis    • Diabetes mellitus (HCC)     type   • GERD (gastroesophageal reflux disease)    • Hiatal hernia    • Hyperlipidemia    • Hypertension    • Peyronie's disease     last assessed [id number]   • Seasonal allergies    • Wears glasses            Date: 10/20/2022 10/06/2022 10/11 10/13/2022 10/18/2022   Visit # 6 2 3 4 FOTO 5   Manuals        Mobilization into hip IR and L knee flexion TC TC AD (hip IR) TC TC    B HS/gastroc/piriformis stretching   B HS/gastroc/piriformis stretching  TC TC                   Neuro Re-Ed        Sciatic nerve glides RLE   ---     Bridges  On PB 2x10 On PB 2x10 2x10 reps w/ glut sets On PB 2x10 On PB 2x10   Clamshells RTB 2x10 RTB 2x10 2x10 reps RTB  RTB 2x10 R/L RTB 2x10 R/L   Marching on beam 5 laps fwd/retro with CS    5 laps fwd/retro with CS   SLS cone taps Bilateral 15x R/L on level ground    Bilateral 15x R/L on level ground                   Ther Ex        Bike RIVERA 10' lvl 3 10' lvl 3 10 min L3 10 min L3 10 min L3   FSU 6" 2x10 R/L 6" 2x10 R/L 6" 2x10 R/L 6" 2x10 R/L and LSU 2x10 R/L 6" 2x10 R/L and LSU 2x10 R/L   SLR flex/abd   ---     Resisted walking #12 5 4-way 5x each #12 5 4-way 5x each #10 -  4-way 5x each Lateral walks with RTB 5 laps  #12 5 at zia 8 laps laterally   Prone hip IR 20x BLE 20x BLE --- 20x BLE 20x BLE   Reverse clamshells 10x R/L 10x R/L ---- 10x R/L 10x R/L   STS 2x10 from chair with airex 2x10 from chair  2x10 from chair  2x10 from chair  2x10 from chair                            Ther Activity                        Gait Training                        Modalities                          Access Code: [email or web address]  URL: [email or web address]  Date: 09/19/2022  Prepared by: Екатерина Cannon    Exercises  · Prone Hip Internal Rotation AROM - 1 x daily - 5 x weekly - 2 sets - 10 reps  · Supine Bridge - 1 x daily - 5 x weekly - 2 sets - 10 reps  · Clamshell with Resistance - 1 x daily - 5 x weekly - 2 sets - 10 reps  · Active Straight Leg Raise with Quad Set - 1 x daily - 5 x weekly - 2 sets - 10 reps

## 2022-10-21 ENCOUNTER — PROCEDURE VISIT (OUTPATIENT)
Dept: OBGYN CLINIC | Facility: CLINIC | Age: 77
End: 2022-10-21
Payer: MEDICARE

## 2022-10-21 VITALS — HEIGHT: 69 IN | BODY MASS INDEX: 23.11 KG/M2 | WEIGHT: 156 LBS

## 2022-10-21 DIAGNOSIS — M17.11 PRIMARY OSTEOARTHRITIS OF RIGHT KNEE: Primary | ICD-10-CM

## 2022-10-21 DIAGNOSIS — M17.12 PRIMARY OSTEOARTHRITIS OF LEFT KNEE: ICD-10-CM

## 2022-10-21 PROCEDURE — 20610 DRAIN/INJ JOINT/BURSA W/O US: CPT | Performed by: PHYSICIAN ASSISTANT

## 2022-10-21 RX ORDER — HYALURONATE SODIUM 10 MG/ML
20 SYRINGE (ML) INTRAARTICULAR
Status: COMPLETED | OUTPATIENT
Start: 2022-10-21 | End: 2022-10-21

## 2022-10-21 RX ADMIN — Medication 20 MG: at 10:41

## 2022-10-21 NOTE — PROGRESS NOTES
Assessment/Plan:  1  Primary osteoarthritis of right knee     2  Primary osteoarthritis of left knee       Follow-up 1 week  Subjective:   Kiley Irwin is a 69 y o  male who presents today for euflexx #1 bilateral knees  Review of Systems      Past Medical History:   Diagnosis Date   • Arthritis     generalized   • Asthma     seasonal-does not use inhaler   • Atrial fibrillation with RVR ([address] ) 5/12/2021   • Back pain    • Basal cell carcinoma 09/23/2021    BCC- Right Posterior Calf    • Carotid artery stenosis    • Diabetes mellitus (HCC)     type   • GERD (gastroesophageal reflux disease)    • Hiatal hernia    • Hyperlipidemia    • Hypertension    • Peyronie's disease     last assessed [id number]   • Seasonal allergies    • Wears glasses        Past Surgical History:   Procedure Laterality Date   • APPENDECTOMY     • CAROTID ENDARTERECTOMY Left 10/29/2020   • COLONOSCOPY N/A 1/22/2016    Procedure: COLONOSCOPY;  Surgeon: Monster Iglesias MD;  Location: [facility]; Service:    • KNEE ARTHROSCOPY Left    • KNEE ARTHROSCOPY W/ MENISCAL REPAIR Right    • MOHS SURGERY Right 11/01/2021    BCC- Right Posterior Calf- Dr Slava Ashley    • UT RPLCMT AORTIC VALVE OPN W/STENTLESS TISSUE VALVE N/A 5/10/2021    Procedure: CORONARY ARTERY BYPASS GRAFT (CABG) X 5 USING LEFT GSV--> DIAGONAL,, RAMUS, PDA ,AND OM1, AND LEFT BALBIR-LAD WITH REPLACEMENT VALVE AORTIC USING 23 MM MURRAY MAGNA EASE(AVR);   Surgeon: Gilbert Francisco MD;  Location:  MAIN OR;  Service: Cardiac Surgery   • UT THROMBOENDARTECTMY NECK,NECK INCIS Left 10/29/2020    Procedure: CAROTID ENDARTERECTOMY W/BOVINE PATCH ANGIOPLASTY AND COMPLETION DUPLEX IMAGING;  Surgeon: Betti Essex, DO;  Location: [facility] Main OR;  Service: Vascular       Family History   Problem Relation Age of Onset   • Heart attack Mother [de-identified]        CABG   • Kidney failure Father    • Diabetes Father         pre-diabetic       Social History     Occupational History   • Not on file   Tobacco Use   • Smoking status: Never Smoker   • Smokeless tobacco: Never Used   Vaping Use   • Vaping Use: Never used   Substance and Sexual Activity   • Alcohol use: Yes     Alcohol/week: 10 0 standard drinks     Types: 7 Glasses of wine, 3 Standard drinks or equivalent per week   • Drug use: No   • Sexual activity: Yes     Partners: Female         Current Outpatient Medications:   •  acetaminophen (TYLENOL) 325 mg tablet, Take 1-2 tablets Q4-6 hours PRN pain  Do not take more than 4 grams in 24 hours  , Disp: , Rfl: 0  •  aspirin 81 mg chewable tablet, Chew 1 tablet (81 mg total) daily, Disp: 30 tablet, Rfl: 2  •  atorvastatin (LIPITOR) 80 mg tablet, Take 1 tablet (80 mg total) by mouth daily with dinner, Disp: 90 tablet, Rfl: 2  •  Blood Glucose Monitoring Suppl (FreeStyle [employer] System) w/Device KIT, Test TID & QHS as directed , Disp: 1 kit, Rfl: 0  •  carvedilol (COREG) 6 25 mg tablet, Take 1 tablet (6 25 mg total) by mouth 2 (two) times a day with meals, Disp: 90 tablet, Rfl: 2  •  FreeStyle [employer] Test test strip, Test TID & QHS as directed , Disp: 100 each, Rfl: 2  •  Lancets (freestyle) lancets, Test TID & QHS as directed , Disp: 100 each, Rfl: 2  •  lisinopril-hydrochlorothiazide (PRINZIDE,ZESTORETIC) 20-25 MG per tablet, Take 1 tablet by mouth daily, Disp: 90 tablet, Rfl: 1  •  metFORMIN (GLUCOPHAGE) 1000 MG tablet, Take 1 tablet (1,000 mg total) by mouth 2 (two) times a day with meals, Disp: 180 tablet, Rfl: 2    No Known Allergies    Objective: There were no vitals filed for this visit      Ortho Exam    Physical Exam    Large joint arthrocentesis: L knee  Universal Protocol:  Risks and benefits: risks, benefits and alternatives were discussed  Consent given by: patient  Timeout called at: 10/21/2022 10:40 AM   Site marked: the operative site was marked  Supporting Documentation  Indications: pain   Procedure Details  Location: knee - L knee  Preparation: Patient was prepped and draped in the usual sterile fashion (Alcohol prep)  Needle size: 22 G  Ultrasound guidance: no  Approach: anterolateral  Medications administered: 20 mg Sodium Hyaluronate 20 MG/2ML    Patient tolerance: patient tolerated the procedure well with no immediate complications  Dressing:  Sterile dressing applied    Large joint arthrocentesis: R knee  Universal Protocol:  Risks and benefits: risks, benefits and alternatives were discussed  Consent given by: patient  Timeout called at: 10/21/2022 10:40 AM   Site marked: the operative site was marked  Supporting Documentation  Indications: pain   Procedure Details  Location: knee - R knee  Preparation: Patient was prepped and draped in the usual sterile fashion (Alcohol prep)  Needle size: 22 G  Ultrasound guidance: no  Approach: anterolateral  Medications administered: 20 mg Sodium Hyaluronate 20 MG/2ML    Patient tolerance: patient tolerated the procedure well with no immediate complications  Dressing:  Sterile dressing applied              This document was created using speech voice recognition software  Grammatical errors, random word insertions, pronoun errors, and incomplete sentences are an occasional consequence of this system due to software limitations, ambient noise, and hardware issues  Any formal questions or concerns about content, text, or information contained within the body of this dictation should be directly addressed to the provider for clarification

## 2022-10-25 ENCOUNTER — APPOINTMENT (OUTPATIENT)
Dept: PHYSICAL THERAPY | Facility: CLINIC | Age: 77
End: 2022-10-25

## 2022-10-25 NOTE — PROGRESS NOTES
Daily Note     Today's date: 10/25/2022  Patient name: Jay Jay Lou  : 1945  MRN: [id number]  Referring provider: Gabe Le*  Dx:   No diagnosis found  Subjective: Patient reports he will be getting gel injections in both knees tomorrow  Patient notes otherwise is feeling pretty good  Objective: See treatment diary below      Assessment: Tolerated treatment well  Advanced patient's program today to add proprioceptive exercises, some CS required at times  Patient demonstrated fatigue post treatment and would benefit from continued PT      Plan: Progress treatment as tolerated         Precautions:   Past Medical History:   Diagnosis Date   • Arthritis     generalized   • Asthma     seasonal-does not use inhaler   • Atrial fibrillation with RVR ([address] ) 2021   • Back pain    • Basal cell carcinoma 2021    BCC- Right Posterior Calf    • Carotid artery stenosis    • Diabetes mellitus (HCC)     type   • GERD (gastroesophageal reflux disease)    • Hiatal hernia    • Hyperlipidemia    • Hypertension    • Peyronie's disease     last assessed [id number]   • Seasonal allergies    • Wears glasses            Date: 10/20/2022 10/25/2022 10/11 10/13/2022 10/18/2022   Visit # 6 7 3 4 FOTO 5   Manuals        Mobilization into hip IR and L knee flexion TC TC AD (hip IR) TC TC   B HS/gastroc/piriformis stretching  B HS/gastroc/piriformis stretching  TC B HS/gastroc/piriformis stretching  TC TC                   Neuro Re-Ed        Sciatic nerve glides RLE   ---     Bridges  On PB 2x10 On PB 2x10 2x10 reps w/ glut sets On PB 2x10 On PB 2x10   Clamshells RTB 2x10 RTB 2x10 2x10 reps RTB  RTB 2x10 R/L RTB 2x10 R/L   Marching on beam 5 laps fwd/retro with CS 5 laps fwd/retro with CS   5 laps fwd/retro with CS   SLS cone taps Bilateral 15x R/L on level ground Bilateral 15x R/L on level ground   Bilateral 15x R/L on level ground                   Ther Ex        Bike RIVERA 10' lvl 3 10' lvl 3 10 min L3 10 min L3 10 min L3   FSU 6" 2x10 R/L 6" 2x10 R/L 6" 2x10 R/L 6" 2x10 R/L and LSU 2x10 R/L 6" 2x10 R/L and LSU 2x10 R/L   SLR flex/abd   ---     Resisted walking #12 5 4-way 5x each #12 5 4-way 5x each #10 -  4-way 5x each Lateral walks with RTB 5 laps  #12 5 at zia 8 laps laterally   Prone hip IR 20x BLE 20x BLE --- 20x BLE 20x BLE   Reverse clamshells 10x R/L 10x R/L ---- 10x R/L 10x R/L   STS 2x10 from chair with airex 2x10 from chair  2x10 from chair  2x10 from chair  2x10 from chair                            Ther Activity                        Gait Training                        Modalities                          Access Code: [email or web address]  URL: [email or web address]  Date: 09/19/2022  Prepared by: Lars Peraza    Exercises  · Prone Hip Internal Rotation AROM - 1 x daily - 5 x weekly - 2 sets - 10 reps  · Supine Bridge - 1 x daily - 5 x weekly - 2 sets - 10 reps  · Clamshell with Resistance - 1 x daily - 5 x weekly - 2 sets - 10 reps  · Active Straight Leg Raise with Quad Set - 1 x daily - 5 x weekly - 2 sets - 10 reps

## 2022-10-27 ENCOUNTER — OFFICE VISIT (OUTPATIENT)
Dept: PHYSICAL THERAPY | Facility: CLINIC | Age: 77
End: 2022-10-27
Payer: MEDICARE

## 2022-10-27 DIAGNOSIS — M62.50 DISUSE ATROPHY OF MUSCLE: ICD-10-CM

## 2022-10-27 DIAGNOSIS — M17.11 PRIMARY OSTEOARTHRITIS OF RIGHT KNEE: ICD-10-CM

## 2022-10-27 DIAGNOSIS — M17.12 PRIMARY OSTEOARTHRITIS OF LEFT KNEE: Primary | ICD-10-CM

## 2022-10-27 DIAGNOSIS — M16.12 PRIMARY OSTEOARTHRITIS OF ONE HIP, LEFT: ICD-10-CM

## 2022-10-27 PROCEDURE — 97110 THERAPEUTIC EXERCISES: CPT

## 2022-10-27 PROCEDURE — 97112 NEUROMUSCULAR REEDUCATION: CPT

## 2022-10-27 NOTE — PROGRESS NOTES
Daily Note     Today's date: 10/27/2022  Patient name: Brandee Iverson  : 1945  MRN: [id number]  Referring provider: Hilario Pederson*  Dx:   Encounter Diagnosis     ICD-10-CM    1  Primary osteoarthritis of left knee  M17 12    2  Primary osteoarthritis of one hip, left  M16 12    3  Primary osteoarthritis of right knee  M17 11    4  Disuse atrophy of muscle, right thigh  M62 50        Start Time: 1022          Subjective: I'm getting the series of 3 injections in my knees  Dr said I probably wont notice any results until I get them all  Today my knees just feel stiff  Objective: See treatment diary below      Assessment: Tolerated treatment fair  Patient demonstrated fatigue post treatment, exhibited good technique with therapeutic exercises and would benefit from continued PT  Patient wearing L knee support  Plan: Progress treatment as tolerated         Precautions:   Past Medical History:   Diagnosis Date   • Arthritis     generalized   • Asthma     seasonal-does not use inhaler   • Atrial fibrillation with RVR ([address] ) 2021   • Back pain    • Basal cell carcinoma 2021    BCC- Right Posterior Calf    • Carotid artery stenosis    • Diabetes mellitus (HCC)     type   • GERD (gastroesophageal reflux disease)    • Hiatal hernia    • Hyperlipidemia    • Hypertension    • Peyronie's disease     last assessed [id number]   • Seasonal allergies    • Wears glasses            Date: 10/20/2022 10/27  10/13/2022 10/18/2022   Visit # 6 7  4 FOTO 5   Manuals        Mobilization into hip IR and L knee flexion TC ----  TC TC    B HS/gastroc/piriformis stretching  AD  TC TC                   Neuro Re-Ed        Sciatic nerve glides RLE  ---      Bridges  On PB 2x10 On PB 2x10  On PB 2x10 On PB 2x10   Clamshells RTB 2x10 RTB 2x10  RTB 2x10 R/L RTB 2x10 R/L   Marching on beam 5 laps fwd/retro with CS -----   5 laps fwd/retro with CS   SLS cone taps Bilateral 15x R/L on level ground ------   Bilateral 15x R/L on level ground                   Ther Ex        Bike RIVERA 10' lvl 3 10 min L5  10 min L3 10 min L3   FSU 6" 2x10 R/L 6" 2x10 R/L  6" 2x10 R/L and LSU 2x10 R/L 6" 2x10 R/L and LSU 2x10 R/L   SLR flex/abd  HEP      Resisted walking #12 5 4-way 5x each #12   4-way 5x each  Lateral walks with RTB 5 laps  #12 5 at zia 8 laps laterally   Prone hip IR 20x BLE 20x BLE  20x BLE 20x BLE   Reverse clamshells 10x R/L 10x R/L  10x R/L 10x R/L   STS 2x10 from chair with airex 2x10 from chair   2x10 from chair  2x10 from chair                            Ther Activity                        Gait Training                        Modalities                          Access Code: [email or web address]  URL: [email or web address]  Date: 09/19/2022  Prepared by: Nydia Cape    Exercises  · Prone Hip Internal Rotation AROM - 1 x daily - 5 x weekly - 2 sets - 10 reps  · Supine Bridge - 1 x daily - 5 x weekly - 2 sets - 10 reps  · Clamshell with Resistance - 1 x daily - 5 x weekly - 2 sets - 10 reps  · Active Straight Leg Raise with Quad Set - 1 x daily - 5 x weekly - 2 sets - 10 reps

## 2022-10-28 ENCOUNTER — OFFICE VISIT (OUTPATIENT)
Dept: VASCULAR SURGERY | Facility: CLINIC | Age: 77
End: 2022-10-28

## 2022-10-28 ENCOUNTER — PROCEDURE VISIT (OUTPATIENT)
Dept: OBGYN CLINIC | Facility: CLINIC | Age: 77
End: 2022-10-28

## 2022-10-28 VITALS
HEIGHT: 69 IN | DIASTOLIC BLOOD PRESSURE: 72 MMHG | SYSTOLIC BLOOD PRESSURE: 128 MMHG | BODY MASS INDEX: 23.4 KG/M2 | HEART RATE: 77 BPM | WEIGHT: 158 LBS

## 2022-10-28 VITALS — BODY MASS INDEX: 23.11 KG/M2 | WEIGHT: 156 LBS | HEIGHT: 69 IN

## 2022-10-28 DIAGNOSIS — M17.11 PRIMARY OSTEOARTHRITIS OF RIGHT KNEE: Primary | ICD-10-CM

## 2022-10-28 DIAGNOSIS — M17.12 PRIMARY OSTEOARTHRITIS OF LEFT KNEE: ICD-10-CM

## 2022-10-28 DIAGNOSIS — I48.0 PAF (PAROXYSMAL ATRIAL FIBRILLATION) (HCC): ICD-10-CM

## 2022-10-28 DIAGNOSIS — I10 BENIGN ESSENTIAL HYPERTENSION: ICD-10-CM

## 2022-10-28 DIAGNOSIS — N18.31 STAGE 3A CHRONIC KIDNEY DISEASE (HCC): ICD-10-CM

## 2022-10-28 DIAGNOSIS — Z98.890 STATUS POST CAROTID ENDARTERECTOMY: ICD-10-CM

## 2022-10-28 DIAGNOSIS — I65.21 INTERNAL CAROTID ARTERY OCCLUSION, RIGHT: ICD-10-CM

## 2022-10-28 DIAGNOSIS — I65.22 CAROTID STENOSIS, LEFT: Primary | ICD-10-CM

## 2022-10-28 RX ORDER — HYALURONATE SODIUM 10 MG/ML
20 SYRINGE (ML) INTRAARTICULAR
Status: COMPLETED | OUTPATIENT
Start: 2022-10-28 | End: 2022-10-28

## 2022-10-28 RX ADMIN — Medication 20 MG: at 11:02

## 2022-10-28 NOTE — PATIENT INSTRUCTIONS
Symptoms of stroke:  - Unable to speak or understand speech  - Unable to move one side of the body (arm or leg)  - Visual changes  - Call 911 for any symptoms of stroke            Carotid artery stenosis  SANTIAGO 9/6/22: R ICA occlusion; L patent ICA and endart (99/28)    -continue with good blood pressure, cholesterol and diabetes control  -continue with aspirin and atorvastatin 80  -we reviewed symptoms of stroke for which he should call 911  -check CV duplex in 6 months and office visit in 1 year

## 2022-10-28 NOTE — PROGRESS NOTES
Assessment/Plan:  1  Primary osteoarthritis of right knee     2  Primary osteoarthritis of left knee       Follow-up 1 week  Subjective:   Doug Hernandez is a 69 y o  male who presents today for euflexxa #2 bilateral knees  Review of Systems      Past Medical History:   Diagnosis Date   • Arthritis     generalized   • Asthma     seasonal-does not use inhaler   • Atrial fibrillation with RVR ([address] ) 5/12/2021   • Back pain    • Basal cell carcinoma 09/23/2021    BCC- Right Posterior Calf    • Carotid artery stenosis    • Diabetes mellitus (HCC)     type   • GERD (gastroesophageal reflux disease)    • Hiatal hernia    • Hyperlipidemia    • Hypertension    • Peyronie's disease     last assessed [id number]   • Seasonal allergies    • Wears glasses        Past Surgical History:   Procedure Laterality Date   • APPENDECTOMY     • CAROTID ENDARTERECTOMY Left 10/29/2020   • COLONOSCOPY N/A 1/22/2016    Procedure: COLONOSCOPY;  Surgeon: Kris Valdez MD;  Location: [facility] GI LAB; Service:    • KNEE ARTHROSCOPY Left    • KNEE ARTHROSCOPY W/ MENISCAL REPAIR Right    • MOHS SURGERY Right 11/01/2021    BCC- Right Posterior Calf- Dr Charles Weinberg    • FL RPLCMT AORTIC VALVE OPN W/STENTLESS TISSUE VALVE N/A 5/10/2021    Procedure: CORONARY ARTERY BYPASS GRAFT (CABG) X 5 USING LEFT GSV--> DIAGONAL,, RAMUS, PDA ,AND OM1, AND LEFT BALBIR-LAD WITH REPLACEMENT VALVE AORTIC USING 23 MM MURRAY MAGNA EASE(AVR);   Surgeon: Sheyla Hamilton MD;  Location:  MAIN OR;  Service: Cardiac Surgery   • FL THROMBOENDARTECTMY NECK,NECK INCIS Left 10/29/2020    Procedure: CAROTID ENDARTERECTOMY W/BOVINE PATCH ANGIOPLASTY AND COMPLETION DUPLEX IMAGING;  Surgeon: Waldo Hodges DO;  Location: [facility];  Service: Vascular       Family History   Problem Relation Age of Onset   • Heart attack Mother [de-identified]        CABG   • Kidney failure Father    • Diabetes Father         pre-diabetic       Social History     Occupational History   • Not on file   Tobacco Use   • Smoking status: Never Smoker   • Smokeless tobacco: Never Used   Vaping Use   • Vaping Use: Never used   Substance and Sexual Activity   • Alcohol use: Yes     Alcohol/week: 10 0 standard drinks     Types: 7 Glasses of wine, 3 Standard drinks or equivalent per week   • Drug use: No   • Sexual activity: Yes     Partners: Female         Current Outpatient Medications:   •  acetaminophen (TYLENOL) 325 mg tablet, Take 1-2 tablets Q4-6 hours PRN pain  Do not take more than 4 grams in 24 hours  , Disp: , Rfl: 0  •  aspirin 81 mg chewable tablet, Chew 1 tablet (81 mg total) daily, Disp: 30 tablet, Rfl: 2  •  atorvastatin (LIPITOR) 80 mg tablet, Take 1 tablet (80 mg total) by mouth daily with dinner, Disp: 90 tablet, Rfl: 2  •  Blood Glucose Monitoring Suppl (FreeStyle InsuLinx System) w/Device KIT, Test TID & QHS as directed , Disp: 1 kit, Rfl: 0  •  carvedilol (COREG) 6 25 mg tablet, Take 1 tablet (6 25 mg total) by mouth 2 (two) times a day with meals, Disp: 90 tablet, Rfl: 2  •  FreeStyle InsuLinx Test test strip, Test TID & QHS as directed , Disp: 100 each, Rfl: 2  •  Lancets (freestyle) lancets, Test TID & QHS as directed , Disp: 100 each, Rfl: 2  •  lisinopril-hydrochlorothiazide (PRINZIDE,ZESTORETIC) 20-25 MG per tablet, Take 1 tablet by mouth daily, Disp: 90 tablet, Rfl: 1  •  metFORMIN (GLUCOPHAGE) 1000 MG tablet, Take 1 tablet (1,000 mg total) by mouth 2 (two) times a day with meals, Disp: 180 tablet, Rfl: 2    No Known Allergies    Objective: There were no vitals filed for this visit      Ortho Exam    Physical Exam    Large joint arthrocentesis: L knee  Universal Protocol:  Risks and benefits: risks, benefits and alternatives were discussed  Consent given by: patient  Timeout called at: 10/28/2022 11:00 AM   Site marked: the operative site was marked  Supporting Documentation  Indications: pain   Procedure Details  Location: knee - L knee  Preparation: Patient was prepped and draped in the usual sterile fashion (Alcohol prep)  Needle size: 22 G  Ultrasound guidance: no  Approach: anterolateral  Medications administered: 20 mg Sodium Hyaluronate 20 MG/2ML    Patient tolerance: patient tolerated the procedure well with no immediate complications  Dressing:  Sterile dressing applied    Large joint arthrocentesis: R knee  Universal Protocol:  Risks and benefits: risks, benefits and alternatives were discussed  Consent given by: patient  Timeout called at: 10/28/2022 11:01 AM   Site marked: the operative site was marked  Supporting Documentation  Indications: pain   Procedure Details  Location: knee - R knee  Preparation: Patient was prepped and draped in the usual sterile fashion (Alcohol prep)  Needle size: 22 G  Ultrasound guidance: no  Approach: anterolateral  Medications administered: 20 mg Sodium Hyaluronate 20 MG/2ML    Patient tolerance: patient tolerated the procedure well with no immediate complications  Dressing:  Sterile dressing applied              This document was created using speech voice recognition software  Grammatical errors, random word insertions, pronoun errors, and incomplete sentences are an occasional consequence of this system due to software limitations, ambient noise, and hardware issues  Any formal questions or concerns about content, text, or information contained within the body of this dictation should be directly addressed to the provider for clarification

## 2022-10-28 NOTE — PROGRESS NOTES
Assessment/Plan:    Carotid artery stenosis  Status post carotid endarterectomy (Left)  Carotid artery occlusion (Right)        -     VAS carotid complete study; Future  -asymptomatic  -CV 9/6/22: R ICA occlusion; L patent ICA and endart (99/28)      Patient with asymptomatic carotid artery disease and known right internal carotid artery occlusion  He underwent left carotid endarterectomy 2 years ago in October '20  He remains asymptomatic  We reviewed his carotid artery duplex study which shows widely patent left ICA and endarterectomy site  The velocities in the left carotid artery are low  He is planned for carotid duplex study in 6 months and if that remains stable he can likely transition to annual carotid surveillance  He should continue with aspirin and statin therapy  He apparently stopped his cholesterol medicine earlier this year since his cholesterol looked good  We discussed the importance of statin therapy in the setting of cardiac and vascular disease  Patient education regarding stroke was provided  Of note, he did have 1 episode of syncope likely vasovagal   Duplex shows antegrade vertebral arteries  Review of his CTA head and neck in 2020 did show focal moderate stenosis in the right vertebral due to compression by osteophyte and 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  Will continue to monitor      -continue with good blood pressure, cholesterol and diabetes control  -continue with aspirin and atorvastatin 80  -we reviewed symptoms of stroke for which he should call 911  -check CV duplex in 6 months and office visit in 1 year       Diagnoses and all orders for this visit:    Benign essential hypertension    PAF (paroxysmal atrial fibrillation) ([facility] [address] )    Stage 3a chronic kidney disease ([facility] [address] )      Subjective:      Patient ID: Yuridia Watson is a 69 y o  male  Pt is here to rev CV 9/6/22  Pt denies TIA stroke symptoms  Pt is taking ASA and Atorvastatin       ERASMO Bautista Sandip 72-year-old male HTN,  Diabetes with neuropathy,  rheumatic heart disease, CAD s/p AoVR and CAB (2021),  carotid artery disease s/p L CEA ([facility], 10/29/20) and occluded ANI who presents for vascular follow up and review recent testing  10/28/22: Patient returns for annual office visit and to review carotid duplex  He is now 2 years post-LEFT carotid artery endarterectomy  Patient reports that he is overall doing very well  He continues to stay active  He has no chest or leg limiting symptoms  He is no symptoms of TIA or stroke  He is no amaurosis, vision changes, dysarthria, unilateral numbness or weakness  He did have 1 ED visit in July 2022 for syncopal episode  He was somewhat lightheaded prior to the shower, but then when he got out of the shower he sat down and lost consciousness  Workup was reportedly unrevealing etiology  Noted, LDL increased from  after he discontinued atorvastatin  He is back on the cholesterol medication  We discussed the indications, risks and benefits of statin therapy  A1c 6 6  He has been diabetic since he was in his 35s  The following portions of the patient's history were reviewed and updated as appropriate: allergies, current medications, past family history, past medical history, past social history, past surgical history and problem list     Review of Systems   Constitutional: Negative  HENT: Negative  Eyes: Negative  Respiratory: Negative  Cardiovascular: Negative  Gastrointestinal: Negative  Endocrine: Negative  Genitourinary: Negative  Musculoskeletal: Negative  Skin: Negative  Allergic/Immunologic: Negative  Neurological: Negative  Hematological: Negative  Psychiatric/Behavioral: Negative            Objective:      /72 (BP Location: Left arm, Patient Position: Sitting, Cuff Size: Standard)   Pulse 77   Ht 5' 9" (1 753 m)   Wt 71 7 kg (158 lb)   BMI 23 33 kg/m²          Physical Exam  Vitals and nursing note reviewed  Constitutional:       Appearance: He is well-developed  HENT:      Head: Normocephalic and atraumatic  Eyes:      Pupils: Pupils are equal, round, and reactive to light  Neck:      Thyroid: No thyromegaly  Vascular: No carotid bruit or JVD  Trachea: Trachea normal    Cardiovascular:      Rate and Rhythm: Normal rate and regular rhythm  Pulses:           Carotid pulses are 2+ on the right side and 2+ on the left side  Radial pulses are 2+ on the right side and 2+ on the left side  Heart sounds: S1 normal and S2 normal  Murmur heard  Systolic murmur is present with a grade of 2/6  No friction rub  No gallop  Pulmonary:      Effort: Pulmonary effort is normal  No accessory muscle usage or respiratory distress  Breath sounds: Normal breath sounds  No wheezing or rales  Abdominal:      General: Bowel sounds are normal  There is no distension  Palpations: Abdomen is soft  Tenderness: There is no abdominal tenderness  Musculoskeletal:         General: No deformity  Normal range of motion  Cervical back: Neck supple  Right lower leg: No edema  Left lower leg: No edema  Skin:     General: Skin is warm and dry  Findings: No lesion or rash  Nails: There is no clubbing  Neurological:      Mental Status: He is alert and oriented to person, place, and time  Comments: Grossly normal    Psychiatric:         Behavior: Behavior is cooperative  CV duplex  9/6/22:  CLINICAL:  Indications:  6 month surveillance of carotid artery disease  Patient is  symptomatic 2 weeks ago patient had dizziness with syncope  Operative History:  2021-05-20 AVR  2020-10-29 Left CEA  Risk Factors  The patient has history of HTN, Diabetes (NIDDM (Diet)) and Hyperlipidemia  Clinical  Right Pressure:  132/70 mm Hg, Left Pressure:  120/70 mm Hg       FINDINGS:     Right        Impression  PSV  EDV (cm/s) Direction of Flow    Dist  ICA    Occluded                                          Mid  ICA     Occluded                                          Prox  ICA    Occluded                                          Dist CCA                  69           9                       Mid CCA                   76          10                       Prox CCA                  40           0                       Ext Carotid               94          12                       Prox Vert                 42          16  Antegrade            Subclavian                57           0                          Left         Impression     PSV  EDV (cm/s)  Direction of Flow    Dist  ICA                    83          37                       Mid  ICA                     97          37                       Prox  ICA    Widely Patent   99          28                       Dist CCA                     92          26                       Mid CCA                      80          27                       Prox CCA                     72          25                       Ext Carotid                 106          10                       Prox Vert                    31           0  Antegrade            Subclavian                  104          11                                CONCLUSION:     Impression  RIGHT:  Known occlusion of the internal carotid artery    Plaque is heterogenous and  homogenous and irregular  Vertebral artery flow is antegrade  There is no significant subclavian artery  disease  LEFT:  Widely patent internal carotid artery and endarterectomy site  Vertebral artery flow is antegrade  There is no significant subclavian artery  disease  Compared to previous study on 3/3/2022 , there is no significant changes  Recommend repeat testing in 6month as per protocol unless otherwise indicated  I have reviewed and made appropriate changes to the review of systems input by the medical assistant      Vitals:    10/28/22 1437 10/28/22 1438   BP: 126/72 128/72   BP Location: Right arm Left arm   Patient Position: Sitting Sitting   Cuff Size: Standard Standard   Pulse: 77    Weight: 71 7 kg (158 lb)    Height: 5' 9" (1 753 m)        Patient Active Problem List   Diagnosis   • Benign essential hypertension   • Controlled diabetes mellitus with diabetic neuropathy ([facility])   • Hyperlipidemia   • Murmur   • Organic impotence   • Mass of right side of neck   • ICAO (internal carotid artery occlusion), right   • Internal carotid artery occlusion, right   • Carotid stenosis, left   • Asthma   • Status post carotid endarterectomy   • Chronic systolic congestive heart failure (HCC)   • Coronary artery disease involving native coronary artery of native heart   • Rheumatic aortic stenosis   • S/P AVR   • S/P CABG (coronary artery bypass graft)   • Acute blood loss anemia   • Thrombocytopenia ([facility])   • LBBB (left bundle branch block)   • 1st degree AV block   • Anemia   • Encounter for postoperative care   • PAF (paroxysmal atrial fibrillation) ([facility])   • Syncope   • 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   • Stage 3a chronic kidney disease ([facility] [address] )       Past Surgical History:   Procedure Laterality Date   • APPENDECTOMY     • CAROTID ENDARTERECTOMY Left 10/29/2020   • COLONOSCOPY N/A 1/22/2016    Procedure: COLONOSCOPY;  Surgeon: Lolis Kathleen MD;  Location: [facility] GI LAB; Service:    • KNEE ARTHROSCOPY Left    • KNEE ARTHROSCOPY W/ MENISCAL REPAIR Right    • MOHS SURGERY Right 11/01/2021    BCC- Right Posterior Calf- Dr Fiordaliza Mcmillan    • AR RPLCMT AORTIC VALVE OPN W/STENTLESS TISSUE VALVE N/A 5/10/2021    Procedure: CORONARY ARTERY BYPASS GRAFT (CABG) X 5 USING LEFT GSV--> DIAGONAL,, RAMUS, PDA ,AND OM1, AND LEFT BALBIR-LAD WITH REPLACEMENT VALVE AORTIC USING 23 MM MURRAY MAGNA EASE(AVR);   Surgeon: Nidhi Lubin MD;  Location: [facility];  Service: Cardiac Surgery   • AR THROMBOENDARTECTMY Annabella PRAJAPATI Left 10/29/2020    Procedure: CAROTID ENDARTERECTOMY W/BOVINE PATCH ANGIOPLASTY AND COMPLETION DUPLEX IMAGING;  Surgeon: Juanito Garza DO;  Location: [facility];  Service: Vascular       Family History   Problem Relation Age of Onset   • Heart attack Mother [de-identified]        CABG   • Kidney failure Father    • Diabetes Father         pre-diabetic       Social History     Socioeconomic History   • Marital status: Single     Spouse name: Not on file   • Number of children: Not on file   • Years of education: Not on file   • Highest education level: Not on file   Occupational History   • Not on file   Tobacco Use   • Smoking status: Never Smoker   • Smokeless tobacco: Never Used   Vaping Use   • Vaping Use: Never used   Substance and Sexual Activity   • Alcohol use:  Yes     Alcohol/week: 10 0 standard drinks     Types: 7 Glasses of wine, 3 Standard drinks or equivalent per week   • Drug use: No   • Sexual activity: Yes     Partners: Female   Other Topics Concern   • Not on file   Social History Narrative    Daily caffeinnated coffee      Social Determinants of Health     Financial Resource Strain: Not on file   Food Insecurity: Not on file   Transportation Needs: Not on file   Physical Activity: Not on file   Stress: Not on file   Social Connections: Not on file   Intimate Partner Violence: Not on file   Housing Stability: Not on file       No Known Allergies      Current Outpatient Medications:   •  aspirin 81 mg chewable tablet, Chew 1 tablet (81 mg total) daily, Disp: 30 tablet, Rfl: 2  •  atorvastatin (LIPITOR) 80 mg tablet, Take 1 tablet (80 mg total) by mouth daily with dinner, Disp: 90 tablet, Rfl: 2  •  Blood Glucose Monitoring Suppl (FreeStyle InsuLinx System) w/Device KIT, Test TID & QHS as directed , Disp: 1 kit, Rfl: 0  •  carvedilol (COREG) 6 25 mg tablet, Take 1 tablet (6 25 mg total) by mouth 2 (two) times a day with meals, Disp: 90 tablet, Rfl: 2  •  FreeStyle InsuLinx Test test strip, Test TID & QHS as directed , Disp: 100 each, Rfl: 2  • Lancets (freestyle) lancets, Test TID & QHS as directed , Disp: 100 each, Rfl: 2  •  lisinopril-hydrochlorothiazide (PRINZIDE,ZESTORETIC) 20-25 MG per tablet, Take 1 tablet by mouth daily, Disp: 90 tablet, Rfl: 1  •  metFORMIN (GLUCOPHAGE) 1000 MG tablet, Take 1 tablet (1,000 mg total) by mouth 2 (two) times a day with meals, Disp: 180 tablet, Rfl: 2  •  acetaminophen (TYLENOL) 325 mg tablet, Take 1-2 tablets Q4-6 hours PRN pain  Do not take more than 4 grams in 24 hours  (Patient not taking: Reported on 10/28/2022), Disp: , Rfl: 0  No current facility-administered medications for this visit

## 2022-11-01 ENCOUNTER — OFFICE VISIT (OUTPATIENT)
Dept: PHYSICAL THERAPY | Facility: CLINIC | Age: 77
End: 2022-11-01

## 2022-11-01 DIAGNOSIS — M17.12 PRIMARY OSTEOARTHRITIS OF LEFT KNEE: Primary | ICD-10-CM

## 2022-11-01 DIAGNOSIS — M17.11 PRIMARY OSTEOARTHRITIS OF RIGHT KNEE: ICD-10-CM

## 2022-11-01 DIAGNOSIS — M16.11 PRIMARY OSTEOARTHRITIS OF ONE HIP, RIGHT: ICD-10-CM

## 2022-11-01 DIAGNOSIS — M16.12 PRIMARY OSTEOARTHRITIS OF ONE HIP, LEFT: ICD-10-CM

## 2022-11-01 NOTE — PROGRESS NOTES
Daily Note     Today's date: 2022  Patient name: Campos Granger  : 1945  MRN: [id number]  Referring provider: Devon Johnson*  Dx:   Encounter Diagnosis     ICD-10-CM    1  Primary osteoarthritis of left knee  M17 12    2  Primary osteoarthritis of one hip, left  M16 12    3  Primary osteoarthritis of right knee  M17 11    4  Primary osteoarthritis of one hip, right  M16 11        Start Time: 0830          Subjective: I get my 3rd knee injection on Friday  Objective: See treatment diary below      Assessment: Tolerated treatment fair  Patient demonstrated fatigue post treatment, exhibited good technique with therapeutic exercises and would benefit from continued PT  Patient wearing L knee support    Plan: Progress treatment as tolerated         Precautions:   Past Medical History:   Diagnosis Date   • Arthritis     generalized   • Asthma     seasonal-does not use inhaler   • Atrial fibrillation with RVR ([facility] [address] ) 2021   • Back pain    • Basal cell carcinoma 2021    BCC- Right Posterior Calf    • Carotid artery stenosis    • Diabetes mellitus (HCC)     type   • GERD (gastroesophageal reflux disease)    • Hiatal hernia    • Hyperlipidemia    • Hypertension    • Peyronie's disease     last assessed [id number]   • Seasonal allergies    • Wears glasses            Date: 10/20/2022 11/1  10/13/2022 10/18/2022   Visit # 6 8  4 FOTO 5   Manuals        Mobilization into hip IR and L knee flexion TC ----  TC TC    B HS/gastroc/piriformis stretching  AD  TC TC                   Neuro Re-Ed        Sciatic nerve glides RLE  ---      Bridges  On PB 2x10 On PB 2x10  On PB 2x10 On PB 2x10   Clamshells RTB 2x10 RTB 2x10  RTB 2x10 R/L RTB 2x10 R/L   Marching on beam 5 laps fwd/retro with CS -----   5 laps fwd/retro with CS   SLS cone taps Bilateral 15x R/L on level ground ------   Bilateral 15x R/L on level ground                   Ther Ex        Bike RIVERA 10' lvl 3 10 min L5  10 min L3 10 min L3   FSU 6" 2x10 R/L 6" 2x10 R/L  6" 2x10 R/L and LSU 2x10 R/L 6" 2x10 R/L and LSU 2x10 R/L   SLR flex/abd  HEP      Resisted walking #12 5 4-way 5x each #11   4-way 10x each  Lateral walks with RTB 5 laps  #12 5 at zia 8 laps laterally   Prone hip IR 20x BLE 20x BLE  20x BLE 20x BLE   Reverse clamshells 10x R/L -----  10x R/L 10x R/L   STS 2x10 from chair with airex 2x10 from chair   2x10 from chair  2x10 from chair    prostretch  R / L 5 x 30 sec hold                       Ther Activity                        Gait Training                        Modalities                          Access Code: [email or web address]  URL: [email or web address]  Date: 09/19/2022  Prepared by: Shelby Go    Exercises  · Prone Hip Internal Rotation AROM - 1 x daily - 5 x weekly - 2 sets - 10 reps  · Supine Bridge - 1 x daily - 5 x weekly - 2 sets - 10 reps  · Clamshell with Resistance - 1 x daily - 5 x weekly - 2 sets - 10 reps  · Active Straight Leg Raise with Quad Set - 1 x daily - 5 x weekly - 2 sets - 10 reps

## 2022-11-03 ENCOUNTER — OFFICE VISIT (OUTPATIENT)
Dept: PHYSICAL THERAPY | Facility: CLINIC | Age: 77
End: 2022-11-03

## 2022-11-03 DIAGNOSIS — M17.12 PRIMARY OSTEOARTHRITIS OF LEFT KNEE: Primary | ICD-10-CM

## 2022-11-03 DIAGNOSIS — M16.12 PRIMARY OSTEOARTHRITIS OF ONE HIP, LEFT: ICD-10-CM

## 2022-11-03 DIAGNOSIS — M62.50 DISUSE ATROPHY OF MUSCLE: ICD-10-CM

## 2022-11-03 DIAGNOSIS — M17.11 PRIMARY OSTEOARTHRITIS OF RIGHT KNEE: ICD-10-CM

## 2022-11-03 NOTE — PROGRESS NOTES
Daily Note     Today's date: 11/3/2022  Patient name: Moni Hyatt  : 1945  MRN: [id number]  Referring provider: Zev Valenzuela*  Dx:   Encounter Diagnosis     ICD-10-CM    1  Primary osteoarthritis of left knee  M17 12    2  Primary osteoarthritis of one hip, left  M16 12    3  Primary osteoarthritis of right knee  M17 11    4  Disuse atrophy of muscle, right thigh  M62 50                   Subjective: Going down the stairs isn't as painful on my knees  My hips are both sore in the morning, but after stretching & a hot shower, they feel better  Objective: See treatment diary below      Assessment: Tolerated treatment fair  Patient demonstrated fatigue post treatment, exhibited good technique with therapeutic exercises and would benefit from continued PT      Plan: Progress treatment as tolerated         Precautions:   Past Medical History:   Diagnosis Date   • Arthritis     generalized   • Asthma     seasonal-does not use inhaler   • Atrial fibrillation with RVR ([address] ) 2021   • Back pain    • Basal cell carcinoma 2021    BCC- Right Posterior Calf    • Carotid artery stenosis    • Diabetes mellitus (HCC)     type   • GERD (gastroesophageal reflux disease)    • Hiatal hernia    • Hyperlipidemia    • Hypertension    • Peyronie's disease     last assessed [id number]   • Seasonal allergies    • Wears glasses            Date: 10/20/2022 11/3  10/13/2022 10/18/2022   Visit # 6 9 / FOTO  4 FOTO 5   Manuals        Mobilization into hip IR and L knee flexion TC ----  TC TC    B HS/gastroc/piriformis stretching  AD  TC TC                   Neuro Re-Ed        Sciatic nerve glides RLE  ---      Bridges  On PB 2x10 2x10 reps w/add  On PB 2x10 On PB 2x10   Clamshells RTB 2x10 Green TB 2x10  RTB 2x10 R/L RTB 2x10 R/L   Marching on beam 5 laps fwd/retro with CS -----   5 laps fwd/retro with CS   SLS cone taps Bilateral 15x R/L on level ground ------   Bilateral 15x R/L on level ground Ther Ex        Bike RIVERA 10' lvl 3 10 min L5  10 min L3 10 min L3   FSU 6" 2x10 R/L 6" 2x10 R/L  6" 2x10 R/L and LSU 2x10 R/L 6" 2x10 R/L and LSU 2x10 R/L   SLR flex/abd  HEP      Resisted walking #12 5 4-way 5x each #10  4-way 10x each  Lateral walks with RTB 5 laps  #12 5 at zia 8 laps laterally   Prone hip IR 20x BLE 20x BLE  20x BLE 20x BLE   Reverse clamshells 10x R/L ------  10x R/L 10x R/L   STS 2x10 from chair with airex 2x10 from chair   2x10 from chair  2x10 from chair    prostretch  R / L 5 x 30 sec hold                       Ther Activity                        Gait Training                        Modalities                          Access Code: [email or web address]  URL: [email or web address]  Date: 09/19/2022  Prepared by: Vipul Perdomo    Exercises  · Prone Hip Internal Rotation AROM - 1 x daily - 5 x weekly - 2 sets - 10 reps  · Supine Bridge - 1 x daily - 5 x weekly - 2 sets - 10 reps  · Clamshell with Resistance - 1 x daily - 5 x weekly - 2 sets - 10 reps  · Active Straight Leg Raise with Quad Set - 1 x daily - 5 x weekly - 2 sets - 10 reps

## 2022-11-04 ENCOUNTER — PROCEDURE VISIT (OUTPATIENT)
Dept: OBGYN CLINIC | Facility: CLINIC | Age: 77
End: 2022-11-04

## 2022-11-04 ENCOUNTER — OFFICE VISIT (OUTPATIENT)
Dept: FAMILY MEDICINE CLINIC | Facility: CLINIC | Age: 77
End: 2022-11-04

## 2022-11-04 ENCOUNTER — APPOINTMENT (OUTPATIENT)
Dept: LAB | Facility: CLINIC | Age: 77
End: 2022-11-04

## 2022-11-04 VITALS — WEIGHT: 160 LBS | HEIGHT: 69 IN | BODY MASS INDEX: 23.7 KG/M2

## 2022-11-04 VITALS
OXYGEN SATURATION: 100 % | SYSTOLIC BLOOD PRESSURE: 130 MMHG | TEMPERATURE: 97.2 F | RESPIRATION RATE: 14 BRPM | DIASTOLIC BLOOD PRESSURE: 80 MMHG | HEIGHT: 69 IN | WEIGHT: 160.25 LBS | HEART RATE: 58 BPM | BODY MASS INDEX: 23.74 KG/M2

## 2022-11-04 DIAGNOSIS — I25.10 CORONARY ARTERY DISEASE INVOLVING NATIVE CORONARY ARTERY OF NATIVE HEART WITHOUT ANGINA PECTORIS: ICD-10-CM

## 2022-11-04 DIAGNOSIS — Z23 ENCOUNTER FOR VACCINATION: ICD-10-CM

## 2022-11-04 DIAGNOSIS — M17.11 PRIMARY OSTEOARTHRITIS OF RIGHT KNEE: Primary | ICD-10-CM

## 2022-11-04 DIAGNOSIS — I48.92 ATRIAL FLUTTER, UNSPECIFIED TYPE (HCC): ICD-10-CM

## 2022-11-04 DIAGNOSIS — E11.40 CONTROLLED TYPE 2 DIABETES MELLITUS WITH DIABETIC NEUROPATHY, WITHOUT LONG-TERM CURRENT USE OF INSULIN (HCC): ICD-10-CM

## 2022-11-04 DIAGNOSIS — N18.31 STAGE 3A CHRONIC KIDNEY DISEASE (HCC): ICD-10-CM

## 2022-11-04 DIAGNOSIS — M17.12 PRIMARY OSTEOARTHRITIS OF LEFT KNEE: ICD-10-CM

## 2022-11-04 DIAGNOSIS — E11.40 CONTROLLED TYPE 2 DIABETES MELLITUS WITH DIABETIC NEUROPATHY, WITHOUT LONG-TERM CURRENT USE OF INSULIN (HCC): Primary | ICD-10-CM

## 2022-11-04 LAB
ANION GAP SERPL CALCULATED.3IONS-SCNC: 5 MMOL/L (ref 4–13)
BUN SERPL-MCNC: 25 MG/DL (ref 5–25)
CALCIUM SERPL-MCNC: 9.3 MG/DL (ref 8.3–10.1)
CHLORIDE SERPL-SCNC: 107 MMOL/L (ref 96–108)
CO2 SERPL-SCNC: 28 MMOL/L (ref 21–32)
CREAT SERPL-MCNC: 1.25 MG/DL (ref 0.6–1.3)
CREAT UR-MCNC: 163 MG/DL
GFR SERPL CREATININE-BSD FRML MDRD: 55 ML/MIN/1.73SQ M
GLUCOSE P FAST SERPL-MCNC: 176 MG/DL (ref 65–99)
MICROALBUMIN UR-MCNC: 115 MG/L (ref 0–20)
MICROALBUMIN/CREAT 24H UR: 71 MG/G CREATININE (ref 0–30)
POTASSIUM SERPL-SCNC: 4.1 MMOL/L (ref 3.5–5.3)
SL AMB POCT HEMOGLOBIN AIC: 6.7 (ref ?–6.5)
SODIUM SERPL-SCNC: 140 MMOL/L (ref 135–147)

## 2022-11-04 RX ORDER — HYALURONATE SODIUM 10 MG/ML
20 SYRINGE (ML) INTRAARTICULAR
Status: COMPLETED | OUTPATIENT
Start: 2022-11-04 | End: 2022-11-04

## 2022-11-04 RX ADMIN — Medication 20 MG: at 10:57

## 2022-11-04 NOTE — PROGRESS NOTES
Assessment/Plan:  1  Primary osteoarthritis of right knee     2  Primary osteoarthritis of left knee         Follow-up 1 week  Subjective:   Kendrick Spencer is a 69 y o  male who presents today for euflexxa #3 bilateral knees  Review of Systems      Past Medical History:   Diagnosis Date   • Arthritis     generalized   • Asthma     seasonal-does not use inhaler   • Atrial fibrillation with RVR ([address] ) 5/12/2021   • Back pain    • Basal cell carcinoma 09/23/2021    BCC- Right Posterior Calf    • Carotid artery stenosis    • Diabetes mellitus (HCC)     type   • GERD (gastroesophageal reflux disease)    • Hiatal hernia    • Hyperlipidemia    • Hypertension    • Peyronie's disease     last assessed [id number]   • Seasonal allergies    • Wears glasses        Past Surgical History:   Procedure Laterality Date   • APPENDECTOMY     • CAROTID ENDARTERECTOMY Left 10/29/2020   • COLONOSCOPY N/A 1/22/2016    Procedure: COLONOSCOPY;  Surgeon: Vergie Goldberg, MD;  Location: [facility] GI LAB; Service:    • KNEE ARTHROSCOPY Left    • KNEE ARTHROSCOPY W/ MENISCAL REPAIR Right    • MOHS SURGERY Right 11/01/2021    BCC- Right Posterior Calf- Dr Nehemias Santoro    • SC RPLCMT AORTIC VALVE OPN W/STENTLESS TISSUE VALVE N/A 5/10/2021    Procedure: CORONARY ARTERY BYPASS GRAFT (CABG) X 5 USING LEFT GSV--> DIAGONAL,, RAMUS, PDA ,AND OM1, AND LEFT BALBIR-LAD WITH REPLACEMENT VALVE AORTIC USING 23 MM MURRAY MAGNA EASE(AVR);   Surgeon: Nadege Christopher MD;  Location:  MAIN OR;  Service: Cardiac Surgery   • SC THROMBOENDARTECTMY NECK,NECK INCIS Left 10/29/2020    Procedure: CAROTID ENDARTERECTOMY W/BOVINE PATCH ANGIOPLASTY AND COMPLETION DUPLEX IMAGING;  Surgeon: Lo Nieves DO;  Location: [facility];  Service: Vascular       Family History   Problem Relation Age of Onset   • Heart attack Mother [de-identified]        CABG   • Kidney failure Father    • Diabetes Father         pre-diabetic       Social History     Occupational History   • Not on file   Tobacco Use   • Smoking status: Never Smoker   • Smokeless tobacco: Never Used   Vaping Use   • Vaping Use: Never used   Substance and Sexual Activity   • Alcohol use: Yes     Alcohol/week: 10 0 standard drinks     Types: 7 Glasses of wine, 3 Standard drinks or equivalent per week   • Drug use: No   • Sexual activity: Yes     Partners: Female         Current Outpatient Medications:   •  acetaminophen (TYLENOL) 325 mg tablet, Take 1-2 tablets Q4-6 hours PRN pain  Do not take more than 4 grams in 24 hours  , Disp: , Rfl: 0  •  apixaban (Eliquis) 5 mg, Take 1 tablet (5 mg total) by mouth 2 (two) times a day, Disp: 60 tablet, Rfl: 0  •  aspirin 81 mg chewable tablet, Chew 1 tablet (81 mg total) daily, Disp: 30 tablet, Rfl: 2  •  atorvastatin (LIPITOR) 80 mg tablet, Take 1 tablet (80 mg total) by mouth daily with dinner, Disp: 90 tablet, Rfl: 2  •  Blood Glucose Monitoring Suppl (FreeStyle InsuLinx System) w/Device KIT, Test TID & QHS as directed , Disp: 1 kit, Rfl: 0  •  carvedilol (COREG) 6 25 mg tablet, Take 1 tablet (6 25 mg total) by mouth 2 (two) times a day with meals, Disp: 90 tablet, Rfl: 2  •  FreeStyle InsuLinx Test test strip, Test TID & QHS as directed , Disp: 100 each, Rfl: 2  •  Lancets (freestyle) lancets, Test TID & QHS as directed , Disp: 100 each, Rfl: 2  •  lisinopril-hydrochlorothiazide (PRINZIDE,ZESTORETIC) 20-25 MG per tablet, Take 1 tablet by mouth daily, Disp: 90 tablet, Rfl: 1  •  metFORMIN (GLUCOPHAGE) 1000 MG tablet, Take 1 tablet (1,000 mg total) by mouth 2 (two) times a day with meals, Disp: 180 tablet, Rfl: 2    No Known Allergies    Objective: There were no vitals filed for this visit      Ortho Exam    Physical Exam    Large joint arthrocentesis: L knee  Universal Protocol:  Risks and benefits: risks, benefits and alternatives were discussed  Consent given by: patient  Timeout called at: 11/4/2022 10:56 AM   Site marked: the operative site was marked  Supporting Documentation  Indications: pain Procedure Details  Location: knee - L knee  Preparation: Patient was prepped and draped in the usual sterile fashion (Alcohol prep)  Needle size: 22 G  Ultrasound guidance: no  Approach: anterolateral  Medications administered: 20 mg Sodium Hyaluronate 20 MG/2ML    Patient tolerance: patient tolerated the procedure well with no immediate complications  Dressing:  Sterile dressing applied    Large joint arthrocentesis: R knee  Universal Protocol:  Risks and benefits: risks, benefits and alternatives were discussed  Consent given by: patient  Timeout called at: 11/4/2022 10:56 AM   Site marked: the operative site was marked  Supporting Documentation  Indications: pain   Procedure Details  Location: knee - R knee  Preparation: Patient was prepped and draped in the usual sterile fashion (Alcohol prep)  Needle size: 22 G  Ultrasound guidance: no  Approach: anterolateral  Medications administered: 20 mg Sodium Hyaluronate 20 MG/2ML    Patient tolerance: patient tolerated the procedure well with no immediate complications  Dressing:  Sterile dressing applied              This document was created using speech voice recognition software  Grammatical errors, random word insertions, pronoun errors, and incomplete sentences are an occasional consequence of this system due to software limitations, ambient noise, and hardware issues  Any formal questions or concerns about content, text, or information contained within the body of this dictation should be directly addressed to the provider for clarification

## 2022-11-04 NOTE — PROGRESS NOTES
[facility]  DM Type 2 check  ASSESSMENT/PLAN  Gaby Hendrix is a 69 y o  male presents to the office for     Diagnoses and all orders for this visit:    Controlled type 2 diabetes mellitus with diabetic neuropathy, without long-term current use of insulin (HCC)  -     POCT hemoglobin A1c  -     Basic metabolic panel; Future  -     Microalbumin / creatinine urine ratio    Encounter for vaccination  -     influenza vaccine, high-dose, PF 0 7 mL (FLUZONE HIGH-DOSE)    Stage 3a chronic kidney disease ([facility] [address] [address] )    Coronary artery disease involving native coronary artery of native heart without angina pectoris    Atrial flutter, unspecified type ([facility])  -     apixaban (Eliquis) 5 mg; Take 1 tablet (5 mg total) by mouth 2 (two) times a day  -     POCT ECG      1) Diabetes Type 2  - Controlled  -Most recent lab testing:   Results from last 6 Months   Lab Units 11/04/22  0854 08/04/22  1246 07/30/22  0516 07/29/22  1625 06/22/22  1048   HEMOGLOBIN A1C  6 7*   < >  --   --   --    LDL CALC mg/dL  --   --   --   --  121*   CREATININE mg/dL  --   --  1 21   < > 1 37*   EGFR ml/min/1 73sq m  --   --  57   < > 49    < > = values in this interval not displayed       - Continue on Metformin  - Opthamology: UTD  - Podiatry/ Foot exam: UTD, neuropathy seen by podiatrist following podiatry for this  - Vaccines:  Immunization History   Administered Date(s) Administered   • COVID-19 MODERNA VACC 0 25 ML IM BOOSTER 11/23/2021, 04/08/2022   • COVID-19 MODERNA VACC 0 5 ML IM 02/11/2021, 03/15/2021   • COVID-19 Pfizer Vac BIVALENT Jarrod-sucrose 12 Yr+ IM (BOOSTER ONLY) 10/19/2022   • INFLUENZA 01/20/2022   • Influenza Split High Dose Preservative Free IM 11/01/2013, 10/19/2015, 10/03/2017   • Influenza, high dose seasonal 0 7 mL 10/29/2018, 12/18/2019, 09/15/2020, 11/04/2022   • Influenza, seasonal, injectable 12/15/1999, 12/27/2010   • Pneumococcal Conjugate 13-Valent 10/03/2017   • Pneumococcal Polysaccharide PPV23 12/27/2010, 02/07/2014     Unfortunately today we detected the patient in AFib and a flutter  Spoke with his cardiologist started the patient on Eliquis twice a day  He will have close follow-up with the cardiologist on Monday  The patient was going in and out of AFib  Recommend that if symptoms worsen to report directly to emergency room  But however the patient is currently asymptomatic at this time    Reviewed in detail coronary artery disease/chronic kidney disease     - Diabetes education:Encourage the patient to continue lifestyle changes  Future Appointments   Date Time Provider [place]   11/4/2022 10:45 AM Daniel Quiroz PA-C [facility]THO [facility]   11/29/2022  2:30 PM MOSHE Lee NEURO [facility]   12/5/2022 11:40 AM Marques Carballo  [facility]   3/7/2023 10:30 AM [place] VASCULAR 2 [id number]        Disposition: Return to the clinic in 1 month AFib follow-up           SUBJECTIVE  CC: Diabetes      HPI:  Juan Burks is a 69 y o  male presenting to the office for Diabetes check with Chronic conditions check  Patient is taking his medications as prescribed without any difficulties  Getting bilateral knee injections for his knees  States that he just recently saw the carotid doctor who noted that he was stable  Patient is asymptomatic of any shortness of breath or palpitations  Continues to be very active  Does admit to having a little bit of a poor diet but is taking his diabetic medications as prescribed  Denies any shortness of breath or chest tightness today    Review of Systems   Constitutional: Negative for activity change, appetite change, chills, fatigue and fever  HENT: Negative for congestion  Respiratory: Negative for cough, chest tightness and shortness of breath  Cardiovascular: Negative for chest pain and leg swelling     Gastrointestinal: Negative for abdominal distention, abdominal pain, constipation, diarrhea, nausea and vomiting  Musculoskeletal: Positive for arthralgias  All other systems reviewed and are negative  Historical Information   The patient history was reviewed as follows:    Past Medical History:   Diagnosis Date   • Arthritis     generalized   • Asthma     seasonal-does not use inhaler   • Atrial fibrillation with RVR ([address] ) 5/12/2021   • Back pain    • Basal cell carcinoma 09/23/2021    BCC- Right Posterior Calf    • Carotid artery stenosis    • Diabetes mellitus (HCC)     type   • GERD (gastroesophageal reflux disease)    • Hiatal hernia    • Hyperlipidemia    • Hypertension    • Peyronie's disease     last assessed [id number]   • Seasonal allergies    • Wears glasses      Past Surgical History:   Procedure Laterality Date   • APPENDECTOMY     • CAROTID ENDARTERECTOMY Left 10/29/2020   • COLONOSCOPY N/A 1/22/2016    Procedure: COLONOSCOPY;  Surgeon: Lisset Wallace MD;  Location: [facility] GI LAB; Service:    • KNEE ARTHROSCOPY Left    • KNEE ARTHROSCOPY W/ MENISCAL REPAIR Right    • MOHS SURGERY Right 11/01/2021    BCC- Right Posterior Calf- Dr Dunne    • MS RPLCMT AORTIC VALVE OPN W/STENTLESS TISSUE VALVE N/A 5/10/2021    Procedure: CORONARY ARTERY BYPASS GRAFT (CABG) X 5 USING LEFT GSV--> DIAGONAL,, RAMUS, PDA ,AND OM1, AND LEFT BALBIR-LAD WITH REPLACEMENT VALVE AORTIC USING 23 MM MURRAY MAGNA EASE(AVR);   Surgeon: Bridger Gardner MD;  Location:  MAIN OR;  Service: Cardiac Surgery   • MS THROMBOENDARTECTMY NECK,NECK INCIS Left 10/29/2020    Procedure: CAROTID ENDARTERECTOMY W/BOVINE PATCH ANGIOPLASTY AND COMPLETION DUPLEX IMAGING;  Surgeon: Emilia Cosme DO;  Location: [facility];  Service: Vascular     Family History   Problem Relation Age of Onset   • Heart attack Mother [de-identified]        CABG   • Kidney failure Father    • Diabetes Father         pre-diabetic      Social History   Social History     Substance and Sexual Activity   Alcohol Use Yes   • Alcohol/week: 10 0 standard drinks   • Types: 7 Glasses of wine, 3 Standard drinks or equivalent per week     Social History     Substance and Sexual Activity   Drug Use No     Social History     Tobacco Use   Smoking Status Never Smoker   Smokeless Tobacco Never Used       Medications:     Current Outpatient Medications:   •  acetaminophen (TYLENOL) 325 mg tablet, Take 1-2 tablets Q4-6 hours PRN pain  Do not take more than 4 grams in 24 hours  , Disp: , Rfl: 0  •  apixaban (Eliquis) 5 mg, Take 1 tablet (5 mg total) by mouth 2 (two) times a day, Disp: 60 tablet, Rfl: 0  •  aspirin 81 mg chewable tablet, Chew 1 tablet (81 mg total) daily, Disp: 30 tablet, Rfl: 2  •  atorvastatin (LIPITOR) 80 mg tablet, Take 1 tablet (80 mg total) by mouth daily with dinner, Disp: 90 tablet, Rfl: 2  •  Blood Glucose Monitoring Suppl (FreeStyle InsuLinx System) w/Device KIT, Test TID & QHS as directed , Disp: 1 kit, Rfl: 0  •  carvedilol (COREG) 6 25 mg tablet, Take 1 tablet (6 25 mg total) by mouth 2 (two) times a day with meals, Disp: 90 tablet, Rfl: 2  •  FreeStyle InsuLinx Test test strip, Test TID & QHS as directed , Disp: 100 each, Rfl: 2  •  Lancets (freestyle) lancets, Test TID & QHS as directed , Disp: 100 each, Rfl: 2  •  lisinopril-hydrochlorothiazide (PRINZIDE,ZESTORETIC) 20-25 MG per tablet, Take 1 tablet by mouth daily, Disp: 90 tablet, Rfl: 1  •  metFORMIN (GLUCOPHAGE) 1000 MG tablet, Take 1 tablet (1,000 mg total) by mouth 2 (two) times a day with meals, Disp: 180 tablet, Rfl: 2  No Known Allergies    OBJECTIVE    Vitals:   Vitals:    11/04/22 0841   BP: 130/80   BP Location: Left arm   Patient Position: Sitting   Cuff Size: Standard   Pulse: 58   Resp: 14   Temp: (!) 97 2 °F (36 2 °C)   SpO2: 100%   Weight: 72 7 kg (160 lb 4 oz)   Height: 5' 9" (1 753 m)           Physical Exam  Vitals reviewed  Constitutional:       Appearance: He is well-developed  HENT:      Head: Normocephalic and atraumatic     Eyes:      Conjunctiva/sclera: Conjunctivae normal       Pupils: Pupils are equal, round, and reactive to light  Cardiovascular:      Rate and Rhythm: Tachycardia present  Rhythm irregular  Heart sounds: Normal heart sounds  Comments:   Pulmonary:      Effort: Pulmonary effort is normal  No respiratory distress  Breath sounds: Normal breath sounds  Musculoskeletal:         General: Normal range of motion  Cervical back: Normal range of motion and neck supple  Skin:     General: Skin is warm  Capillary Refill: Capillary refill takes less than 2 seconds  Neurological:      Mental Status: He is alert and oriented to person, place, and time                 [address]

## 2022-11-07 ENCOUNTER — OFFICE VISIT (OUTPATIENT)
Dept: CARDIOLOGY CLINIC | Facility: CLINIC | Age: 77
End: 2022-11-07

## 2022-11-07 VITALS
OXYGEN SATURATION: 100 % | BODY MASS INDEX: 23.7 KG/M2 | WEIGHT: 160 LBS | DIASTOLIC BLOOD PRESSURE: 82 MMHG | SYSTOLIC BLOOD PRESSURE: 152 MMHG | TEMPERATURE: 97 F | HEART RATE: 54 BPM | HEIGHT: 69 IN

## 2022-11-07 DIAGNOSIS — Z95.1 S/P CABG (CORONARY ARTERY BYPASS GRAFT): ICD-10-CM

## 2022-11-07 DIAGNOSIS — E78.2 MIXED HYPERLIPIDEMIA: ICD-10-CM

## 2022-11-07 DIAGNOSIS — I44.7 LBBB (LEFT BUNDLE BRANCH BLOCK): ICD-10-CM

## 2022-11-07 DIAGNOSIS — Z95.2 S/P AVR: ICD-10-CM

## 2022-11-07 DIAGNOSIS — I10 BENIGN ESSENTIAL HYPERTENSION: ICD-10-CM

## 2022-11-07 DIAGNOSIS — I48.0 PAROXYSMAL ATRIAL FIBRILLATION (HCC): Primary | ICD-10-CM

## 2022-11-07 NOTE — PROGRESS NOTES
Cardiology   Eloise Diaz DO, Kaelyn Antonio MD, Patricia Lopes MD, Dayanara Georges MD, [facility] [place]  -------------------------------------------------------------------  [facility]  [address], [address], [address] [place] [address], [facility]  [phone]    Cardiology Follow Up  Kamron Song  1945  [id number]          Assessment/Plan:    1  Paroxysmal atrial fibrillation (HCC)    2  Benign essential hypertension    3  S/P CABG (coronary artery bypass graft)    4  LBBB (left bundle branch block)    5  S/P AVR    6  Mixed hyperlipidemia      - patient with newly diagnosed atrial fibrillation  He has had 1 documented episode in the past after his cardiac surgery  He has been started on Eliquis 5 mg b i d  He is already on carvedilol  He is in sinus rhythm today with a heart rate of 60 beats per minute  - continue Eliquis 5 mg b i d  Alexey Frye - if episodes reoccur, may switch carvedilol to Toprol XL or nebivolol  - previously, patient had a syncopal event which likely was caused by vasovagal episode as event occurred in the shower while bending over  He has no history of syncope  - he will follow up with Neurology for brain MRI  - continue current medication regimen including lisinopril-hydrochlorothiazide, carvedilol and atorvastatin  - continue metformin  - patient's last LDL cholesterol was 121 with an HDL of 50  Target cholesterol should be less than 70  Repeat lipid panel during next visit with primary medical doctor      Interval History:     Kamron Song is 69 y o  male here for evaluation of atrial fibrillation  Patient was at his primary medical doctor's office on Friday for a routine visit  During that exam, heart rate was noted to be irregular and ECG showed atrial fibrillation  He had no complaints at that time  He did not have any palpitations or shortness of breath  He did not have any chest pain    He denies any excessive alcohol use  He snores but does not have any witnessed apnea episodes  Does not complain of daytime fatigue  ECG done today shows sinus rhythm  Patient did have an episode of atrial fibrillation after his bypass surgery last year  He has had no further documented episodes since then  His last echocardiogram in July 2022 showed ejection fraction of 50% with normal functioning bioprosthetic aortic valve  He has a history of CAD and congestive heart failure  On 4/6/21, the patient underwent nuclear stress test  because of congestive heart failure  He had an ejection fraction of 35-40% seen on echocardiogram   He was not having any symptoms of chest pain or shortness of breath  Nuclear stress test was done which showed ischemia of the lateral and inferoseptal walls  Cardiac catheterization was done afterwards which showed severe multivessel CAD  He subsequently underwent CABGx5 (LIMA to LAD, SVG to D1, SVG -Y- to ramus intermedius and OM1, SVG to R PDA) and tissue AVR 23 mm [facility] Ease    Procedure complicated by a new left bundle-branch block on postop ECG and atrial fibrillation  He converted to sinus rhythm with IV amiodarone  Patient had a 30 day biotel monitor placed after discharge  There were no events noted  Coumadin was stopped       Past Medical History:   Diagnosis Date   • Arthritis     generalized   • Asthma     seasonal-does not use inhaler   • Atrial fibrillation with RVR (HCC) 5/12/2021   • Back pain    • Basal cell carcinoma 09/23/2021    BCC- Right Posterior Calf    • Carotid artery stenosis    • Diabetes mellitus (HCC)     type   • GERD (gastroesophageal reflux disease)    • Hiatal hernia    • Hyperlipidemia    • Hypertension    • Peyronie's disease     last assessed [id number]   • Seasonal allergies    • Wears glasses      Social History     Socioeconomic History   • Marital status: Single     Spouse name: Not on file   • Number of children: Not on file   • Years of education: Not on file   • Highest education level: Not on file   Occupational History   • Not on file   Tobacco Use   • Smoking status: Never Smoker   • Smokeless tobacco: Never Used   Vaping Use   • Vaping Use: Never used   Substance and Sexual Activity   • Alcohol use: Yes     Alcohol/week: 10 0 standard drinks     Types: 7 Glasses of wine, 3 Standard drinks or equivalent per week   • Drug use: No   • Sexual activity: Yes     Partners: Female   Other Topics Concern   • Not on file   Social History Narrative    Daily caffeinnated coffee      Social Determinants of Health     Financial Resource Strain: Not on file   Food Insecurity: Not on file   Transportation Needs: Not on file   Physical Activity: Not on file   Stress: Not on file   Social Connections: Not on file   Intimate Partner Violence: Not on file   Housing Stability: Not on file      Family History   Problem Relation Age of Onset   • Heart attack Mother [de-identified]        CABG   • Kidney failure Father    • Diabetes Father         pre-diabetic     Past Surgical History:   Procedure Laterality Date   • APPENDECTOMY     • CAROTID ENDARTERECTOMY Left 10/29/2020   • COLONOSCOPY N/A 1/22/2016    Procedure: COLONOSCOPY;  Surgeon: Angelika Fritz MD;  Location: [facility] GI LAB; Service:    • KNEE ARTHROSCOPY Left    • KNEE ARTHROSCOPY W/ MENISCAL REPAIR Right    • MOHS SURGERY Right 11/01/2021    BCC- Right Posterior Calf- Dr Sade Latham    • MD RPLCMT AORTIC VALVE OPN W/STENTLESS TISSUE VALVE N/A 5/10/2021    Procedure: CORONARY ARTERY BYPASS GRAFT (CABG) X 5 USING LEFT GSV--> DIAGONAL,, RAMUS, PDA ,AND OM1, AND LEFT BALBIR-LAD WITH REPLACEMENT VALVE AORTIC USING 23 MM MURRAY MAGNA EASE(AVR);   Surgeon: Radha Blanchard MD;  Location:  MAIN OR;  Service: Cardiac Surgery   • MD THROMBOENDARTECTMY [place] INCIS Left 10/29/2020    Procedure: CAROTID ENDARTERECTOMY W/BOVINE PATCH ANGIOPLASTY AND COMPLETION DUPLEX IMAGING;  Surgeon: Krystian Soriano DO;  Location: [facility]; Service: Vascular       Current Outpatient Medications:   •  acetaminophen (TYLENOL) 325 mg tablet, Take 1-2 tablets Q4-6 hours PRN pain  Do not take more than 4 grams in 24 hours  , Disp: , Rfl: 0  •  apixaban (Eliquis) 5 mg, Take 1 tablet (5 mg total) by mouth 2 (two) times a day, Disp: 60 tablet, Rfl: 0  •  atorvastatin (LIPITOR) 80 mg tablet, Take 1 tablet (80 mg total) by mouth daily with dinner, Disp: 90 tablet, Rfl: 2  •  Blood Glucose Monitoring Suppl (FreeStyle InsuLinx System) w/Device KIT, Test TID & QHS as directed , Disp: 1 kit, Rfl: 0  •  carvedilol (COREG) 6 25 mg tablet, Take 1 tablet (6 25 mg total) by mouth 2 (two) times a day with meals, Disp: 90 tablet, Rfl: 2  •  FreeStyle InsuLinx Test test strip, Test TID & QHS as directed , Disp: 100 each, Rfl: 2  •  Lancets (freestyle) lancets, Test TID & QHS as directed , Disp: 100 each, Rfl: 2  •  lisinopril-hydrochlorothiazide (PRINZIDE,ZESTORETIC) 20-25 MG per tablet, Take 1 tablet by mouth daily, Disp: 90 tablet, Rfl: 1  •  metFORMIN (GLUCOPHAGE) 1000 MG tablet, Take 1 tablet (1,000 mg total) by mouth 2 (two) times a day with meals, Disp: 180 tablet, Rfl: 2        Review of Systems:  Review of Systems   Respiratory: Negative for shortness of breath  Cardiovascular: Negative for chest pain, palpitations and leg swelling  Neurological: Negative for syncope  All other systems reviewed and are negative  Physical Exam:  Vitals:  Vitals:    11/07/22 0922   BP: 152/82   BP Location: Left arm   Patient Position: Sitting   Cuff Size: Large   Pulse: (!) 54   Temp: (!) 97 °F (36 1 °C)   SpO2: 100%   Weight: 72 6 kg (160 lb)   Height: 5' 9" (1 753 m)     Physical Exam   Constitutional: He appears healthy  No distress  Eyes: Pupils are equal, round, and reactive to light  Conjunctivae are normal    Neck: No JVD present  Cardiovascular: Normal rate and regular rhythm  Exam reveals no gallop and no friction rub  Murmur heard    Pulmonary/Chest: Effort normal and breath sounds normal  He has no wheezes  He has no rales  Musculoskeletal:         General: No tenderness, deformity or edema  Cervical back: Normal range of motion and neck supple  Neurological: He is alert and oriented to person, place, and time  Skin: Skin is warm and dry  Cardiographics:  EKG: Personally reviewed    Normal sinus rhythm at 60 beats per minute with PVC   Last known EF: 50%    This note was completed in part utilizing [employer] Software  Grammatical errors, random word insertions, spelling mistakes, and incomplete sentences can be an occasional consequence of this system secondary to software limitations, ambient noise, and hardware issues  If you have any questions or concerns about the content, text, or information contained within the body of this dictation, please contact the provider for clarification

## 2022-11-17 DIAGNOSIS — E11.9 TYPE 2 DIABETES MELLITUS WITHOUT COMPLICATION, WITHOUT LONG-TERM CURRENT USE OF INSULIN (HCC): ICD-10-CM

## 2022-11-29 ENCOUNTER — OFFICE VISIT (OUTPATIENT)
Dept: NEUROLOGY | Facility: CLINIC | Age: 77
End: 2022-11-29

## 2022-11-29 VITALS
HEART RATE: 68 BPM | SYSTOLIC BLOOD PRESSURE: 142 MMHG | HEIGHT: 69 IN | BODY MASS INDEX: 23.99 KG/M2 | OXYGEN SATURATION: 97 % | DIASTOLIC BLOOD PRESSURE: 80 MMHG | TEMPERATURE: 97 F | WEIGHT: 162 LBS

## 2022-11-29 DIAGNOSIS — E11.40 CONTROLLED TYPE 2 DIABETES MELLITUS WITH DIABETIC NEUROPATHY, WITHOUT LONG-TERM CURRENT USE OF INSULIN (HCC): ICD-10-CM

## 2022-11-29 DIAGNOSIS — Z86.73 OLD CEREBRAL INFARCT WITHOUT RESIDUAL DEFICIT: Chronic | ICD-10-CM

## 2022-11-29 DIAGNOSIS — R55 VASOVAGAL SYNCOPE: Primary | ICD-10-CM

## 2022-11-29 DIAGNOSIS — E78.41 ELEVATED LIPOPROTEIN(A): ICD-10-CM

## 2022-11-29 NOTE — PATIENT INSTRUCTIONS
Continue with follow up through Cardiology for BP medication adjustments  Continue Eliquis 5mg twice a day per cardio  Continue with atorvastatin 80mg daily per Cardio  Follow-up with outpatient Neurology office in 6 months for further evaluation syncopal event

## 2022-11-29 NOTE — PROGRESS NOTES
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    Controlled type 2 diabetes mellitus with diabetic neuropathy, without long-term current use of insulin ([address] )       Continue with follow up through Cardiology for BP medication adjustments  Continue Eliquis 5mg twice a day per cardio  Continue with atorvastatin 80mg daily per Cardio  Follow-up with outpatient Neurology office in 6 months for further evaluation syncopal event  If no further syncopal event noted, will release patient to as needed follow-up  Subjective/HPI:  Cate Torres is a 75yo male with PMH of DM, HTN, CHF, PAF, HLD and left CEA/carotid artery stenosis who presented to [facility] on 07/29/2022 after having syncopal event  Patient reportedly felt lightheaded going into the shower times 10-15 minutes  He was feeling progressively dizzy in the shower and had to sit down  He did not have any tongue bite or stool incontinence history however he did lose his bowels during that shower  Patient had no significant symptoms prior to this event and has had no history of syncope or presyncopal events  He had no palpitations  There was low suspicion for acute seizure activity however he did have an outpatient EEG which was normal   CTH showed evidence of chronic right frontal lobe infarct  He did have a brief period of atrial fibrillation after cardiac catheterization in the past, there was question whether this could be cardiogenic syncope  [place] of static vital signs were negative  Loop recorder was recommended, EEG was recommended as stated above was normal   Patient was continued on his home baby aspirin and home statin      According to Neurology attending notations, stroke and seizures were low suspicion therefore no MRI was required in outpatient EEG was recommended  Patient presents to Neurology office today as a hospital follow-up  He reports no further syncopal events since his hospitalization  Patient denies any complaints of headache, dizziness, lightheadedness, vision changes, speech or swallowing deficits imbalances or falls  Patient does endorse having some numbness to his right hand  He notes he has neuropathy 2nd to his diabetes  This is not bothersome for him and does not require treatment at this time  Patient reported his event as feeling dizzy prior to his shower, patient call find the dizziness as a woozy lightheaded sensation walking into the shower  He noted that he had bent over in the shower to use discrete he on his shower window when he had a sudden blackout loss of consciousness  Patient noted when he came to in came out of the shower he had noticed she was incontinent of stool in the shower  Patient describes the event as feeling woozy, lightheaded and lost strength in his legs  Noted his legs were wobbly and when out on him  Patient does deny having any further episodes or lightheadedness since leaving the hospital   He was discharged home on his home regimen of baby aspirin and atorvastatin 80 mg daily  Patient was recommended to have a loop recorder inserted for evaluation of atrial fibrillation  It was noted patient had 1 event of atrial fibrillation during cardiac catheterization, question additional episodes leading to this syncopal event  Patient stated he never had a loop recorder completed, he was at his doctor's office who noted irregularity in his heartbeat and was sent for EKG found to have atrial fibrillation  He was evaluated by his cardiologist who noted as suspicion for vasovagal event while patient was bent down which would correlate with a loss of consciousness and incontinence of stool  to put him on Eliquis 5 mg b i d    He is no longer on the baby aspirin as this was discontinued when he started the Radha  He continues on his atorvastatin, noted his LDL to be 120, he is diabetic so goal range should be 70 or below  These medications are being managed by his cardiologist     Patient was also recommended to have an EEG in the outpatient setting  This was completed and was negative for any epileptiform discharges  No seizure activity was captured  This does not indicate patient had no seizure activity, currently no activity with this exam   Patient was low suspicion for seizure activity, no EEG findings were seen  Doubtful for seizure activity, no AEDs were considered or needed at this time  Patient's neurological exam is normal, no focal deficits  He has good strength, coordination, sensation and reflexes  His CN II-XII are intact  No neurological abnormality seen on exam   No additional need for MRI studies or other diagnostics at this time  To note patient presents to Neurology office today with a systolic pressure of 070, his heart rate is 41  This was reassessed prior to him leaving the office and his blood pressure had decreased to 142/80 with a heart rate of 68  Patient was encouraged to continue to follow-up with Cardiology for medical management of his blood pressures and arrhythmia  He can follow-up in the outpatient neurology office in 6 months, he has no further neurological event or need for Neurology at that time  We will release him to as needed follow-up at that time              The following portions of the patient's history were reviewed and updated as appropriate: allergies, current medications, past family history, past medical history, past social history, past surgical history and problem list         Past Medical History:   Diagnosis Date   • Arthritis     generalized   • Asthma     seasonal-does not use inhaler   • Atrial fibrillation with RVR ([facility] [address] ) 5/12/2021   • Back pain    • Basal cell carcinoma 09/23/2021    BCC- Right Posterior Calf    • Carotid artery stenosis    • Diabetes mellitus ([facility] [address] )     type   • GERD (gastroesophageal reflux disease)    • Hiatal hernia    • Hyperlipidemia    • Hypertension    • Peyronie's disease     last assessed [id number]   • Seasonal allergies    • Wears glasses        Past Surgical History:   Procedure Laterality Date   • APPENDECTOMY     • CAROTID ENDARTERECTOMY Left 10/29/2020   • COLONOSCOPY N/A 1/22/2016    Procedure: COLONOSCOPY;  Surgeon: Melanie Lee MD;  Location: [facility] GI LAB; Service:    • KNEE ARTHROSCOPY Left    • KNEE ARTHROSCOPY W/ MENISCAL REPAIR Right    • MOHS SURGERY Right 11/01/2021    BCC- Right Posterior Calf- Dr Carmela Ramos    • MD RPLCMT AORTIC VALVE OPN W/STENTLESS TISSUE VALVE N/A 5/10/2021    Procedure: CORONARY ARTERY BYPASS GRAFT (CABG) X 5 USING LEFT GSV--> DIAGONAL,, RAMUS, PDA ,AND OM1, AND LEFT BALBIR-LAD WITH REPLACEMENT VALVE AORTIC USING 23 MM MURRAY MAGNA EASE(AVR); Surgeon: Justin Gonzales MD;  Location:  MAIN OR;  Service: Cardiac Surgery   • MD THROMBOENDARTECTMY NECK,NECK INCIS Left 10/29/2020    Procedure: CAROTID ENDARTERECTOMY W/BOVINE PATCH ANGIOPLASTY AND COMPLETION DUPLEX IMAGING;  Surgeon: Bertha Jimenez DO;  Location: [facility] Main OR;  Service: Vascular       Social History     Socioeconomic History   • Marital status: Single     Spouse name: None   • Number of children: None   • Years of education: None   • Highest education level: None   Occupational History   • None   Tobacco Use   • Smoking status: Never   • Smokeless tobacco: Never   Vaping Use   • Vaping Use: Never used   Substance and Sexual Activity   • Alcohol use:  Yes     Alcohol/week: 10 0 standard drinks     Types: 7 Glasses of wine, 3 Standard drinks or equivalent per week   • Drug use: No   • Sexual activity: Yes     Partners: Female   Other Topics Concern   • None   Social History Narrative    Daily caffeinnated coffee      Social Determinants of Health     Financial Resource Strain: Not on file   Food Insecurity: Not on file Transportation Needs: Not on file   Physical Activity: Not on file   Stress: Not on file   Social Connections: Not on file   Intimate Partner Violence: Not on file   Housing Stability: Not on file       Family History   Problem Relation Age of Onset   • Heart attack Mother [de-identified]        CABG   • Kidney failure Father    • Diabetes Father         pre-diabetic         Current Outpatient Medications:   •  acetaminophen (TYLENOL) 325 mg tablet, Take 1-2 tablets Q4-6 hours PRN pain  Do not take more than 4 grams in 24 hours  , Disp: , Rfl: 0  •  apixaban (Eliquis) 5 mg, Take 1 tablet (5 mg total) by mouth 2 (two) times a day, Disp: 60 tablet, Rfl: 0  •  atorvastatin (LIPITOR) 80 mg tablet, Take 1 tablet (80 mg total) by mouth daily with dinner, Disp: 90 tablet, Rfl: 2  •  Blood Glucose Monitoring Suppl (FreeStyle InsuLinx System) w/Device KIT, Test TID & QHS as directed , Disp: 1 kit, Rfl: 0  •  carvedilol (COREG) 6 25 mg tablet, Take 1 tablet (6 25 mg total) by mouth 2 (two) times a day with meals, Disp: 90 tablet, Rfl: 2  •  FreeStyle InsuLinx Test test strip, Test TID & QHS as directed , Disp: 100 each, Rfl: 2  •  Lancets (freestyle) lancets, Test TID & QHS as directed , Disp: 100 each, Rfl: 2  •  lisinopril-hydrochlorothiazide (PRINZIDE,ZESTORETIC) 20-25 MG per tablet, Take 1 tablet by mouth daily, Disp: 90 tablet, Rfl: 1  •  metFORMIN (GLUCOPHAGE) 1000 MG tablet, Take 1 tablet (1,000 mg total) by mouth 2 (two) times a day with meals, Disp: 180 tablet, Rfl: 2    No Known Allergies     Blood pressure 142/80, pulse 68, temperature (!) 97 °F (36 1 °C), temperature source Tympanic, height 5' 9" (1 753 m), weight 73 5 kg (162 lb), SpO2 97 %  Objective:    Blood pressure 142/80, pulse 68, temperature (!) 97 °F (36 1 °C), temperature source Tympanic, height 5' 9" (1 753 m), weight 73 5 kg (162 lb), SpO2 97 %  Physical Exam    Neurological Exam      ROS:    Review of Systems   Constitutional: Negative    Negative for appetite change and fever  HENT: Negative  Negative for hearing loss, tinnitus, trouble swallowing and voice change  Eyes: Negative  Negative for photophobia, pain and visual disturbance  Respiratory: Negative  Negative for shortness of breath and stridor  Cardiovascular: Negative  Negative for palpitations  Gastrointestinal: Negative  Negative for nausea and vomiting  Endocrine: Negative  Negative for cold intolerance  Genitourinary: Negative  Negative for dysuria, frequency and urgency  Musculoskeletal: Negative  Negative for gait problem, myalgias and neck pain  Skin: Negative  Negative for rash  Allergic/Immunologic: Negative  Neurological: Positive for numbness (right toes)  Negative for dizziness, tremors, seizures, syncope, facial asymmetry, speech difficulty, weakness, light-headedness and headaches  Hematological: Negative  Does not bruise/bleed easily  Psychiatric/Behavioral: Negative  Negative for confusion, hallucinations and sleep disturbance

## 2022-12-05 ENCOUNTER — OFFICE VISIT (OUTPATIENT)
Dept: FAMILY MEDICINE CLINIC | Facility: CLINIC | Age: 77
End: 2022-12-05

## 2022-12-05 VITALS
RESPIRATION RATE: 16 BRPM | SYSTOLIC BLOOD PRESSURE: 130 MMHG | OXYGEN SATURATION: 99 % | WEIGHT: 162 LBS | TEMPERATURE: 97.7 F | HEART RATE: 52 BPM | DIASTOLIC BLOOD PRESSURE: 86 MMHG | HEIGHT: 69 IN | BODY MASS INDEX: 23.99 KG/M2

## 2022-12-05 DIAGNOSIS — I48.91 ATRIAL FIBRILLATION WITH RVR (HCC): Primary | ICD-10-CM

## 2022-12-05 DIAGNOSIS — I10 BENIGN ESSENTIAL HYPERTENSION: ICD-10-CM

## 2022-12-05 DIAGNOSIS — J34.89 NASAL DRYNESS: ICD-10-CM

## 2022-12-05 DIAGNOSIS — R94.31 ABNORMAL EKG: ICD-10-CM

## 2022-12-06 ENCOUNTER — TELEPHONE (OUTPATIENT)
Dept: FAMILY MEDICINE CLINIC | Facility: CLINIC | Age: 77
End: 2022-12-06

## 2022-12-06 RX ORDER — CARVEDILOL 3.12 MG/1
3.12 TABLET ORAL 2 TIMES DAILY WITH MEALS
Qty: 120 TABLET | Refills: 6 | Status: SHIPPED | OUTPATIENT
Start: 2022-12-06

## 2022-12-06 NOTE — TELEPHONE ENCOUNTER
Please advise patient that I spoke with Dr Jean Carlos Suarez yesterday night  We recommend that we cut his Carvedilol in half 3 125mg; He can cut pills at home, or  new script  Would like him to monitor his BP and Pulse at home  If he see ghat his pulse is higher 90s-100s to please call us  Please be sure he schedules a follow up sooner if not  We can Squeeze him any day please let him know that   He is already scheduled for cardiology eval in Feb

## 2022-12-06 NOTE — PROGRESS NOTES
Amada Bob 1945 male MRN: [id number]    FAMILY PRACTICE OFFICE VISIT  [facility] - [address]      ASSESSMENT/PLAN  Amada Bob is a 69 y o  male presents to the office for    Diagnoses and all orders for this visit:    Atrial fibrillation with RVR ([address] )  -     POCT ECG    Benign essential hypertension  -     carvedilol (COREG) 3 125 mg tablet; Take 1 tablet (3 125 mg total) by mouth 2 (two) times a day with meals    Nasal dryness    Abnormal EKG        Paged Dr Reun Gonzalez, he agrees that Carvedilol 6 35mg should be cut in half, given EKG finding  Recommend monitoring BP at home  If elevated to contact Cardiology or myself  Recommend Normal saline spray 3 times a day  Future Appointments   Date Time Provider [place]   2/6/2023  9:20 AM DO MARC Hutchison [facility]   3/6/2023  9:20 AM Renny Ruiz MD [facility]   3/7/2023 10:30 AM [place] [facility]ULAR 2 [id number]   6/2/2023 10:15 AM MOSHE Escobar NEURO [facility]          SUBJECTIVE  CC: Follow-up (Afib)      HPI:  Amada Bob is a 69 y o  male who presents for a follow up EKG  After being seen for a new onset of Afib  Patient on Elliquis  Nose bleed x 2 days given COVID swab trauma  He performed test himself  Patient with NO symptoms today feels no SOB, CP or palpitations   Review of Systems   Constitutional: Negative for activity change, appetite change, chills, fatigue and fever  HENT: Negative for congestion  Respiratory: Negative for cough, chest tightness and shortness of breath  Cardiovascular: Negative for chest pain and leg swelling  Gastrointestinal: Negative for abdominal distention, abdominal pain, constipation, diarrhea, nausea and vomiting  All other systems reviewed and are negative        Historical Information   The patient history was reviewed as follows:  Past Medical History:   Diagnosis Date   • Arthritis     generalized   • Asthma seasonal-does not use inhaler   • Atrial fibrillation with RVR ([facility] [address] ) 5/12/2021   • Back pain    • Basal cell carcinoma 09/23/2021    BCC- Right Posterior Calf    • Carotid artery stenosis    • Diabetes mellitus ([facility])     type   • GERD (gastroesophageal reflux disease)    • Hiatal hernia    • Hyperlipidemia    • Hypertension    • Peyronie's disease     last assessed [id number]   • Seasonal allergies    • Wears glasses          Medications:     Current Outpatient Medications:   •  acetaminophen (TYLENOL) 325 mg tablet, Take 1-2 tablets Q4-6 hours PRN pain  Do not take more than 4 grams in 24 hours  , Disp: , Rfl: 0  •  apixaban (Eliquis) 5 mg, Take 1 tablet (5 mg total) by mouth 2 (two) times a day, Disp: 60 tablet, Rfl: 0  •  atorvastatin (LIPITOR) 80 mg tablet, Take 1 tablet (80 mg total) by mouth daily with dinner, Disp: 90 tablet, Rfl: 2  •  Blood Glucose Monitoring Suppl (FreeStyle InsuLinx System) w/Device KIT, Test TID & QHS as directed , Disp: 1 kit, Rfl: 0  •  carvedilol (COREG) 3 125 mg tablet, Take 1 tablet (3 125 mg total) by mouth 2 (two) times a day with meals, Disp: 120 tablet, Rfl: 6  •  FreeStyle InsuLinx Test test strip, Test TID & QHS as directed , Disp: 100 each, Rfl: 2  •  Lancets (freestyle) lancets, Test TID & QHS as directed , Disp: 100 each, Rfl: 2  •  lisinopril-hydrochlorothiazide (PRINZIDE,ZESTORETIC) 20-25 MG per tablet, Take 1 tablet by mouth daily, Disp: 90 tablet, Rfl: 1  •  metFORMIN (GLUCOPHAGE) 1000 MG tablet, Take 1 tablet (1,000 mg total) by mouth 2 (two) times a day with meals, Disp: 180 tablet, Rfl: 2    No Known Allergies    OBJECTIVE  Vitals:   Vitals:    12/05/22 1145   BP: 130/86   BP Location: Left arm   Patient Position: Sitting   Cuff Size: Standard   Pulse: (!) 52   Resp: 16   Temp: 97 7 °F (36 5 °C)   SpO2: 99%   Weight: 73 5 kg (162 lb)   Height: 5' 9" (1 753 m)         Physical Exam  Constitutional:       Appearance: Normal appearance     Cardiovascular:      Rate and Rhythm: Normal rate  Pulses: Normal pulses  Pulmonary:      Effort: Pulmonary effort is normal    Musculoskeletal:         General: Normal range of motion  Neurological:      General: No focal deficit present  Mental Status: He is alert and oriented to person, place, and time  Psychiatric:         Mood and Affect: Mood normal          Behavior: Behavior normal          Thought Content:  Thought content normal          Judgment: Judgment normal                     Geoffrey Ennis MD,   [facility]  12/6/2022

## 2022-12-26 PROBLEM — R77.8 ELEVATED TROPONIN: Status: ACTIVE | Noted: 2022-01-01

## 2022-12-26 PROBLEM — R65.20 SEVERE SEPSIS (HCC): Status: ACTIVE | Noted: 2022-01-01

## 2022-12-26 PROBLEM — A41.9 SEVERE SEPSIS (HCC): Status: ACTIVE | Noted: 2022-01-01

## 2022-12-26 PROBLEM — I46.9 CARDIAC ARREST (HCC): Status: ACTIVE | Noted: 2022-01-01

## 2022-12-26 PROBLEM — E87.20 LACTIC ACIDOSIS: Status: ACTIVE | Noted: 2022-01-01

## 2022-12-26 PROBLEM — J96.01 ACUTE RESPIRATORY FAILURE WITH HYPOXIA (HCC): Status: ACTIVE | Noted: 2022-01-01

## 2022-12-26 NOTE — ASSESSMENT & PLAN NOTE
Wt Readings from Last 3 Encounters:   12/05/22 73 5 kg (162 lb)   11/29/22 73 5 kg (162 lb)   11/07/22 72 6 kg (160 lb)     · Most recent EF now improved 50% 7/2022  · Repeat echo

## 2022-12-26 NOTE — RESPIRATORY THERAPY NOTE
Intubation assist Pt intubated by ER MD Successful 1st attempt  Bilateral BS and + etco2 color change  X-ray ordered  ETT secured  Placed on Vent with settings per flowsheet  BVM was used to ventilate and oxygenate prior to intuibation

## 2022-12-26 NOTE — CONSULTS
Consultation - Cardiology   Sara Guillory 69 y o  male MRN: [id number]  Unit/Bed#: ICU 09 Encounter: [id number]    Assessment/Plan     Assessment:  1  Cardiac arrest  2  Vent dependent respiratory failure  3  Coronary artery disease with history of CABG x5  4  Aortic stenosis with history of bioprosthetic AVR  5  Chronic kidney disease stage III  6  Abnormal troponins in the setting of cardiac arrest and defibrillation  7  Paroxysmal atrial fibrillation  8  Diabetes: Hemoglobin A1c is 6 7  9  Right-sided carotid stenosis: Chronically occluded  10  Recent syncopal episode: Question arrhythmia      Plan:  Patient is currently admitted to the intensive care unit  1  Eliquis was discontinued on admission and patient has been transitioned to IV heparin ACS protocol    2   2D echocardiogram was ordered by ICU staff to reevaluate patient's cardiac function    3  Ventilator management per ICU staff    4  Home medications currently on hold due to borderline hypotension    5  Continue supportive therapies at this time and will have further cardiac evaluation once patient is stabilized  6   Continue to monitor telemetry for any arrhythmias      History of Present Illness   Physician Requesting Consult: Myrtle Franco MD  Reason for Consult / Principal Problem: Out of hospital cardiac arrest      HPI: Sara Guillory is a 69y o  year old male who presented to the emergency room last night after experienced out of hospital cardiac arrest   He was home and walked to his bathroom and back to the living room  Family in the other room heard a strange sound and when they checked on him they found him to be unresponsive  They called 911  On arrival EMS found a shockable rhythm and patient received 2 defibrillations with ROSC obtained  Patient was also given lidocaine 100 mg x 2 by ALS and brought to the emergency room  Patient was intubated in the emergency room and subsequently admitted to the ICU  Postarrest CAT scan of the chest noted extensive perihilar groundglass and alveolar opacities and elevated right heart pressures concerning for ARDS  Patient has a history for paroxysmal atrial fibrillation, hypertension, coronary artery disease with aortic stenosis and he underwent CABG x5 with bioprosthetic aortic valve replacement in May 10, 2021 which includes a 23 mm [facility] Ease pericardial tissue valve and CABG with LIMA to LAD, saphenous vein graft to diagonal 1, saphenous vein graft Y to ramus intermedius and obtuse marginal 1 and a saphenous vein graft to the RPDA  Chronic left bundle branch block, dyslipidemia, CVA and carotid stenosis  He follows with Dr Cobian New  Echocardiogram from July 2022 demonstrates an EF of 50% with moderate left atrial dilatation, mild right atrial dilatation, prosthetic AVR functioning well and moderate mitral valve regurgitation/stenosis and normal PA pressures  Inpatient consult to Cardiology  Consult performed by: MOSHE Navarro  Consult ordered by: Daljit Delaney PA-C          Review of Systems   Unable to perform ROS: Intubated       Historical Information   Past Medical History:   Diagnosis Date   • Arthritis     generalized   • Asthma     seasonal-does not use inhaler   • Atrial fibrillation with RVR ([facility] [address] ) 5/12/2021   • Back pain    • Basal cell carcinoma 09/23/2021    BCC- Right Posterior Calf    • Carotid artery stenosis    • Diabetes mellitus ([facility] [address] )     type   • GERD (gastroesophageal reflux disease)    • Hiatal hernia    • Hyperlipidemia    • Hypertension    • Peyronie's disease     last assessed [id number]   • Seasonal allergies    • Wears glasses      Past Surgical History:   Procedure Laterality Date   • APPENDECTOMY     • CAROTID ENDARTERECTOMY Left 10/29/2020   • COLONOSCOPY N/A 1/22/2016    Procedure: COLONOSCOPY;  Surgeon: Melissa Nunn MD;  Location: [facility];   Service:    • KNEE ARTHROSCOPY Left    • KNEE ARTHROSCOPY W/ MENISCAL REPAIR Right    • MOHS SURGERY Right 11/01/2021    BCC- Right Posterior Calf- Dr Nacho Triplett    • MD RPLCMT AORTIC VALVE OPN W/STENTLESS TISSUE VALVE N/A 5/10/2021    Procedure: CORONARY ARTERY BYPASS GRAFT (CABG) X 5 USING LEFT GSV--> DIAGONAL,, RAMUS, PDA ,AND OM1, AND LEFT BALBIR-LAD WITH REPLACEMENT VALVE AORTIC USING 23 MM M[employer] MAGNA EASE(AVR);   Surgeon: Burak Gonzalez MD;  Location: [facility];  Service: Cardiac Surgery   • MD TEAEC W/PATCH GRF CAROTID VERTB SUBCLAV NECK INC Left 10/29/2020    Procedure: CAROTID ENDARTERECTOMY W/BOVINE PATCH ANGIOPLASTY AND COMPLETION DUPLEX IMAGING;  Surgeon: Jun García DO;  Location: [facility];  Service: Vascular     Social History     Substance and Sexual Activity   Alcohol Use Yes   • Alcohol/week: 10 0 standard drinks   • Types: 7 Glasses of wine, 3 Standard drinks or equivalent per week     Social History     Substance and Sexual Activity   Drug Use No     E-Cigarette/Vaping   • E-Cigarette Use Never User      E-Cigarette/Vaping Substances   • Nicotine No    • THC No    • CBD No    • Flavoring No    • Other No    • Unknown No      Social History     Tobacco Use   Smoking Status Never   Smokeless Tobacco Never     Family History:   Family History   Problem Relation Age of Onset   • Heart attack Mother [de-identified]        CABG   • Kidney failure Father    • Diabetes Father         pre-diabetic       Meds/Allergies   all current active meds have been reviewed, current meds:   Current Facility-Administered Medications   Medication Dose Route Frequency   • acetaminophen (TYLENOL) tablet 975 mg  975 mg Oral Q8H [address]   • busPIRone (BUSPAR) tablet 30 mg  30 mg Oral Q8H   • chlorhexidine (PERIDEX) 0 12 % oral rinse 15 mL  15 mL Mouth/Throat Q12H [address]   • fentanyl citrate (PF) 100 MCG/2ML 50 mcg  50 mcg Intravenous Q1H PRN   • heparin (porcine) 25,000 units in 0 45% NaCl 250 mL infusion (premix)  3-20 Units/kg/hr (Order-Specific) Intravenous Titrated   • heparin (porcine) injection 2,000 Units  2,000 Units Intravenous Q6H PRN   • heparin (porcine) injection 4,000 Units  4,000 Units Intravenous Q6H PRN   • insulin lispro (HumaLOG) 100 units/mL subcutaneous injection 1-6 Units  1-6 Units Subcutaneous Q6H [facility]   • magnesium sulfate 2 g/50 mL IVPB (premix) 2 g  2 g Intravenous Once   • multi-electrolyte (ISOLYTE-S PH 7 4) bolus 500 mL  500 mL Intravenous Once   • multi-electrolyte (PLASMALYTE-A/ISOLYTE-S PH 7 4) IV solution  50 mL/hr Intravenous Continuous   • omeprazole (PRILOSEC) suspension 2 mg/mL  20 mg Oral Daily   • piperacillin-tazobactam (ZOSYN) 3 375 g in sodium chloride 0 9 % 100 mL IVPB  3 375 g Intravenous Q6H   • propofol (DIPRIVAN) 1000 mg in 100 mL infusion (premix)  5-50 mcg/kg/min Intravenous Titrated    and PTA meds:   Prior to Admission Medications   Prescriptions Last Dose Informant Patient Reported? Taking? Blood Glucose Monitoring Suppl (FreeStyle [employer] System) w/Device KIT   No No   Sig: Test TID & QHS as directed  FreeStyle [employer] Test test strip   No No   Sig: Test TID & QHS as directed  Lancets (freestyle) lancets   No No   Sig: Test TID & QHS as directed  acetaminophen (TYLENOL) 325 mg tablet Unknown  No No   Sig: Take 1-2 tablets Q4-6 hours PRN pain  Do not take more than 4 grams in 24 hours     apixaban (Eliquis) 5 mg Unknown  No No   Sig: Take 1 tablet (5 mg total) by mouth 2 (two) times a day   atorvastatin (LIPITOR) 80 mg tablet Unknown  No No   Sig: Take 1 tablet (80 mg total) by mouth daily with dinner   carvedilol (COREG) 3 125 mg tablet Unknown  No No   Sig: Take 1 tablet (3 125 mg total) by mouth 2 (two) times a day with meals   lisinopril-hydrochlorothiazide (PRINZIDE,ZESTORETIC) 20-25 MG per tablet Unknown  No No   Sig: Take 1 tablet by mouth daily   metFORMIN (GLUCOPHAGE) 1000 MG tablet Unknown  No No   Sig: Take 1 tablet (1,000 mg total) by mouth 2 (two) times a day with meals      Facility-Administered Medications: None     No Known Allergies    Objective   Vitals: Blood pressure 120/86, pulse 79, temperature 97 7 °F (36 5 °C), temperature source Esophageal, resp  rate 20, height 5' 9" (1 753 m), weight 71 9 kg (158 lb 8 2 oz), SpO2 100 %  Orthostatic Blood Pressures    Flowsheet Row Most Recent Value   Blood Pressure 120/86 filed at 12/26/2022 0700   Patient Position - Orthostatic VS Lying filed at 12/25/2022 2305            Intake/Output Summary (Last 24 hours) at 12/26/2022 0845  Last data filed at 12/26/2022 0600  Gross per 24 hour   Intake 1498 12 ml   Output 1000 ml   Net 498 12 ml       Invasive Devices     Central Venous Catheter Line  Duration           CVC Central Lines 12/25/22 Triple Right Femoral <1 day          Peripheral Intravenous Line  Duration           Peripheral IV 12/25/22 Left Antecubital <1 day    Peripheral IV 12/25/22 Right Antecubital <1 day          Drain  Duration           NG/OG/Enteral Tube Orogastric 16 Fr Center mouth <1 day    Urethral Catheter <1 day          Airway  Duration           ETT  7 5 mm <1 day                Physical Exam  Vitals and nursing note reviewed  Constitutional:       Interventions: He is sedated and intubated  Comments: With bear hugger   HENT:      Right Ear: External ear normal       Left Ear: External ear normal    Cardiovascular:      Rate and Rhythm: Normal rate and regular rhythm  Pulses: Normal pulses  Heart sounds: Normal heart sounds  Pulmonary:      Effort: He is intubated  Breath sounds: Examination of the right-lower field reveals decreased breath sounds  Examination of the left-lower field reveals decreased breath sounds  Decreased breath sounds present  Abdominal:      General: Bowel sounds are normal  There is no distension  Palpations: Abdomen is soft  Musculoskeletal:      Right lower leg: No edema  Left lower leg: No edema  Skin:     General: Skin is warm and dry  Capillary Refill: Capillary refill takes less than 2 seconds  Neurological:      Comments: Intubated and sedated         Lab Results:   I have personally reviewed pertinent lab results  CBC with diff:   Results from last 7 days   Lab Units 12/26/22  0723   WBC Thousand/uL 8 50   RBC Million/uL 4 14   HEMOGLOBIN g/dL 13 0   HEMATOCRIT % 39 0   MCV fL 94   MCH pg 31 4   MCHC g/dL 33 3   RDW % 12 8   MPV fL 10 1   PLATELETS Thousands/uL 154     CMP:   Results from last 7 days   Lab Units 12/26/22  0557   SODIUM mmol/L 139   CHLORIDE mmol/L 104   CO2 mmol/L 26   BUN mg/dL 25   CREATININE mg/dL 1 21   CALCIUM mg/dL 8 1*   AST U/L 227*   ALT U/L 116*   ALK PHOS U/L 96   EGFR ml/min/1 73sq m 57     HS Troponin:   0   Lab Value Date/Time    HSTNI0 96 (H) 12/25/2022 2035    HSTNI2 1,132 (H) 12/25/2022 2236    HSTNI4 3,917 (H) 12/26/2022 0105    HSTNI4 29 07/29/2022 2003     BNP:   Results from last 7 days   Lab Units 12/26/22  0557   POTASSIUM mmol/L 4 6   CHLORIDE mmol/L 104   CO2 mmol/L 26   BUN mg/dL 25   CREATININE mg/dL 1 21   CALCIUM mg/dL 8 1*   EGFR ml/min/1 73sq m 57     Coags:   Results from last 7 days   Lab Units 12/26/22  0557   PTT seconds 29   INR  1 12     TSH:   Results from last 7 days   Lab Units 12/25/22  2035   TSH 3RD GENERATON uIU/mL 4 543*     Magnesium:   Results from last 7 days   Lab Units 12/26/22  0557   MAGNESIUM mg/dL 1 7     Lipid Profile:     Imaging: I have personally reviewed pertinent reports      EKG: Sinus rhythm with first-degree AV heart block, septal infarct old undetermined age, seen on previous EKGs  VTE Prophylaxis: Sequential compression device Naty Angel)     Code Status: Level 1 - Full Code  Advance Directive and Living Will:      Power of :    POLST:      Roque  Cardiology

## 2022-12-26 NOTE — PLAN OF CARE

## 2022-12-26 NOTE — RESPIRATORY THERAPY NOTE
Transferred pt from ER to CTscan on vent and back to ER BVM on stby  No complications during transport

## 2022-12-26 NOTE — ASSESSMENT & PLAN NOTE
· Witnessed arrest at home  Initial responders had shockable rhythm believed to be V Fib  Shocked x 2 with ROSC and given lidocaine 100mg x 2  · No STEMI on EKG  Discussed with cardiology - plan for TTM  Heparin infusion considered but patient received Eliquis today so will hold for tonight and re-eval tomorrow     · Repeat echo  · Trend troponins  · Had recent syncopal episodes worked up by neurology - ?arrythmia

## 2022-12-26 NOTE — RESPIRATORY THERAPY NOTE
Transferred pt from ER to ICU on vent  BVM on stby  No complications during transport  Report given to ICU RT

## 2022-12-26 NOTE — ASSESSMENT & PLAN NOTE
· Likely related to cardiac arrest/compressions/shock   · Trend troponins  · Consider heparin infusion tomorrow  · Repeat EKG in AM

## 2022-12-26 NOTE — ASSESSMENT & PLAN NOTE
Lab Results   Component Value Date    HGBA1C 6 7 (A) 11/04/2022       Recent Labs     12/25/22 2031   POCGLU 153*       Blood Sugar Average: Last 72 hrs:  (P) 153     SSI

## 2022-12-26 NOTE — ASSESSMENT & PLAN NOTE
· 5/2021 CABGx5 (LIMA to LAD, SVG to D1, SVG -Y- to ramus intermedius and OM1, SVG to R PDA) and tissue AVR 23 mm [employer] Ease  · Maintained on Coreg/lisinopril/hctz

## 2022-12-26 NOTE — ASSESSMENT & PLAN NOTE
· Intubated for inability to protect airway  · AC/VC fio2 40/peep 6  · SBT daily  · Basilar opacities seen on CT - no clinical evidence of infection so will hold off on antibiotics

## 2022-12-26 NOTE — H&P
[address]  H&P- Yvette Shelton 1945, 69 y o  male MRN: [id number]  Unit/Bed#: ICU 09 Encounter: [id number]  Primary Care Provider: Kaylan Restrepo MD   Date and time admitted to hospital: 12/25/2022  8:33 PM    Cardiac arrest [facility])  Assessment & Plan  · Witnessed arrest at home  Initial responders had shockable rhythm believed to be V Fib  Shocked x 2 with ROSC and given lidocaine 100mg x 2  · No STEMI on EKG  Discussed with cardiology - plan for TTM  Heparin infusion considered but patient received Eliquis today so will hold for tonight and re-eval tomorrow     · Repeat echo  · Trend troponins  · Had recent syncopal episodes worked up by neurology - ?arrythmia     Coronary artery disease involving native coronary artery of native heart  Assessment & Plan  · 5/2021 CABGx5 (LIMA to LAD, SVG to D1, SVG -Y- to ramus intermedius and OM1, SVG to R PDA) and tissue AVR 23 mm Dickerson Magna Ease  · Maintained on Coreg/lisinopril/hctz    Acute respiratory failure with hypoxia (HCC)  Assessment & Plan  · Intubated for inability to protect airway  · AC/VC fio2 40/peep 6  · SBT daily  · Basilar opacities seen on CT - no clinical evidence of infection so will hold off on antibiotics    Chronic systolic congestive heart failure (HCC)  Assessment & Plan  Wt Readings from Last 3 Encounters:   12/05/22 73 5 kg (162 lb)   11/29/22 73 5 kg (162 lb)   11/07/22 72 6 kg (160 lb)     · Most recent EF now improved 50% 7/2022  · Repeat echo        Hyperlipidemia  Assessment & Plan  · Continue statin    Elevated troponin  Assessment & Plan  · Likely related to cardiac arrest/compressions/shock   · Trend troponins  · Consider heparin infusion tomorrow  · Repeat EKG in AM    Stage 3a chronic kidney disease [facility])  Assessment & Plan  Lab Results   Component Value Date    EGFR 49 12/25/2022    EGFR 55 11/04/2022    EGFR 57 07/30/2022    CREATININE 1 37 (H) 12/25/2022    CREATININE 1 25 11/04/2022    CREATININE 1 21 07/30/2022     · Cr around 1 2  · Gentle fluids overnight given contrast load    Benign essential hypertension  Assessment & Plan  · Hold antihypertensive medications for now as required pressors briefly    Lactic acidosis  Assessment & Plan  · Secondary to cardiac arrest and not sepsis  · Trend in AM    Controlled diabetes mellitus with diabetic neuropathy [facility])  Assessment & Plan  Lab Results   Component Value Date    HGBA1C 6 7 (A) 11/04/2022       Recent Labs     12/25/22 2031   POCGLU 153*       Blood Sugar Average: Last 72 hrs:  (P) 153     SSI    PAF (paroxysmal atrial fibrillation) ([facility])  Assessment & Plan  · Currently in NSR  · Holding eliquis    S/P AVR  Assessment & Plan  · 5/2021 tissue AVR 23 mm Yasemin Cambric Ease    Internal carotid artery occlusion, right  Assessment & Plan  · Chronic, no intervention    -------------------------------------------------------------------------------------------------------------  Chief Complaint: cardiac arrest    History of Present Illness   HX and PE limited by: intubated  Anayeli Pollack is a 69 y o  male with PMHx of CAD and CABGx5 in 2021, Aortic stenosis s/p bioprosthetic AVR, HTN, HLD, DM2, hx of previous CVA who presents with cardiac arrest  Was at home and walked to bathroom and back and sat down in living room  Family heard him making a strange sound and when they checked on him, he was unresponsive  Fire crew arrived first and had shockable rhythm believed to be VFib  Shock x 2, ROSC obtained  Lidocaine [id number] given by ALS and brought to [facility] ER where patient was intubated  EKG without STEMI  Labs and imaging largely unremarkable  Will be admitted to ICU for further monitoring  History obtained from chart review  -------------------------------------------------------------------------------------------------------------  Dispo:  Admit to Critical Care     Code Status: Level 1 - Full Code  --------------------------------------------------------------------------------------------------------------  Review of Systems    Review of systems was unable to be performed secondary to intubated    Physical Exam  Vitals and nursing note reviewed  Constitutional:       General: He is not in acute distress  Appearance: He is not ill-appearing  HENT:      Head: Normocephalic and atraumatic  Mouth/Throat:      Mouth: Mucous membranes are moist    Eyes:      Pupils: Pupils are equal, round, and reactive to light  Cardiovascular:      Rate and Rhythm: Normal rate  Pulses: Normal pulses  Heart sounds: No murmur heard  Pulmonary:      Effort: Pulmonary effort is normal  No respiratory distress  Abdominal:      General: Abdomen is flat  Bowel sounds are normal  There is no distension  Musculoskeletal:         General: No swelling  Skin:     General: Skin is warm  Neurological:      Comments: Did not consistently follow commands although did appear to attempt to squeeze hands and wiggles toes  Moving all extremities       --------------------------------------------------------------------------------------------------------------  Vitals:   Vitals:    12/25/22 2055 12/25/22 2110 12/25/22 2305 12/25/22 2315   BP:  (!) 252/122 166/98 165/98   BP Location:   Left arm    Pulse:   74 71   Resp:   22 (!) 26   Temp: 98 5 °F (36 9 °C)      TempSrc: Rectal      SpO2:   98% 98%     Temp  Min: 98 5 °F (36 9 °C)  Max: 98 5 °F (36 9 °C)        There is no height or weight on file to calculate BMI      Laboratory and Diagnostics:  Results from last 7 days   Lab Units 12/25/22 2035   WBC Thousand/uL 12 96*   HEMOGLOBIN g/dL 14 0   HEMATOCRIT % 43 0   PLATELETS Thousands/uL 183   NEUTROS PCT % 42*   MONOS PCT % 4     Results from last 7 days   Lab Units 12/25/22 2035   SODIUM mmol/L 138   POTASSIUM mmol/L 5 2   CHLORIDE mmol/L 101   CO2 mmol/L 21   ANION GAP mmol/L 16*   BUN mg/dL 22 CREATININE mg/dL 1 37*   CALCIUM mg/dL 8 4   GLUCOSE RANDOM mg/dL 205*   ALT U/L 149*   AST U/L 344*   ALK PHOS U/L 100   ALBUMIN g/dL 4 0   TOTAL BILIRUBIN mg/dL 0 36     Results from last 7 days   Lab Units 12/25/22 2035   MAGNESIUM mg/dL 1 5*               Results from last 7 days   Lab Units 12/25/22 2236 12/25/22 2035   LACTIC ACID mmol/L 3 9* 8 2*     ABG:  Results from last 7 days   Lab Units 12/25/22 2053   PH ART  7 294*   PCO2 ART mm Hg 37 2   PO2 ART mm Hg 408 9*   HCO3 ART mmol/L 17 6*   BASE EXC ART mmol/L -8 1   ABG SOURCE  Artery     VBG:  Results from last 7 days   Lab Units 12/25/22 2053   ABG SOURCE  Artery     Results from last 7 days   Lab Units 12/25/22 2236   PROCALCITONIN ng/ml 0 07       Micro:        EKG: NSR  Imaging: I have personally reviewed pertinent reports  and I have personally reviewed pertinent films in PACS      Historical Information   Past Medical History:   Diagnosis Date   • Arthritis     generalized   • Asthma     seasonal-does not use inhaler   • Atrial fibrillation with RVR ([address] ) 5/12/2021   • Back pain    • Basal cell carcinoma 09/23/2021    BCC- Right Posterior Calf    • Carotid artery stenosis    • Diabetes mellitus (HCC)     type   • GERD (gastroesophageal reflux disease)    • Hiatal hernia    • Hyperlipidemia    • Hypertension    • Peyronie's disease     last assessed [id number]   • Seasonal allergies    • Wears glasses      Past Surgical History:   Procedure Laterality Date   • APPENDECTOMY     • CAROTID ENDARTERECTOMY Left 10/29/2020   • COLONOSCOPY N/A 1/22/2016    Procedure: COLONOSCOPY;  Surgeon: Kayley Owen MD;  Location: [facility] GI LAB;   Service:    • KNEE ARTHROSCOPY Left    • KNEE ARTHROSCOPY W/ MENISCAL REPAIR Right    • MOHS SURGERY Right 11/01/2021    BCC- Right Posterior Calf- Dr Shasta Wong    • MS RPLCMT AORTIC VALVE OPN W/STENTLESS TISSUE VALVE N/A 5/10/2021    Procedure: CORONARY ARTERY BYPASS GRAFT (CABG) X 5 USING LEFT GSV--> DIAGONAL,, RAMUS, PDA ,AND OM1, AND LEFT BALBIR-LAD WITH REPLACEMENT VALVE AORTIC USING 23 MM MURRAY MAGNA EASE(AVR);   Surgeon: Abdiel Falcon MD;  Location: [facility];  Service: Cardiac Surgery   • VA TEAEC W/PATCH GRF CAROTID VERTB SUBCLAV NECK INC Left 10/29/2020    Procedure: CAROTID ENDARTERECTOMY W/BOVINE PATCH ANGIOPLASTY AND COMPLETION DUPLEX IMAGING;  Surgeon: Jenny Hughes DO;  Location: [facility];  Service: Vascular     Social History   Social History     Substance and Sexual Activity   Alcohol Use Yes   • Alcohol/week: 10 0 standard drinks   • Types: 7 Glasses of wine, 3 Standard drinks or equivalent per week     Social History     Substance and Sexual Activity   Drug Use No     Social History     Tobacco Use   Smoking Status Never   Smokeless Tobacco Never     Exercise History:   Family History:   Family History   Problem Relation Age of Onset   • Heart attack Mother [de-identified]        CABG   • Kidney failure Father    • Diabetes Father         pre-diabetic     I have reviewed this patient's family history and commented on sigificant items within the HPI      Medications:  Current Facility-Administered Medications   Medication Dose Route Frequency   • atorvastatin (LIPITOR) tablet 80 mg  80 mg Oral Daily With Dinner   • chlorhexidine (PERIDEX) 0 12 % oral rinse 15 mL  15 mL Mouth/Throat Q12H [facility] & long-term   • heparin (porcine) subcutaneous injection 5,000 Units  5,000 Units Subcutaneous Q8H [facility] & long-term   • insulin lispro (HumaLOG) 100 units/mL subcutaneous injection 1-6 Units  1-6 Units Subcutaneous Q6H [facility]   • multi-electrolyte (PLASMALYTE-A/ISOLYTE-S PH 7 4) IV solution  50 mL/hr Intravenous Continuous   • norepinephrine (LEVOPHED) 4 mg (STANDARD CONCENTRATION) IV in sodium chloride 0 9% 250 mL  1-30 mcg/min Intravenous Titrated   • omeprazole (PRILOSEC) suspension 2 mg/mL  20 mg Oral Daily   • propofol (DIPRIVAN) 1000 mg in 100 mL infusion (premix)  5-50 mcg/kg/min Intravenous Titrated     Home medications:  Prior to Admission Medications Prescriptions Last Dose Informant Patient Reported? Taking? Blood Glucose Monitoring Suppl (FreeStyle Insu[employer] System) w/Device KIT   No No   Sig: Test TID & QHS as directed  FreeStyle Insu[employer]x Test test strip   No No   Sig: Test TID & QHS as directed  Lancets (freestyle) lancets   No No   Sig: Test TID & QHS as directed  acetaminophen (TYLENOL) 325 mg tablet   No No   Sig: Take 1-2 tablets Q4-6 hours PRN pain  Do not take more than 4 grams in 24 hours  apixaban (Eliquis) 5 mg   No No   Sig: Take 1 tablet (5 mg total) by mouth 2 (two) times a day   atorvastatin (LIPITOR) 80 mg tablet   No No   Sig: Take 1 tablet (80 mg total) by mouth daily with dinner   carvedilol (COREG) 3 125 mg tablet   No No   Sig: Take 1 tablet (3 125 mg total) by mouth 2 (two) times a day with meals   lisinopril-hydrochlorothiazide (PRINZIDE,ZESTORETIC) 20-25 MG per tablet   No No   Sig: Take 1 tablet by mouth daily   metFORMIN (GLUCOPHAGE) 1000 MG tablet   No No   Sig: Take 1 tablet (1,000 mg total) by mouth 2 (two) times a day with meals      Facility-Administered Medications: None     Allergies:  No Known Allergies    ------------------------------------------------------------------------------------------------------------  Advance Directive and Living Will:      Power of :    POLST:    ------------------------------------------------------------------------------------------------------------  Anticipated Length of Stay is > 2 midnights    Care Time Delivered:   Upon my evaluation, this patient had a high probability of imminent or life-threatening deterioration due to cardiac arrest, hypoxic respiratory failure, which required my direct attention, intervention, and personal management  I have personally provided 37 minutes ([phone]) of critical care time, exclusive of procedures, teaching, family meetings, and any prior time recorded by providers other than myself         Ty Kennedy PA-C        Portions of the record may have been created with voice recognition software  Occasional wrong word or "sound a like" substitutions may have occurred due to the inherent limitations of voice recognition software    Read the chart carefully and recognize, using context, where substitutions have occurred

## 2022-12-26 NOTE — ED PROVIDER NOTES
History  Chief Complaint   Patient presents with   • Cardiac Arrest     Pt arrives from home post cardiac arrest with medics  Pt family reported hearing a loud noise, called 911 when patient was found  Prior to [employer] arrival fire department gave 2 shocks, ROSC before medics arrived  Pt administered 2 doses of lidocaine in transit if 100mg each, some spontaneous breathing at this time, but unable to maintain airway  BVM assisted respirations on arrival        81-year-old male presents after cardiac arrest with his return of spontaneous circulation  He was at his home upstairs but the family heard a thud from downstairs  By the time they arrived  Collapsed CPR started he had to shocks delivered by first responders presumably for V  fib  He was also given 100 mg of IV lidocaine 2 doses  Patient is currently being bagged not maintaining his airway not responsive  History of CABG noted  History provided by:  Patient   used: No        Prior to Admission Medications   Prescriptions Last Dose Informant Patient Reported? Taking? Blood Glucose Monitoring Suppl (FreeStyle [employer] System) w/Device KIT   No No   Sig: Test TID & QHS as directed  FreeStyle [employer] Test test strip   No No   Sig: Test TID & QHS as directed  Lancets (freestyle) lancets   No No   Sig: Test TID & QHS as directed  acetaminophen (TYLENOL) 325 mg tablet Unknown  No No   Sig: Take 1-2 tablets Q4-6 hours PRN pain  Do not take more than 4 grams in 24 hours     apixaban (Eliquis) 5 mg Unknown  No No   Sig: Take 1 tablet (5 mg total) by mouth 2 (two) times a day   atorvastatin (LIPITOR) 80 mg tablet Unknown  No No   Sig: Take 1 tablet (80 mg total) by mouth daily with dinner   carvedilol (COREG) 3 125 mg tablet Unknown  No No   Sig: Take 1 tablet (3 125 mg total) by mouth 2 (two) times a day with meals   lisinopril-hydrochlorothiazide (PRINZIDE,ZESTORETIC) 20-25 MG per tablet Unknown  No No   Sig: Take 1 tablet by mouth daily   metFORMIN (GLUCOPHAGE) 1000 MG tablet Unknown  No No   Sig: Take 1 tablet (1,000 mg total) by mouth 2 (two) times a day with meals      Facility-Administered Medications: None       Past Medical History:   Diagnosis Date   • Arthritis     generalized   • Asthma     seasonal-does not use inhaler   • Atrial fibrillation with RVR ([address] ) 5/12/2021   • Back pain    • Basal cell carcinoma 09/23/2021    BCC- Right Posterior Calf    • Carotid artery stenosis    • Diabetes mellitus (HCC)     type   • GERD (gastroesophageal reflux disease)    • Hiatal hernia    • Hyperlipidemia    • Hypertension    • Peyronie's disease     last assessed [id number]   • Seasonal allergies    • Wears glasses        Past Surgical History:   Procedure Laterality Date   • APPENDECTOMY     • CAROTID ENDARTERECTOMY Left 10/29/2020   • COLONOSCOPY N/A 1/22/2016    Procedure: COLONOSCOPY;  Surgeon: Alicia Agosto MD;  Location: [facility] GI LAB; Service:    • KNEE ARTHROSCOPY Left    • KNEE ARTHROSCOPY W/ MENISCAL REPAIR Right    • MOHS SURGERY Right 11/01/2021    BCC- Right Posterior Calf- Dr Neftali Gutierrez    • VT RPLCMT AORTIC VALVE OPN W/STENTLESS TISSUE VALVE N/A 5/10/2021    Procedure: CORONARY ARTERY BYPASS GRAFT (CABG) X 5 USING LEFT GSV--> DIAGONAL,, RAMUS, PDA ,AND OM1, AND LEFT BALBIR-LAD WITH REPLACEMENT VALVE AORTIC USING 23 MM MURRAY MAGNA EASE(AVR); Surgeon: Yahir Navarro MD;  Location:  MAIN OR;  Service: Cardiac Surgery   • VT TEAEC W/PATCH GRF CAROTID VERTB SUBCLAV NECK INC Left 10/29/2020    Procedure: CAROTID ENDARTERECTOMY W/BOVINE PATCH ANGIOPLASTY AND COMPLETION DUPLEX IMAGING;  Surgeon: Nannette Caruso DO;  Location: [facility] OR;  Service: Vascular       Family History   Problem Relation Age of Onset   • Heart attack Mother [de-identified]        CABG   • Kidney failure Father    • Diabetes Father         pre-diabetic     I have reviewed and agree with the history as documented      E-Cigarette/Vaping   • E-Cigarette Use Never User E-Cigarette/Vaping Substances   • Nicotine No    • THC No    • CBD No    • Flavoring No    • Other No    • Unknown No      Social History     Tobacco Use   • Smoking status: Never   • Smokeless tobacco: Never   Vaping Use   • Vaping Use: Never used   Substance Use Topics   • Alcohol use: Yes     Alcohol/week: 10 0 standard drinks     Types: 7 Glasses of wine, 3 Standard drinks or equivalent per week   • Drug use: No       Review of Systems   Unable to perform ROS: Patient unresponsive   All other systems reviewed and are negative  Physical Exam  Physical Exam  Vitals and nursing note reviewed  Constitutional:       Comments: Patient is not alert or awake he is unresponsive  HENT:      Head: Normocephalic and atraumatic  Nose: Nose normal       Mouth/Throat:      Mouth: Mucous membranes are moist    Eyes:      Extraocular Movements: Extraocular movements intact  Pupils: Pupils are equal, round, and reactive to light  Cardiovascular:      Rate and Rhythm: Normal rate and regular rhythm  Pulmonary:      Effort: Pulmonary effort is normal       Breath sounds: Normal breath sounds  Abdominal:      General: Abdomen is flat  Bowel sounds are normal       Palpations: Abdomen is soft  Musculoskeletal:         General: Normal range of motion  Cervical back: Normal range of motion and neck supple  Skin:     General: Skin is warm  Capillary Refill: Capillary refill takes less than 2 seconds  Neurological:      Comments: GCS of 4 patient not protecting his airway not responsive           Vital Signs  ED Triage Vitals   Temperature Pulse Respirations Blood Pressure SpO2   12/25/22 2055 12/25/22 2034 12/25/22 2034 12/25/22 2034 12/25/22 2030   98 5 °F (36 9 °C) 77 12 (!) 218/113 100 %      Temp Source Heart Rate Source Patient Position - Orthostatic VS BP Location FiO2 (%)   12/25/22 2055 12/25/22 2034 12/25/22 2034 12/25/22 2034 12/25/22 2320   Rectal Monitor Lying Left arm 45      Pain Score       12/26/22 0821       Med Not Given for Pain - for MAR use only           Vitals:    12/27/22 0825 12/27/22 0845 12/27/22 0900 12/27/22 1400   BP: (!) 88/54 150/88 161/95 154/66   Pulse: 58 (!) 113 (!) 132 84   Patient Position - Orthostatic VS:             Visual Acuity  Visual Acuity    Flowsheet Row Most Recent Value   L Pupil Size (mm) 2   R Pupil Size (mm) 2   L Pupil Shape Round   R Pupil Shape Round          ED Medications  Medications   heparin (porcine) 25,000 units in 0 45% NaCl 250 mL infusion (premix) (10 Units/kg/hr × 70 kg (Order-Specific) Intravenous New Bag 12/27/22 1617)   heparin (porcine) injection 4,000 Units (has no administration in time range)   heparin (porcine) injection 2,000 Units (2,000 Units Intravenous Given 12/27/22 0958)   acetaminophen (TYLENOL) tablet 975 mg (975 mg Oral Given 12/27/22 1357)   sodium chloride 3 % inhalation solution 4 mL (4 mL Nebulization Given 12/27/22 1318)   levalbuterol (XOPENEX) inhalation solution 0 63 mg (0 63 mg Nebulization Given 12/27/22 1318)   ampicillin-sulbactam (UNASYN) 3 g in sodium chloride 0 9 % 100 mL IVPB (0 g Intravenous Stopped 12/27/22 1425)   aspirin tablet 325 mg (325 mg Per NG Tube Given 12/27/22 0826)   amiodarone 150 mg/3 mL injection **ADS Override Pull** (  Not Given 12/27/22 0107)   insulin lispro (HumaLOG) 100 units/mL subcutaneous injection 1-6 Units (3 Units Subcutaneous Given 12/27/22 1137)   potassium chloride 20 mEq IVPB (premix) (20 mEq Intravenous New Bag 12/27/22 1530)     Followed by   potassium chloride 20 mEq IVPB (premix) (20 mEq Intravenous New Bag 12/27/22 1244)   sodium chloride 0 9 % bolus 1,000 mL (0 mL Intravenous Stopped 12/25/22 2221)   etomidate (AMIDATE) 2 mg/mL injection 20 mg (20 mg Intravenous Given 12/25/22 2020)   Succinylcholine Chloride 100 mg/5 mL syringe 125 mg (125 mg Intravenous Given 12/25/22 2020)   labetalol (NORMODYNE) injection 20 mg (20 mg Intravenous Given 12/25/22 2115)   magnesium sulfate 2 g/50 mL IVPB (premix) 2 g (0 g Intravenous Stopped 12/25/22 2338)   iohexol (OMNIPAQUE) 300 mg/mL injection 100 mL (80 mL Intravenous Given 12/25/22 2140)   iohexol (OMNIPAQUE) 350 MG/ML injection (SINGLE-DOSE) 100 mL (80 mL Intravenous Given 12/25/22 2142)   cefepime (MAXIPIME) IVPB (premix in dextrose) 2,000 mg 50 mL (0 mg Intravenous Stopped 12/25/22 2338)   midazolam (VERSED) injection 4 mg (4 mg Intravenous Given 12/25/22 2354)   calcium gluconate 1 g in sodium chloride 0 9% 50 mL (premix) (1 g Intravenous New Bag 12/26/22 0640)   busPIRone (BUSPAR) tablet 30 mg (30 mg Oral Given 12/27/22 0708)   magnesium sulfate 2 g/50 mL IVPB (premix) 2 g (0 g Intravenous Stopped 12/26/22 0944)   multi-electrolyte (ISOLYTE-S PH 7 4) bolus 500 mL (0 mL Intravenous Stopped 12/26/22 1001)   multi-electrolyte (ISOLYTE-S PH 7 4) bolus 500 mL (0 mL Intravenous Stopped 12/26/22 1430)   potassium chloride oral solution 20 mEq (20 mEq Oral Given 12/26/22 1622)   albumin human (FLEXBUMIN) 5 % injection 12 5 g (0 g Intravenous Stopped 12/26/22 2002)   amiodarone 150 mg in dextrose 5 % 100 mL IV bolus (0 mg Intravenous Stopped 12/27/22 0111)   magnesium sulfate 2 g/50 mL IVPB (premix) 2 g (0 g Intravenous Stopped 12/27/22 0259)   albumin human (FLEXBUMIN) 5 % injection 12 5 g (0 g Intravenous Stopped 12/27/22 0747)   metoprolol (LOPRESSOR) injection 5 mg (5 mg Intravenous Given 12/27/22 0936)   metoprolol (LOPRESSOR) injection 5 mg (5 mg Intravenous Given 12/27/22 1244)       Diagnostic Studies  Results Reviewed     Procedure Component Value Units Date/Time    Blood culture #1 [[id number]] Collected: 12/25/22 2310    Lab Status: Preliminary result Specimen: Blood from Arm, Left Updated: 12/27/22 1401     Blood Culture No Growth at 24 hrs      Blood culture #2 [[id number]] Collected: 12/25/22 2310    Lab Status: Preliminary result Specimen: Blood from Central Venous Line Updated: 12/27/22 1401     Blood Culture No Growth at 24 hrs     HS Troponin I 4hr [[id number]]  (Abnormal) Collected: 12/26/22 0105    Lab Status: Final result Specimen: Blood from Central Venous Line Updated: 12/26/22 0132     hs TnI 4hr 3,917 ng/L      Delta 4hr hsTnI 3,821 ng/L     Procalcitonin [[id number]]  (Normal) Collected: 12/25/22 2236    Lab Status: Final result Specimen: Blood from Arm, Left Updated: 12/25/22 2309     Procalcitonin 0 07 ng/ml     Lactic acid 2 Hours [[id number]]  (Abnormal) Collected: 12/25/22 2236    Lab Status: Final result Specimen: Blood from Arm, Left Updated: 12/25/22 2308     LACTIC ACID 3 9 mmol/L     Narrative:      Result may be elevated if tourniquet was used during collection  HS Troponin I 2hr [[id number]]  (Abnormal) Collected: 12/25/22 2236    Lab Status: Final result Specimen: Blood from Arm, Left Updated: 12/25/22 2306     hs TnI 2hr 1,132 ng/L      Delta 2hr hsTnI 1,036 ng/L     FLU/RSV/COVID - if FLU/RSV clinically relevant [[id number]]  (Normal) Collected: 12/25/22 2048    Lab Status: Final result Specimen: Nares from Nose Updated: 12/25/22 2133     SARS-CoV-2 Negative     INFLUENZA A PCR Negative     INFLUENZA B PCR Negative     RSV PCR Negative    Narrative:      FOR PEDIATRIC PATIENTS - copy/paste COVID Guidelines URL to browser: [email or web address]  [email or web address]    SARS-CoV-2 assay is a Nucleic Acid Amplification assay intended for the  qualitative detection of nucleic acid from SARS-CoV-2 in nasopharyngeal  swabs  Results are for the presumptive identification of SARS-CoV-2 RNA  Positive results are indicative of infection with SARS-CoV-2, the virus  causing COVID-19, but do not rule out bacterial infection or co-infection  with other viruses  Laboratories within the [place] and its  territories are required to report all positive results to the appropriate  public health authorities   Negative results do not preclude SARS-CoV-2  infection and should not be used as the sole basis for treatment or other  patient management decisions  Negative results must be combined with  clinical observations, patient history, and epidemiological information  This test has not been FDA cleared or approved  This test has been authorized by FDA under an Emergency Use Authorization  (EUA)  This test is only authorized for the duration of time the  declaration that circumstances exist justifying the authorization of the  emergency use of an in vitro diagnostic tests for detection of SARS-CoV-2  virus and/or diagnosis of COVID-19 infection under section 564(b)(1) of  the Act, 21 U  S C  737KZR-5(R)(8), unless the authorization is terminated  or revoked sooner  The test has been validated but independent review by FDA  and CLIA is pending  Test performed using [employer] GeneXpert: This RT-PCR assay targets N2,  a region unique to SARS-CoV-2  A conserved region in the E-gene was chosen  for pan-Sarbecovirus detection which includes SARS-CoV-2  According to CMS-2020-01-R, this platform meets the definition of high-throughput technology  Lactic acid [[id number]]  (Abnormal) Collected: 12/25/22 2035    Lab Status: Final result Specimen: Blood from Arm, Left Updated: 12/25/22 2128     LACTIC ACID 8 2 mmol/L     Narrative:      Result may be elevated if tourniquet was used during collection  Rapid drug screen, urine [[id number]]  (Normal) Collected: 12/25/22 2048    Lab Status: Final result Specimen: Urine, Catheter Updated: 12/25/22 2109     Amph/Meth UR Negative     Barbiturate Ur Negative     Benzodiazepine Urine Negative     Cocaine Urine Negative     Methadone Urine Negative     Opiate Urine Negative     PCP Ur Negative     THC Urine Negative     Oxycodone Urine Negative    Narrative:      FOR MEDICAL PURPOSES ONLY  IF CONFIRMATION NEEDED PLEASE CONTACT THE LAB WITHIN 5 DAYS      Drug Screen Cutoff Levels:  AMPHETAMINE/METHAMPHETAMINES  1000 ng/mL  BARBITURATES     200 ng/mL  BENZODIAZEPINES     200 ng/mL  COCAINE      300 ng/mL  METHADONE      300 ng/mL  OPIATES      300 ng/mL  PHENCYCLIDINE     25 ng/mL  THC       50 ng/mL  OXYCODONE      100 ng/mL    Magnesium [[id number]]  (Abnormal) Collected: 12/25/22 2035    Lab Status: Final result Specimen: Blood from Arm, Left Updated: 12/25/22 2108     Magnesium 1 5 mg/dL     NT-BNP PRO [[id number]]  (Abnormal) Collected: 12/25/22 2035    Lab Status: Final result Specimen: Blood from Arm, Left Updated: 12/25/22 2108     NT-proBNP 1,032 pg/mL     Lipase [[id number]]  (Normal) Collected: 12/25/22 2035    Lab Status: Final result Specimen: Blood from Arm, Left Updated: 12/25/22 2108     Lipase 197 u/L     TSH [[id number]]  (Abnormal) Collected: 12/25/22 2035    Lab Status: Final result Specimen: Blood from Arm, Left Updated: 12/25/22 2108     TSH 3RD GENERATON 4 543 uIU/mL     Narrative:      Patients undergoing fluorescein dye angiography may retain small amounts of fluorescein in the body for 48-72 hours post procedure  Samples containing fluorescein can produce falsely depressed TSH values  If the patient had this procedure,a specimen should be resubmitted post fluorescein clearance        HS Troponin 0hr (reflex protocol) [[id number]]  (Abnormal) Collected: 12/25/22 2035    Lab Status: Final result Specimen: Blood from Arm, Left Updated: 12/25/22 2108     hs TnI 0hr 96 ng/L     Urine Microscopic [[id number]]  (Abnormal) Collected: 12/25/22 2048    Lab Status: Final result Specimen: Urine, Indwelling Pulliam Catheter Updated: 12/25/22 2106     RBC, UA 0-1 /hpf      WBC, UA 4-10 /hpf      Epithelial Cells Occasional /hpf      Bacteria, UA Occasional /hpf      OTHER OBSERVATIONS Sperm Present    Comprehensive metabolic panel [[id number]]  (Abnormal) Collected: 12/25/22 2035    Lab Status: Final result Specimen: Blood from Arm, Left Updated: 12/25/22 2101     Sodium 138 mmol/L      Potassium 5 2 mmol/L      Chloride 101 mmol/L      CO2 21 mmol/L ANION GAP 16 mmol/L      BUN 22 mg/dL      Creatinine 1 37 mg/dL      Glucose 205 mg/dL      Calcium 8 4 mg/dL       U/L       U/L      Alkaline Phosphatase 100 U/L      Total Protein 7 1 g/dL      Albumin 4 0 g/dL      Total Bilirubin 0 36 mg/dL      eGFR 49 ml/min/1 73sq m     Narrative:      [place] guidelines for Chronic Kidney Disease (CKD):   •  Stage 1 with normal or high GFR (GFR > 90 mL/min/1 73 square meters)  •  Stage 2 Mild CKD (GFR = 60-89 mL/min/1 73 square meters)  •  Stage 3A Moderate CKD (GFR = 45-59 mL/min/1 73 square meters)  •  Stage 3B Moderate CKD (GFR = 30-44 mL/min/1 73 square meters)  •  Stage 4 Severe CKD (GFR = 15-29 mL/min/1 73 square meters)  •  Stage 5 End Stage CKD (GFR <15 mL/min/1 73 square meters)  Note: GFR calculation is accurate only with a steady state creatinine    Blood gas, arterial [[id number]]  (Abnormal) Collected: 12/25/22 2053    Lab Status: Final result Specimen: Blood, Arterial from Artery Updated: 12/25/22 2059     pH, Arterial 7 294     PH ART TC 7 295     pCO2, Arterial 37 2 mm Hg      PCO2 (TC) Arterial 37 0 mm Hg      pO2, Arterial 408 9 mm Hg      PO2 (TC) Arterial 408 3 mm Hg      HCO3, Arterial 17 6 mmol/L      Base Excess, Arterial -8 1 mmol/L      O2 Content, Arterial 20 2 mL/dL      O2 HGB,Arterial  99 3 %      SOURCE Artery     CRISTINA TEST Yes     Temperature 98 5 Degrees Fehrenheit     Ethanol [[id number]]  (Abnormal) Collected: 12/25/22 2035    Lab Status: Final result Specimen: Blood from Arm, Left Updated: 12/25/22 2056     Ethanol Lvl 7 mg/dL     UA (URINE) with reflex to Scope [[id number]]  (Abnormal) Collected: 12/25/22 2048    Lab Status: Final result Specimen: Urine, Indwelling Pulliam Catheter Updated: 12/25/22 2056     Color, UA Light Yellow     Clarity, UA Slightly Cloudy     Specific Gravity, UA >=1 030     pH, UA 6 0     Leukocytes, UA Negative     Nitrite, UA Negative     Protein,  (2+) mg/dl Glucose,  (1/4%) mg/dl      Ketones, UA 15 (1+) mg/dl      Urobilinogen, UA 0 2 E U /dl      Bilirubin, UA Negative     Occult Blood, UA Moderate    CBC and differential [[id number]]  (Abnormal) Collected: 12/25/22 2035    Lab Status: Final result Specimen: Blood from Arm, Left Updated: 12/25/22 2043     WBC 12 96 Thousand/uL      RBC 4 56 Million/uL      Hemoglobin 14 0 g/dL      Hematocrit 43 0 %      MCV 94 fL      MCH 30 7 pg      MCHC 32 6 g/dL      RDW 12 6 %      MPV 10 2 fL      Platelets 003 Thousands/uL      nRBC 0 /100 WBCs      Neutrophils Relative 42 %      Immat GRANS % 2 %      Lymphocytes Relative 48 %      Monocytes Relative 4 %      Eosinophils Relative 3 %      Basophils Relative 1 %      Neutrophils Absolute 5 43 Thousands/µL      Immature Grans Absolute 0 22 Thousand/uL      Lymphocytes Absolute 6 27 Thousands/µL      Monocytes Absolute 0 56 Thousand/µL      Eosinophils Absolute 0 39 Thousand/µL      Basophils Absolute 0 09 Thousands/µL     Fingerstick Glucose (POCT) [[id number]]  (Abnormal) Collected: 12/25/22 2031    Lab Status: Final result Updated: 12/25/22 2032     POC Glucose 153 mg/dl                  XR chest portable ICU   Final Result by Chaya Carolina MD (12/26 1111)      Development of flash pulmonary edema  Temperature probe coiled in the cervical esophagus  Workstation performed: [id number]         CTA head and neck with and without contrast   Final Result by Negro Bautista DO (12/25 2312)      No acute intracranial abnormality is seen  Other findings as above  CTA NECK AND BRAIN WITH CONTRAST      INDICATION: Dizziness, persistent/recurrent, cardiac or vascular cause suspected   cardiac arrest   history of chronic right ICA occlusion      COMPARISON:   CTA head and neck dated 9/18/2020        TECHNIQUE:  Routine CT imaging of the Brain without contrast   Post contrast imaging was performed after administration of iodinated contrast through the neck and brain  Post contrast axial 0 625 mm images timed to opacify the arterial system  3D rendering was performed on an independent workstation  MIP reconstructions performed  Coronal reconstructions were performed of the noncontrast portion of the brain  Radiation dose length product (DLP) for this visit:  1099 74 mGy-cm  (accession [id number]), 1549 83 mGy-cm  (accession [id number]), 613 mGy-cm  (accession [id number])  This examination, like all CT scans performed in the [facility], was    performed utilizing techniques to minimize radiation dose exposure, including the use of iterative reconstruction and automated exposure control  IV Contrast:  Was administered       IMAGE QUALITY:   Diagnostic      FINDINGS:      CERVICAL VASCULATURE   AORTIC ARCH AND GREAT VESSELS:  Mild atherosclerotic disease of the arch, proximal great vessels and visualized subclavian vessels  No significant stenosis  RIGHT VERTEBRAL ARTERY CERVICAL SEGMENT:  Mild stenosis at the origin  The vessel is normal in caliber throughout the neck  LEFT VERTEBRAL ARTERY CERVICAL SEGMENT:  Normal origin  The vessel is small in caliber throughout the neck, but patent  RIGHT EXTRACRANIAL CAROTID SEGMENT:  Moderate atherosclerotic disease of the bifurcation  There is redemonstration of occlusion of the right internal carotid artery at the bifurcation      LEFT EXTRACRANIAL CAROTID SEGMENT:  Mild atherosclerotic disease of the distal common carotid artery and proximal cervical internal carotid artery without significant stenosis compared to the more distal ICA  Suspect prior carotid endarterectomy      NASCET criteria was used to determine the degree of internal carotid artery diameter stenosis  INTRACRANIAL VASCULATURE    INTERNAL CAROTID ARTERIES:  The right internal carotid artery is occluded to the level of the bifurcation where there is reconstitution  Mild to moderate atherosclerosis but otherwise normal enhancement of the intracranial portions of the left internal    carotid artery  Normal ophthalmic artery origins  ANTERIOR CIRCULATION:  Hypoplastic right A1 segment  A1 segments and anterior cerebral arteries otherwise with normal enhancement  Normal anterior communicating artery  MIDDLE CEREBRAL ARTERY CIRCULATION:  M1 segment and middle cerebral artery branches demonstrate normal enhancement bilaterally  DISTAL VERTEBRAL ARTERIES:  Mild atherosclerosis  Right dominant vertebrobasilar system, patent distal vertebral arteries  Posterior inferior cerebellar artery origins are normal  Normal vertebral basilar junction  BASILAR ARTERY:  Basilar artery is normal in caliber  Normal superior cerebellar arteries  POSTERIOR CEREBRAL ARTERIES: Both posterior cerebral arteries arises from the basilar tip  Both arteries demonstrate normal enhancement  Normal posterior communicating arteries  VENOUS STRUCTURES:  Patent         NON VASCULAR ANATOMY   BONY STRUCTURES:  No acute osseous abnormality  SOFT TISSUES OF THE NECK:  Better evaluated on CT cervical spine obtained concurrently      THORACIC INLET:  Please refer to the concurrent chest, abdomen, and pelvic CT report for description of the thoracic inlet findings  IMPRESSION:      No acute intracranial process is seen  Chronic occlusion of the right internal carotid artery is redemonstrated with distal reconstitution, as described  The vessels of the [place] are patent  No significant stenosis in the left internal carotid artery  Right dominant vertebrobasilar system but patent bilateral vertebral arteries  Degenerative changes of the cervical spine, better evaluated on dedicated CT of the cervical spine  Other findings as above              CT CERVICAL SPINE - WITHOUT CONTRAST      INDICATION:   Dizziness, persistent/recurrent, cardiac or vascular cause suspected   cardiac arrest       COMPARISON:  None  TECHNIQUE:  CT examination of the cervical spine was performed without intravenous contrast   Contiguous axial images were obtained  Sagittal and coronal reconstructions were performed  Radiation dose length product (DLP) for this visit:  1099 74 mGy-cm  (accession [id number]), 1549 83 mGy-cm  (accession [id number]), 692 mGy-cm  (accession [id number])  This examination, like all CT scans performed in the [facility], was    performed utilizing techniques to minimize radiation dose exposure, including the use of iterative reconstruction and automated exposure control  IMAGE QUALITY:  Diagnostic  FINDINGS:   Some images are suboptimal secondary to patient motion  Subject to this,      ALIGNMENT:  Anterolisthesis of C4 on C5 and C7 on T1 of approximately 2 mm, probably related to facet joint arthropathy at these levels  Spinal alignment otherwise grossly maintained         VERTEBRAL BODIES:  No acute fracture  DEGENERATIVE CHANGES:  Intervertebral disc space narrowing at C5/C6 and C6/C7 with anterior, marginal, and uncovertebral osteophytosis at these levels  Multilevel facet joint arthropathy  PREVERTEBRAL AND PARASPINAL SOFT TISSUES:  Suspected lipoma in the distal right sternocleidomastoid measures approximately 5 cm in size  Otherwise grossly unremarkable      THORACIC INLET:  Please refer to the concurrent chest CT report for thoracic inlet findings  IMPRESSION:      Some images are suboptimal secondary to patient motion  Subject to this, degenerative changes as described no evidence of acute cervical spine injury  CTA - CHEST, ABDOMEN AND PELVIS - WITH IV CONTRAST      INDICATION:   Dizziness, persistent/recurrent, cardiac or vascular cause suspected   cardiac arrest       COMPARISON:  None        TECHNIQUE: CT examination of the chest, abdomen and pelvis was performed after the administration of intravenous contrast   The noncontrast portion of this examination was performed utilizing low radiation dose technique  Thin section angiographic    arterial phase post contrast technique was used in order to evaluate for aortic dissection  3D reformatted images and volume rendering were performed on an independent workstation  Additionally, axial, sagittal, and coronal 2D reformatted images were    created from the source data and submitted for interpretation  Radiation dose length product (DLP) for this visit:  1099 74 mGy-cm  (accession [id number]), 1549 83 mGy-cm  (accession [id number]), 217 mGy-cm  (accession [id number])  This examination, like all CT scans performed in the [facility], was    performed utilizing techniques to minimize radiation dose exposure, including the use of iterative reconstruction and automated exposure control  IV Contrast:  Was administered   Enteric Contrast:  Enteric contrast was not administered  FINDINGS:       AORTA:  Mild atherosclerosis  There is no aortic dissection or intramural hematoma  There is no aortic aneurysm  CHEST      LUNGS:  Extensive perihilar groundglass and alveolar opacities are noted with more dense alveolar opacities noted dependently in the bilateral lungs  Some interstitial edema is seen most prominent in the upper lung fields  Could represent edema,    aspiration, infection, and/or ARDS  PLEURA:  Unremarkable  HEART/PULMONARY ARTERIAL TREE:  No pulmonary embolism is seen  The heart appears enlarged  Prominence of the right heart chambers with reflux of contrast into the IVC and posterior hepatic veins suggests a component of increased right heart pressures  Coronary atherosclerosis  MEDIASTINUM AND ROSI: Endotracheal tube terminates in the trachea    Status post CABG      CHEST WALL AND LOWER NECK: Degenerative changes of the bilateral shoulders, worse on the right; with prominent heterotopic ossification about the right shoulder  There are several anterior rib fractures noted, possibly related to recent CPR  Median    sternotomy changes are noted  Cardiac pads overlie the anterior chest      ABDOMEN      LIVER/BILIARY TREE:  Reflux of contrast is seen in the posterior liver, otherwise grossly unremarkable  GALLBLADDER:  No calcified gallstones  No pericholecystic inflammatory change  SPLEEN:  Unremarkable  PANCREAS:  Unremarkable  ADRENAL GLANDS:  Unremarkable  KIDNEYS/URETERS:  1 5 cm cyst in the upper pole of the right kidney  Several subcentimeter cysts are scattered in the bilateral kidneys, of unlikely clinical significance  Bilateral kidneys otherwise appear grossly unremarkable; no hydronephrosis  STOMACH AND BOWEL:  Scattered colonic diverticulosis  No discrete evidence of acute diverticulitis  No evidence of bowel obstruction  Otherwise grossly unremarkable  Enteric feeding tube terminates in the stomach  APPENDIX:  No findings to suggest appendicitis  ABDOMINOPELVIC CAVITY:  No ascites or free intraperitoneal air  No lymphadenopathy  PELVIS      REPRODUCTIVE ORGANS:  The prostate is enlarged  Prostate volume estimated at 70 mL  URINARY BLADDER:  Bladder collapsed around a bladder catheter and not well evaluated  ABDOMINAL WALL/INGUINAL REGIONS:  Body wall edema      OSSEOUS STRUCTURES:  Multilevel degenerative changes of the spine, otherwise no acute fracture or destructive osseous lesion  IMPRESSION:        No pulmonary embolism or acute aortic process is seen  Coronary atherosclerosis, status post CABG  Heart appears enlarged  Extensive perihilar groundglass and alveolar opacities are noted with more dense alveolar opacities noted dependently in the bilateral lungs    Some interstitial edema is seen most    prominent in the upper lung fields; findings could represent edema, aspiration, infection, and/or ARDS  Prominence of the right heart chambers with reflux of contrast into the IVC and posterior hepatic veins suggests a component of increased right heart pressures  Right renal cyst, colonic diverticulosis without evidence of acute diverticulitis, enlarged prostate, and other findings as above  I personally discussed this study with Yumiko Click on 12/25/2022 at 10:39 PM                      Workstation performed: [id number]         CTA chest ct abdomen pelvis w contrast   Final Result by Stephanie Rosa DO (12/25 2312)      No acute intracranial abnormality is seen  Other findings as above  CTA NECK AND BRAIN WITH CONTRAST      INDICATION: Dizziness, persistent/recurrent, cardiac or vascular cause suspected   cardiac arrest   history of chronic right ICA occlusion      COMPARISON:   CTA head and neck dated 9/18/2020  TECHNIQUE:  Routine CT imaging of the Brain without contrast   Post contrast imaging was performed after administration of iodinated contrast through the neck and brain  Post contrast axial 0 625 mm images timed to opacify the arterial system  3D rendering was performed on an independent workstation  MIP reconstructions performed  Coronal reconstructions were performed of the noncontrast portion of the brain  Radiation dose length product (DLP) for this visit:  1099 74 mGy-cm  (accession [id number]), 1549 83 mGy-cm  (accession [id number]), 938 mGy-cm  (accession [id number])  This examination, like all CT scans performed in the [facility], was    performed utilizing techniques to minimize radiation dose exposure, including the use of iterative reconstruction and automated exposure control           IV Contrast:  Was administered       IMAGE QUALITY:   Diagnostic      FINDINGS:      CERVICAL VASCULATURE   AORTIC ARCH AND GREAT VESSELS:  Mild atherosclerotic disease of the arch, proximal great vessels and visualized subclavian vessels  No significant stenosis  RIGHT VERTEBRAL ARTERY CERVICAL SEGMENT:  Mild stenosis at the origin  The vessel is normal in caliber throughout the neck  LEFT VERTEBRAL ARTERY CERVICAL SEGMENT:  Normal origin  The vessel is small in caliber throughout the neck, but patent  RIGHT EXTRACRANIAL CAROTID SEGMENT:  Moderate atherosclerotic disease of the bifurcation  There is redemonstration of occlusion of the right internal carotid artery at the bifurcation      LEFT EXTRACRANIAL CAROTID SEGMENT:  Mild atherosclerotic disease of the distal common carotid artery and proximal cervical internal carotid artery without significant stenosis compared to the more distal ICA  Suspect prior carotid endarterectomy      NASCET criteria was used to determine the degree of internal carotid artery diameter stenosis  INTRACRANIAL VASCULATURE    INTERNAL CAROTID ARTERIES:  The right internal carotid artery is occluded to the level of the bifurcation where there is reconstitution  Mild to moderate atherosclerosis but otherwise normal enhancement of the intracranial portions of the left internal    carotid artery  Normal ophthalmic artery origins  ANTERIOR CIRCULATION:  Hypoplastic right A1 segment  A1 segments and anterior cerebral arteries otherwise with normal enhancement  Normal anterior communicating artery  MIDDLE CEREBRAL ARTERY CIRCULATION:  M1 segment and middle cerebral artery branches demonstrate normal enhancement bilaterally  DISTAL VERTEBRAL ARTERIES:  Mild atherosclerosis  Right dominant vertebrobasilar system, patent distal vertebral arteries  Posterior inferior cerebellar artery origins are normal  Normal vertebral basilar junction  BASILAR ARTERY:  Basilar artery is normal in caliber  Normal superior cerebellar arteries  POSTERIOR CEREBRAL ARTERIES: Both posterior cerebral arteries arises from the basilar tip    Both arteries demonstrate normal enhancement  Normal posterior communicating arteries  VENOUS STRUCTURES:  Patent         NON VASCULAR ANATOMY   BONY STRUCTURES:  No acute osseous abnormality  SOFT TISSUES OF THE NECK:  Better evaluated on CT cervical spine obtained concurrently      THORACIC INLET:  Please refer to the concurrent chest, abdomen, and pelvic CT report for description of the thoracic inlet findings  IMPRESSION:      No acute intracranial process is seen  Chronic occlusion of the right internal carotid artery is redemonstrated with distal reconstitution, as described  The vessels of the [place] are patent  No significant stenosis in the left internal carotid artery  Right dominant vertebrobasilar system but patent bilateral vertebral arteries  Degenerative changes of the cervical spine, better evaluated on dedicated CT of the cervical spine  Other findings as above  CT CERVICAL SPINE - WITHOUT CONTRAST      INDICATION:   Dizziness, persistent/recurrent, cardiac or vascular cause suspected   cardiac arrest       COMPARISON:  None  TECHNIQUE:  CT examination of the cervical spine was performed without intravenous contrast   Contiguous axial images were obtained  Sagittal and coronal reconstructions were performed  Radiation dose length product (DLP) for this visit:  1099 74 mGy-cm  (accession [id number]), 1549 83 mGy-cm  (accession [id number]), 463 mGy-cm  (accession [id number])  This examination, like all CT scans performed in the [facility], was    performed utilizing techniques to minimize radiation dose exposure, including the use of iterative reconstruction and automated exposure control  IMAGE QUALITY:  Diagnostic  FINDINGS:   Some images are suboptimal secondary to patient motion    Subject to this,      ALIGNMENT:  Anterolisthesis of C4 on C5 and C7 on T1 of approximately 2 mm, probably related to facet joint arthropathy at these levels  Spinal alignment otherwise grossly maintained         VERTEBRAL BODIES:  No acute fracture  DEGENERATIVE CHANGES:  Intervertebral disc space narrowing at C5/C6 and C6/C7 with anterior, marginal, and uncovertebral osteophytosis at these levels  Multilevel facet joint arthropathy  PREVERTEBRAL AND PARASPINAL SOFT TISSUES:  Suspected lipoma in the distal right sternocleidomastoid measures approximately 5 cm in size  Otherwise grossly unremarkable      THORACIC INLET:  Please refer to the concurrent chest CT report for thoracic inlet findings  IMPRESSION:      Some images are suboptimal secondary to patient motion  Subject to this, degenerative changes as described no evidence of acute cervical spine injury  CTA - CHEST, ABDOMEN AND PELVIS - WITH IV CONTRAST      INDICATION:   Dizziness, persistent/recurrent, cardiac or vascular cause suspected   cardiac arrest       COMPARISON:  None  TECHNIQUE: CT examination of the chest, abdomen and pelvis was performed after the administration of intravenous contrast   The noncontrast portion of this examination was performed utilizing low radiation dose technique  Thin section angiographic    arterial phase post contrast technique was used in order to evaluate for aortic dissection  3D reformatted images and volume rendering were performed on an independent workstation  Additionally, axial, sagittal, and coronal 2D reformatted images were    created from the source data and submitted for interpretation  Radiation dose length product (DLP) for this visit:  1099 74 mGy-cm  (accession [id number]), 1549 83 mGy-cm  (accession [id number]), 590 mGy-cm  (accession [id number])  This examination, like all CT scans performed in the [facility], was    performed utilizing techniques to minimize radiation dose exposure, including the use of iterative reconstruction and automated exposure control        IV Contrast:  Was administered   Enteric Contrast:  Enteric contrast was not administered  FINDINGS:       AORTA:  Mild atherosclerosis  There is no aortic dissection or intramural hematoma  There is no aortic aneurysm  CHEST      LUNGS:  Extensive perihilar groundglass and alveolar opacities are noted with more dense alveolar opacities noted dependently in the bilateral lungs  Some interstitial edema is seen most prominent in the upper lung fields  Could represent edema,    aspiration, infection, and/or ARDS  PLEURA:  Unremarkable  HEART/PULMONARY ARTERIAL TREE:  No pulmonary embolism is seen  The heart appears enlarged  Prominence of the right heart chambers with reflux of contrast into the IVC and posterior hepatic veins suggests a component of increased right heart pressures  Coronary atherosclerosis  MEDIASTINUM AND ROSI: Endotracheal tube terminates in the trachea  Status post CABG      CHEST WALL AND LOWER NECK: Degenerative changes of the bilateral shoulders, worse on the right; with prominent heterotopic ossification about the right shoulder  There are several anterior rib fractures noted, possibly related to recent CPR  Median    sternotomy changes are noted  Cardiac pads overlie the anterior chest      ABDOMEN      LIVER/BILIARY TREE:  Reflux of contrast is seen in the posterior liver, otherwise grossly unremarkable  GALLBLADDER:  No calcified gallstones  No pericholecystic inflammatory change  SPLEEN:  Unremarkable  PANCREAS:  Unremarkable  ADRENAL GLANDS:  Unremarkable  KIDNEYS/URETERS:  1 5 cm cyst in the upper pole of the right kidney  Several subcentimeter cysts are scattered in the bilateral kidneys, of unlikely clinical significance  Bilateral kidneys otherwise appear grossly unremarkable; no hydronephrosis  STOMACH AND BOWEL:  Scattered colonic diverticulosis  No discrete evidence of acute diverticulitis    No evidence of bowel obstruction  Otherwise grossly unremarkable  Enteric feeding tube terminates in the stomach  APPENDIX:  No findings to suggest appendicitis  ABDOMINOPELVIC CAVITY:  No ascites or free intraperitoneal air  No lymphadenopathy  PELVIS      REPRODUCTIVE ORGANS:  The prostate is enlarged  Prostate volume estimated at 70 mL  URINARY BLADDER:  Bladder collapsed around a bladder catheter and not well evaluated  ABDOMINAL WALL/INGUINAL REGIONS:  Body wall edema      OSSEOUS STRUCTURES:  Multilevel degenerative changes of the spine, otherwise no acute fracture or destructive osseous lesion  IMPRESSION:        No pulmonary embolism or acute aortic process is seen  Coronary atherosclerosis, status post CABG  Heart appears enlarged  Extensive perihilar groundglass and alveolar opacities are noted with more dense alveolar opacities noted dependently in the bilateral lungs  Some interstitial edema is seen most    prominent in the upper lung fields; findings could represent edema, aspiration, infection, and/or ARDS  Prominence of the right heart chambers with reflux of contrast into the IVC and posterior hepatic veins suggests a component of increased right heart pressures  Right renal cyst, colonic diverticulosis without evidence of acute diverticulitis, enlarged prostate, and other findings as above  I personally discussed this study with Oliver Souza on 12/25/2022 at 10:39 PM                      Workstation performed: [id number]         CT spine cervical without contrast   Final Result by Gogo Palumbo DO (12/25 3952)      No acute intracranial abnormality is seen  Other findings as above  CTA NECK AND BRAIN WITH CONTRAST      INDICATION: Dizziness, persistent/recurrent, cardiac or vascular cause suspected   cardiac arrest   history of chronic right ICA occlusion      COMPARISON:   CTA head and neck dated 9/18/2020        TECHNIQUE: Routine CT imaging of the Brain without contrast   Post contrast imaging was performed after administration of iodinated contrast through the neck and brain  Post contrast axial 0 625 mm images timed to opacify the arterial system  3D rendering was performed on an independent workstation  MIP reconstructions performed  Coronal reconstructions were performed of the noncontrast portion of the brain  Radiation dose length product (DLP) for this visit:  1099 74 mGy-cm  (accession [id number]), 1549 83 mGy-cm  (accession [id number]), 285 mGy-cm  (accession [id number])  This examination, like all CT scans performed in the [facility], was    performed utilizing techniques to minimize radiation dose exposure, including the use of iterative reconstruction and automated exposure control  IV Contrast:  Was administered       IMAGE QUALITY:   Diagnostic      FINDINGS:      CERVICAL VASCULATURE   AORTIC ARCH AND GREAT VESSELS:  Mild atherosclerotic disease of the arch, proximal great vessels and visualized subclavian vessels  No significant stenosis  RIGHT VERTEBRAL ARTERY CERVICAL SEGMENT:  Mild stenosis at the origin  The vessel is normal in caliber throughout the neck  LEFT VERTEBRAL ARTERY CERVICAL SEGMENT:  Normal origin  The vessel is small in caliber throughout the neck, but patent  RIGHT EXTRACRANIAL CAROTID SEGMENT:  Moderate atherosclerotic disease of the bifurcation  There is redemonstration of occlusion of the right internal carotid artery at the bifurcation      LEFT EXTRACRANIAL CAROTID SEGMENT:  Mild atherosclerotic disease of the distal common carotid artery and proximal cervical internal carotid artery without significant stenosis compared to the more distal ICA  Suspect prior carotid endarterectomy      NASCET criteria was used to determine the degree of internal carotid artery diameter stenosis           INTRACRANIAL VASCULATURE    INTERNAL CAROTID ARTERIES:  The right internal carotid artery is occluded to the level of the bifurcation where there is reconstitution  Mild to moderate atherosclerosis but otherwise normal enhancement of the intracranial portions of the left internal    carotid artery  Normal ophthalmic artery origins  ANTERIOR CIRCULATION:  Hypoplastic right A1 segment  A1 segments and anterior cerebral arteries otherwise with normal enhancement  Normal anterior communicating artery  MIDDLE CEREBRAL ARTERY CIRCULATION:  M1 segment and middle cerebral artery branches demonstrate normal enhancement bilaterally  DISTAL VERTEBRAL ARTERIES:  Mild atherosclerosis  Right dominant vertebrobasilar system, patent distal vertebral arteries  Posterior inferior cerebellar artery origins are normal  Normal vertebral basilar junction  BASILAR ARTERY:  Basilar artery is normal in caliber  Normal superior cerebellar arteries  POSTERIOR CEREBRAL ARTERIES: Both posterior cerebral arteries arises from the basilar tip  Both arteries demonstrate normal enhancement  Normal posterior communicating arteries  VENOUS STRUCTURES:  Patent         NON VASCULAR ANATOMY   BONY STRUCTURES:  No acute osseous abnormality  SOFT TISSUES OF THE NECK:  Better evaluated on CT cervical spine obtained concurrently      THORACIC INLET:  Please refer to the concurrent chest, abdomen, and pelvic CT report for description of the thoracic inlet findings  IMPRESSION:      No acute intracranial process is seen  Chronic occlusion of the right internal carotid artery is redemonstrated with distal reconstitution, as described  The vessels of the [place] are patent  No significant stenosis in the left internal carotid artery  Right dominant vertebrobasilar system but patent bilateral vertebral arteries  Degenerative changes of the cervical spine, better evaluated on dedicated CT of the cervical spine         Other findings as above  CT CERVICAL SPINE - WITHOUT CONTRAST      INDICATION:   Dizziness, persistent/recurrent, cardiac or vascular cause suspected   cardiac arrest       COMPARISON:  None  TECHNIQUE:  CT examination of the cervical spine was performed without intravenous contrast   Contiguous axial images were obtained  Sagittal and coronal reconstructions were performed  Radiation dose length product (DLP) for this visit:  1099 74 mGy-cm  (accession [id number]), 1549 83 mGy-cm  (accession [id number]), 260 mGy-cm  (accession [id number])  This examination, like all CT scans performed in the [facility], was    performed utilizing techniques to minimize radiation dose exposure, including the use of iterative reconstruction and automated exposure control  IMAGE QUALITY:  Diagnostic  FINDINGS:   Some images are suboptimal secondary to patient motion  Subject to this,      ALIGNMENT:  Anterolisthesis of C4 on C5 and C7 on T1 of approximately 2 mm, probably related to facet joint arthropathy at these levels  Spinal alignment otherwise grossly maintained         VERTEBRAL BODIES:  No acute fracture  DEGENERATIVE CHANGES:  Intervertebral disc space narrowing at C5/C6 and C6/C7 with anterior, marginal, and uncovertebral osteophytosis at these levels  Multilevel facet joint arthropathy  PREVERTEBRAL AND PARASPINAL SOFT TISSUES:  Suspected lipoma in the distal right sternocleidomastoid measures approximately 5 cm in size  Otherwise grossly unremarkable      THORACIC INLET:  Please refer to the concurrent chest CT report for thoracic inlet findings  IMPRESSION:      Some images are suboptimal secondary to patient motion  Subject to this, degenerative changes as described no evidence of acute cervical spine injury              CTA - CHEST, ABDOMEN AND PELVIS - WITH IV CONTRAST      INDICATION:   Dizziness, persistent/recurrent, cardiac or vascular cause suspected cardiac arrest       COMPARISON:  None  TECHNIQUE: CT examination of the chest, abdomen and pelvis was performed after the administration of intravenous contrast   The noncontrast portion of this examination was performed utilizing low radiation dose technique  Thin section angiographic    arterial phase post contrast technique was used in order to evaluate for aortic dissection  3D reformatted images and volume rendering were performed on an independent workstation  Additionally, axial, sagittal, and coronal 2D reformatted images were    created from the source data and submitted for interpretation  Radiation dose length product (DLP) for this visit:  1099 74 mGy-cm  (accession [id number]), 1549 83 mGy-cm  (accession [id number]), 340 mGy-cm  (accession [id number])  This examination, like all CT scans performed in the [place], was    performed utilizing techniques to minimize radiation dose exposure, including the use of iterative reconstruction and automated exposure control  IV Contrast:  Was administered   Enteric Contrast:  Enteric contrast was not administered  FINDINGS:       AORTA:  Mild atherosclerosis  There is no aortic dissection or intramural hematoma  There is no aortic aneurysm  CHEST      LUNGS:  Extensive perihilar groundglass and alveolar opacities are noted with more dense alveolar opacities noted dependently in the bilateral lungs  Some interstitial edema is seen most prominent in the upper lung fields  Could represent edema,    aspiration, infection, and/or ARDS  PLEURA:  Unremarkable  HEART/PULMONARY ARTERIAL TREE:  No pulmonary embolism is seen  The heart appears enlarged  Prominence of the right heart chambers with reflux of contrast into the IVC and posterior hepatic veins suggests a component of increased right heart pressures  Coronary atherosclerosis  MEDIASTINUM AND ROSI: Endotracheal tube terminates in the trachea  Status post CABG      CHEST WALL AND LOWER NECK: Degenerative changes of the bilateral shoulders, worse on the right; with prominent heterotopic ossification about the right shoulder  There are several anterior rib fractures noted, possibly related to recent CPR  Median    sternotomy changes are noted  Cardiac pads overlie the anterior chest      ABDOMEN      LIVER/BILIARY TREE:  Reflux of contrast is seen in the posterior liver, otherwise grossly unremarkable  GALLBLADDER:  No calcified gallstones  No pericholecystic inflammatory change  SPLEEN:  Unremarkable  PANCREAS:  Unremarkable  ADRENAL GLANDS:  Unremarkable  KIDNEYS/URETERS:  1 5 cm cyst in the upper pole of the right kidney  Several subcentimeter cysts are scattered in the bilateral kidneys, of unlikely clinical significance  Bilateral kidneys otherwise appear grossly unremarkable; no hydronephrosis  STOMACH AND BOWEL:  Scattered colonic diverticulosis  No discrete evidence of acute diverticulitis  No evidence of bowel obstruction  Otherwise grossly unremarkable  Enteric feeding tube terminates in the stomach  APPENDIX:  No findings to suggest appendicitis  ABDOMINOPELVIC CAVITY:  No ascites or free intraperitoneal air  No lymphadenopathy  PELVIS      REPRODUCTIVE ORGANS:  The prostate is enlarged  Prostate volume estimated at 70 mL  URINARY BLADDER:  Bladder collapsed around a bladder catheter and not well evaluated  ABDOMINAL WALL/INGUINAL REGIONS:  Body wall edema      OSSEOUS STRUCTURES:  Multilevel degenerative changes of the spine, otherwise no acute fracture or destructive osseous lesion  IMPRESSION:        No pulmonary embolism or acute aortic process is seen  Coronary atherosclerosis, status post CABG  Heart appears enlarged  Extensive perihilar groundglass and alveolar opacities are noted with more dense alveolar opacities noted dependently in the bilateral lungs  Some interstitial edema is seen most    prominent in the upper lung fields; findings could represent edema, aspiration, infection, and/or ARDS  Prominence of the right heart chambers with reflux of contrast into the IVC and posterior hepatic veins suggests a component of increased right heart pressures  Right renal cyst, colonic diverticulosis without evidence of acute diverticulitis, enlarged prostate, and other findings as above  I personally discussed this study with Kayode Carrizales on 12/25/2022 at 10:39 PM                      Workstation performed: [id number]         CT head without contrast   Final Result by Mikala Baltazar DO (12/25 8148)      No acute intracranial abnormality is seen  Other findings as above  CTA NECK AND BRAIN WITH CONTRAST      INDICATION: Dizziness, persistent/recurrent, cardiac or vascular cause suspected   cardiac arrest   history of chronic right ICA occlusion      COMPARISON:   CTA head and neck dated 9/18/2020  TECHNIQUE:  Routine CT imaging of the Brain without contrast   Post contrast imaging was performed after administration of iodinated contrast through the neck and brain  Post contrast axial 0 625 mm images timed to opacify the arterial system  3D rendering was performed on an independent workstation  MIP reconstructions performed  Coronal reconstructions were performed of the noncontrast portion of the brain  Radiation dose length product (DLP) for this visit:  1099 74 mGy-cm  (accession [id number]), 1549 83 mGy-cm  (accession [id number]), 456 mGy-cm  (accession [id number])  This examination, like all CT scans performed in the [facility], was    performed utilizing techniques to minimize radiation dose exposure, including the use of iterative reconstruction and automated exposure control           IV Contrast:  Was administered       IMAGE QUALITY:   Diagnostic      FINDINGS:      CERVICAL VASCULATURE   AORTIC ARCH AND GREAT VESSELS:  Mild atherosclerotic disease of the arch, proximal great vessels and visualized subclavian vessels  No significant stenosis  RIGHT VERTEBRAL ARTERY CERVICAL SEGMENT:  Mild stenosis at the origin  The vessel is normal in caliber throughout the neck  LEFT VERTEBRAL ARTERY CERVICAL SEGMENT:  Normal origin  The vessel is small in caliber throughout the neck, but patent  RIGHT EXTRACRANIAL CAROTID SEGMENT:  Moderate atherosclerotic disease of the bifurcation  There is redemonstration of occlusion of the right internal carotid artery at the bifurcation      LEFT EXTRACRANIAL CAROTID SEGMENT:  Mild atherosclerotic disease of the distal common carotid artery and proximal cervical internal carotid artery without significant stenosis compared to the more distal ICA  Suspect prior carotid endarterectomy      NASCET criteria was used to determine the degree of internal carotid artery diameter stenosis  INTRACRANIAL VASCULATURE    INTERNAL CAROTID ARTERIES:  The right internal carotid artery is occluded to the level of the bifurcation where there is reconstitution  Mild to moderate atherosclerosis but otherwise normal enhancement of the intracranial portions of the left internal    carotid artery  Normal ophthalmic artery origins  ANTERIOR CIRCULATION:  Hypoplastic right A1 segment  A1 segments and anterior cerebral arteries otherwise with normal enhancement  Normal anterior communicating artery  MIDDLE CEREBRAL ARTERY CIRCULATION:  M1 segment and middle cerebral artery branches demonstrate normal enhancement bilaterally  DISTAL VERTEBRAL ARTERIES:  Mild atherosclerosis  Right dominant vertebrobasilar system, patent distal vertebral arteries  Posterior inferior cerebellar artery origins are normal  Normal vertebral basilar junction  BASILAR ARTERY:  Basilar artery is normal in caliber  Normal superior cerebellar arteries  POSTERIOR CEREBRAL ARTERIES: Both posterior cerebral arteries arises from the basilar tip  Both arteries demonstrate normal enhancement  Normal posterior communicating arteries  VENOUS STRUCTURES:  Patent         NON VASCULAR ANATOMY   BONY STRUCTURES:  No acute osseous abnormality  SOFT TISSUES OF THE NECK:  Better evaluated on CT cervical spine obtained concurrently      THORACIC INLET:  Please refer to the concurrent chest, abdomen, and pelvic CT report for description of the thoracic inlet findings  IMPRESSION:      No acute intracranial process is seen  Chronic occlusion of the right internal carotid artery is redemonstrated with distal reconstitution, as described  The vessels of the [place] are patent  No significant stenosis in the left internal carotid artery  Right dominant vertebrobasilar system but patent bilateral vertebral arteries  Degenerative changes of the cervical spine, better evaluated on dedicated CT of the cervical spine  Other findings as above  CT CERVICAL SPINE - WITHOUT CONTRAST      INDICATION:   Dizziness, persistent/recurrent, cardiac or vascular cause suspected   cardiac arrest       COMPARISON:  None  TECHNIQUE:  CT examination of the cervical spine was performed without intravenous contrast   Contiguous axial images were obtained  Sagittal and coronal reconstructions were performed  Radiation dose length product (DLP) for this visit:  1099 74 mGy-cm  (accession [id number]), 1549 83 mGy-cm  (accession [id number]), 023 mGy-cm  (accession [id number])  This examination, like all CT scans performed in the [facility], was    performed utilizing techniques to minimize radiation dose exposure, including the use of iterative reconstruction and automated exposure control  IMAGE QUALITY:  Diagnostic  FINDINGS:   Some images are suboptimal secondary to patient motion    Subject to this, ALIGNMENT:  Anterolisthesis of C4 on C5 and C7 on T1 of approximately 2 mm, probably related to facet joint arthropathy at these levels  Spinal alignment otherwise grossly maintained         VERTEBRAL BODIES:  No acute fracture  DEGENERATIVE CHANGES:  Intervertebral disc space narrowing at C5/C6 and C6/C7 with anterior, marginal, and uncovertebral osteophytosis at these levels  Multilevel facet joint arthropathy  PREVERTEBRAL AND PARASPINAL SOFT TISSUES:  Suspected lipoma in the distal right sternocleidomastoid measures approximately 5 cm in size  Otherwise grossly unremarkable      THORACIC INLET:  Please refer to the concurrent chest CT report for thoracic inlet findings  IMPRESSION:      Some images are suboptimal secondary to patient motion  Subject to this, degenerative changes as described no evidence of acute cervical spine injury  CTA - CHEST, ABDOMEN AND PELVIS - WITH IV CONTRAST      INDICATION:   Dizziness, persistent/recurrent, cardiac or vascular cause suspected   cardiac arrest       COMPARISON:  None  TECHNIQUE: CT examination of the chest, abdomen and pelvis was performed after the administration of intravenous contrast   The noncontrast portion of this examination was performed utilizing low radiation dose technique  Thin section angiographic    arterial phase post contrast technique was used in order to evaluate for aortic dissection  3D reformatted images and volume rendering were performed on an independent workstation  Additionally, axial, sagittal, and coronal 2D reformatted images were    created from the source data and submitted for interpretation  Radiation dose length product (DLP) for this visit:  1099 74 mGy-cm  (accession [id number]), 1549 83 mGy-cm  (accession [id number]), 222 mGy-cm  (accession [id number])    This examination, like all CT scans performed in the [facility], was    performed utilizing techniques to minimize radiation dose exposure, including the use of iterative reconstruction and automated exposure control  IV Contrast:  Was administered   Enteric Contrast:  Enteric contrast was not administered  FINDINGS:       AORTA:  Mild atherosclerosis  There is no aortic dissection or intramural hematoma  There is no aortic aneurysm  CHEST      LUNGS:  Extensive perihilar groundglass and alveolar opacities are noted with more dense alveolar opacities noted dependently in the bilateral lungs  Some interstitial edema is seen most prominent in the upper lung fields  Could represent edema,    aspiration, infection, and/or ARDS  PLEURA:  Unremarkable  HEART/PULMONARY ARTERIAL TREE:  No pulmonary embolism is seen  The heart appears enlarged  Prominence of the right heart chambers with reflux of contrast into the IVC and posterior hepatic veins suggests a component of increased right heart pressures  Coronary atherosclerosis  MEDIASTINUM AND ROSI: Endotracheal tube terminates in the trachea  Status post CABG      CHEST WALL AND LOWER NECK: Degenerative changes of the bilateral shoulders, worse on the right; with prominent heterotopic ossification about the right shoulder  There are several anterior rib fractures noted, possibly related to recent CPR  Median    sternotomy changes are noted  Cardiac pads overlie the anterior chest      ABDOMEN      LIVER/BILIARY TREE:  Reflux of contrast is seen in the posterior liver, otherwise grossly unremarkable  GALLBLADDER:  No calcified gallstones  No pericholecystic inflammatory change  SPLEEN:  Unremarkable  PANCREAS:  Unremarkable  ADRENAL GLANDS:  Unremarkable  KIDNEYS/URETERS:  1 5 cm cyst in the upper pole of the right kidney  Several subcentimeter cysts are scattered in the bilateral kidneys, of unlikely clinical significance  Bilateral kidneys otherwise appear grossly unremarkable; no hydronephrosis        STOMACH AND BOWEL:  Scattered colonic diverticulosis  No discrete evidence of acute diverticulitis  No evidence of bowel obstruction  Otherwise grossly unremarkable  Enteric feeding tube terminates in the stomach  APPENDIX:  No findings to suggest appendicitis  ABDOMINOPELVIC CAVITY:  No ascites or free intraperitoneal air  No lymphadenopathy  PELVIS      REPRODUCTIVE ORGANS:  The prostate is enlarged  Prostate volume estimated at 70 mL  URINARY BLADDER:  Bladder collapsed around a bladder catheter and not well evaluated  ABDOMINAL WALL/INGUINAL REGIONS:  Body wall edema      OSSEOUS STRUCTURES:  Multilevel degenerative changes of the spine, otherwise no acute fracture or destructive osseous lesion  IMPRESSION:        No pulmonary embolism or acute aortic process is seen  Coronary atherosclerosis, status post CABG  Heart appears enlarged  Extensive perihilar groundglass and alveolar opacities are noted with more dense alveolar opacities noted dependently in the bilateral lungs  Some interstitial edema is seen most    prominent in the upper lung fields; findings could represent edema, aspiration, infection, and/or ARDS  Prominence of the right heart chambers with reflux of contrast into the IVC and posterior hepatic veins suggests a component of increased right heart pressures  Right renal cyst, colonic diverticulosis without evidence of acute diverticulitis, enlarged prostate, and other findings as above  I personally discussed this study with Cachorro Galicia on 12/25/2022 at 10:39 PM                      Workstation performed: [id number]         XR chest 1 view portable   Final Result by Xuan Campuzano MD (12/26 1109)      No acute cardiopulmonary disease  Subsequent chest CT shows development of flash pulmonary edema                    Workstation performed: [id number]         XR chest portable ICU    (Results Pending)              Procedures  ECG 12 Lead Documentation Only  Performed by: Geoff Luna DO  Authorized by: Geoff Luna DO     ECG reviewed by me, the ED Provider: yes    Patient location:  ED  Previous ECG:     Previous ECG:  Compared to current    Similarity:  No change    Comparison to cardiac monitor: Yes    Interpretation:     Interpretation: non-specific    Rate:     ECG rate assessment: normal    Rhythm:     Rhythm: sinus rhythm    Ectopy:     Ectopy: none    QRS:     QRS axis:  Normal  Conduction:     Conduction: normal    ST segments:     ST segments:  Non-specific  T waves:     T waves: non-specific    CriticalCare Time  Performed by: Geoff Luna DO  Authorized by: Geoff Luna DO     Critical care provider statement:     Critical care time (minutes):  60    Critical care was necessary to treat or prevent imminent or life-threatening deterioration of the following conditions:  Cardiac failure and metabolic crisis    Critical care was time spent personally by me on the following activities:  Blood draw for specimens, obtaining history from patient or surrogate, development of treatment plan with patient or surrogate, discussions with consultants, evaluation of patient's response to treatment, examination of patient, interpretation of cardiac output measurements, ordering and performing treatments and interventions, ordering and review of laboratory studies, ordering and review of radiographic studies, re-evaluation of patient's condition, review of old charts and ventilator management    I assumed direction of critical care for this patient from another provider in my specialty: no    Central Line    Date/Time: 12/25/2022 9:30 AM  Performed by: Geoff Luna DO  Authorized by: Geoff Luna DO     Patient location:  ED  Consent:     Consent obtained:  Emergent situation    Alternatives discussed:  No treatment  Universal protocol:     Patient identity confirmed:  Arm band  Pre-procedure details:     Skin preparation:  2% chlorhexidine  Indications:     Central line indications: medications requiring central line and hemodynamic monitoring    Anesthesia (see MAR for exact dosages): Anesthesia method:  Local infiltration    Local anesthetic:  Lidocaine 1% WITH epi  Procedure details:     Location:  Right femoral    Vessel type: vein      Laterality:  Right    Approach: percutaneous technique used      Catheter type:  Triple lumen    Catheter size:  7 Fr    Landmarks identified: yes      Ultrasound guidance: no      Manometry confirmation: no      Number of attempts:  1    Successful placement: yes    Post-procedure details:     Post-procedure:  Dressing applied and line sutured    Assessment:  Blood return through all ports    Patient tolerance of procedure: Tolerated well, no immediate complications    Observer: Yes    Intubation    Date/Time: 12/25/2022 9:00 AM  Performed by: Nestor Romero DO  Authorized by: Nestor Romero DO     Patient location:  ED  Consent:     Consent obtained:  Emergent situation    Alternatives discussed:  No treatment  Universal protocol:     Patient identity confirmed:  Arm band  Pre-procedure details:     Patient status:  Unresponsive    Mallampati score:  1    Pretreatment medications:  Etomidate    Paralytics:  Succinylcholine  Indications:     Indications for intubation: respiratory failure and airway protection    Procedure details:     Preoxygenation:  Bag valve mask    CPR in progress: no      Intubation method:  Oral    Oral intubation technique:  Glidescope    Laryngoscope blade: Mac 4    Tube type:  Cuffed    Number of attempts:  1    Ventilation between attempts: no      Cricoid pressure: no      Tube visualized through cords: yes    Placement assessment:     ETT to lip:  24    Tube secured with:   Adhesive tape and ETT brower    Breath sounds:  Absent over the epigastrium and equal    Placement verification: chest rise, CXR verification and equal breath sounds      CXR findings:  ETT in proper place  Post-procedure details:     Patient tolerance of procedure: Tolerated well, no immediate complications             ED Course                                             MDM  Number of Diagnoses or Management Options     Amount and/or Complexity of Data Reviewed  Clinical lab tests: ordered and reviewed  Tests in the radiology section of CPT®: ordered and reviewed  Tests in the medicine section of CPT®: ordered and reviewed        Disposition  Final diagnoses:   Cardiac arrest [facility])     Time reflects when diagnosis was documented in both MDM as applicable and the Disposition within this note     Time User Action Codes Description Comment    12/25/2022 11:05 PM Slava Thomas Shoulder Add [I46 9] Cardiac arrest [facility])       ED Disposition     ED Disposition   Admit    Condition   Stable    Date/Time   Sun Dec 25, 2022 11:05 PM    Comment              Follow-up Information    None         Current Discharge Medication List      CONTINUE these medications which have NOT CHANGED    Details   acetaminophen (TYLENOL) 325 mg tablet Take 1-2 tablets Q4-6 hours PRN pain  Do not take more than 4 grams in 24 hours  Refills: 0    Associated Diagnoses: S/P AVR; S/P CABG (coronary artery bypass graft); Controlled type 2 diabetes mellitus with diabetic neuropathy, without long-term current use of insulin ([facility])      apixaban (Eliquis) 5 mg Take 1 tablet (5 mg total) by mouth 2 (two) times a day  Qty: 60 tablet, Refills: 0    Associated Diagnoses: Atrial flutter, unspecified type ([facility])      atorvastatin (LIPITOR) 80 mg tablet Take 1 tablet (80 mg total) by mouth daily with dinner  Qty: 90 tablet, Refills: 2    Associated Diagnoses: S/P AVR; S/P CABG (coronary artery bypass graft); Controlled type 2 diabetes mellitus with diabetic neuropathy, without long-term current use of insulin ([facility])      Blood Glucose Monitoring Suppl (FreeStyle [employer] System) w/Device KIT Test TID & QHS as directed    Qty: 1 kit, Refills: 0    Comments: Myriam E11 9; Dispense formulary compatible with insurance  Associated Diagnoses: S/P AVR; S/P CABG (coronary artery bypass graft); Controlled type 2 diabetes mellitus with diabetic neuropathy, without long-term current use of insulin ([facility])      carvedilol (COREG) 3 125 mg tablet Take 1 tablet (3 125 mg total) by mouth 2 (two) times a day with meals  Qty: 120 tablet, Refills: 6    Associated Diagnoses: Benign essential hypertension      FreeStyle [employer] Test test strip Test TID & QHS as directed  Qty: 100 each, Refills: 2    Comments: Dx E11 9; Dispense formulary compatible with insurance  Associated Diagnoses: S/P AVR; S/P CABG (coronary artery bypass graft); Controlled type 2 diabetes mellitus with diabetic neuropathy, without long-term current use of insulin ([facility])      Lancets (freestyle) lancets Test TID & QHS as directed  Qty: 100 each, Refills: 2    Comments: Dx E11 9; Dispense formulary compatible with insurance  Associated Diagnoses: S/P AVR; S/P CABG (coronary artery bypass graft); Controlled type 2 diabetes mellitus with diabetic neuropathy, without long-term current use of insulin ([facility])      lisinopril-hydrochlorothiazide (PRINZIDE,ZESTORETIC) 20-25 MG per tablet Take 1 tablet by mouth daily  Qty: 90 tablet, Refills: 1    Associated Diagnoses: Benign essential hypertension      metFORMIN (GLUCOPHAGE) 1000 MG tablet Take 1 tablet (1,000 mg total) by mouth 2 (two) times a day with meals  Qty: 180 tablet, Refills: 2    Associated Diagnoses: Type 2 diabetes mellitus without complication, without long-term current use of insulin ([facility])             No discharge procedures on file      PDMP Review       Value Time User    PDMP Reviewed  Yes 5/14/2021 11:09 AM Tonio Cedeno PA-C          ED Provider  Electronically Signed by           Marichuy Singleton DO  12/27/22 2225

## 2022-12-26 NOTE — ASSESSMENT & PLAN NOTE
Lab Results   Component Value Date    EGFR 49 12/25/2022    EGFR 55 11/04/2022    EGFR 57 07/30/2022    CREATININE 1 37 (H) 12/25/2022    CREATININE 1 25 11/04/2022    CREATININE 1 21 07/30/2022     · Cr around 1 2  · Gentle fluids overnight given contrast load

## 2022-12-26 NOTE — PROGRESS NOTES
Recommend Jevity 1 2 at goal rate of 50 mL/hr for a total volume of 1200 mL  This will provide 1440 kcals (1963 kcals with current Propofol), 67 grams protein and 968 mL free water  Recommend 1 packet of Prosource for a total kcal intake of 2023 and 82 gms protein  Recommend flushes of 150 mL q 4 hrs for a total fluid intake of 1868 mL

## 2022-12-27 PROBLEM — I44.7 LBBB (LEFT BUNDLE BRANCH BLOCK): Chronic | Status: ACTIVE | Noted: 2021-05-11

## 2022-12-27 PROBLEM — I25.10 CORONARY ARTERY DISEASE INVOLVING NATIVE CORONARY ARTERY OF NATIVE HEART: Chronic | Status: ACTIVE | Noted: 2021-04-13

## 2022-12-27 PROBLEM — Z95.2 S/P AVR: Chronic | Status: ACTIVE | Noted: 2021-05-10

## 2022-12-27 PROBLEM — N18.31 STAGE 3A CHRONIC KIDNEY DISEASE (HCC): Chronic | Status: ACTIVE | Noted: 2022-01-01

## 2022-12-27 PROBLEM — Z95.1 S/P CABG (CORONARY ARTERY BYPASS GRAFT): Chronic | Status: ACTIVE | Noted: 2021-05-10

## 2022-12-27 PROBLEM — Z48.89 ENCOUNTER FOR POSTOPERATIVE CARE: Status: RESOLVED | Noted: 2021-06-10 | Resolved: 2022-01-01

## 2022-12-27 PROBLEM — I06.0 RHEUMATIC AORTIC STENOSIS: Chronic | Status: ACTIVE | Noted: 2021-04-22

## 2022-12-27 PROBLEM — D62 ACUTE BLOOD LOSS ANEMIA: Status: RESOLVED | Noted: 2021-05-11 | Resolved: 2022-01-01

## 2022-12-27 PROBLEM — I65.21 INTERNAL CAROTID ARTERY OCCLUSION, RIGHT: Chronic | Status: ACTIVE | Noted: 2020-09-30

## 2022-12-27 PROBLEM — Z98.890 STATUS POST CAROTID ENDARTERECTOMY: Chronic | Status: ACTIVE | Noted: 2020-10-29

## 2022-12-27 PROBLEM — E87.20 LACTIC ACIDOSIS: Status: RESOLVED | Noted: 2022-01-01 | Resolved: 2022-01-01

## 2022-12-27 PROBLEM — R55 SYNCOPE: Status: RESOLVED | Noted: 2022-01-01 | Resolved: 2022-01-01

## 2022-12-27 PROBLEM — I50.22 CHRONIC SYSTOLIC CONGESTIVE HEART FAILURE (HCC): Chronic | Status: ACTIVE | Noted: 2021-03-09

## 2022-12-27 NOTE — ASSESSMENT & PLAN NOTE
· POA, source possibly aspiration pneumonia that may have occurred during cardiac arrest  · Awaiting blood cultures and sputum cultures  · Continue antibiotics

## 2022-12-27 NOTE — ASSESSMENT & PLAN NOTE
Lab Results   Component Value Date    HGBA1C 6 7 (A) 11/04/2022       Recent Labs     12/26/22 2002 12/26/22  2201 12/27/22  0008 12/27/22  0105   POCGLU 99 116 131 126       Blood Sugar Average: Last 72 hrs:  (P) 152     SSI

## 2022-12-27 NOTE — ASSESSMENT & PLAN NOTE
· Witnessed arrest at home  Initial responders had shockable rhythm believed to be V Fib  Shocked x 2 with ROSC and given lidocaine 100mg x 2  · No STEMI on EKG  Discussed with cardiology -will need ischemic work-up  · TTM initiated however patient following commands so then discontinued  · Repeat echo 45 to 50%  · Continue heparin infusion  · Had recent syncopal episodes worked up by neurology - ?arrythmia    Likely will need a defibrillator before discharge

## 2022-12-27 NOTE — RESPIRATORY THERAPY NOTE
Pt recd on a Blue c3 vent setting as per flow sheet all alarms on and functional vent working propely  Vent check pt stable   Pt restrain are secure

## 2022-12-27 NOTE — ASSESSMENT & PLAN NOTE
· Currently in NSR  · Holding eliquis  · Brief episode overnight of what appeared to be atrial fibrillation    Resolved with amnio bolus x1

## 2022-12-27 NOTE — ASSESSMENT & PLAN NOTE
· Intubated for inability to protect airway  · AC/VC fio2 40/peep 6  · SBT daily  · Concern for aspiration pneumonia POA around time of cardiac arrest, patient was started on Unasyn day #2

## 2022-12-27 NOTE — ASSESSMENT & PLAN NOTE
Lab Results   Component Value Date    EGFR 55 12/26/2022    EGFR 57 12/26/2022    EGFR 49 12/25/2022    CREATININE 1 25 12/26/2022    CREATININE 1 21 12/26/2022    CREATININE 1 37 (H) 12/25/2022     · Cr around 1 2, currently at baseline

## 2022-12-27 NOTE — ASSESSMENT & PLAN NOTE
Wt Readings from Last 3 Encounters:   12/26/22 71 7 kg (158 lb)   12/05/22 73 5 kg (162 lb)   11/29/22 73 5 kg (162 lb)     · Most recent EF now improved 50% 7/2022  · Repeat echo with EF 45 to 50%

## 2022-12-27 NOTE — ASSESSMENT & PLAN NOTE
· 5/2021 CABGx5 (LIMA to LAD, SVG to D1, SVG -Y- to ramus intermedius and OM1, SVG to R PDA) and tissue AVR 23 mm [employer]  · Maintained on Coreg/lisinopril/hctz (on hold)

## 2022-12-27 NOTE — PROGRESS NOTES
C[facility] 46  Progress Note - Kiera Navarro 1945, 69 y o  male MRN: [id number]  Unit/Bed#: ICU 09 Encounter: [id number]  Primary Care Provider: Hugo Castro MD   Date and time admitted to hospital: 12/25/2022  8:33 PM    * Cardiac arrest [facility])  Assessment & Plan  · Witnessed arrest at home  Initial responders had shockable rhythm believed to be V Fib  Shocked x 2 with ROSC and given lidocaine 100mg x 2  · No STEMI on EKG  Discussed with cardiology -will need ischemic work-up  · TTM initiated however patient following commands so then discontinued  · Repeat echo 45 to 50%  · Continue heparin infusion  · Had recent syncopal episodes worked up by neurology - ?arrythmia    Likely will need a defibrillator before discharge     Coronary artery disease involving native coronary artery of native heart  Assessment & Plan  · 5/2021 CABGx5 (LIMA to LAD, SVG to D1, SVG -Y- to ramus intermedius and OM1, SVG to R PDA) and tissue AVR 23 mm [facility] Ease  · Maintained on Coreg/lisinopril/hctz (on hold)    Acute respiratory failure with hypoxia (HCC)  Assessment & Plan  · Intubated for inability to protect airway  · AC/VC fio2 40/peep 6  · SBT daily  · Concern for aspiration pneumonia POA around time of cardiac arrest, patient was started on Unasyn day #2    Severe sepsis ([facility] [address] )  Assessment & Plan  · POA, source possibly aspiration pneumonia that may have occurred during cardiac arrest  · Awaiting blood cultures and sputum cultures  · Continue antibiotics    Chronic systolic congestive heart failure (HCC)  Assessment & Plan  Wt Readings from Last 3 Encounters:   12/26/22 71 7 kg (158 lb)   12/05/22 73 5 kg (162 lb)   11/29/22 73 5 kg (162 lb)     · Most recent EF now improved 50% 7/2022  · Repeat echo with EF 45 to 50%        Hyperlipidemia  Assessment & Plan  · Continue statin    Elevated troponin  Assessment & Plan  · Likely related to cardiac arrest/compressions/shock   · Trend troponins  · Consider heparin infusion tomorrow  · Repeat EKG in AM    Stage 3a chronic kidney disease [facility])  Assessment & Plan  Lab Results   Component Value Date    EGFR 55 12/26/2022    EGFR 57 12/26/2022    EGFR 49 12/25/2022    CREATININE 1 25 12/26/2022    CREATININE 1 21 12/26/2022    CREATININE 1 37 (H) 12/25/2022     · Cr around 1 2, currently at baseline    Benign essential hypertension  Assessment & Plan  · Hold antihypertensive medications for now as required pressors briefly    Lactic acidosis-resolved as of 12/27/2022  Assessment & Plan  · Secondary to cardiac arrest and not sepsis  · Trend in AM    Controlled diabetes mellitus with diabetic neuropathy [facility])  Assessment & Plan  Lab Results   Component Value Date    HGBA1C 6 7 (A) 11/04/2022       Recent Labs     12/26/22 2002 12/26/22  2201 12/27/22  0008 12/27/22  0105   POCGLU 99 116 131 126       Blood Sugar Average: Last 72 hrs:  (P) 152     SSI    PAF (paroxysmal atrial fibrillation) (HCC)  Assessment & Plan  · Currently in NSR  · Holding eliquis  · Brief episode overnight of what appeared to be atrial fibrillation  Resolved with amnio bolus x1    S/P AVR  Assessment & Plan  · 5/2021 tissue AVR 23 mm [facility] Ease    Internal carotid artery occlusion, right  Assessment & Plan  · Chronic, no intervention      ----------------------------------------------------------------------------------------  HPI/24hr events: Patient had an episode of tachycardia which appeared to be atrial fibrillation with RVR    Episode was brief, self resolved as was being given amiodarone 150 mg and magnesium 2gms    Patient appropriate for transfer out of the ICU today?: No  Disposition: Continue Critical Care   Code Status: Level 1 - Full Code  ---------------------------------------------------------------------------------------  SUBJECTIVE  Intubated to follows commands    Review of Systems  Review of systems was unable to be performed secondary to Intubated  ---------------------------------------------------------------------------------------  OBJECTIVE    Vitals   Vitals:    22 0228 22 0300 22 0310 22 0400   BP:    125/69   Pulse:    65   Resp:    20   Temp:  (!) 97 3 °F (36 3 °C)  98 1 °F (36 7 °C)   TempSrc:  Esophageal  Esophageal   SpO2: 99%  99% 98%   Weight:       Height:         Temp (24hrs), Av 8 °F (36 6 °C), Min:96 1 °F (35 6 °C), Max:99 3 °F (37 4 °C)  Current: Temperature: 98 1 °F (36 7 °C)          Respiratory:  SpO2: SpO2: 98 %, SpO2 Activity: SpO2 Activity: At Rest       Invasive/non-invasive ventilation settings   Respiratory    Lab Data (Last 4 hours)    None         O2/Vent Data (Last 4 hours)    None                Physical Exam  Vitals and nursing note reviewed  Constitutional:       Appearance: Normal appearance  He is not ill-appearing  HENT:      Head: Normocephalic and atraumatic  Mouth/Throat:      Mouth: Mucous membranes are moist    Eyes:      Pupils: Pupils are equal, round, and reactive to light  Cardiovascular:      Rate and Rhythm: Normal rate and regular rhythm  Pulses: Normal pulses  Heart sounds: No murmur heard  Pulmonary:      Effort: Pulmonary effort is normal  No respiratory distress  Abdominal:      General: Abdomen is flat  Bowel sounds are normal  There is no distension  Musculoskeletal:         General: No swelling  Skin:     General: Skin is warm  Neurological:      General: No focal deficit present  Mental Status: He is alert  Comments:  Following commands             Laboratory and Diagnostics:  Results from last 7 days   Lab Units 22  07228 225   WBC Thousand/uL 8 50  --  12 96*   HEMOGLOBIN g/dL 13 0  --  14 0   HEMATOCRIT % 39 0  --  43 0   PLATELETS Thousands/uL 154 155 183   NEUTROS PCT % 83*  --  42*   MONOS PCT % 5  --  4     Results from last 7 days   Lab Units 22  1218 22  0557 229 SODIUM mmol/L 137 139 138   POTASSIUM mmol/L 3 9 4 6 5 2   CHLORIDE mmol/L 103 104 101   CO2 mmol/L 23 26 21   ANION GAP mmol/L 11 9 16*   BUN mg/dL 24 25 22   CREATININE mg/dL 1 25 1 21 1 37*   CALCIUM mg/dL 7 8* 8 1* 8 4   GLUCOSE RANDOM mg/dL 189* 230* 205*   ALT U/L  --  116* 149*   AST U/L  --  227* 344*   ALK PHOS U/L  --  96 100   ALBUMIN g/dL  --  3 7 4 0   TOTAL BILIRUBIN mg/dL  --  0 54 0 36     Results from last 7 days   Lab Units 12/26/22  1218 12/26/22  0557 12/25/22  2035   MAGNESIUM mg/dL 2 4 1 7 1 5*   PHOSPHORUS mg/dL 4 4* 3 9  --       Results from last 7 days   Lab Units 12/27/22  0227 12/26/22  1939 12/26/22  1218 12/26/22  0557   INR   --   --   --  1 12   PTT seconds 61* 95* 94* 29          Results from last 7 days   Lab Units 12/26/22  0918 12/26/22  0557 12/25/22  2236 12/25/22  2035   LACTIC ACID mmol/L 2 5* 2 7* 3 9* 8 2*     ABG:  Results from last 7 days   Lab Units 12/26/22  0455   PH ART  7 350   PCO2 ART mm Hg 34 5*   PO2 ART mm Hg 70 1*   HCO3 ART mmol/L 18 6*   BASE EXC ART mmol/L -6 1   ABG SOURCE  Radial, Right     VBG:  Results from last 7 days   Lab Units 12/26/22  0455   ABG SOURCE  Radial, Right     Results from last 7 days   Lab Units 12/26/22  0557 12/25/22  2236   PROCALCITONIN ng/ml 0 67* 0 07       Micro  Results from last 7 days   Lab Units 12/26/22  1004 12/26/22  0057 12/25/22  2310   BLOOD CULTURE   --   --  Received in Microbiology Lab  Culture in Progress  Received in Microbiology Lab  Culture in Progress  GRAM STAIN RESULT   --  Rare Polys*  Rare Gram positive cocci in pairs*  Rare Gram variable rods*  --    LEGIONELLA URINARY ANTIGEN  Negative  --   --    STREP PNEUMONIAE ANTIGEN, URINE  Negative  --   --        EKG: Normal sinus rhythm  Imaging: I have personally reviewed pertinent reports        Intake and Output  I/O       12/25 0701 12/26 0700 12/26 0701 12/27 0700    I V  (mL/kg) 398 1 (5 5) 237 (3 3)    NG/GT  0    IV Piggyback 1100 450    Total Intake(mL/kg) 1498 1 (20 8) 687 (9 6)    Urine (mL/kg/hr) 950 725 (0 4)    Emesis/NG output 50     Total Output 1000 725    Net +498 1 -38                Height and Weights   Height: 5' 9" (175 3 cm)  IBW (Ideal Body Weight): 70 7 kg  Body mass index is 23 33 kg/m²  Weight (last 2 days)     Date/Time Weight    12/26/22 1102 71 7 (158)    12/26/22 0600 71 9 (158 51)    12/26/22 0134 74 7 (164 68)            Nutrition       Diet Orders   (From admission, onward)             Start     Ordered    12/26/22 0954  Diet Enteral/Parenteral; Tube Feeding No Oral Diet; Jevity 1 2 Primo; Continuous; 20  Diet effective now        References:    Nutrtion Support Algorithm Enteral vs  Parenteral   Question Answer Comment   Diet Type Enteral/Parenteral    Enteral/Parenteral Tube Feeding No Oral Diet    Tube Feeding Formula: Jevity 1 2 Primo    Bolus/Cyclic/Continuous Continuous    Tube Feeding Goal Rate (mL/hr): 20    RD to adjust diet per protocol?  Yes        12/26/22 0953    12/26/22 0814  Room Service  Once        Question:  Type of Service  Answer:  Room Service - Appropriate with Assistance    12/26/22 0813                  Active Medications  Scheduled Meds:  Current Facility-Administered Medications   Medication Dose Route Frequency Provider Last Rate   • acetaminophen  975 mg Oral Q8H [address] Hyun Spironello V, CRNP     • amiodarone          • ampicillin-sulbactam  3 g Intravenous Q6H Hyun Spironello V, CRNP 3 g (12/27/22 0209)   • aspirin  325 mg Per NG Tube Daily Hyun Spironello V, CRNP     • busPIRone  30 mg Oral Q8H Hyun Spironello V, CRNP     • chlorhexidine  15 mL Mouth/Throat Z03B [address] John Waller PA-C     • fentaNYL  50 mcg/hr Intravenous Continuous Hyun Spironello V, CRNP 50 mcg/hr (12/27/22 0228)   • fentanyl citrate (PF)  50 mcg Intravenous Q1H PRN Hyun Spironello V, CRNP     • heparin (porcine)  3-20 Units/kg/hr (Order-Specific) Intravenous Titrated John Waller PA-C 8 Units/kg/hr (12/26/22 2032)   • heparin (porcine)  2,000 Units Intravenous W3B PRN Cristian Monet PA-C     • heparin (porcine)  4,000 Units Intravenous G0B PRN Dane Phalen Rudd, PA-C     • insulin lispro  1-6 Units Subcutaneous B6I [address] Cristian Monet PA-C     • levalbuterol  0 63 mg Nebulization Q6H Hyun Spironello V, CRNP     • omeprazole (PRILOSEC) suspension 2 mg/mL  20 mg Oral Daily Cristian Monet PA-C     • propofol  5-50 mcg/kg/min Intravenous Titrated Dane Phalen Rudd, PA-C 35 mcg/kg/min (12/27/22 0228)   • sodium chloride  4 mL Nebulization Q6H Hyun Spironello V, CRNP       Continuous Infusions:  fentaNYL, 50 mcg/hr, Last Rate: 50 mcg/hr (12/27/22 0228)  heparin (porcine), 3-20 Units/kg/hr (Order-Specific), Last Rate: 8 Units/kg/hr (12/26/22 2032)  propofol, 5-50 mcg/kg/min, Last Rate: 35 mcg/kg/min (12/27/22 0228)      PRN Meds:   fentanyl citrate (PF), 50 mcg, Q1H PRN  heparin (porcine), 2,000 Units, Q6H PRN  heparin (porcine), 4,000 Units, Q6H PRN        Invasive Devices Review  Invasive Devices     Central Venous Catheter Line  Duration           CVC Central Lines 12/25/22 Triple Right Femoral 1 day          Peripheral Intravenous Line  Duration           Peripheral IV 12/25/22 Left Antecubital 1 day    Long-Dwell Peripheral IV (Midline) 23/66/46 Left Basilic <1 day          Drain  Duration           NG/OG/Enteral Tube Orogastric 16 Fr Center mouth 1 day    Urethral Catheter 1 day          Airway  Duration           ETT  7 5 mm 1 day                Rationale for remaining devices: Critically ill  ---------------------------------------------------------------------------------------  Advance Directive and Living Will:      Power of :    POLST:    ---------------------------------------------------------------------------------------  Care Time Delivered:   Upon my evaluation, this patient had a high probability of imminent or life-threatening deterioration due to Cardiac arrest, hypoxic respiratory failure, atrial fibrillation, which required my direct attention, intervention, and personal management  I have personally provided 32 minutes ([phone] to [phone]) of critical care time, exclusive of procedures, teaching, family meetings, and any prior time recorded by providers other than myself  Princess Sean PA-C      Portions of the record may have been created with voice recognition software  Occasional wrong word or "sound a like" substitutions may have occurred due to the inherent limitations of voice recognition software    Read the chart carefully and recognize, using context, where substitutions have occurred

## 2022-12-28 NOTE — ASSESSMENT & PLAN NOTE
Lab Results   Component Value Date    HGBA1C 6 7 (A) 11/04/2022       Recent Labs     12/27/22  1137 12/27/22  1721 12/28/22  0013 12/28/22  0700   POCGLU 245* 175* 161* 150*       Blood Sugar Average: Last 72 hrs:  (P) 073 [id number]     · SSI

## 2022-12-28 NOTE — ASSESSMENT & PLAN NOTE
· Likely related to cardiac arrest/compressions/shock   · Trend troponins  · Continue heparin infusion

## 2022-12-28 NOTE — PHYSICAL THERAPY NOTE
PHYSICAL THERAPY EVALUATION/TREATMENT     12/28/22 1505   PT Last Visit   PT Visit Date 12/28/22   Note Type   Note type Evaluation   Pain Assessment   Pain Assessment Tool 0-10   Pain Score No Pain   Restrictions/Precautions   Weight Bearing Precautions Per Order No   Other Precautions Chair Alarm; Bed Alarm;Multiple lines;Telemetry;O2;Fall Risk  (no OOB today per RN)   Home Living   Type of [address] [place] [address] Two level;Bed/bath upstairs;1/2 bath on main level  (0 RENETTA)   Prior Function   Level of [place] Independent with ADLs; Independent with functional mobility; Independent with IADLS   Lives With Significant other   Receives Help From Family   IADLs Independent with driving; Independent with meal prep; Independent with medication management   Vocational Retired   [employer]   Additional Pertinent History Pt admitted with MI   Family/Caregiver Present No   Cognition   Overall Cognitive Status WFL   Arousal/Participation Cooperative   Orientation Level Oriented to person;Oriented to place;Oriented to time   Memory Decreased recall of recent events   Following Commands Follows multistep commands with increased time or repetition   Subjective   Subjective Pt would like to get OOB   RLE Assessment   RLE Assessment WFL  (strength: grossly WFLs )   LLE Assessment   LLE Assessment   (knee flexion to 90* (golf injury per pt ) Strength grossly 3/5 at knee, 4/5 for hip and 5/5 for ankle)   Bed Mobility   Rolling R 6  Modified independent   Additional items Verbal cues   Rolling L 6  Modified independent   Additional items Verbal cues   Additional Comments Pt is able to pull himself to longsit   Dependent boost to head of bed  Transfers   Sit to Stand Unable to assess   Additional Comments RN deferred OOB today due to tachy HR earlier and now under control     Ambulation/Elevation   Gait Assistance Not tested   Activity Tolerance   Activity Tolerance Patient limited by fatigue;Treatment limited secondary to medical complications (Comment)   Nurse Made Aware yes: Guerline   Assessment   Prognosis Good   Problem List Decreased strength;Decreased endurance; Impaired balance;Decreased mobility; Decreased safety awareness; Impaired judgement   Assessment Patient seen for Physical Therapy evaluation  Patient admitted with Cardiac arrest [facility])  Comorbidities affecting patient's physical performance include: CABG x 5 5/2021, CHF, arthritis DM, HTN  Personal factors affecting patient at time of initial evaluation include: lives in two story house, inability to ambulate household distances, inability to navigate community distances, inability to navigate level surfaces without external assistance, impulsivity, limited insight into impairments, inability to perform ADLS and inability to perform IADLS   Prior to admission, patient was independent with functional mobility without assistive device, independent with ADLS, independent with IADLS, living with significant other in a two level home with 0 steps to enter, ambulating household distance, ambulating community distances and retired  Please find objective findings from Physical Therapy assessment regarding body systems outlined above with impairments and limitations including weakness, impaired balance, decreased endurance, gait deviations, decreased activity tolerance, decreased functional mobility tolerance, decreased safety awareness, impaired judgement, fall risk and decreased cognition  The Barthel Index was used as a functional outcome tool presenting with a score of Barthel Index Score: 35 today indicating marked limitations of functional mobility and ADLS  Patient's clinical presentation is currently unstable/unpredictable as seen in patient's presentation of vital sign response, varying levels of cognitive performance, increased fall risk, new onset of impairment of functional mobility, decreased endurance and new onset of weakness   Pt would benefit from continued Physical Therapy treatment to address deficits as defined above and maximize level of functional mobility  As demonstrated by objective findings, the assigned level of complexity for this evaluation is high  The patient's AM-PAC Basic Mobility Inpatient Short Form Raw Score is 17  A Raw score of greater than 16 suggests the patient may benefit from discharge to post-acute rehabilitation services, however this is TBD once functional mobility assessment is complete    Please also refer to the recommendation of the Physical Therapist for safe discharge planning  Goals   Patient Goals to be able to get up OOB   STG Expiration Date 01/04/23   Short Term Goal #1 Indep transfers supine < > short sit and sit <> stand with good balance   Short Term Goal #2 Supervision for amb  without AD for functional household distances with fair + balance   LTG Expiration Date 01/11/23   Long Term Goal #1 Indep amb  without AD for community distances with good balance   Long Term Goal #2 Indep navigating stairs to allow pt to get to second level of his home  Plan   Treatment/Interventions Functional transfer training;LE strengthening/ROM; Elevations; Therapeutic exercise; Endurance training;Patient/family training;Equipment eval/education; Bed mobility;Gait training;Spoke to nursing   PT Frequency Other (Comment)  (5/wk)   Recommendation   PT Discharge Recommendation Post acute rehabilitation services   Additional Comments At this time, unable to assess functional mobility due to OOB restriction by RN due to cardiac issues  At this time will recommend STR pending progress during hospital course     AM-PAC Basic Mobility Inpatient   Turning in Bed Without Bedrails 4   Lying on Back to Sitting on Edge of Flat Bed 3   Moving Bed to Chair 3   Standing Up From Chair 3   Walk in Room 2   Climb 3-5 Stairs 2   Basic Mobility Inpatient Raw Score 17   Basic Mobility Standardized Score 39 67   Highest Level Of Mobility   [facility] Goal 5: Stand one or more mins   NANO-ESTEFANI Achieved 2: Bed activities/Dependent transfer  ([facility], but probability of mobility : no OOB per RN today  Will assess when medically appropriate )   Barthel Index   Feeding 5   Bathing 0   Grooming Score 0   Dressing Score 5   Bladder Score 0   Bowels Score 10   Toilet Use Score 5   Transfers (Bed/Chair) Score 10   Mobility (Level Surface) Score 0   Stairs Score 0   Barthel Index Score 35   Additional Treatment Session   Start Time 1455   End Time 1505   Treatment Assessment pt seen for evaluation and followed by bedside strengthening exercises to Te , see below  A: Tolerated well  Pt has decreased safety awareness   P: Recommend: STR pending progress during hosptial course  Exercises   Heelslides Supine;10 reps;Bilateral   Hip Flexion Supine;10 reps;AAROM; Bilateral  (SLRs )   Hip Abduction Supine;10 reps;AAROM; Bilateral   Knee AROM Short Arc Quad Supine;10 reps;Bilateral   Ankle Pumps Supine;20 reps;Bilateral   UE Exercise Supine;5 reps;Bilateral  (shoulder flexion)   End of Consult   Patient Position at End of Consult Supine;Bed/Chair alarm activated; All needs within reach   End of Consult Comments Rn with pt at end of session   Licensure   [place] License Number  James Tyree Jewellence Pay PT  [id number]

## 2022-12-28 NOTE — PROGRESS NOTES
Progress Note - Cardiology   [facility]     Joseline Patel 69 y o  male MRN: [id number]  : 1945  Unit/Bed#: ICU 09 Encounter: [id number]    Assessment and Plan:   1  Cardiac arrest: Missed arrest on 2022 with bystander CPR    -   EMS responders noted shockable rhythm, patient shocked x2 with ROSC    -   Patient also given lidocaine 100 mg IV x2   -    Postarrest echocardiogram noted EF had dropped to approximately 35% by visual estimation, with moderate global hypokinesis    -   Previous echo from 2022 demonstrated an EF of 50%    -   Continue beta-blocker, Lopressor 50 mg twice daily    -   ACE/ARB currently held due to acute kidney injury on chronic kidney disease    -   Recommend transfer to [facility] for cardiac catheterization and for ICD implantation    2  Ventricular tachycardia: Patient noted with intermittent short salvos of monomorphic ventricular tachycardia starting on 2022    -   Received 2 boluses of IV amiodarone and started on drip a m  of 2022    -   Continue beta-blocker    -   Transfer to [place] for ICD implantation and EP evaluation    3  Paroxysmal atrial fibrillation: Episodes noted to begin after his cardiac surgery    -   Was on Coreg and Eliquis at home which were held on admission    -   Started on Lopressor 50 mg every 12 hours per ICU staff    -   Also on IV amiodarone as above    -   Eliquis currently on hold and patient is on IV heparin    -   Transition back to p o  anticoagulant once invasive procedures are completed    4  Chronic kidney disease stage III: Appears to be around 1 2     -   Continue to monitor    5  Abnormal troponins in the setting of cardiac arrest and defibrillation: Peak sensitivity troponin was 3917    -   Continue Lopressor 50 mg every 12 hours    -   Continue IV heparin ACS protocol to    -   For transfer to [facility] for ischemia    6    Diabetes: Hemoglobin A1c is 6 7, managed per primary team    7  Coronary artery disease with history of CABG x5: Underwent LIMA to LAD, SVG to diagonal 1, SVG Y to ramus intermedius and obtuse marginal 1 and SVG to RPDA performed at [facility] in May 2021    8  Aortic stenosis with history of bioprosthetic AVR:  Tissue AVR 23 mm [facility] Ease aortic valve implanted at time of CABG in May 2021    9  Right-sided carotid stenosis: Chronically occluded    10  Recent syncopal episode: Question arrhythmia    11  Vent dependent respiratory failure: Resolved, patient extubated on 12/27/2022    Subjective / Objective:   Patient seen and examined  Appears to be alert and oriented and answering questions appropriately  Last night converted into rapid atrial fibrillation and then began to have short runs of monomorphic ventricular tachycardia  Initially last evening patient received a bolus of amiodarone 150 mg  This morning again patient began having short runs of monomorphic ventricular tachycardia and he was rebolus with amiodarone followed by amiodarone drip  Patient also started on Lopressor 50 mg every 12 hours  Vitals: Blood pressure 137/89, pulse 91, temperature 98 2 °F (36 8 °C), temperature source Temporal, resp  rate (!) 32, height 5' 9" (1 753 m), weight 73 5 kg (162 lb 0 6 oz), SpO2 96 %  Vitals:    12/27/22 0600 12/28/22 0600   Weight: 73 5 kg (162 lb 0 6 oz) 73 5 kg (162 lb 0 6 oz)     Body mass index is 23 93 kg/m²  BP Readings from Last 3 Encounters:   12/28/22 137/89   12/05/22 130/86   11/29/22 142/80     Orthostatic Blood Pressures    Flowsheet Row Most Recent Value   Blood Pressure 137/89 filed at 12/28/2022 1300   Patient Position - Orthostatic VS Lying filed at 12/28/2022 1200        I/O       12/26 0701  12/27 0700 12/27 0701  12/28 0700 12/28 0701 12/29 0700    P  O   240 360    I V  (mL/kg) 768 3 (10 5) 498 (6 8)     NG/GT 0 0     IV Piggyback 600 950 300    Total Intake(mL/kg) 1368 3 (18 6) 1688 (23) 660 (9) Urine (mL/kg/hr) 945 (0 5) 385 (0 2)     Emesis/NG output       Total Output 945 385     Net +423 3 +1303 +660           Unmeasured Urine Occurrence  2 x         Invasive Devices     Peripheral Intravenous Line  Duration           Peripheral IV 12/25/22 Left Antecubital 2 days    Long-Dwell Peripheral IV (Midline) 41/43/75 Left Basilic 1 day    Peripheral IV 12/27/22 Left;Upper;Ventral (anterior) Arm 1 day          Drain  Duration           External Urinary Catheter 1 day                  Intake/Output Summary (Last 24 hours) at 12/28/2022 1413  Last data filed at 12/28/2022 1344  Gross per 24 hour   Intake 1848 93 ml   Output 275 ml   Net 1573 93 ml         Physical Exam:   Physical Exam  Vitals and nursing note reviewed  Constitutional:       General: He is not in acute distress  Appearance: Normal appearance  He is normal weight  HENT:      Right Ear: External ear normal       Left Ear: External ear normal    Eyes:      General: No scleral icterus  Right eye: No discharge  Left eye: No discharge  Cardiovascular:      Rate and Rhythm: Normal rate  Rhythm irregularly irregular  Frequent extrasystoles (PVCs) are present  Pulses: Normal pulses  Pulmonary:      Effort: Pulmonary effort is normal  No accessory muscle usage or respiratory distress  Breath sounds: Examination of the right-lower field reveals decreased breath sounds  Examination of the left-lower field reveals decreased breath sounds  Decreased breath sounds present  Abdominal:      General: Bowel sounds are normal  There is no distension  Palpations: Abdomen is soft  Musculoskeletal:      Right lower leg: No edema  Left lower leg: No edema  Skin:     General: Skin is warm  Capillary Refill: Capillary refill takes less than 2 seconds  Neurological:      Mental Status: He is alert and oriented to person, place, and time     Psychiatric:         Mood and Affect: Mood normal             Medications/ Allergies:     Current Facility-Administered Medications   Medication Dose Route Frequency Provider Last Rate   • acetaminophen  650 mg Oral Q6H PRN MOSHE Watkins     • amiodarone  1 mg/min Intravenous Continuous Dago Bassett, [address] 1 mg/min (12/28/22 9407)    Followed by   • amiodarone  0 5 mg/min Intravenous Continuous [employer], CRNP     • ampicillin-sulbactam  3 g Intravenous Q6H MOSHE Raya 3 g (12/28/22 1344)   • aspirin  81 mg Oral Daily MOSHE Gonzalez     • heparin (porcine)  3-20 Units/kg/hr (Order-Specific) Intravenous Titrated Bro Bergeron PA-C 14 Units/kg/hr (12/28/22 0710)   • heparin (porcine)  2,000 Units Intravenous H6Q PRN Bro Bergeron PA-C     • heparin (porcine)  4,000 Units Intravenous M9H PRN Kristofer Amaro PA-C     • insulin lispro  1-6 Units Subcutaneous TID AC MOSHE Link     • insulin lispro  1-6 Units Subcutaneous HS Marlise MOSHE Chan     • labetalol  20 mg Intravenous Q4H PRN MOSHE Watkins     • levalbuterol  0 63 mg Nebulization Q6H MOSHE Raya     • metoprolol  5 mg Intravenous Q6H PRN MOSHE Coronel     • metoprolol tartrate  50 mg Oral Q12H [address] MOSHE Link     • sodium chloride  4 mL Nebulization Q6H MOSHE Raya       acetaminophen, 650 mg, Q6H PRN  heparin (porcine), 2,000 Units, Q6H PRN  heparin (porcine), 4,000 Units, Q6H PRN  labetalol, 20 mg, Q4H PRN  metoprolol, 5 mg, Q6H PRN      No Known Allergies    VTE Pharmacologic Prophylaxis:   Sequential compression device (Venodyne)     Labs:   Troponins:  Results from last 7 days   Lab Units 12/26/22  1218 12/26/22  0105 12/25/22  2236   HS TNI RAND ng/L 3,886*  --   --    HSTNI D2 ng/L  --   --  1,036*   HSTNI D4 ng/L  --  3,821*  --      CBC with diff:  Results from last 7 days   Lab Units 12/26/22  0723 12/26/22  0058 12/25/22  2035   WBC Thousand/uL 8 50 --  12 96*   HEMOGLOBIN g/dL 13 0  --  14 0   HEMATOCRIT % 39 0  --  43 0   MCV fL 94  --  94   PLATELETS Thousands/uL 154 155 183   MCH pg 31 4  --  30 7   MCHC g/dL 33 3  --  32 6   RDW % 12 8  --  12 6   MPV fL 10 1 10 0 10 2   NRBC AUTO /100 WBCs 0  --  0     CMP:  Results from last 7 days   Lab Units 12/28/22  0528 12/27/22  0604 12/26/22  1218 12/26/22  0557 12/25/22  2035   SODIUM mmol/L 141 138 137 139 138   POTASSIUM mmol/L 4 5 3 4* 3 9 4 6 5 2   CHLORIDE mmol/L 105 104 103 104 101   CO2 mmol/L 23 24 23 26 21   ANION GAP mmol/L 13 10 11 9 16*   BUN mg/dL 29* 25 24 25 22   CREATININE mg/dL 1 42* 1 25 1 25 1 21 1 37*   CALCIUM mg/dL 8 3 7 8* 7 8* 8 1* 8 4   AST U/L  --  65*  --  227* 344*   ALT U/L  --  51  --  116* 149*   ALK PHOS U/L  --  54  --  96 100   TOTAL PROTEIN g/dL  --  5 2*  --  6 7 7 1   ALBUMIN g/dL  --  2 8*  --  3 7 4 0   TOTAL BILIRUBIN mg/dL  --  0 39  --  0 54 0 36   EGFR ml/min/1 73sq m 47 55 55 57 49     Magnesium:  Results from last 7 days   Lab Units 12/28/22  0528 12/27/22  0604 12/26/22  1218 12/26/22  0557 12/25/22  2035   MAGNESIUM mg/dL 2 7* 2 6 2 4 1 7 1 5*     Coags:  Results from last 7 days   Lab Units 12/28/22  1318 12/28/22  0700 12/28/22  0013 12/27/22  1616 12/27/22  0914 12/27/22  0614 12/27/22  0227 12/26/22  1939 12/26/22  1218 12/26/22  0557   PTT seconds 62* 66* 55* 57* 49*  --  61* 95*   < > 29   INR   --   --   --   --   --  1 28*  --   --   --  1 12    < > = values in this interval not displayed  TSH:  Results from last 7 days   Lab Units 12/25/22 2035   TSH 3RD GENERATON uIU/mL 4 543*     NT-proBNP:   Recent Labs     12/25/22 2035   NTBNP 1,032*        Imaging & Testing   I have personally reviewed pertinent reports      CTA head and neck with and without contrast    Result Date: 12/25/2022  Narrative: CT BRAIN - WITHOUT CONTRAST INDICATION:   Dizziness, persistent/recurrent, cardiac or vascular cause suspected cardiac arrest  COMPARISON:  CT head dated 7/30/2022 TECHNIQUE:  CT examination of the brain was performed  In addition to axial images, sagittal and coronal 2D reformatted images were created and submitted for interpretation  Radiation dose length product (DLP) for this visit:  1099 74 mGy-cm  (accession [id number]), 1549 83 mGy-cm  (accession [id number]), 294 mGy-cm  (accession [id number])  This examination, like all CT scans performed in the [facility], was performed utilizing techniques to minimize radiation dose exposure, including the use of iterative reconstruction and automated exposure control  IMAGE QUALITY:  Diagnostic  FINDINGS: PARENCHYMA: Decreased attenuation is noted in periventricular and subcortical white matter demonstrating an appearance that is statistically most likely to represent mild microangiopathic change  Focal encephalomalacia redemonstrated in the right frontal region  No CT signs of acute territorial infarction  No intracranial mass, mass effect or midline shift  No acute parenchymal hemorrhage  Gray-white differentiation otherwise appears maintained  Mild parenchymal atrophy  VENTRICLES AND EXTRA-AXIAL SPACES:  Ventricles and extra-axial CSF spaces are prominent commensurate with the degree of volume loss  No hydrocephalus  No acute extra-axial hemorrhage  VISUALIZED ORBITS AND PARANASAL SINUSES:  Orbits appear intact  Total opacification of the left maxillary sinus  Small polyp/mucous retention cyst in the right maxillary sinus  Visualized paranasal sinuses otherwise grossly clear  CALVARIUM AND EXTRACRANIAL SOFT TISSUES:  Normal      Impression: No acute intracranial abnormality is seen  Other findings as above  CTA NECK AND BRAIN WITH CONTRAST INDICATION: Dizziness, persistent/recurrent, cardiac or vascular cause suspected cardiac arrest   history of chronic right ICA occlusion COMPARISON:   CTA head and neck dated 9/18/2020   TECHNIQUE:  Routine CT imaging of the Brain without contrast   Post contrast imaging was performed after administration of iodinated contrast through the neck and brain  Post contrast axial 0 625 mm images timed to opacify the arterial system  3D rendering was performed on an independent workstation  MIP reconstructions performed  Coronal reconstructions were performed of the noncontrast portion of the brain  Radiation dose length product (DLP) for this visit:  1099 74 mGy-cm  (accession [id number]), 1549 83 mGy-cm  (accession [id number]), 394 mGy-cm  (accession [id number])  This examination, like all CT scans performed in the [facility], was performed utilizing techniques to minimize radiation dose exposure, including the use of iterative reconstruction and automated exposure control  IV Contrast:  Was administered  IMAGE QUALITY:   Diagnostic FINDINGS: CERVICAL VASCULATURE AORTIC ARCH AND GREAT VESSELS:  Mild atherosclerotic disease of the arch, proximal great vessels and visualized subclavian vessels  No significant stenosis  RIGHT VERTEBRAL ARTERY CERVICAL SEGMENT:  Mild stenosis at the origin  The vessel is normal in caliber throughout the neck  LEFT VERTEBRAL ARTERY CERVICAL SEGMENT:  Normal origin  The vessel is small in caliber throughout the neck, but patent  RIGHT EXTRACRANIAL CAROTID SEGMENT:  Moderate atherosclerotic disease of the bifurcation  There is redemonstration of occlusion of the right internal carotid artery at the bifurcation LEFT EXTRACRANIAL CAROTID SEGMENT:  Mild atherosclerotic disease of the distal common carotid artery and proximal cervical internal carotid artery without significant stenosis compared to the more distal ICA  Suspect prior carotid endarterectomy NASCET criteria was used to determine the degree of internal carotid artery diameter stenosis  INTRACRANIAL VASCULATURE INTERNAL CAROTID ARTERIES:  The right internal carotid artery is occluded to the level of the bifurcation where there is reconstitution    Mild to moderate atherosclerosis but otherwise normal enhancement of the intracranial portions of the left internal carotid artery  Normal ophthalmic artery origins  ANTERIOR CIRCULATION:  Hypoplastic right A1 segment  A1 segments and anterior cerebral arteries otherwise with normal enhancement  Normal anterior communicating artery  MIDDLE CEREBRAL ARTERY CIRCULATION:  M1 segment and middle cerebral artery branches demonstrate normal enhancement bilaterally  DISTAL VERTEBRAL ARTERIES:  Mild atherosclerosis  Right dominant vertebrobasilar system, patent distal vertebral arteries  Posterior inferior cerebellar artery origins are normal  Normal vertebral basilar junction  BASILAR ARTERY:  Basilar artery is normal in caliber  Normal superior cerebellar arteries  POSTERIOR CEREBRAL ARTERIES: Both posterior cerebral arteries arises from the basilar tip  Both arteries demonstrate normal enhancement  Normal posterior communicating arteries  VENOUS STRUCTURES:  Patent NON VASCULAR ANATOMY BONY STRUCTURES:  No acute osseous abnormality  SOFT TISSUES OF THE NECK:  Better evaluated on CT cervical spine obtained concurrently THORACIC INLET:  Please refer to the concurrent chest, abdomen, and pelvic CT report for description of the thoracic inlet findings  IMPRESSION: No acute intracranial process is seen  Chronic occlusion of the right internal carotid artery is redemonstrated with distal reconstitution, as described  The vessels of the [place] are patent  No significant stenosis in the left internal carotid artery  Right dominant vertebrobasilar system but patent bilateral vertebral arteries  Degenerative changes of the cervical spine, better evaluated on dedicated CT of the cervical spine  Other findings as above  CT CERVICAL SPINE - WITHOUT CONTRAST INDICATION:   Dizziness, persistent/recurrent, cardiac or vascular cause suspected cardiac arrest  COMPARISON:  None   TECHNIQUE:  CT examination of the cervical spine was performed without intravenous contrast   Contiguous axial images were obtained  Sagittal and coronal reconstructions were performed  Radiation dose length product (DLP) for this visit:  1099 74 mGy-cm  (accession [id number]), 1549 83 mGy-cm  (accession [id number]), 780 mGy-cm  (accession [id number])  This examination, like all CT scans performed in the [facility], was performed utilizing techniques to minimize radiation dose exposure, including the use of iterative reconstruction and automated exposure control  IMAGE QUALITY:  Diagnostic  FINDINGS: Some images are suboptimal secondary to patient motion  Subject to this, ALIGNMENT:  Anterolisthesis of C4 on C5 and C7 on T1 of approximately 2 mm, probably related to facet joint arthropathy at these levels  Spinal alignment otherwise grossly maintained    VERTEBRAL BODIES:  No acute fracture  DEGENERATIVE CHANGES:  Intervertebral disc space narrowing at C5/C6 and C6/C7 with anterior, marginal, and uncovertebral osteophytosis at these levels  Multilevel facet joint arthropathy  PREVERTEBRAL AND PARASPINAL SOFT TISSUES:  Suspected lipoma in the distal right sternocleidomastoid measures approximately 5 cm in size  Otherwise grossly unremarkable THORACIC INLET:  Please refer to the concurrent chest CT report for thoracic inlet findings  IMPRESSION: Some images are suboptimal secondary to patient motion  Subject to this, degenerative changes as described no evidence of acute cervical spine injury  CTA - CHEST, ABDOMEN AND PELVIS - WITH IV CONTRAST INDICATION:   Dizziness, persistent/recurrent, cardiac or vascular cause suspected cardiac arrest  COMPARISON:  None  TECHNIQUE: CT examination of the chest, abdomen and pelvis was performed after the administration of intravenous contrast   The noncontrast portion of this examination was performed utilizing low radiation dose technique    Thin section angiographic arterial phase post contrast technique was used in order to evaluate for aortic dissection  3D reformatted images and volume rendering were performed on an independent workstation  Additionally, axial, sagittal, and coronal 2D reformatted images were created from the source data and submitted for interpretation  Radiation dose length product (DLP) for this visit:  1099 74 mGy-cm  (accession [id number]), 1549 83 mGy-cm  (accession [id number]), 341 mGy-cm  (accession [id number])  This examination, like all CT scans performed in the [facility], was performed utilizing techniques to minimize radiation dose exposure, including the use of iterative reconstruction and automated exposure control  IV Contrast:  Was administered Enteric Contrast:  Enteric contrast was not administered  FINDINGS: AORTA:  Mild atherosclerosis  There is no aortic dissection or intramural hematoma  There is no aortic aneurysm  CHEST LUNGS:  Extensive perihilar groundglass and alveolar opacities are noted with more dense alveolar opacities noted dependently in the bilateral lungs  Some interstitial edema is seen most prominent in the upper lung fields  Could represent edema, aspiration, infection, and/or ARDS  PLEURA:  Unremarkable  HEART/PULMONARY ARTERIAL TREE:  No pulmonary embolism is seen  The heart appears enlarged  Prominence of the right heart chambers with reflux of contrast into the IVC and posterior hepatic veins suggests a component of increased right heart pressures  Coronary atherosclerosis  MEDIASTINUM AND ROSI: Endotracheal tube terminates in the trachea  Status post CABG CHEST WALL AND LOWER NECK: Degenerative changes of the bilateral shoulders, worse on the right; with prominent heterotopic ossification about the right shoulder  There are several anterior rib fractures noted, possibly related to recent CPR  Median sternotomy changes are noted    Cardiac pads overlie the anterior chest ABDOMEN LIVER/BILIARY TREE:  Reflux of contrast is seen in the posterior liver, otherwise grossly unremarkable  GALLBLADDER:  No calcified gallstones  No pericholecystic inflammatory change  SPLEEN:  Unremarkable  PANCREAS:  Unremarkable  ADRENAL GLANDS:  Unremarkable  KIDNEYS/URETERS:  1 5 cm cyst in the upper pole of the right kidney  Several subcentimeter cysts are scattered in the bilateral kidneys, of unlikely clinical significance  Bilateral kidneys otherwise appear grossly unremarkable; no hydronephrosis  STOMACH AND BOWEL:  Scattered colonic diverticulosis  No discrete evidence of acute diverticulitis  No evidence of bowel obstruction  Otherwise grossly unremarkable  Enteric feeding tube terminates in the stomach  APPENDIX:  No findings to suggest appendicitis  ABDOMINOPELVIC CAVITY:  No ascites or free intraperitoneal air  No lymphadenopathy  PELVIS REPRODUCTIVE ORGANS:  The prostate is enlarged  Prostate volume estimated at 70 mL  URINARY BLADDER:  Bladder collapsed around a bladder catheter and not well evaluated  ABDOMINAL WALL/INGUINAL REGIONS:  Body wall edema OSSEOUS STRUCTURES:  Multilevel degenerative changes of the spine, otherwise no acute fracture or destructive osseous lesion  IMPRESSION:  No pulmonary embolism or acute aortic process is seen  Coronary atherosclerosis, status post CABG  Heart appears enlarged  Extensive perihilar groundglass and alveolar opacities are noted with more dense alveolar opacities noted dependently in the bilateral lungs  Some interstitial edema is seen most prominent in the upper lung fields; findings could represent edema, aspiration, infection, and/or ARDS  Prominence of the right heart chambers with reflux of contrast into the IVC and posterior hepatic veins suggests a component of increased right heart pressures  Right renal cyst, colonic diverticulosis without evidence of acute diverticulitis, enlarged prostate, and other findings as above    I personally discussed this study with Oliver Souza on 12/25/2022 at 10:39 PM  Workstation performed: [id number]     XR chest 1 view portable    Result Date: 12/26/2022  Narrative: CHEST INDICATION:   cardiac arrest  COMPARISON:  CXR 5/11/2021 and chest CT 12/25/2022  EXAM PERFORMED/VIEWS:  XR CHEST PORTABLE FINDINGS:  ET tube 5 cm above the leonid  NG tube in stomach  Mild cardiomegaly, AVR, CABG  The lungs are clear  No pneumothorax or pleural effusion  No acute displaced rib fractures  Old right rib fracture  Severe right glenohumeral degenerative disease with calcified loose bodies  Impression: No acute cardiopulmonary disease  Subsequent chest CT shows development of flash pulmonary edema  Workstation performed: [id number]     CT head without contrast    Result Date: 12/25/2022  Narrative: CT BRAIN - WITHOUT CONTRAST INDICATION:   Dizziness, persistent/recurrent, cardiac or vascular cause suspected cardiac arrest  COMPARISON:  CT head dated 7/30/2022 TECHNIQUE:  CT examination of the brain was performed  In addition to axial images, sagittal and coronal 2D reformatted images were created and submitted for interpretation  Radiation dose length product (DLP) for this visit:  1099 74 mGy-cm  (accession [id number]), 1549 83 mGy-cm  (accession [id number]), 811 mGy-cm  (accession [id number])  This examination, like all CT scans performed in the [facility], was performed utilizing techniques to minimize radiation dose exposure, including the use of iterative reconstruction and automated exposure control  IMAGE QUALITY:  Diagnostic  FINDINGS: PARENCHYMA: Decreased attenuation is noted in periventricular and subcortical white matter demonstrating an appearance that is statistically most likely to represent mild microangiopathic change  Focal encephalomalacia redemonstrated in the right frontal region  No CT signs of acute territorial infarction  No intracranial mass, mass effect or midline shift  No acute parenchymal hemorrhage     Gray-white differentiation otherwise appears maintained  Mild parenchymal atrophy  VENTRICLES AND EXTRA-AXIAL SPACES:  Ventricles and extra-axial CSF spaces are prominent commensurate with the degree of volume loss  No hydrocephalus  No acute extra-axial hemorrhage  VISUALIZED ORBITS AND PARANASAL SINUSES:  Orbits appear intact  Total opacification of the left maxillary sinus  Small polyp/mucous retention cyst in the right maxillary sinus  Visualized paranasal sinuses otherwise grossly clear  CALVARIUM AND EXTRACRANIAL SOFT TISSUES:  Normal      Impression: No acute intracranial abnormality is seen  Other findings as above  CTA NECK AND BRAIN WITH CONTRAST INDICATION: Dizziness, persistent/recurrent, cardiac or vascular cause suspected cardiac arrest   history of chronic right ICA occlusion COMPARISON:   CTA head and neck dated 9/18/2020  TECHNIQUE:  Routine CT imaging of the Brain without contrast   Post contrast imaging was performed after administration of iodinated contrast through the neck and brain  Post contrast axial 0 625 mm images timed to opacify the arterial system  3D rendering was performed on an independent workstation  MIP reconstructions performed  Coronal reconstructions were performed of the noncontrast portion of the brain  Radiation dose length product (DLP) for this visit:  1099 74 mGy-cm  (accession [id number]), 1549 83 mGy-cm  (accession [id number]), 605 mGy-cm  (accession [id number])  This examination, like all CT scans performed in the [facility], was performed utilizing techniques to minimize radiation dose exposure, including the use of iterative reconstruction and automated exposure control  IV Contrast:  Was administered  IMAGE QUALITY:   Diagnostic FINDINGS: CERVICAL VASCULATURE AORTIC ARCH AND GREAT VESSELS:  Mild atherosclerotic disease of the arch, proximal great vessels and visualized subclavian vessels  No significant stenosis   RIGHT VERTEBRAL ARTERY CERVICAL SEGMENT:  Mild stenosis at the origin  The vessel is normal in caliber throughout the neck  LEFT VERTEBRAL ARTERY CERVICAL SEGMENT:  Normal origin  The vessel is small in caliber throughout the neck, but patent  RIGHT EXTRACRANIAL CAROTID SEGMENT:  Moderate atherosclerotic disease of the bifurcation  There is redemonstration of occlusion of the right internal carotid artery at the bifurcation LEFT EXTRACRANIAL CAROTID SEGMENT:  Mild atherosclerotic disease of the distal common carotid artery and proximal cervical internal carotid artery without significant stenosis compared to the more distal ICA  Suspect prior carotid endarterectomy NASCET criteria was used to determine the degree of internal carotid artery diameter stenosis  INTRACRANIAL VASCULATURE INTERNAL CAROTID ARTERIES:  The right internal carotid artery is occluded to the level of the bifurcation where there is reconstitution  Mild to moderate atherosclerosis but otherwise normal enhancement of the intracranial portions of the left internal carotid artery  Normal ophthalmic artery origins  ANTERIOR CIRCULATION:  Hypoplastic right A1 segment  A1 segments and anterior cerebral arteries otherwise with normal enhancement  Normal anterior communicating artery  MIDDLE CEREBRAL ARTERY CIRCULATION:  M1 segment and middle cerebral artery branches demonstrate normal enhancement bilaterally  DISTAL VERTEBRAL ARTERIES:  Mild atherosclerosis  Right dominant vertebrobasilar system, patent distal vertebral arteries  Posterior inferior cerebellar artery origins are normal  Normal vertebral basilar junction  BASILAR ARTERY:  Basilar artery is normal in caliber  Normal superior cerebellar arteries  POSTERIOR CEREBRAL ARTERIES: Both posterior cerebral arteries arises from the basilar tip  Both arteries demonstrate normal enhancement  Normal posterior communicating arteries  VENOUS STRUCTURES:  Patent NON VASCULAR ANATOMY BONY STRUCTURES:  No acute osseous abnormality   SOFT TISSUES OF THE NECK:  Better evaluated on CT cervical spine obtained concurrently THORACIC INLET:  Please refer to the concurrent chest, abdomen, and pelvic CT report for description of the thoracic inlet findings  IMPRESSION: No acute intracranial process is seen  Chronic occlusion of the right internal carotid artery is redemonstrated with distal reconstitution, as described  The vessels of the [place] are patent  No significant stenosis in the left internal carotid artery  Right dominant vertebrobasilar system but patent bilateral vertebral arteries  Degenerative changes of the cervical spine, better evaluated on dedicated CT of the cervical spine  Other findings as above  CT CERVICAL SPINE - WITHOUT CONTRAST INDICATION:   Dizziness, persistent/recurrent, cardiac or vascular cause suspected cardiac arrest  COMPARISON:  None  TECHNIQUE:  CT examination of the cervical spine was performed without intravenous contrast   Contiguous axial images were obtained  Sagittal and coronal reconstructions were performed  Radiation dose length product (DLP) for this visit:  1099 74 mGy-cm  (accession [id number]), 1549 83 mGy-cm  (accession [id number]), 273 mGy-cm  (accession [id number])  This examination, like all CT scans performed in the [facility], was performed utilizing techniques to minimize radiation dose exposure, including the use of iterative reconstruction and automated exposure control  IMAGE QUALITY:  Diagnostic  FINDINGS: Some images are suboptimal secondary to patient motion  Subject to this, ALIGNMENT:  Anterolisthesis of C4 on C5 and C7 on T1 of approximately 2 mm, probably related to facet joint arthropathy at these levels  Spinal alignment otherwise grossly maintained    VERTEBRAL BODIES:  No acute fracture  DEGENERATIVE CHANGES:  Intervertebral disc space narrowing at C5/C6 and C6/C7 with anterior, marginal, and uncovertebral osteophytosis at these levels  Multilevel facet joint arthropathy  PREVERTEBRAL AND PARASPINAL SOFT TISSUES:  Suspected lipoma in the distal right sternocleidomastoid measures approximately 5 cm in size  Otherwise grossly unremarkable THORACIC INLET:  Please refer to the concurrent chest CT report for thoracic inlet findings  IMPRESSION: Some images are suboptimal secondary to patient motion  Subject to this, degenerative changes as described no evidence of acute cervical spine injury  CTA - CHEST, ABDOMEN AND PELVIS - WITH IV CONTRAST INDICATION:   Dizziness, persistent/recurrent, cardiac or vascular cause suspected cardiac arrest  COMPARISON:  None  TECHNIQUE: CT examination of the chest, abdomen and pelvis was performed after the administration of intravenous contrast   The noncontrast portion of this examination was performed utilizing low radiation dose technique  Thin section angiographic arterial phase post contrast technique was used in order to evaluate for aortic dissection  3D reformatted images and volume rendering were performed on an independent workstation  Additionally, axial, sagittal, and coronal 2D reformatted images were created from the source data and submitted for interpretation  Radiation dose length product (DLP) for this visit:  1099 74 mGy-cm  (accession [id number]), 1549 83 mGy-cm  (accession [id number]), 034 mGy-cm  (accession [id number])  This examination, like all CT scans performed in the [facility], was performed utilizing techniques to minimize radiation dose exposure, including the use of iterative reconstruction and automated exposure control  IV Contrast:  Was administered Enteric Contrast:  Enteric contrast was not administered  FINDINGS: AORTA:  Mild atherosclerosis  There is no aortic dissection or intramural hematoma  There is no aortic aneurysm  CHEST LUNGS:  Extensive perihilar groundglass and alveolar opacities are noted with more dense alveolar opacities noted dependently in the bilateral lungs    Some interstitial edema is seen most prominent in the upper lung fields  Could represent edema, aspiration, infection, and/or ARDS  PLEURA:  Unremarkable  HEART/PULMONARY ARTERIAL TREE:  No pulmonary embolism is seen  The heart appears enlarged  Prominence of the right heart chambers with reflux of contrast into the IVC and posterior hepatic veins suggests a component of increased right heart pressures  Coronary atherosclerosis  MEDIASTINUM AND ROSI: Endotracheal tube terminates in the trachea  Status post CABG CHEST WALL AND LOWER NECK: Degenerative changes of the bilateral shoulders, worse on the right; with prominent heterotopic ossification about the right shoulder  There are several anterior rib fractures noted, possibly related to recent CPR  Median sternotomy changes are noted  Cardiac pads overlie the anterior chest ABDOMEN LIVER/BILIARY TREE:  Reflux of contrast is seen in the posterior liver, otherwise grossly unremarkable  GALLBLADDER:  No calcified gallstones  No pericholecystic inflammatory change  SPLEEN:  Unremarkable  PANCREAS:  Unremarkable  ADRENAL GLANDS:  Unremarkable  KIDNEYS/URETERS:  1 5 cm cyst in the upper pole of the right kidney  Several subcentimeter cysts are scattered in the bilateral kidneys, of unlikely clinical significance  Bilateral kidneys otherwise appear grossly unremarkable; no hydronephrosis  STOMACH AND BOWEL:  Scattered colonic diverticulosis  No discrete evidence of acute diverticulitis  No evidence of bowel obstruction  Otherwise grossly unremarkable  Enteric feeding tube terminates in the stomach  APPENDIX:  No findings to suggest appendicitis  ABDOMINOPELVIC CAVITY:  No ascites or free intraperitoneal air  No lymphadenopathy  PELVIS REPRODUCTIVE ORGANS:  The prostate is enlarged  Prostate volume estimated at 70 mL  URINARY BLADDER:  Bladder collapsed around a bladder catheter and not well evaluated   ABDOMINAL WALL/INGUINAL REGIONS:  Body wall edema OSSEOUS STRUCTURES:  Multilevel degenerative changes of the spine, otherwise no acute fracture or destructive osseous lesion  IMPRESSION:  No pulmonary embolism or acute aortic process is seen  Coronary atherosclerosis, status post CABG  Heart appears enlarged  Extensive perihilar groundglass and alveolar opacities are noted with more dense alveolar opacities noted dependently in the bilateral lungs  Some interstitial edema is seen most prominent in the upper lung fields; findings could represent edema, aspiration, infection, and/or ARDS  Prominence of the right heart chambers with reflux of contrast into the IVC and posterior hepatic veins suggests a component of increased right heart pressures  Right renal cyst, colonic diverticulosis without evidence of acute diverticulitis, enlarged prostate, and other findings as above  I personally discussed this study with Cachorro Gal[place] on 12/25/2022 at 10:39 PM  Workstation performed: [id number]     CT spine cervical without contrast    Result Date: 12/25/2022  Narrative: CT BRAIN - WITHOUT CONTRAST INDICATION:   Dizziness, persistent/recurrent, cardiac or vascular cause suspected cardiac arrest  COMPARISON:  CT head dated 7/30/2022 TECHNIQUE:  CT examination of the brain was performed  In addition to axial images, sagittal and coronal 2D reformatted images were created and submitted for interpretation  Radiation dose length product (DLP) for this visit:  1099 74 mGy-cm  (accession [id number]), 1549 83 mGy-cm  (accession [id number]), 105 mGy-cm  (accession [id number])  This examination, like all CT scans performed in the [facility], was performed utilizing techniques to minimize radiation dose exposure, including the use of iterative reconstruction and automated exposure control  IMAGE QUALITY:  Diagnostic   FINDINGS: PARENCHYMA: Decreased attenuation is noted in periventricular and subcortical white matter demonstrating an appearance that is statistically most likely to represent mild microangiopathic change  Focal encephalomalacia redemonstrated in the right frontal region  No CT signs of acute territorial infarction  No intracranial mass, mass effect or midline shift  No acute parenchymal hemorrhage  Gray-white differentiation otherwise appears maintained  Mild parenchymal atrophy  VENTRICLES AND EXTRA-AXIAL SPACES:  Ventricles and extra-axial CSF spaces are prominent commensurate with the degree of volume loss  No hydrocephalus  No acute extra-axial hemorrhage  VISUALIZED ORBITS AND PARANASAL SINUSES:  Orbits appear intact  Total opacification of the left maxillary sinus  Small polyp/mucous retention cyst in the right maxillary sinus  Visualized paranasal sinuses otherwise grossly clear  CALVARIUM AND EXTRACRANIAL SOFT TISSUES:  Normal      Impression: No acute intracranial abnormality is seen  Other findings as above  CTA NECK AND BRAIN WITH CONTRAST INDICATION: Dizziness, persistent/recurrent, cardiac or vascular cause suspected cardiac arrest   history of chronic right ICA occlusion COMPARISON:   CTA head and neck dated 9/18/2020  TECHNIQUE:  Routine CT imaging of the Brain without contrast   Post contrast imaging was performed after administration of iodinated contrast through the neck and brain  Post contrast axial 0 625 mm images timed to opacify the arterial system  3D rendering was performed on an independent workstation  MIP reconstructions performed  Coronal reconstructions were performed of the noncontrast portion of the brain  Radiation dose length product (DLP) for this visit:  1099 74 mGy-cm  (accession [id number]), 1549 83 mGy-cm  (accession [id number]), 970 mGy-cm  (accession [id number])  This examination, like all CT scans performed in the [facility], was performed utilizing techniques to minimize radiation dose exposure, including the use of iterative reconstruction and automated exposure control     IV Contrast:  Was administered  IMAGE QUALITY: Diagnostic FINDINGS: CERVICAL VASCULATURE AORTIC ARCH AND GREAT VESSELS:  Mild atherosclerotic disease of the arch, proximal great vessels and visualized subclavian vessels  No significant stenosis  RIGHT VERTEBRAL ARTERY CERVICAL SEGMENT:  Mild stenosis at the origin  The vessel is normal in caliber throughout the neck  LEFT VERTEBRAL ARTERY CERVICAL SEGMENT:  Normal origin  The vessel is small in caliber throughout the neck, but patent  RIGHT EXTRACRANIAL CAROTID SEGMENT:  Moderate atherosclerotic disease of the bifurcation  There is redemonstration of occlusion of the right internal carotid artery at the bifurcation LEFT EXTRACRANIAL CAROTID SEGMENT:  Mild atherosclerotic disease of the distal common carotid artery and proximal cervical internal carotid artery without significant stenosis compared to the more distal ICA  Suspect prior carotid endarterectomy NASCET criteria was used to determine the degree of internal carotid artery diameter stenosis  INTRACRANIAL VASCULATURE INTERNAL CAROTID ARTERIES:  The right internal carotid artery is occluded to the level of the bifurcation where there is reconstitution  Mild to moderate atherosclerosis but otherwise normal enhancement of the intracranial portions of the left internal carotid artery  Normal ophthalmic artery origins  ANTERIOR CIRCULATION:  Hypoplastic right A1 segment  A1 segments and anterior cerebral arteries otherwise with normal enhancement  Normal anterior communicating artery  MIDDLE CEREBRAL ARTERY CIRCULATION:  M1 segment and middle cerebral artery branches demonstrate normal enhancement bilaterally  DISTAL VERTEBRAL ARTERIES:  Mild atherosclerosis  Right dominant vertebrobasilar system, patent distal vertebral arteries  Posterior inferior cerebellar artery origins are normal  Normal vertebral basilar junction  BASILAR ARTERY:  Basilar artery is normal in caliber  Normal superior cerebellar arteries   POSTERIOR CEREBRAL ARTERIES: Both posterior cerebral arteries arises from the basilar tip  Both arteries demonstrate normal enhancement  Normal posterior communicating arteries  VENOUS STRUCTURES:  Patent NON VASCULAR ANATOMY BONY STRUCTURES:  No acute osseous abnormality  SOFT TISSUES OF THE NECK:  Better evaluated on CT cervical spine obtained concurrently THORACIC INLET:  Please refer to the concurrent chest, abdomen, and pelvic CT report for description of the thoracic inlet findings  IMPRESSION: No acute intracranial process is seen  Chronic occlusion of the right internal carotid artery is redemonstrated with distal reconstitution, as described  The vessels of the [place] are patent  No significant stenosis in the left internal carotid artery  Right dominant vertebrobasilar system but patent bilateral vertebral arteries  Degenerative changes of the cervical spine, better evaluated on dedicated CT of the cervical spine  Other findings as above  CT CERVICAL SPINE - WITHOUT CONTRAST INDICATION:   Dizziness, persistent/recurrent, cardiac or vascular cause suspected cardiac arrest  COMPARISON:  None  TECHNIQUE:  CT examination of the cervical spine was performed without intravenous contrast   Contiguous axial images were obtained  Sagittal and coronal reconstructions were performed  Radiation dose length product (DLP) for this visit:  1099 74 mGy-cm  (accession [id number]), 1549 83 mGy-cm  (accession [id number]), 477 mGy-cm  (accession [id number])  This examination, like all CT scans performed in the [facility], was performed utilizing techniques to minimize radiation dose exposure, including the use of iterative reconstruction and automated exposure control  IMAGE QUALITY:  Diagnostic  FINDINGS: Some images are suboptimal secondary to patient motion  Subject to this, ALIGNMENT:  Anterolisthesis of C4 on C5 and C7 on T1 of approximately 2 mm, probably related to facet joint arthropathy at these levels    Spinal alignment otherwise grossly maintained    VERTEBRAL BODIES:  No acute fracture  DEGENERATIVE CHANGES:  Intervertebral disc space narrowing at C5/C6 and C6/C7 with anterior, marginal, and uncovertebral osteophytosis at these levels  Multilevel facet joint arthropathy  PREVERTEBRAL AND PARASPINAL SOFT TISSUES:  Suspected lipoma in the distal right sternocleidomastoid measures approximately 5 cm in size  Otherwise grossly unremarkable THORACIC INLET:  Please refer to the concurrent chest CT report for thoracic inlet findings  IMPRESSION: Some images are suboptimal secondary to patient motion  Subject to this, degenerative changes as described no evidence of acute cervical spine injury  CTA - CHEST, ABDOMEN AND PELVIS - WITH IV CONTRAST INDICATION:   Dizziness, persistent/recurrent, cardiac or vascular cause suspected cardiac arrest  COMPARISON:  None  TECHNIQUE: CT examination of the chest, abdomen and pelvis was performed after the administration of intravenous contrast   The noncontrast portion of this examination was performed utilizing low radiation dose technique  Thin section angiographic arterial phase post contrast technique was used in order to evaluate for aortic dissection  3D reformatted images and volume rendering were performed on an independent workstation  Additionally, axial, sagittal, and coronal 2D reformatted images were created from the source data and submitted for interpretation  Radiation dose length product (DLP) for this visit:  1099 74 mGy-cm  (accession [id number]), 1549 83 mGy-cm  (accession [id number]), 760 mGy-cm  (accession [id number])  This examination, like all CT scans performed in the [facility], was performed utilizing techniques to minimize radiation dose exposure, including the use of iterative reconstruction and automated exposure control  IV Contrast:  Was administered Enteric Contrast:  Enteric contrast was not administered   FINDINGS: AORTA:  Mild atherosclerosis  There is no aortic dissection or intramural hematoma  There is no aortic aneurysm  CHEST LUNGS:  Extensive perihilar groundglass and alveolar opacities are noted with more dense alveolar opacities noted dependently in the bilateral lungs  Some interstitial edema is seen most prominent in the upper lung fields  Could represent edema, aspiration, infection, and/or ARDS  PLEURA:  Unremarkable  HEART/PULMONARY ARTERIAL TREE:  No pulmonary embolism is seen  The heart appears enlarged  Prominence of the right heart chambers with reflux of contrast into the IVC and posterior hepatic veins suggests a component of increased right heart pressures  Coronary atherosclerosis  MEDIASTINUM AND ROSI: Endotracheal tube terminates in the trachea  Status post CABG CHEST WALL AND LOWER NECK: Degenerative changes of the bilateral shoulders, worse on the right; with prominent heterotopic ossification about the right shoulder  There are several anterior rib fractures noted, possibly related to recent CPR  Median sternotomy changes are noted  Cardiac pads overlie the anterior chest ABDOMEN LIVER/BILIARY TREE:  Reflux of contrast is seen in the posterior liver, otherwise grossly unremarkable  GALLBLADDER:  No calcified gallstones  No pericholecystic inflammatory change  SPLEEN:  Unremarkable  PANCREAS:  Unremarkable  ADRENAL GLANDS:  Unremarkable  KIDNEYS/URETERS:  1 5 cm cyst in the upper pole of the right kidney  Several subcentimeter cysts are scattered in the bilateral kidneys, of unlikely clinical significance  Bilateral kidneys otherwise appear grossly unremarkable; no hydronephrosis  STOMACH AND BOWEL:  Scattered colonic diverticulosis  No discrete evidence of acute diverticulitis  No evidence of bowel obstruction  Otherwise grossly unremarkable  Enteric feeding tube terminates in the stomach  APPENDIX:  No findings to suggest appendicitis   ABDOMINOPELVIC CAVITY:  No ascites or free intraperitoneal air  No lymphadenopathy  PELVIS REPRODUCTIVE ORGANS:  The prostate is enlarged  Prostate volume estimated at 70 mL  URINARY BLADDER:  Bladder collapsed around a bladder catheter and not well evaluated  ABDOMINAL WALL/INGUINAL REGIONS:  Body wall edema OSSEOUS STRUCTURES:  Multilevel degenerative changes of the spine, otherwise no acute fracture or destructive osseous lesion  IMPRESSION:  No pulmonary embolism or acute aortic process is seen  Coronary atherosclerosis, status post CABG  Heart appears enlarged  Extensive perihilar groundglass and alveolar opacities are noted with more dense alveolar opacities noted dependently in the bilateral lungs  Some interstitial edema is seen most prominent in the upper lung fields; findings could represent edema, aspiration, infection, and/or ARDS  Prominence of the right heart chambers with reflux of contrast into the IVC and posterior hepatic veins suggests a component of increased right heart pressures  Right renal cyst, colonic diverticulosis without evidence of acute diverticulitis, enlarged prostate, and other findings as above  I personally discussed this study with Lai Jack on 12/25/2022 at 10:39 PM  Workstation performed: [id number]     CTA chest ct abdomen pelvis w contrast    Result Date: 12/25/2022  Narrative: CT BRAIN - WITHOUT CONTRAST INDICATION:   Dizziness, persistent/recurrent, cardiac or vascular cause suspected cardiac arrest  COMPARISON:  CT head dated 7/30/2022 TECHNIQUE:  CT examination of the brain was performed  In addition to axial images, sagittal and coronal 2D reformatted images were created and submitted for interpretation  Radiation dose length product (DLP) for this visit:  1099 74 mGy-cm  (accession [id number]), 1549 83 mGy-cm  (accession [id number]), 173 mGy-cm  (accession [id number])    This examination, like all CT scans performed in the [facility], was performed utilizing techniques to minimize radiation dose exposure, including the use of iterative reconstruction and automated exposure control  IMAGE QUALITY:  Diagnostic  FINDINGS: PARENCHYMA: Decreased attenuation is noted in periventricular and subcortical white matter demonstrating an appearance that is statistically most likely to represent mild microangiopathic change  Focal encephalomalacia redemonstrated in the right frontal region  No CT signs of acute territorial infarction  No intracranial mass, mass effect or midline shift  No acute parenchymal hemorrhage  Gray-white differentiation otherwise appears maintained  Mild parenchymal atrophy  VENTRICLES AND EXTRA-AXIAL SPACES:  Ventricles and extra-axial CSF spaces are prominent commensurate with the degree of volume loss  No hydrocephalus  No acute extra-axial hemorrhage  VISUALIZED ORBITS AND PARANASAL SINUSES:  Orbits appear intact  Total opacification of the left maxillary sinus  Small polyp/mucous retention cyst in the right maxillary sinus  Visualized paranasal sinuses otherwise grossly clear  CALVARIUM AND EXTRACRANIAL SOFT TISSUES:  Normal      Impression: No acute intracranial abnormality is seen  Other findings as above  CTA NECK AND BRAIN WITH CONTRAST INDICATION: Dizziness, persistent/recurrent, cardiac or vascular cause suspected cardiac arrest   history of chronic right ICA occlusion COMPARISON:   CTA head and neck dated 9/18/2020  TECHNIQUE:  Routine CT imaging of the Brain without contrast   Post contrast imaging was performed after administration of iodinated contrast through the neck and brain  Post contrast axial 0 625 mm images timed to opacify the arterial system  3D rendering was performed on an independent workstation  MIP reconstructions performed  Coronal reconstructions were performed of the noncontrast portion of the brain    Radiation dose length product (DLP) for this visit:  1099 74 mGy-cm  (accession [id number]), 1549 83 mGy-cm  (accession [id number]), 037 mGy-cm  (accession [id number])  This examination, like all CT scans performed in the [facility], was performed utilizing techniques to minimize radiation dose exposure, including the use of iterative reconstruction and automated exposure control  IV Contrast:  Was administered  IMAGE QUALITY:   Diagnostic FINDINGS: CERVICAL VASCULATURE AORTIC ARCH AND GREAT VESSELS:  Mild atherosclerotic disease of the arch, proximal great vessels and visualized subclavian vessels  No significant stenosis  RIGHT VERTEBRAL ARTERY CERVICAL SEGMENT:  Mild stenosis at the origin  The vessel is normal in caliber throughout the neck  LEFT VERTEBRAL ARTERY CERVICAL SEGMENT:  Normal origin  The vessel is small in caliber throughout the neck, but patent  RIGHT EXTRACRANIAL CAROTID SEGMENT:  Moderate atherosclerotic disease of the bifurcation  There is redemonstration of occlusion of the right internal carotid artery at the bifurcation LEFT EXTRACRANIAL CAROTID SEGMENT:  Mild atherosclerotic disease of the distal common carotid artery and proximal cervical internal carotid artery without significant stenosis compared to the more distal ICA  Suspect prior carotid endarterectomy NASCET criteria was used to determine the degree of internal carotid artery diameter stenosis  INTRACRANIAL VASCULATURE INTERNAL CAROTID ARTERIES:  The right internal carotid artery is occluded to the level of the bifurcation where there is reconstitution  Mild to moderate atherosclerosis but otherwise normal enhancement of the intracranial portions of the left internal carotid artery  Normal ophthalmic artery origins  ANTERIOR CIRCULATION:  Hypoplastic right A1 segment  A1 segments and anterior cerebral arteries otherwise with normal enhancement  Normal anterior communicating artery  MIDDLE CEREBRAL ARTERY CIRCULATION:  M1 segment and middle cerebral artery branches demonstrate normal enhancement bilaterally  DISTAL VERTEBRAL ARTERIES:  Mild atherosclerosis  Right dominant vertebrobasilar system, patent distal vertebral arteries  Posterior inferior cerebellar artery origins are normal  Normal vertebral basilar junction  BASILAR ARTERY:  Basilar artery is normal in caliber  Normal superior cerebellar arteries  POSTERIOR CEREBRAL ARTERIES: Both posterior cerebral arteries arises from the basilar tip  Both arteries demonstrate normal enhancement  Normal posterior communicating arteries  VENOUS STRUCTURES:  Patent NON VASCULAR ANATOMY BONY STRUCTURES:  No acute osseous abnormality  SOFT TISSUES OF THE NECK:  Better evaluated on CT cervical spine obtained concurrently THORACIC INLET:  Please refer to the concurrent chest, abdomen, and pelvic CT report for description of the thoracic inlet findings  IMPRESSION: No acute intracranial process is seen  Chronic occlusion of the right internal carotid artery is redemonstrated with distal reconstitution, as described  The vessels of the [place] are patent  No significant stenosis in the left internal carotid artery  Right dominant vertebrobasilar system but patent bilateral vertebral arteries  Degenerative changes of the cervical spine, better evaluated on dedicated CT of the cervical spine  Other findings as above  CT CERVICAL SPINE - WITHOUT CONTRAST INDICATION:   Dizziness, persistent/recurrent, cardiac or vascular cause suspected cardiac arrest  COMPARISON:  None  TECHNIQUE:  CT examination of the cervical spine was performed without intravenous contrast   Contiguous axial images were obtained  Sagittal and coronal reconstructions were performed  Radiation dose length product (DLP) for this visit:  1099 74 mGy-cm  (accession [id number]), 1549 83 mGy-cm  (accession [id number]), 269 mGy-cm  (accession [id number])    This examination, like all CT scans performed in the [facility], was performed utilizing techniques to minimize radiation dose exposure, including the use of iterative reconstruction and automated exposure control  IMAGE QUALITY:  Diagnostic  FINDINGS: Some images are suboptimal secondary to patient motion  Subject to this, ALIGNMENT:  Anterolisthesis of C4 on C5 and C7 on T1 of approximately 2 mm, probably related to facet joint arthropathy at these levels  Spinal alignment otherwise grossly maintained    VERTEBRAL BODIES:  No acute fracture  DEGENERATIVE CHANGES:  Intervertebral disc space narrowing at C5/C6 and C6/C7 with anterior, marginal, and uncovertebral osteophytosis at these levels  Multilevel facet joint arthropathy  PREVERTEBRAL AND PARASPINAL SOFT TISSUES:  Suspected lipoma in the distal right sternocleidomastoid measures approximately 5 cm in size  Otherwise grossly unremarkable THORACIC INLET:  Please refer to the concurrent chest CT report for thoracic inlet findings  IMPRESSION: Some images are suboptimal secondary to patient motion  Subject to this, degenerative changes as described no evidence of acute cervical spine injury  CTA - CHEST, ABDOMEN AND PELVIS - WITH IV CONTRAST INDICATION:   Dizziness, persistent/recurrent, cardiac or vascular cause suspected cardiac arrest  COMPARISON:  None  TECHNIQUE: CT examination of the chest, abdomen and pelvis was performed after the administration of intravenous contrast   The noncontrast portion of this examination was performed utilizing low radiation dose technique  Thin section angiographic arterial phase post contrast technique was used in order to evaluate for aortic dissection  3D reformatted images and volume rendering were performed on an independent workstation  Additionally, axial, sagittal, and coronal 2D reformatted images were created from the source data and submitted for interpretation  Radiation dose length product (DLP) for this visit:  1099 74 mGy-cm  (accession [id number]), 1549 83 mGy-cm  (accession [id number]), 719 mGy-cm  (accession [id number])    This examination, like all CT scans performed in the [facility], was performed utilizing techniques to minimize radiation dose exposure, including the use of iterative reconstruction and automated exposure control  IV Contrast:  Was administered Enteric Contrast:  Enteric contrast was not administered  FINDINGS: AORTA:  Mild atherosclerosis  There is no aortic dissection or intramural hematoma  There is no aortic aneurysm  CHEST LUNGS:  Extensive perihilar groundglass and alveolar opacities are noted with more dense alveolar opacities noted dependently in the bilateral lungs  Some interstitial edema is seen most prominent in the upper lung fields  Could represent edema, aspiration, infection, and/or ARDS  PLEURA:  Unremarkable  HEART/PULMONARY ARTERIAL TREE:  No pulmonary embolism is seen  The heart appears enlarged  Prominence of the right heart chambers with reflux of contrast into the IVC and posterior hepatic veins suggests a component of increased right heart pressures  Coronary atherosclerosis  MEDIASTINUM AND ROSI: Endotracheal tube terminates in the trachea  Status post CABG CHEST WALL AND LOWER NECK: Degenerative changes of the bilateral shoulders, worse on the right; with prominent heterotopic ossification about the right shoulder  There are several anterior rib fractures noted, possibly related to recent CPR  Median sternotomy changes are noted  Cardiac pads overlie the anterior chest ABDOMEN LIVER/BILIARY TREE:  Reflux of contrast is seen in the posterior liver, otherwise grossly unremarkable  GALLBLADDER:  No calcified gallstones  No pericholecystic inflammatory change  SPLEEN:  Unremarkable  PANCREAS:  Unremarkable  ADRENAL GLANDS:  Unremarkable  KIDNEYS/URETERS:  1 5 cm cyst in the upper pole of the right kidney  Several subcentimeter cysts are scattered in the bilateral kidneys, of unlikely clinical significance  Bilateral kidneys otherwise appear grossly unremarkable; no hydronephrosis   STOMACH AND BOWEL:  Scattered colonic diverticulosis  No discrete evidence of acute diverticulitis  No evidence of bowel obstruction  Otherwise grossly unremarkable  Enteric feeding tube terminates in the stomach  APPENDIX:  No findings to suggest appendicitis  ABDOMINOPELVIC CAVITY:  No ascites or free intraperitoneal air  No lymphadenopathy  PELVIS REPRODUCTIVE ORGANS:  The prostate is enlarged  Prostate volume estimated at 70 mL  URINARY BLADDER:  Bladder collapsed around a bladder catheter and not well evaluated  ABDOMINAL WALL/INGUINAL REGIONS:  Body wall edema OSSEOUS STRUCTURES:  Multilevel degenerative changes of the spine, otherwise no acute fracture or destructive osseous lesion  IMPRESSION:  No pulmonary embolism or acute aortic process is seen  Coronary atherosclerosis, status post CABG  Heart appears enlarged  Extensive perihilar groundglass and alveolar opacities are noted with more dense alveolar opacities noted dependently in the bilateral lungs  Some interstitial edema is seen most prominent in the upper lung fields; findings could represent edema, aspiration, infection, and/or ARDS  Prominence of the right heart chambers with reflux of contrast into the IVC and posterior hepatic veins suggests a component of increased right heart pressures  Right renal cyst, colonic diverticulosis without evidence of acute diverticulitis, enlarged prostate, and other findings as above  I personally discussed this study with Dioni Cabrera on 12/25/2022 at 10:39 PM  Workstation performed: [id number]     XR chest portable ICU    Result Date: 12/27/2022  Narrative: CHEST INDICATION:   pneumonia  COMPARISON:  12/26/2022 EXAM PERFORMED/VIEWS:  XR CHEST PORTABLE ICU FINDINGS: Endotracheal tube in similar and standard position  Enteric tube below the diaphragm, tip appears in the stomach fundus  Temperature probe remains in the cervical esophagus as reported previously    Persistent, increased bilateral central airspace opacities most compatible with edema  Layering right effusion appears improved  No pneumothorax  Heart, mediastinal and hilar structures are within normal limits  Aortic valve replacement  Surgical changes  No acute osseous or soft tissue pathology  Similar severe shoulder arthropathy  Impression: Compared to yesterday, right pleural effusion appears improved and pulmonary edema appears increased  Similar cervical esophageal position of temperature probe  Workstation performed: [id number]     XR chest portable ICU    Result Date: 12/26/2022  Narrative: CHEST INDICATION:   hypoxia  COMPARISON:  CXR and CT 12/25/2022  Frutoso Spatz EXAM PERFORMED/VIEWS:  XR CHEST PORTABLE ICU FINDINGS:  Temperature probe coiled in the cervical esophagus  ET tube 5 cm above the leonid  NG tube in stomach  Cardiomediastinal silhouette appears unremarkable  Development of flash pulmonary edema  No effusion or pneumothorax  Severe bilateral glenohumeral degenerative disease with multiple right calcified loose bodies  Impression: Development of flash pulmonary edema  Temperature probe coiled in the cervical esophagus  Workstation performed: [id number]     Echo complete w/ contrast if indicated    Result Date: 12/26/2022  Narrative: •  Left Ventricle: Left ventricular cavity size is normal  Wall thickness is moderately increased  There is mild to moderate concentric hypertrophy  The left ventricular ejection fraction is around 35% by visual estimation  Systolic function is moderately reduced  There is moderate global hypokinesis  •  IVS: Paradoxical septal motion consistent with postoperative status as well as conduction abnormality  •  Right Ventricle: Systolic function is mildly reduced  •  Left Atrium: The atrium is moderately dilated  •  Aortic Valve: There is a bioprosthetic valve  The prosthetic valve appears well-seated and appears to be functioning normally  Mean gradient around 8 mmHg  There is no evidence of transvalvular regurgitation   •  Mitral Valve: There is moderate diffuse thickening of the anterior leaflet and posterior leaflet  There is mild diffuse calcification  There is moderately reduced mobility of the anterior leaflet and posterior leaflet  Patient has history of rheumatic fever  There is severe  There is mild regurgitation  There is mild stenosis  •  As compared to previous echo patient's EF has decreased, current EF appears to be around 35%  There is a paradoxical septal motion but patient is also intubated and this is a limited study and study was done after cardiac arrest         EKG / Monitor: Personally reviewed  Patient now in atrial fibrillation with frequent short bursts of monomorphic ventricular tachycardia    [facility]  Cardiology      "This note was completed in part utilizing [employer] direct voice recognition software  Grammatical errors, random word insertion, spelling mistakes, and incomplete sentences may be an occasional consequence of the system secondary to software limitations, ambient noise and hardware issues  Please read the chart carefully and recognize, using context, where substitutions have occurred    If you have any questions or concerns about the context, text or information contained within the body of this dictation, please contact myself, the provider, for further clarification "

## 2022-12-28 NOTE — ASSESSMENT & PLAN NOTE
· Currently in NSR  · Holding eliquis -on a heparin infusion  · Brief episode overnight of what appeared to be atrial fibrillation    Resolved with amnio bolus x1

## 2022-12-28 NOTE — ASSESSMENT & PLAN NOTE
· POA, source possibly aspiration pneumonia that may have occurred during cardiac arrest  · Blood cultures negative x24 hours  · Sputum culture pending  · Continue antibiotics

## 2022-12-28 NOTE — ASSESSMENT & PLAN NOTE
· Intubated for inability to protect airway  · Extubated successfully on 12/27  · Concern for aspiration pneumonia POA around time of cardiac arrest, patient was started on Unasyn day #3

## 2022-12-28 NOTE — ASSESSMENT & PLAN NOTE
· Witnessed arrest at home on 12/25  Initial responders had shockable rhythm believed to be V Fib  Shocked x 2 with ROSC and given lidocaine 100mg x 2  · No STEMI on EKG  Discussed with cardiology -will need ischemic work-up  · TTM initiated however patient following commands so then discontinued  · Repeat echo 45 to 50%  · Continue heparin infusion  · Had recent syncopal episodes worked up by neurology - ?arrythmia    Likely will need a defibrillator before discharge

## 2022-12-28 NOTE — ASSESSMENT & PLAN NOTE
Wt Readings from Last 3 Encounters:   12/28/22 73 5 kg (162 lb 0 6 oz)   12/05/22 73 5 kg (162 lb)   11/29/22 73 5 kg (162 lb)     · Most recent EF now improved 50% 7/2022  · Repeat echo with EF 45 to 50%

## 2022-12-28 NOTE — PROGRESS NOTES
Crystal 46  Progress Note - Alma Fitz 1945, 69 y o  male MRN: [id number]  Unit/Bed#: ICU 09 Encounter: [id number]  Primary Care Provider: Gabby Le MD   Date and time admitted to hospital: 12/25/2022  8:33 PM    * Cardiac arrest [facility])  Assessment & Plan  · Witnessed arrest at home on 12/25  Initial responders had shockable rhythm believed to be V Fib  Shocked x 2 with ROSC and given lidocaine 100mg x 2  · No STEMI on EKG  Discussed with cardiology -will need ischemic work-up  · TTM initiated however patient following commands so then discontinued  · Repeat echo 45 to 50%  · Continue heparin infusion  · Had recent syncopal episodes worked up by neurology - ?arrythmia  Likely will need a defibrillator before discharge     Acute respiratory failure with hypoxia ([facility] [address] )  Assessment & Plan  · Intubated for inability to protect airway  · Extubated successfully on 12/27  · Concern for aspiration pneumonia POA around time of cardiac arrest, patient was started on Unasyn day #3    Severe sepsis [facility])  Assessment & Plan  · POA, source possibly aspiration pneumonia that may have occurred during cardiac arrest  · Blood cultures negative x24 hours  · Sputum culture pending  · Continue antibiotics    Elevated troponin  Assessment & Plan  · Likely related to cardiac arrest/compressions/shock   · Trend troponins  · Continue heparin infusion      PAF (paroxysmal atrial fibrillation) (HCC)  Assessment & Plan  · Currently in NSR  · Holding eliquis -on a heparin infusion  · Brief episode overnight of what appeared to be atrial fibrillation    Resolved with amnio bolus x1    Chronic systolic congestive heart failure [facility])  Assessment & Plan  Wt Readings from Last 3 Encounters:   12/28/22 73 5 kg (162 lb 0 6 oz)   12/05/22 73 5 kg (162 lb)   11/29/22 73 5 kg (162 lb)     · Most recent EF now improved 50% 7/2022  · Repeat echo with EF 45 to 50%        S/P AVR  Assessment & Plan  · 5/2021 tissue AVR 23 mm [facility] Ease    Internal carotid artery occlusion, right  Assessment & Plan  · Chronic, no intervention    Controlled diabetes mellitus with diabetic neuropathy [facility])  Assessment & Plan  Lab Results   Component Value Date    HGBA1C 6 7 (A) 11/04/2022       Recent Labs     12/27/22  1137 12/27/22  1721 12/28/22  0013 12/28/22  0700   POCGLU 245* 175* 161* 150*       Blood Sugar Average: Last 72 hrs:  (P) 016 [id number]     · SSI    Stage 3a chronic kidney disease [facility])  Assessment & Plan  Lab Results   Component Value Date    EGFR 47 12/28/2022    EGFR 55 12/27/2022    EGFR 55 12/26/2022    CREATININE 1 42 (H) 12/28/2022    CREATININE 1 25 12/27/2022    CREATININE 1 25 12/26/2022     · Cr around 1 2, currently at baseline    Coronary artery disease involving native coronary artery of native heart  Assessment & Plan  · 5/2021 CABGx5 (LIMA to LAD, SVG to D1, SVG -Y- to ramus intermedius and OM1, SVG to R PDA) and tissue AVR 23 mm [facility] Ease  · Maintained on Coreg/lisinopril/hctz (on hold)    Hyperlipidemia  Assessment & Plan  · Continue statin    Benign essential hypertension  Assessment & Plan  · Hold antihypertensive medications for now as required pressors briefly        ----------------------------------------------------------------------------------------  HPI/24hr events: Patient continued to be intermittently confused overnight  He remained on his heparin infusion  And he had episodes of A  fib with AVR  He did receive 1 amnio bolus with some improvement of ectopy  Patient appropriate for transfer out of the ICU today?: No  Disposition: Continue Stepdown Level 1 level of care   Code Status: Level 1 - Full Code  ---------------------------------------------------------------------------------------  SUBJECTIVE  "I feel better"     Review of Systems   Constitutional: Positive for fatigue  HENT: Negative  Eyes: Negative  Respiratory: Positive for cough  Cardiovascular: Negative  Endocrine: Negative  Genitourinary: Negative  Musculoskeletal: Negative  Skin: Negative  Allergic/Immunologic: Negative  Neurological: Negative  Hematological: Negative  Psychiatric/Behavioral: Negative  Review of systems was reviewed and negative unless stated above in HPI/24-hour events   ---------------------------------------------------------------------------------------  OBJECTIVE    Vitals   Vitals:    22 0702 22 0736 22 0739 22 0800   BP: (!) 178/80  150/87 139/72   BP Location:   Right arm    Pulse: 77  (!) 115 (!) 113   Resp: (!) 38  (!) 26 (!) 24   Temp:   98 4 °F (36 9 °C)    TempSrc:   Temporal    SpO2: 93% 95% 94% 92%   Weight:       Height:         Temp (24hrs), Av 2 °F (36 8 °C), Min:97 6 °F (36 4 °C), Max:98 7 °F (37 1 °C)  Current: Temperature: 98 4 °F (36 9 °C)          Respiratory:  SpO2: SpO2: 92 %       Invasive/non-invasive ventilation settings   Respiratory    Lab Data (Last 4 hours)    None         O2/Vent Data (Last 4 hours)    None                Physical Exam  Vitals and nursing note reviewed  Constitutional:       Appearance: He is ill-appearing  HENT:      Head: Normocephalic and atraumatic  Right Ear: Tympanic membrane, ear canal and external ear normal       Left Ear: Tympanic membrane, ear canal and external ear normal       Nose: Nose normal       Mouth/Throat:      Mouth: Mucous membranes are dry  Pharynx: Oropharynx is clear  Eyes:      Extraocular Movements: Extraocular movements intact  Conjunctiva/sclera: Conjunctivae normal       Pupils: Pupils are equal, round, and reactive to light  Cardiovascular:      Rate and Rhythm: Normal rate and regular rhythm  Pulses: Normal pulses  Heart sounds: Normal heart sounds  Pulmonary:      Effort: Pulmonary effort is normal       Breath sounds: Normal breath sounds  Comments:  On 5 L NC   B/L breath sounds diminished Genitourinary:     Comments: Voids appropriately  Musculoskeletal:         General: Normal range of motion  Cervical back: Normal range of motion and neck supple  Skin:     General: Skin is warm and dry  Capillary Refill: Capillary refill takes less than 2 seconds  Neurological:      Mental Status: He is alert and oriented to person, place, and time  Mental status is at baseline     Psychiatric:      Comments: Intermittently confused but easily reoriented             Laboratory and Diagnostics:  Results from last 7 days   Lab Units 12/26/22  0723 12/26/22  0058 12/25/22  2035   WBC Thousand/uL 8 50  --  12 96*   HEMOGLOBIN g/dL 13 0  --  14 0   HEMATOCRIT % 39 0  --  43 0   PLATELETS Thousands/uL 154 155 183   NEUTROS PCT % 83*  --  42*   MONOS PCT % 5  --  4     Results from last 7 days   Lab Units 12/28/22  0528 12/27/22  0604 12/26/22  1218 12/26/22  0557 12/25/22 2035   SODIUM mmol/L 141 138 137 139 138   POTASSIUM mmol/L 4 5 3 4* 3 9 4 6 5 2   CHLORIDE mmol/L 105 104 103 104 101   CO2 mmol/L 23 24 23 26 21   ANION GAP mmol/L 13 10 11 9 16*   BUN mg/dL 29* 25 24 25 22   CREATININE mg/dL 1 42* 1 25 1 25 1 21 1 37*   CALCIUM mg/dL 8 3 7 8* 7 8* 8 1* 8 4   GLUCOSE RANDOM mg/dL 138 169* 189* 230* 205*   ALT U/L  --  51  --  116* 149*   AST U/L  --  65*  --  227* 344*   ALK PHOS U/L  --  54  --  96 100   ALBUMIN g/dL  --  2 8*  --  3 7 4 0   TOTAL BILIRUBIN mg/dL  --  0 39  --  0 54 0 36     Results from last 7 days   Lab Units 12/28/22  0528 12/27/22  0604 12/26/22  1218 12/26/22  0557 12/25/22  2035   MAGNESIUM mg/dL 2 7* 2 6 2 4 1 7 1 5*   PHOSPHORUS mg/dL 4 1 3 9 4 4* 3 9  --       Results from last 7 days   Lab Units 12/28/22  0700 12/28/22  0013 12/27/22  1616 12/27/22  0914 12/27/22  0614 12/27/22  0227 12/26/22  1939 12/26/22  1218 12/26/22  0557   INR   --   --   --   --  1 28*  --   --   --  1 12   PTT seconds 66* 55* 57* 49*  --  61* 95* 94* 29          Results from last 7 days   Lab Units 12/26/22  0918 12/26/22  0557 12/25/22  2236 12/25/22  2035   LACTIC ACID mmol/L 2 5* 2 7* 3 9* 8 2*     ABG:  Results from last 7 days   Lab Units 12/27/22  0456   PH ART  7 432   PCO2 ART mm Hg 35 3*   PO2 ART mm Hg 92 4   HCO3 ART mmol/L 23 0   BASE EXC ART mmol/L -0 9   ABG SOURCE  Radial, Left     VBG:  Results from last 7 days   Lab Units 12/27/22  0456   ABG SOURCE  Radial, Left     Results from last 7 days   Lab Units 12/27/22  0604 12/26/22  0557 12/25/22 2236   PROCALCITONIN ng/ml 1 33* 0 67* 0 07       Micro  Results from last 7 days   Lab Units 12/26/22  1004 12/26/22  0057 12/25/22  2310   BLOOD CULTURE   --   --  No Growth at 24 hrs  No Growth at 24 hrs  SPUTUM CULTURE   --  Culture results to follow  --    GRAM STAIN RESULT   --  Rare Polys*  Rare Gram positive cocci in pairs*  Rare Gram variable rods*  --    MRSA CULTURE ONLY   --  No Methicillin Resistant Staphlyococcus aureus (MRSA) isolated  --    LEGIONELLA URINARY ANTIGEN  Negative  --   --    STREP PNEUMONIAE ANTIGEN, URINE  Negative  --   --        EKG: afib alarms on   Imaging: I have personally reviewed pertinent reports  Intake and Output  I/O       12/26 0701 12/27 0700 12/27 0701 12/28 0700 12/28 0701 12/29 0700    P  O   240     I V  (mL/kg) 768 3 (10 5) 498 (6 8)     NG/GT 0 0     IV Piggyback 600 950 100    Total Intake(mL/kg) 1368 3 (18 6) 1688 (23) 100 (1 4)    Urine (mL/kg/hr) 945 (0 5) 385 (0 2)     Emesis/NG output       Total Output 945 385     Net +423 3 +1303 +100           Unmeasured Urine Occurrence  2 x           Height and Weights   Height: 5' 9" (175 3 cm)  IBW (Ideal Body Weight): 70 7 kg  Body mass index is 23 93 kg/m²    Weight (last 2 days)     Date/Time Weight    12/28/22 0600 73 5 (162 04)    12/27/22 0600 73 5 (162 04)    12/26/22 1102 71 7 (158)    12/26/22 0600 71 9 (158 51)    12/26/22 0134 74 7 (164 68)            Nutrition       Diet Orders   (From admission, onward)             Start     Ordered 12/27/22 1738  Diet Cardiovascular; Cardiac; Consistent Carbohydrate Diet Level 2 (5 carb servings/75 grams CHO/meal)  Diet effective now        References:    Nutrtion Support Algorithm Enteral vs  Parenteral   Question Answer Comment   Diet Type Cardiovascular    Cardiac Cardiac    Other Restriction(s): Consistent Carbohydrate Diet Level 2 (5 carb servings/75 grams CHO/meal)    RD to adjust diet per protocol?  Yes        12/27/22 1738 12/26/22 0814  Room Service  Once        Question:  Type of Service  Answer:  Room Service - Appropriate with Assistance    12/26/22 0813                  Active Medications  Scheduled Meds:  Current Facility-Administered Medications   Medication Dose Route Frequency Provider Last Rate   • acetaminophen  650 mg Oral Q6H PRN MOSHE Meyer     • ampicillin-sulbactam  3 g Intravenous Q6H MOSHE Raya 3 g (12/28/22 0815)   • aspirin  325 mg Per NG Tube Daily MOSHE Raya     • heparin (porcine)  3-20 Units/kg/hr (Order-Specific) Intravenous Titrated Rowena Amaro PA-C 14 Units/kg/hr (12/28/22 0710)   • heparin (porcine)  2,000 Units Intravenous V6K PRN Arie Menon PA-C     • heparin (porcine)  4,000 Units Intravenous E9L PRN Rowena Amaro PA-C     • insulin lispro  1-6 Units Subcutaneous TID AC MOSHE Link     • insulin lispro  1-6 Units Subcutaneous HS Ulus LoveMOSHE Sotelo     • labetalol  20 mg Intravenous Q4H PRN MOSHE Meyer     • levalbuterol  0 63 mg Nebulization Q6H MOSHE Raya     • metoprolol  5 mg Intravenous Q6H PRN MOSHE Yanez     • metoprolol tartrate  50 mg Oral Q12H [address] MOSHE Link     • sodium chloride  4 mL Nebulization Q6H MOSHE Raya       Continuous Infusions:  heparin (porcine), 3-20 Units/kg/hr (Order-Specific), Last Rate: 14 Units/kg/hr (12/28/22 0710)      PRN Meds: acetaminophen, 650 mg, Q6H PRN  heparin (porcine), 2,000 Units, Q6H PRN  heparin (porcine), 4,000 Units, Q6H PRN  labetalol, 20 mg, Q4H PRN  metoprolol, 5 mg, Q6H PRN        Invasive Devices Review  Invasive Devices     Peripheral Intravenous Line  Duration           Peripheral IV 12/25/22 Left Antecubital 2 days    Long-Dwell Peripheral IV (Midline) 13/29/71 Left Basilic 1 day    Peripheral IV 12/27/22 Left;Upper;Ventral (anterior) Arm 1 day          Drain  Duration           External Urinary Catheter 1 day                ---------------------------------------------------------------------------------------  Care Time Delivered:   No Critical Care time spent       [facility]      Portions of the record may have been created with voice recognition software  Occasional wrong word or "sound a like" substitutions may have occurred due to the inherent limitations of voice recognition software    Read the chart carefully and recognize, using context, where substitutions have occurred

## 2022-12-28 NOTE — ASSESSMENT & PLAN NOTE
Lab Results   Component Value Date    EGFR 47 12/28/2022    EGFR 55 12/27/2022    EGFR 55 12/26/2022    CREATININE 1 42 (H) 12/28/2022    CREATININE 1 25 12/27/2022    CREATININE 1 25 12/26/2022     · Cr around 1 2, currently at baseline

## 2022-12-29 NOTE — PROGRESS NOTES
Overnight patient with episodes of increased work of breathing  Also having episodes of atrial fibrillation with wide complex  EKG obtained which confirmed Afib  No acute ST changes  Patient denied chest pain  CXR obtained with asymmetric infiltrates R>L, ? Pneumonia vs pulmonary edema but relatively unchanged from previous  ABG obtained on NRB: 7 31/22/155/11 4/-13  Given metabolic acidosis on ABG, STAT labs sent  Labs noted worsening SAHIL, significant transaminitis, mild hyperkalemia, lactic acidosis  Hyperkalemia treated with insulin/calcium and sodium bicarb  Amiodarone infusion stopped in the setting of significant transaminitis  HR and BP stable at that time  Work of breathing improved without intervention, patient placed on HFNC and appeared to be resting comfortably  6148:  Patient again with episode of increased WOB followed by bradycardia and ultimately pulselessness  Telemetry showed slow atrial flutter with HR decreasing as low as 16 before losing pulse  Code blue activated and CPR started immediately  Given 1amp Epinephrine given  ROSC achieved with first pulse check but patient was in ventricular tachycardia with a pulse  He was moving spontaneously at that time but continued to be agonally breathing  Prior to being able to defibrillate him due to instability, he was given 100mg of lidocaine which converted him to NSR with a adequate BP  He was then intubated  After intubation, an arterial line was placed  He then had several more episodes where he would go bradycardic (now sinus bradycardia) and subsequently hypotensive and therefore epinephrine push was given  POC US without evidence of pericardial tamponade  EF appeared significantly reduced compared to previous  Epinephrine gtt ordered  Patient empircally given multiple push doses of sodium bicarb and a push dose of calcium chloride  Dr Zeyad See called and made aware of events   [address] [facility] called and updated on cardiac arrest, recommending transfer to [facility] upon stabilization  Dr Moise Laird arrived at the bedside and ultimately placed temporary pacing wire given ongoing bradycardic arrests  Patient's significant other called and updated regarding events  She is planning to come to bedside  She requests ongoing full resuscitative efforts at this time

## 2022-12-29 NOTE — EMTALA/ACUTE CARE TRANSFER
[address] [facility] INTENSIVE CARE  UNIT  [address]  [place] [address]  Dept: [phone]      ACUTE CARE TRANSFER CONSENT    NAME Rickie FRAZIER 1945                              MRN [id number]    I have been informed of my rights regarding examination, treatment, and transfer   by Dr Tg Szymanski MD    Benefits: Specialized equipment and/or services available at the receiving facility (Include comment)________________________ (cath lab)    Risks: Potential for delay in receiving treatment, Potential deterioration of medical condition, Loss of IV, Increased discomfort during transfer, Possible worsening of condition or death during transfer      Consent for Transfer:  I acknowledge that my medical condition has been evaluated and explained to me by the treating physician or other qualified medical person and/or my attending physician, who has recommended that I be transferred to the service of  Accepting Physician: Dr Apple Mcqueen at [address] [facility] Name, [facility] [address] : [address] [facility] [address] [place]  The above potential benefits of such transfer, the potential risks associated with such transfer, and the probable risks of not being transferred have been explained to me, and I fully understand them  The doctor has explained that, in my case, the benefits of transfer outweigh the risks  I agree to be transferred  I authorize the performance of emergency medical procedures and treatments upon me in both transit and upon arrival at the receiving facility  Additionally, I authorize the release of any and all medical records to the receiving facility and request they be transported with me, if possible  I understand that the safest mode of transportation during a medical emergency is an ambulance and that the Hospital advocates the use of this mode of transport   Risks of traveling to the receiving facility by car, including absence of medical control, life sustaining equipment, such as oxygen, and medical personnel has been explained to me and I fully understand them  (KELLEY CORRECT BOX BELOW)  [  ]  I consent to the stated transfer and to be transported by ambulance/helicopter  [  ]  I consent to the stated transfer, but refuse transportation by ambulance and accept full responsibility for my transportation by car  I understand the risks of non-ambulance transfers and I exonerate the Hospital and its staff from any deterioration in my condition that results from this refusal     X___________________________________________    DATE  22  TIME________  Signature of patient or legally responsible individual signing on patient behalf           RELATIONSHIP TO PATIENT_________________________          Provider Certification    NAME Theresa Garrett                                        [facility] 1945                              MRN [id number]    A medical screening exam was performed on the above named patient  Based on the examination:    Condition Necessitating Transfer cardiogenic shock, concern for ACS needing urgent cardiac cath     Patient Condition:  An emergency transfer is being made prior to stabilization due to the need for definitive care and the benefit of transfer outweighs the risk    Reason for Transfer: Other (Include comment)____________________ (cath lab)    Transfer Requirements: [address], [address] [place] [address]   · Space available and qualified personnel available for treatment as acknowledged by [phone]  · Agreed to accept transfer and to provide appropriate medical treatment as acknowledged by       Dr Colleen Duong  · Appropriate medical records of the examination and treatment of the patient are provided at the time of transfer   [address] _______  · Transfer will be performed by qualified personnel from [facility]  and appropriate transfer equipment as required, including the use of necessary and appropriate life support measures  Provider Certification: I have examined the patient and explained the following risks and benefits of being transferred/refusing transfer to the patient/family:  General risk, such as traffic hazards, adverse weather conditions, rough terrain or turbulence, possible failure of equipment (including vehicle or aircraft), or consequences of actions of persons outside the control of the transport personnel, Unanticipated needs of medical equipment and personnel during transport, Risk of worsening condition, The possibility of a transport vehicle being unavailable      Based on these reasonable risks and benefits to the patient and/or the unborn child(jamey), and based upon the information available at the time of the patient’s examination, I certify that the medical benefits reasonably to be expected from the provision of appropriate medical treatments at another medical facility outweigh the increasing risks, if any, to the individual’s medical condition, and in the case of labor to the unborn child, from effecting the transfer      X____________________________________________ DATE 12/29/22        TIME_______      ORIGINAL - SEND TO MEDICAL RECORDS   COPY - SEND WITH PATIENT DURING TRANSFER

## 2022-12-29 NOTE — PROCEDURES
Intubation    Date/Time: 12/29/2022 7:59 AM  Performed by: MOSHE Jovel  Authorized by: MOSHE Jovel     Patient location:  Bedside  Consent:     Consent obtained:  Emergent situation  Universal protocol:     Patient identity confirmed:  Arm band and hospital-assigned identification number  Pre-procedure details:     Patient status:  Unresponsive    Pretreatment medications:  Etomidate  Indications:     Indications for intubation: respiratory failure and hypoxemia    Procedure details:     Preoxygenation:  None    CPR in progress: no      Laryngoscope blade: Mac 3    Tube size (mm):  7 5    Tube type:  Cuffed    Number of attempts:  1    Tube visualized through cords: yes    Placement assessment:     ETT to lip:  24    Tube secured with:  ETT brower    Breath sounds:  Absent over the epigastrium and equal    Placement verification: chest rise, condensation, colorimetric ETCO2 device, CXR verification, direct visualization, equal breath sounds and tube exhalation      CXR findings:  ETT in proper place    Ventilator settings:  200x400, 100, 10  Post-procedure details:     Patient tolerance of procedure:   Tolerated well, no immediate complications

## 2022-12-29 NOTE — RESPIRATORY THERAPY NOTE
Pt recd on a pb 840 vent setting as per flow sheet all alarms on and function vent working properly

## 2022-12-29 NOTE — PROGRESS NOTES
Critical Care Services- Interval Progress Note   Orestes Henry 69 y o  male MRN: [id number]  Unit/Bed#: ICU 09 Encounter: [id number]  Assessment and Plan    This morning between 6-7:30am patient had multiple bradycardia into PEA arrests requiring brief chest compressions and several amps of Epi  He was on an Epi gtt which was increased to 10mcg  Emergent RIJ TVP was placed by Dr Zeayd See with good capture  Patient VVI 80 with mA 10  Levo added for hypotension, which was quickly increased to 16mcg  Patient was also given multiple amps of bicarb for severe metabolic acidosis with pH 7 0 and started on bicarb gtt 125mL/hr  Ventilator settings were also adjusted to aid in CO2 clearance to improve pH  RR increased to 28, however patient overbreathing in the 30s and vT increased to 500 from 400  Patient was also maintained on Lido 1mg/min d/t prior ventricular arrhythmias  Patient did stabilize and had acceptable BP and remained 100% V-paced  Transfer to [facility] was initiated with plans for cardiac cath lab  [facility] arrived to bedside to transfer patient and had patient on stretcher, hooked up to their monitors, and gtts switched over to their pumps and were preparing to leave when ICU team was called urgently to the room d/t transport team being unable to palpate a pulse  This occurred at 8892  [place] tracing was flat on transport monitoring  Was unable to adequately visualize rhythm before compressions were started  Patient was given Epi, 2 amps bicarb, and calcium  At first pulse check patient was in pulseless VT and shocked at 200J via transport [facility]  Patient was subsequently given 100 lido, 300 amio, and Epi  Patient remained in pulseless VT at next pulse check and had been transferred to [address] [place] [address] during compressions and was then shocked with 360J  At next pulse check patient did have an arterial line tracing with SBP 60-70   Levo put to 30mcg, Epi to 20mcg, and Vaso added at 0 04 and then increased to 0 08  Shortly after patient's yrn tracing went flat, rhythm showed V pacing at 80 with 100% capture, however no generation of cardiac contractility and code was resumed  Patient received multiple amps of Epi, Bicarb, and Calcium (see code record for further details)  KATJA device was put in place for compression  Labs abe arrest with ABG 7 0/31 5/58/9 1/-19, Lactic 18 1, AG 30, creat 2 8, AST 13,903, ALT 3,572, T bili 2 47, and INR 3 9  Ultimately could not regain ROSC despite exhausting all possible interventions and time of death was called at [phone]  Family, which including patient's significant other Whit Lynch and ex wife, had been present at bedside during start of code and then had been taken to the waiting room  They were informed of patient's death by Dr Gerard Banks  Upon my evaluation, this patient had a high probability of imminent or life-threatening deterioration due to cardiogenic shock/recurrent cardiac arrests, which required my direct attention, intervention, and personal management  I have personally provided 120 minutes (0600 to [phone]) on 12/29/2022 of critical care time, exclusive of procedures, teaching, family meetings, and any prior time recorded by providers other than myself   Interventions were performed as documented above    -------------------------------------------------------------------------------------------------------------------------------------    Laboratory   Results from last 7 days   Lab Units 12/29/22  0945 12/29/22  0927 12/29/22  0526 12/29/22  0026 12/29/22  0010 12/26/22  0723 12/26/22  0058 12/25/22 2035   WBC Thousand/uL 13 22*  --  15 91* 9 64  --  8 50  --  12 96*   HEMOGLOBIN g/dL 8 7*  --  9 9* 10 4*  --  13 0  --  14 0   I STAT HEMOGLOBIN g/dl  --  5 4*  --   --  10 2*  --   --   --    HEMATOCRIT % 28 9*  --  31 9* 32 7*  --  39 0  --  43 0   HEMATOCRIT, ISTAT %  --  16*  --   --  30*  --   --   --    PLATELETS Thousands/uL 76*  --  105* 116*  --  154 155 183   NEUTROS PCT %  --   --   --   --   --  83*  --  42*   BANDS PCT %  --   --  6  --   --   --   --   --    MONOS PCT %  --   --   --   --   --  5  --  4   MONO PCT %  --   --  3*  --   --   --   --   --      Results from last 7 days   Lab Units 12/29/22  0945 12/29/22  0927 12/29/22  0735 12/29/22  0526 12/29/22  0134 12/29/22  0010 12/28/22  0528 12/27/22  0604 12/26/22  1218 12/26/22  0557 12/25/22  2035   SODIUM mmol/L 148*  --  141 145 134*  --  141 138 137 139 138   POTASSIUM mmol/L 5 2  --  5 0 5 3 5 9*  --  4 5 3 4* 3 9 4 6 5 2   CHLORIDE mmol/L 102  --  100 103 98  --  105 104 103 104 101   CO2 mmol/L 16*  --  15* 24 17*  --  23 24 23 26 21   CO2, I-STAT mmol/L  --  10*  --   --   --  12*  --   --   --   --   --    ANION GAP mmol/L 30*  --  26* 18* 19*  --  13 10 11 9 16*   BUN mg/dL 49*  --  47* 45* 40*  --  29* 25 24 25 22   CREATININE mg/dL 2 83*  --  2 86* 2 43* 2 31*  --  1 42* 1 25 1 25 1 21 1 37*   CALCIUM mg/dL 10 7*  --  8 8 11 8* 8 0*  --  8 3 7 8* 7 8* 8 1* 8 4   GLUCOSE RANDOM mg/dL 57*  --  76 109 151*  --  138 169* 189* 230* 205*   ALT U/L 3,573*  --   --  2,755* 2,105*  --   --  51  --  116* 149*   AST U/L 13,906*  --   --  10,368* 8,110*  --   --  65*  --  227* 344*   ALK PHOS U/L 107  --   --  102 105  --   --  54  --  96 100   ALBUMIN g/dL 2 6*  --   --  2 9* 3 0*  --   --  2 8*  --  3 7 4 0   TOTAL BILIRUBIN mg/dL 2 47*  --   --  1 94* 2 19*  --   --  0 39  --  0 54 0 36     Results from last 7 days   Lab Units 12/29/22  0945 12/29/22  0526 12/29/22  0134 12/28/22  0528 12/27/22  0604 12/26/22  1218 12/26/22  0557   MAGNESIUM mg/dL 3 3* 3 2* 2 6 2 7* 2 6 2 4 1 7   PHOSPHORUS mg/dL  --  8 5*  --  4 1 3 9 4 4* 3 9      Results from last 7 days   Lab Units 12/29/22  0945 12/29/22  0526 12/28/22  1318 12/28/22  0700 12/28/22  0013 12/27/22  1616 12/27/22  0914 12/27/22  0614 12/26/22  1218 12/26/22  0557   INR  3 92*  --   --   --   --   --   --  1 28*  --  1 12   PTT seconds 57* 52* 62* 66* 55* 57* 49* --    < > 29    < > = values in this interval not displayed  Results from last 7 days   Lab Units 12/29/22  0945 12/29/22  0735 12/29/22  0526 12/29/22  0134 12/26/22  0918 12/26/22  0557 12/25/22  2236   LACTIC ACID mmol/L 18 1* 14 7* 12 6* 8 3* 2 5* 2 7* 3 9*     ABG:  Results from last 7 days   Lab Units 12/29/22  0629   PH ART  7 028*   PCO2 ART mm Hg 43 7   PO2 ART mm Hg 44 9*   HCO3 ART mmol/L 11 2*   BASE EXC ART mmol/L -18 8   ABG SOURCE  Line, Arterial     VBG:  Results from last 7 days   Lab Units 12/29/22  0733 12/29/22  0629   PH ELIE  7 088*  --    PCO2 ELIE mm Hg 46 0  --    PO2 ELIE mm Hg 44 3  --    HCO3 ELIE mmol/L 13 6*  --    BASE EXC ELIE mmol/L -15 6  --    ABG SOURCE   --  Line, Arterial     Results from last 7 days   Lab Units 12/29/22  0526 12/28/22  0528 12/27/22  0604 12/26/22  0557 12/25/22  2236   PROCALCITONIN ng/ml 1 04* 0 78* 1 33* 0 67* 0 07       Micro  Results from last 7 days   Lab Units 12/26/22  1004 12/26/22  0057 12/25/22  2310   BLOOD CULTURE   --   --  No Growth at 48 hrs  No Growth at 48 hrs     SPUTUM CULTURE   --  1+ Growth of  --    GRAM STAIN RESULT   --  Rare Polys*  Rare Gram positive cocci in pairs*  Rare Gram variable rods*  --    MRSA CULTURE ONLY   --  No Methicillin Resistant Staphlyococcus aureus (MRSA) isolated  --    LEGIONELLA URINARY ANTIGEN  Negative  --   --    STREP PNEUMONIAE ANTIGEN, URINE  Negative  --   --          Medications:  Current Facility-Administered Medications   Medication Dose Route Frequency   • acetaminophen (TYLENOL) oral suspension 650 mg  650 mg Oral Q6H PRN   • amiodarone 150 mg/3 mL injection    Code/Trauma/Sedation Med   • ampicillin-sulbactam (UNASYN) 3 g in sodium chloride 0 9 % 100 mL IVPB  3 g Intravenous Q6H   • aspirin chewable tablet 81 mg  81 mg Oral Daily   • chlorhexidine (PERIDEX) 0 12 % oral rinse 15 mL  15 mL Mouth/Throat Q12H [address]   • dextrose 25 g/50 mL IV solution    Code/Trauma/Sedation Med   • EPINEPHrine (ADRENALIN) injection   Intravenous Code/Trauma/Sedation Med   • EPINEPHrine 3000 mcg (STANDARD CONCENTRATION) IV in sodium chloride 0 9% 250 mL  1-10 mcg/min Intravenous Titrated   • heparin (porcine) 25,000 units in 0 45% NaCl 250 mL infusion (premix)  3-20 Units/kg/hr (Order-Specific) Intravenous Titrated   • heparin (porcine) injection 2,000 Units  2,000 Units Intravenous Q6H PRN   • heparin (porcine) injection 4,000 Units  4,000 Units Intravenous Q6H PRN   • insulin lispro (HumaLOG) 100 units/mL subcutaneous injection 5-25 Units  5-25 Units Subcutaneous Q6H [address]   • levalbuterol (XOPENEX) inhalation solution 0 63 mg  0 63 mg Nebulization Q6H   • lidocaine 2000 mg in 250 mL infusion (cardiac premix)- NOT FOR TREATMENT OF PAIN  1 mg/min Intravenous Continuous   • norepinephrine (LEVOPHED) 4 mg (STANDARD CONCENTRATION) IV in sodium chloride 0 9% 250 mL  1-30 mcg/min Intravenous Continuous   • pantoprazole (PROTONIX) injection 40 mg  40 mg Intravenous Q24H MARIANO   • propofol (DIPRIVAN) 1000 mg in 100 mL infusion (premix) **ADS Override Pull**       • sodium bicarbonate 150 mEq in dextrose 5 % 1,000 mL infusion  125 mL/hr Intravenous Continuous   • sodium bicarbonate 50 mEq/50 mL injection    Code/Trauma/Sedation Med   • sodium chloride 3 % inhalation solution 4 mL  4 mL Nebulization Q6H   • vasopressin (PITRESSIN) 20 Units in sodium chloride 0 9 % 100 mL infusion  0 04 Units/min Intravenous Continuous       SIGNATURE: Wynelle Nissen Spironello, CRNP    Portions of the record may have been created with voice recognition software  Occasional wrong word or "sound a like" substitutions may have occurred due to the inherent limitations of voice recognition software    Read the chart carefully and recognize, using context, where substitutions have occurred

## 2022-12-29 NOTE — RESPIRATORY THERAPY NOTE
Responded to code blue  CPR in progress  Assisted with manual resuscitation with BMV and 100% O2  Patient was intubated and placed on ventilator

## 2022-12-29 NOTE — PROCEDURES
Transvenous Pacing Wire Insertion    Date/Time: 12/29/2022 7:30 AM  Performed by: Adrian Maloney MD  Authorized by: Adrian Maloney MD     Patient location:  ICU  Other Assisting Provider: Yes (comment) (Camden Servin 40)    Consent:     Consent obtained:  Emergent situation  Universal protocol:     Patient identity confirmed:  Arm band  Pre-procedure details:     Hand hygiene: Hand hygiene performed prior to insertion      Sterile barrier technique: All elements of maximal sterile technique followed      Skin preparation:  ChloraPrep  Indications:     Temporary pacing indications:  Cardiovascular instability  Procedure details:     Patient position:  Trendelenburg    Venous introducer: new sheath/introducer placed      Location:  Right internal jugular    Landmarks identified: yes      Ultrasound guidance: yes      Pacing catheter size:  5 Fr    TVP depth at introducer hub (cm):  40  Pacer settings:     Ventricular site placed: right ventricle      Temporary pacer function: turned on      Pacer mode: asynchronous      Rate:  80    mA:  15    Pacing capture:  100% capture  Post-procedure details:     TVP catheter securement: TVP catheter cover in place and TVP catheter hub locked      Post-procedure:  Line sutured    Image confirmation:  Chest x-ray pending    Patient tolerance of procedure:   Tolerated well, no immediate complications

## 2022-12-29 NOTE — PROGRESS NOTES
Spiritual Care Progress Note    2022  Patient: Trena Brizuela : 1945  Admission Date & Time: 2022  MRN: [id number] CSN: [id number]     responded to Code Blue per protocol   provided hospitality to patient's family members  Pt's family members declined further  support  Spiritual care will be available if requested       22 1000   Clinical Encounter Type   Visited With Family   Routine Visit Introduction   Crisis Visit Code;Critical Care   Referral From Other (Comment)  (Per protocol)

## 2022-12-29 NOTE — DISCHARGE SUMMARY
Critical Care Discharge Death Summary   Fanta Handy 69 y o  male MRN: [id number]  [facility]   Unit/Bed#: ICU 09 Encounter: [id number]    [address] [place] [address]    Admitting Provider: Pete David MD  Discharge Provider: No att  providers found    Admission Date: 12/25/2022       Date of Death: 12/29/2022    Primary Discharge Diagnosis  Principal Problem:    Cardiac arrest [facility])  Active Problems:    Acute respiratory failure with hypoxia ([facility] )    Severe sepsis (HCC)    Elevated troponin    PAF (paroxysmal atrial fibrillation) ([facility])    Chronic systolic [facility]    Internal carotid artery occlusion, right    S/P AVR    Controlled diabetes mellitus with diabetic neuropathy ([place] [address] )    Benign essential hypertension    Hyperlipidemia    Coronary artery disease involving native coronary artery of native heart    Stage 3a chronic kidney disease ([place] [address] )  Resolved Problems:    Lactic acidosis      Consulting Providers   · Cardiology    Therapeutic Procedures Performed  · Intubation, left radial yrn, RIJ cordis/TVP    Diagnostic Procedures Performed  12/29 CXR-Right jugular transvenous pacemaker in right ventricle with no pneumothorax  Persistent bilateral consolidation with small right effusion  12/26: Echo-EF 35%, mild RV dysfunction   12/25: CTA C/A/P-No pulmonary embolism or acute aortic process is seen  Coronary atherosclerosis, status post CABG  Heart appears enlarged  Extensive perihilar groundglass and alveolar opacities are noted with more dense alveolar opacities noted dependently in the bilateral lungs  Some interstitial edema is seen most prominent in the upper lung fields; findings could represent edema, aspiration, infection, and/or ARDS  Prominence of the right heart chambers with reflux of contrast into the IVC and posterior hepatic veins suggests a component of increased right heart pressures   Right renal cyst, colonic diverticulosis without evidence of acute diverticulitis, enlarged prostate  12/25: CT head/CT cervical spine-negative    Hospital Course  Tim Mcneil is a 69 y o  male patient who originally presented to the hospital on 12/25/2022   Per Lee Ann Grewal PA-C H&P 12/26 0106: "69 y o  male with PMHx of CAD and CABGx5 in 2021, Aortic stenosis s/p bioprosthetic AVR, HTN, HLD, DM2, hx of previous CVA who presents with cardiac arrest  Was at home and walked to bathroom and back and sat down in living room  Family heard him making a strange sound and when they checked on him, he was unresponsive  Fire crew arrived first and had shockable rhythm believed to be VFib  Shock x 2, ROSC obtained  Lidocaine [id number] given by ALS and brought to [place] [facility] where patient was intubated  EKG without STEMI  Labs and imaging largely unremarkable  Will be admitted to ICU for further monitoring"    Per Pat Cartwright progress note 12/29 0801: "Overnight patient with episodes of increased work of breathing  Also having episodes of atrial fibrillation with wide complex  EKG obtained which confirmed Afib  No acute ST changes  Patient denied chest pain  CXR obtained with asymmetric infiltrates R>L, ? Pneumonia vs pulmonary edema but relatively unchanged from previous  ABG obtained on NRB: 7 31/22/155/11 4/-13  Given metabolic acidosis on ABG, STAT labs sent  Labs noted worsening SAHIL, significant transaminitis, mild hyperkalemia, lactic acidosis  Hyperkalemia treated with insulin/calcium and sodium bicarb  Amiodarone infusion stopped in the setting of significant transaminitis  HR and BP stable at that time  Work of breathing improved without intervention, patient placed on HFNC and appeared to be resting comfortably       0417:  Patient again with episode of increased WOB followed by bradycardia and ultimately pulselessness  Telemetry showed slow atrial flutter with HR decreasing as low as 16 before losing pulse  Code blue activated and CPR started immediately   Given 1amp Epinephrine given  ROSC achieved with first pulse check but patient was in ventricular tachycardia with a pulse  He was moving spontaneously at that time but continued to be agonally breathing  Prior to being able to defibrillate him due to instability, he was given 100mg of lidocaine which converted him to NSR with a adequate BP  He was then intubated  After intubation, an arterial line was placed  He then had several more episodes where he would go bradycardic (now sinus bradycardia) and subsequently hypotensive and therefore epinephrine push was given  POC US without evidence of pericardial tamponade  EF appeared significantly reduced compared to previous  Epinephrine gtt ordered  Patient empircally given multiple push doses of sodium bicarb and a push dose of calcium chloride  Dr Armin Vazquez called and made aware of events  [address] [facility] called and updated on cardiac arrest, recommending transfer to [facility] upon stabilization  Dr Armin Vazquez arrived at the bedside and ultimately placed temporary pacing wire given ongoing bradycardic arrests       Patient's significant other called and updated regarding events  She is planning to come to bedside  She requests ongoing full resuscitative efforts at this time "    This morning between 6-7:30am patient had multiple bradycardia into PEA arrests requiring brief chest compressions and several amps of Epi  He was on an Epi gtt which was increased to 10mcg  Emergent RIJ TVP was placed by Dr Armin Vazquez with good capture  Patient VVI 80 with mA 10  Levo added for hypotension, which was quickly increased to 16mcg  Patient was also given multiple amps of bicarb for severe metabolic acidosis with pH 7 0 and started on bicarb gtt 125mL/hr  Ventilator settings were also adjusted to aid in CO2 clearance to improve pH  RR increased to 28, however patient overbreathing in the 30s and vT increased to 500 from 400   Patient was also maintained on Lido 1mg/min d/t prior ventricular arrhythmias      Patient did stabilize and had acceptable BP and remained 100% V-paced  Transfer to [facility] was initiated with plans for cardiac cath lab  [facility] arrived to bedside to transfer patient and had patient on stretcher, hooked up to their monitors, and gtts switched over to their pumps and were preparing to leave when ICU team was called urgently to the room d/t transport team being unable to palpate a pulse  This occurred at 4973  [place] tracing was flat on transport monitoring  Was unable to adequately visualize rhythm before compressions were started  Patient was given Epi, 2 amps bicarb, and calcium  At first pulse check patient was in pulseless VT and shocked at 200J via transport [facility]  Patient was subsequently given 100 lido, 300 amio, and Epi  Patient remained in pulseless VT at next pulse check and had been transferred to [address] [place] [address] during compressions and was then shocked with 360J  At next pulse check patient did have an arterial line tracing with SBP 60-70  Levo put to 30mcg, Epi to 20mcg, and Vaso added at 0 04 and then increased to 0 08  Shortly after patient's yrn tracing went flat, rhythm showed V pacing at 80 with 100% capture, however no generation of cardiac contractility and code was resumed  Patient received multiple amps of Epi, Bicarb, and Calcium (see code record for further details)  KATJA device was put in place for compression  Labs abe arrest with ABG 7 0/31 5/58/9 1/-19, Lactic 18 1, AG 30, creat 2 8, AST 13,903, ALT 3,572, T bili 2 47, and INR 3 9  Ultimately could not regain ROSC despite exhausting all possible interventions and time of death was called at [phone]  Family, which including patient's significant other Venessa Delvalle and ex wife, had been present at bedside during start of code and then had been taken to the waiting room   They were informed of patient's death by Dr Raad Bal        Date, Time and Cause of Death    Date of Death: 12/29/22  Time of Death:  9:49 AM  Preliminary Cause of Death: Cardiogenic shock  Entered by: Obie Schlatter, CRNP[JS1 1]     Attribution     JS1 1 Obie Schlatter, CRNP 22 09:59          Code Status: Level 1 - Full Code    Discharge Condition:     Signature: Obie Schlatter, CRNP  Date: 2022

## 2022-12-29 NOTE — PROCEDURES
Central Line Insertion    Date/Time: 12/29/2022 7:26 AM  Performed by: Bety Chawla MD  Authorized by: Bety Chawla MD     Patient location:  ICU  Consent:     Consent obtained:  Emergent situation  Universal protocol:     Patient identity confirmed:  Arm band  Pre-procedure details:     Hand hygiene: Hand hygiene performed prior to insertion      Sterile barrier technique: All elements of maximal sterile technique followed      Skin preparation:  2% chlorhexidine and ChloraPrep    Skin preparation agent: Skin preparation agent completely dried prior to procedure    Indications:     Central line indications: other (comment)      Central line indications comment:  Transvenous pacing  Anesthesia (see MAR for exact dosages): Anesthesia method:  None  Procedure details:     Location:  Right internal jugular    Vessel type: vein      Laterality:  Right    Approach: percutaneous technique used      Patient position:  Trendelenburg    Catheter type:  Cordis    Catheter size:  6 Fr    Landmarks identified: yes      Ultrasound guidance: yes      Ultrasound image availability:  Still images obtained    Sterile ultrasound techniques: Sterile gel and sterile probe covers were used      Manometry confirmation: no      Number of attempts:  1    Successful placement: yes    Post-procedure details:     Post-procedure:  Line sutured and dressing applied    Assessment:  Blood return through all ports    Post-procedure complications: none      Patient tolerance of procedure:   Tolerated well, no immediate complications

## 2022-12-29 NOTE — DEATH NOTE
INPATIENT DEATH NOTE  Lilia Silva 69 y o  male MRN: [id number]  Unit/Bed#: ICU 09 Encounter: [id number]    Date, Time and Cause of Death    Date of Death: 22  Time of Death:  9:49 AM  Preliminary Cause of Death: Cardiogenic shock  Entered by: MOSHE King[JS1 1]     Attribution     JS1 1 MOSHE King 22 09:59           Patient's Information  Pronounced by: MOSHE King  Did the patient's death occur in the ED?: No  Did the patient's death occur in the OR?: No  Did the patient's death occur less than 10 days post-op?: No  Did the patient's death occur within 24 hours of admission?: No  Was code status DNR at the time of death?: No    PHYSICAL EXAM:  Unresponsive to noxious stimuli, Spontaneous respirations absent, Breath sounds absent, Carotid pulse absent, Heart sounds absent, Pupillary light reflex absent and Corneal blink reflex absent    Medical Examiner notification criteria:  Does not meet criteria    Medical Examiner's office notified?:  No   Medical Examiner accepted case?:  No  Name of Medical Examiner: N/A    Family Notification  Was the family notified?: Yes  Date Notified: 22  Time Notified: 6599  Notified by: Dr Bertis Koyanagi  Name of Family Notified of Death: significant other Hodan Dinning and ex wife also present   Relationship to Patient: Partner  Family Notification Route:  At bedside  Was the family told to contact a  home?: Yes    Autopsy Options:  Post-mortem examination declined by next of kin    Primary Service Attending Physician notified?:  yes - Attending:  Shyam Calderon MD    VANESA responsible for completing Discharge Summary:  MOSHE King

## 2022-12-30 LAB
ATRIAL RATE: 131 BPM
ATRIAL RATE: 55 BPM
ATRIAL RATE: 59 BPM
ATRIAL RATE: 68 BPM
ATRIAL RATE: 73 BPM
ATRIAL RATE: 80 BPM
BASE EXCESS BLDA CALC-SCNC: -19 MMOL/L (ref -2–3)
BODY TEMPERATURE: 98.2 DEGREES FEHRENHEIT
CA-I BLD-SCNC: >2.2 MMOL/L (ref 1.12–1.32)
FIO2 GAS DIL.REBREATH: ABNORMAL L
GLUCOSE SERPL-MCNC: 41 MG/DL (ref 65–140)
HCO3 BLDA-SCNC: 9.1 MMOL/L (ref 22–28)
HCT VFR BLD CALC: 16 % (ref 36.5–49.3)
HGB BLDA-MCNC: 5.4 G/DL (ref 12–17)
P AXIS: 25 DEGREES
P AXIS: 33 DEGREES
PCO2 BLD: 10 MMOL/L (ref 21–32)
PCO2 BLD: 31.5 MM HG (ref 36–44)
PH BLD: 7.07 [PH] (ref 7.35–7.45)
PO2 BLD: 58 MM HG (ref 75–129)
POTASSIUM BLD-SCNC: 3.5 MMOL/L (ref 3.5–5.3)
PR INTERVAL: 250 MS
QRS AXIS: -37 DEGREES
QRS AXIS: -81 DEGREES
QRS AXIS: -84 DEGREES
QRS AXIS: 256 DEGREES
QRS AXIS: 50 DEGREES
QRS AXIS: 81 DEGREES
QRSD INTERVAL: 104 MS
QRSD INTERVAL: 116 MS
QRSD INTERVAL: 140 MS
QRSD INTERVAL: 154 MS
QRSD INTERVAL: 158 MS
QRSD INTERVAL: 162 MS
QT INTERVAL: 390 MS
QT INTERVAL: 414 MS
QT INTERVAL: 452 MS
QT INTERVAL: 470 MS
QT INTERVAL: 476 MS
QT INTERVAL: 542 MS
QTC INTERVAL: 432 MS
QTC INTERVAL: 462 MS
QTC INTERVAL: 510 MS
QTC INTERVAL: 537 MS
QTC INTERVAL: 542 MS
QTC INTERVAL: 548 MS
SAO2 % BLD FROM PO2: 77 % (ref 60–85)
SODIUM BLD-SCNC: 145 MMOL/L (ref 136–145)
SPECIMEN SOURCE: ABNORMAL
T WAVE AXIS: 101 DEGREES
T WAVE AXIS: 106 DEGREES
T WAVE AXIS: 108 DEGREES
T WAVE AXIS: 81 DEGREES
T WAVE AXIS: 89 DEGREES
T WAVE AXIS: 98 DEGREES
VENTRICULAR RATE: 114 BPM
VENTRICULAR RATE: 55 BPM
VENTRICULAR RATE: 60 BPM
VENTRICULAR RATE: 71 BPM
VENTRICULAR RATE: 75 BPM
VENTRICULAR RATE: 80 BPM

## 2022-12-30 NOTE — CLINICAL RISK MANAGEMENT
Progress Note - Death in Restraints   Mai Morley 69 y o  male MRN: [id number]  Unit/Bed#: ICU 09 Encounter: [id number]      Patient  within 24 hours of restraint  with Soft restraint right wrist and Soft restraint left wrist  Death unrelated to use of restraints  This situation was tracked internally  CMS and [facility]  notification not required

## 2022-12-31 LAB
BACTERIA BLD CULT: NORMAL
BACTERIA BLD CULT: NORMAL

## 2023-02-27 NOTE — ASSESSMENT & PLAN NOTE
Called patient to him aware that our c-arm is down for tomorrow  He would like to speak to a nurse   Please call patient Repeat bw in April

## 2023-03-22 NOTE — PROGRESS NOTES
Cardiology   Ann Mccarthy DO, Alanis Martínez MD, Ricardo De La Torre MD, Tamy Mullen MD, [facility] [place]  -------------------------------------------------------------------  [facility]  [address], [address] [place] [address], [facility]0 [facility]  [phone]    Cardiology Follow Up  Tom Carney  1945  [id number]          Assessment/Plan:    1  Benign essential hypertension  - BP improved with addition of HCTZ and increase in lisinopril and switch to carvedilol  - Avoid NSAIDS     2  Internal carotid artery occlusion, right  - Continue ASA  - POCT ECG    3  Mixed hyperlipidemia  - Continue pravastatin    4  Controlled type 2 diabetes mellitus with diabetic neuropathy, without long-term current use of insulin (HCC)  - Management per PMD     5  CHF - chronic systolic CHF with EF of 04-06%  - No symptoms at this time  - Continue carvedilol and lisinopril  - Stress test ordered to rule out CAD - he has a history of PVD  Interval History:     Tom Carney is 77 y o  male here for followup of hypertension  During his last visit,  Hydrochlorothiazide was added to his medication regimen as blood pressure remained elevated  Previously, he was seen on February 8th and was  switched from labetalol to carvedilol 6 25 mg b i d  He reports good adherence with medications and uses NSAIDS rarely  blood pressure has  Improved  Since his last visit, he has been feeling well without any chest pain, shortness of breath, lower extremity edema, orthopnea or paroxysmal nocturnal dyspnea  The patient has a longstanding history of hypertension  He has been on labetalol for many years  Previously he was on losartan as well as amlodipine  Recently, was found to have severe carotid artery stenosis incidentally on CT and eventually underwent carotid endarterectomy       He had an echocardiogram done last year which showed an ejection fraction which was moderately reduced        Past Medical History:   Diagnosis Date    Arthritis     generalized    Asthma     seasonal-does not use inhaler    Back pain     Carotid artery stenosis     Diabetes mellitus (HCC)     type    Hyperlipidemia     Hypertension     Peyronie's disease     last assessed [id number]    Seasonal allergies     Wears glasses      Social History     Socioeconomic History    Marital status: Single     Spouse name: Not on file    Number of children: Not on file    Years of education: Not on file    Highest education level: Not on file   Occupational History    Not on file   Social Needs    Financial resource strain: Not on file    Food insecurity     Worry: Not on file     Inability: Not on file    Transportation needs     Medical: Not on file     Non-medical: Not on file   Tobacco Use    Smoking status: Never Smoker    Smokeless tobacco: Never Used   Substance and Sexual Activity    Alcohol use: Yes     Frequency: 4 or more times a week     Drinks per session: 1 or 2    Drug use: No    Sexual activity: Not on file   Lifestyle    Physical activity     Days per week: Not on file     Minutes per session: Not on file    Stress: Not on file   Relationships    Social connections     Talks on phone: Not on file     Gets together: Not on file     Attends [employer] service: Not on file     Active member of club or organization: Not on file     Attends meetings of clubs or organizations: Not on file     Relationship status: Not on file    Intimate partner violence     Fear of current or ex partner: Not on file     Emotionally abused: Not on file     Physically abused: Not on file     Forced sexual activity: Not on file   Other Topics Concern    Not on file   Social History Narrative    Daily caffeinnated coffee       Family History   Problem Relation Age of Onset    Heart attack Mother [de-identified]        CABG    Kidney failure Father     Diabetes Father         pre-diabetic     Past Surgical History:   Procedure Laterality Date    APPENDECTOMY      CAROTID ENDARTERECTOMY Left 10/29/2020    COLONOSCOPY N/A 1/22/2016    Procedure: COLONOSCOPY;  Surgeon: Gayathri Escalante MD;  Location: [place] [address] [facility]; Service:     KNEE ARTHROSCOPY W/ MENISCAL REPAIR Right     RI THROMBOENDARTECTMY NECK,NECK INCIS Left 10/29/2020    Procedure: CAROTID ENDARTERECTOMY W/BOVINE PATCH ANGIOPLASTY AND COMPLETION DUPLEX IMAGING;  Surgeon: Shelly Chirinos DO;  Location: [facility];  Service: Vascular       Current Outpatient Medications:     aspirin 81 mg chewable tablet, Chew 81 mg daily, Disp: , Rfl:     carvedilol (COREG) 6 25 mg tablet, Take 1 tablet (6 25 mg total) by mouth 2 (two) times a day with meals, Disp: 60 tablet, Rfl: 1    Dapagliflozin Propanediol (Farxiga) 5 MG TABS, Take 1 tablet (5 mg total) by mouth daily, Disp: 30 tablet, Rfl: 1    lisinopril (ZESTRIL) 10 mg tablet, Take 1 tablet (10 mg total) by mouth daily (Patient taking differently: Take 10 mg by mouth 2 (two) times a day ), Disp: 90 tablet, Rfl: 1    lisinopril-hydrochlorothiazide (PRINZIDE,ZESTORETIC) 10-12 5 MG per tablet, Take 1 tablet by mouth 2 (two) times a day, Disp: 60 tablet, Rfl: 2    metFORMIN (GLUCOPHAGE) 1000 MG tablet, TAKE ONE TABLET BY MOUTH TWICE A DAY WITH MEALS, Disp: 180 tablet, Rfl: 2    Naproxen Sodium (ALEVE PO), Take 2 tablets by mouth as needed , Disp: , Rfl:     pravastatin (PRAVACHOL) 80 mg tablet, Take 1 tablet (80 mg total) by mouth daily at bedtime, Disp: 90 tablet, Rfl: 2        Review of Systems:  Review of Systems   Constitutional: Positive for fatigue  Negative for chills and fever  HENT: Negative for ear pain and sore throat  Eyes: Negative for pain and visual disturbance  Respiratory: Negative for cough and shortness of breath  Cardiovascular: Negative for chest pain and palpitations  Gastrointestinal: Negative for abdominal pain and vomiting     Genitourinary: Negative for dysuria and hematuria  Musculoskeletal: Negative for arthralgias and back pain  Skin: Negative for color change and rash  Neurological: Negative for seizures and syncope  All other systems reviewed and are negative  Physical Exam:  Vitals:  Vitals:    03/26/21 1022   BP: 138/92   BP Location: Left arm   Patient Position: Sitting   Cuff Size: Standard   Pulse: 55   Temp: 98 4 °F (36 9 °C)   TempSrc: Temporal   SpO2: 97%   Weight: 74 6 kg (164 lb 8 oz)   Height: 5' 9" (1 753 m)     Physical Exam   Constitutional: He appears healthy  No distress  Eyes: Pupils are equal, round, and reactive to light  Conjunctivae are normal    Neck: Normal range of motion  Neck supple  No JVD present  Cardiovascular: Normal rate, regular rhythm and normal heart sounds  Exam reveals no gallop and no friction rub  No murmur heard  Pulmonary/Chest: Effort normal and breath sounds normal  He has no wheezes  He has no rales  Musculoskeletal:         General: No tenderness, deformity or edema  Neurological: He is alert and oriented to person, place, and time  Skin: Skin is warm and dry  Cardiographics:  EKG: Personally reviewed   Sinus bradycardia 55 beats per minute with LVH and  Repolarization abnormality  Last known EF: 35-40%    This note was completed in part utilizing [employer] Software  Grammatical errors, random word insertions, spelling mistakes, and incomplete sentences can be an occasional consequence of this system secondary to software limitations, ambient noise, and hardware issues  If you have any questions or concerns about the content, text, or information contained within the body of this dictation, please contact the provider for clarification  Skin normal color for race, warm, dry and intact. No evidence of rash.

## 2023-09-22 NOTE — PROGRESS NOTES
Cardiac Rehabilitation Plan of Care   90 Day Reassessment          Today's date: 2021   # of Exercise Sessions Completed: 32  Patient name: Shira Mann      : 1945  Age: 77 y o  MRN: [id number]  Referring Physician: Milton Amaro PA-C  Cardiologist: Jenni Veronica  Provider: [place]  Clinician: Naresh Ratliff RN    Dx:   Encounter Diagnoses   Name Primary?  S/P AVR     S/P CABG (coronary artery bypass graft)      Date of onset: 5/10/2021      SUMMARY OF PROGRESS:  Mr Rebekah Shelby completed 32 sessions of the cardiac rehabilitation program  Explained cardiac risk factors, how the heart functions, Ejection fraction, afib, heart healthy diet, Diabetes and exercise  Completed sub max treadmill test  Obtained BMI and waist measurements  Telemetry monitor NSR with BBB, 1AVB, PAC and PVC at rest and with exercise  Exercise MET level 2 6-4 1 MET's  RPE 4  Tolerated exercise well  Denied any complaint of chest discomfort  Mid sternal chest incision and three drain sites clean, dry and intact  No redness or drainage noted  Chronic discomfort in left knee  He has a large mass on right neck he stated MD aware  Pt made aware of financial responsibility of 20% of the cardiac rehabilitation  He stated he has noticed an increase in endurance and has returned to [employer]  Provided him with handbook for cardiac rehabilitation  Will continue to monitor  Medication compliance: Yes   Comments: Pt reports to be compliant with medications  Fall Risk: Low   Comments: Ambulates with a steady gait with no assist device and Denies a fall in the past 6 months    EKG Interpretation: NSR 1AVB   PAC, PVC      EXERCISE ASSESSMENT and PLAN    Current Exercise Program in Rehab:       Frequency: 3 days/week    Minutes: 31-43         METS: 2 6-4 1          HR:    RPE: 3-4         Modalities: Treadmill, UBE, NuStep and Recumbent bike      Exercise Progression 30 Day Goals :    Frequency: 3 days/week of cardiac rehab Patient was schedule for an appt and labs were extended.   When signing the medication there was a drug interaction with the Diltiazem and Simvastatin. Ok to fill?    To Dr SERVIN  Thank you     Supplement with home exercise 2+ days/wk as tolerated    Minutes: 30-50                            >150 mins/wk of moderate intensity exercise   METS: 3 0-6 0   HR:     RPE: 4-6   Modalities: Treadmill, UBE, NuStep and Recumbent bike    Strength training: Will be added following at least 8 weeks post surgery and 8-10 monitored sessions   Modalities: Arm Curl, Wall push-ups, Front Raises and Shoulder Shrugs    Home Exercise: Type: walking , Frequency: 5-7 days/week, Duration: 30 mins    Goals: Attend Rehab regularly and increase endurance to return to normal activities such as yard work, and golfing    Progression Toward Goals:  Reviewed Pt goals and determined plan of care, Will continue to educate and progress as tolerated  Education: benefit of exercise for CAD risk factors, AHA guidelines to achieve >150 mins/wk of moderate exercise, RPE scale and class: Risk Factors for Heart Disease   Plan:education on home exercise guidelines and home exercise 30+ mins 2 days opposite CR  Readiness to change: Action:  (Changing behavior)      NUTRITION ASSESSMENT AND PLAN    Weight control:    Starting weight: 156   Current weight:   15 5  Waist circumference:    Startin   Current:      Diabetes: T2D, Patient reported fasting , No insulin  A1c: 5 5    last measured:     Lipid management: Discussed diet and lipid management and Last lipid profile 2021  Chol 155    HDL 57  LDL 75    Goals:choose low sodium processed foods    Progression Toward Goals: Reviewed Pt goals and determined plan of care, Will continue to educate and progress as tolerated      Education: heart healthy eating  low sodium diet  exercise and diabetes management    Healthy food choices  Label reading  Plan: Education class: Reading Food Labels, Education Class: Heart Healthy Eating and replace unhealthy snacks with fruits & vegs  Readiness to change: Preparation:  (Getting ready to change)       PSYCHOSOCIAL ASSESSMENT AND PLAN    Emotional:  Depression assessment:  PHQ-9 = 5-9 = Mild Depression            Anxiety measure:  DENIS-7 = 0-4  = Not anxious  Self-reported stress level:  1  Social support: Very Good, Patient reports excellent emotional/social support from family and Patient has friends available for support when needed     Goals:  improved sleep and increased energy    Progression Toward Goals: Reviewed Pt goals and determined plan of care, Will continue to educate and progress as tolerated  Education: signs/sxs of depression, benefits of a positive support system and stress management techniques  Plan: Class: Stress and Your Health, Refer to [employer], Practice relaxation techniques, Exercise, Spend time outdoors, Read a Book and Keep a positive mindset  Readiness to change: Preparation:  (Getting ready to change)       OTHER CORE COMPONENTS     Tobacco:   Social History     Tobacco Use   Smoking Status Never Smoker   Smokeless Tobacco Never Used       Tobacco Use Intervention:   N/A:  Patient is a non-smoker     Anginal Symptoms:  None   NTG use: No prescription    Blood pressure:    Restin//58   Exercise: 120//80    Goals: consistent BP < 130/80, reduced dietary sodium <2300mg, moderate intensity exercise >150 mins/wk and medication compliance    Progression Toward Goals: Reviewed Pt goals and determined plan of care, Will continue to educate and progress as tolerated      Education:  understanding high blood pressure and it's relationship to CAD, low sodium diet and HTN and Education class: Understanding Heart Disease, common heart medications, cardiac risk factots  Plan: Class: Understanding Heart Disease, Class: Common Heart Medications, Avoid Processed foods, engage in regular exercise and check labels for sodium content  Readiness to change: Preparation:  (Getting ready to change)

## (undated) DEVICE — BONE WAX WHITE: Brand: BONE WAX WHITE

## (undated) DEVICE — BLADE BEAVER MINI SZ 69

## (undated) DEVICE — 3M™ IOBAN™ 2 ANTIMICROBIAL INCISE DRAPE 6650EZ: Brand: IOBAN™ 2

## (undated) DEVICE — ULTRASOUND GEL STERILE FOIL PK

## (undated) DEVICE — IV CATH INTROCAN 18G X 1 1/4 SAFETY

## (undated) DEVICE — BETHL CAROTID ENDARTERECTOMY: Brand: CARDINAL HEALTH

## (undated) DEVICE — OASIS DRAIN, DUAL, IN-LINE, ATS COMPATIBLE: Brand: OASIS

## (undated) DEVICE — LIGACLIP MCA MULTIPLE CLIP APPLIERS, 20 SMALL CLIPS: Brand: LIGACLIP

## (undated) DEVICE — INTENDED FOR TISSUE SEPARATION, AND OTHER PROCEDURES THAT REQUIRE A SHARP SURGICAL BLADE TO PUNCTURE OR CUT.: Brand: BARD-PARKER SAFETY BLADES SIZE 15, STERILE

## (undated) DEVICE — SUT PROLENE 7-0 BV175-8/BV175-8 24 IN EPM8747

## (undated) DEVICE — SORIN VEIN DISTENTION KIT

## (undated) DEVICE — NEEDLE 25G X 1 1/2

## (undated) DEVICE — DRESSING ALLEVYN LIFE HEEL 25 X 25.2CM

## (undated) DEVICE — GLOVE INDICATOR PI UNDERGLOVE SZ 8 BLUE

## (undated) DEVICE — SUT SILK 0 CT-1 30 IN 424H

## (undated) DEVICE — SUT MONOCRYL PLUS 3-0 PS-2 27 IN MCP427H

## (undated) DEVICE — INTENDED FOR TISSUE SEPARATION, AND OTHER PROCEDURES THAT REQUIRE A SHARP SURGICAL BLADE TO PUNCTURE OR CUT.: Brand: BARD-PARKER ® CARBON RIB-BACK BLADES

## (undated) DEVICE — SYRINGE 10ML LL

## (undated) DEVICE — UMBILICAL TAPE: Brand: DEROYAL

## (undated) DEVICE — 40601 PROLONGED POSITIONING SYSTEM: Brand: 40601 PROLONGED POSITIONING SYSTEM

## (undated) DEVICE — SUCTION CATH 18 FR

## (undated) DEVICE — DRAPE PROBE NEO-PROBE/ULTRASOUND

## (undated) DEVICE — SUT PROLENE 7-0 BV 175-6 24 IN 8735H

## (undated) DEVICE — VASOVIEW HEMOPRO 2: Brand: VASOVIEW HEMOPRO 2

## (undated) DEVICE — ALCON OPHTHALMIC KNIFE 15 °: Brand: ALCON

## (undated) DEVICE — SUT PROLENE 6-0 C-1/C-1 30 IN 8307H

## (undated) DEVICE — GAUZE SPONGES,16 PLY: Brand: CURITY

## (undated) DEVICE — SUT ETHIBOND 2-0 SH-1/SH-1 30 IN X763H

## (undated) DEVICE — ELECTRODE BLADE E-Z CLEAN 2.75IN 7CM -0012A

## (undated) DEVICE — SUT SILK 4-0 30 IN A303H

## (undated) DEVICE — LIGACLIP MCA MULTIPLE CLIP APPLIERS, 20 MEDIUM CLIPS: Brand: LIGACLIP

## (undated) DEVICE — 32 FR STRAIGHT – SOFT PVC CATHETER: Brand: PVC THORACIC CATHETERS

## (undated) DEVICE — SUT SILK 3-0 30 IN A304H

## (undated) DEVICE — FILTER SMOKE EVAC VIROSAFE

## (undated) DEVICE — AORTIC PUNCH 5.2 MM DISP

## (undated) DEVICE — SILVER-COATED ANTIBACTERIAL BARRIER DRESSING: Brand: ACTICOAT SURGIC 10X12CM 5PK US

## (undated) DEVICE — STERNAL WIRE

## (undated) DEVICE — PACK CABG PBDS

## (undated) DEVICE — SURGICEL FIBRILLAR 1 X 2

## (undated) DEVICE — LIGHT HANDLE COVER SLEEVE DISP BLUE STELLAR

## (undated) DEVICE — CHLORAPREP HI-LITE 26ML ORANGE

## (undated) DEVICE — DRAPE SHEET THREE QUARTER

## (undated) DEVICE — THYROID SHEET: Brand: CONVERTORS

## (undated) DEVICE — 32 FR RIGHT ANGLE – SOFT PVC CATHETER: Brand: PVC THORACIC CATHETERS

## (undated) DEVICE — TRAY FOLEY 16FR URIMETER SURESTEP

## (undated) DEVICE — SILVER-COATED ANTIBACTERIAL BARRIER DRESSING: Brand: ACTICOAT SURGIC 10X25CM 5PK US

## (undated) DEVICE — SCD SEQUENTIAL COMPRESSION COMFORT SLEEVE MEDIUM KNEE LENGTH: Brand: KENDALL SCD

## (undated) DEVICE — SUT MONOCRYL 4-0 PS-2 27 IN Y426H

## (undated) DEVICE — TUBING INSUFFLATION SET ISO CONNECTOR

## (undated) DEVICE — SUT SILK 2 60 IN SA8H

## (undated) DEVICE — PLUMEPEN PRO 10FT

## (undated) DEVICE — BLANKET HYPOTHERMIA ADULT GAYMAR

## (undated) DEVICE — DRAPE ISO IRRIGATION POUCH 1016

## (undated) DEVICE — PETRI DISH STERILE

## (undated) DEVICE — PENCIL ELECTROSURG E-Z CLEAN -0035H

## (undated) DEVICE — BAG DECANTER

## (undated) DEVICE — GLOVE SRG BIOGEL 7.5

## (undated) DEVICE — SUT PDS PLUS 1 CTB 36 IN PDPB359T

## (undated) DEVICE — BULB SYRINGE,IRRIGATION WITH PROTECTIVE CAP: Brand: DOVER

## (undated) DEVICE — SUT SILK 3-0 SH CR/8 18 IN C013D

## (undated) DEVICE — VESSEL LOOPS X-RAY DETECTABLE: Brand: DEROYAL

## (undated) DEVICE — ADHESIVE SKIN HIGH VISCOSITY EXOFIN 1ML

## (undated) DEVICE — RECIP.STERNUM SAW BLADE 34/7.5/0.7MM: Brand: AESCULAP

## (undated) DEVICE — EVERGRIP INSERT SET 61MM: Brand: FOGARTY EVERGRIP

## (undated) DEVICE — 3000CC GUARDIAN II: Brand: GUARDIAN

## (undated) DEVICE — CAROTID ARTERY SHUNT KIT,RADIOPAQUE LINE, STRAIGHT: Brand: ARGYLE

## (undated) DEVICE — JP PERF DRN SIL FLT 10MM FULL: Brand: CARDINAL HEALTH

## (undated) DEVICE — GLOVE SRG BIOGEL ECLIPSE 7.5

## (undated) DEVICE — THERMOFLECT BLANKET, L, 25EA                               TS THERMOFLECT BLANKET, 48" X 84", SILVER, 5/BG, 5 BG/CS NW: Brand: THERMOFLECT

## (undated) DEVICE — ACE WRAP 6 IN UNSTERILE

## (undated) DEVICE — HEMOCLIP CARTRIDGE LRG

## (undated) DEVICE — ANTIBACTERIAL UNDYED BRAIDED (POLYGLACTIN 910), SYNTHETIC ABSORBABLE SUTURE: Brand: COATED VICRYL

## (undated) DEVICE — SUT SILK 2-0 30 IN A305H

## (undated) DEVICE — SUTURE GUIDE

## (undated) DEVICE — SUT MONOCRYL 3-0 SH 27 IN Y416H

## (undated) DEVICE — TRAY FOLEY 16FR SURESTEP TEMP SENS URIMETER STAT LOK

## (undated) DEVICE — SUT PROLENE 6-0 BV130 30 IN 8709H

## (undated) DEVICE — SUT PROLENE 4-0 RB-1/RB-1 36 IN 8557H

## (undated) DEVICE — CATH STRAIGHT RED RUBBER 20 FR